# Patient Record
Sex: FEMALE | Race: WHITE | NOT HISPANIC OR LATINO | Employment: FULL TIME | URBAN - METROPOLITAN AREA
[De-identification: names, ages, dates, MRNs, and addresses within clinical notes are randomized per-mention and may not be internally consistent; named-entity substitution may affect disease eponyms.]

---

## 2017-02-10 ENCOUNTER — OFFICE VISIT (OUTPATIENT)
Dept: LAB | Facility: CLINIC | Age: 37
End: 2017-02-10
Payer: COMMERCIAL

## 2017-02-10 ENCOUNTER — TRANSCRIBE ORDERS (OUTPATIENT)
Dept: LAB | Facility: CLINIC | Age: 37
End: 2017-02-10

## 2017-02-10 DIAGNOSIS — Z79.899 ENCOUNTER FOR LONG-TERM (CURRENT) USE OF OTHER MEDICATIONS: ICD-10-CM

## 2017-02-10 DIAGNOSIS — Z79.899 ENCOUNTER FOR LONG-TERM (CURRENT) USE OF OTHER MEDICATIONS: Primary | ICD-10-CM

## 2017-02-10 LAB
ATRIAL RATE: 83 BPM
P AXIS: 46 DEGREES
PR INTERVAL: 152 MS
QRS AXIS: 16 DEGREES
QRSD INTERVAL: 74 MS
QT INTERVAL: 374 MS
QTC INTERVAL: 439 MS
T WAVE AXIS: 34 DEGREES
VENTRICULAR RATE: 83 BPM

## 2017-02-10 PROCEDURE — 93005 ELECTROCARDIOGRAM TRACING: CPT

## 2017-02-14 ENCOUNTER — ALLSCRIPTS OFFICE VISIT (OUTPATIENT)
Dept: OTHER | Facility: OTHER | Age: 37
End: 2017-02-14

## 2017-02-14 DIAGNOSIS — Z86.69 PERSONAL HISTORY OF OTHER DISEASES OF THE NERVOUS SYSTEM AND SENSE ORGANS: ICD-10-CM

## 2017-02-14 DIAGNOSIS — I10 ESSENTIAL (PRIMARY) HYPERTENSION: ICD-10-CM

## 2017-04-20 ENCOUNTER — ALLSCRIPTS OFFICE VISIT (OUTPATIENT)
Dept: OTHER | Facility: OTHER | Age: 37
End: 2017-04-20

## 2017-05-23 ENCOUNTER — HOSPITAL ENCOUNTER (EMERGENCY)
Facility: HOSPITAL | Age: 37
Discharge: HOME | End: 2017-05-23
Attending: EMERGENCY MEDICINE
Payer: COMMERCIAL

## 2017-05-23 ENCOUNTER — APPOINTMENT (EMERGENCY)
Dept: CT IMAGING | Facility: HOSPITAL | Age: 37
End: 2017-05-23
Payer: COMMERCIAL

## 2017-05-23 ENCOUNTER — ALLSCRIPTS OFFICE VISIT (OUTPATIENT)
Dept: OTHER | Facility: OTHER | Age: 37
End: 2017-05-23

## 2017-05-23 VITALS
HEART RATE: 76 BPM | SYSTOLIC BLOOD PRESSURE: 121 MMHG | OXYGEN SATURATION: 98 % | BODY MASS INDEX: 30.57 KG/M2 | WEIGHT: 238.1 LBS | RESPIRATION RATE: 18 BRPM | DIASTOLIC BLOOD PRESSURE: 79 MMHG | TEMPERATURE: 98.1 F

## 2017-05-23 DIAGNOSIS — R10.9 FLANK PAIN: Primary | ICD-10-CM

## 2017-05-23 DIAGNOSIS — N39.0 UTI (URINARY TRACT INFECTION): ICD-10-CM

## 2017-05-23 LAB
ALBUMIN SERPL BCP-MCNC: 3.7 G/DL (ref 3.5–5)
ALP SERPL-CCNC: 58 U/L (ref 46–116)
ALT SERPL W P-5'-P-CCNC: 51 U/L (ref 12–78)
ANION GAP SERPL CALCULATED.3IONS-SCNC: 6 MMOL/L (ref 4–13)
AST SERPL W P-5'-P-CCNC: 21 U/L (ref 5–45)
ATRIAL RATE: 70 BPM
BACTERIA UR QL AUTO: ABNORMAL /HPF
BASOPHILS # BLD AUTO: 0.02 THOUSANDS/ΜL (ref 0–0.1)
BASOPHILS NFR BLD AUTO: 0 % (ref 0–1)
BILIRUB SERPL-MCNC: 0.3 MG/DL (ref 0.2–1)
BILIRUB UR QL STRIP: NEGATIVE
BUN SERPL-MCNC: 7 MG/DL (ref 5–25)
CALCIUM SERPL-MCNC: 9.3 MG/DL (ref 8.3–10.1)
CHLORIDE SERPL-SCNC: 103 MMOL/L (ref 100–108)
CLARITY UR: CLEAR
CLARITY, POC: CLEAR
CO2 SERPL-SCNC: 30 MMOL/L (ref 21–32)
COLOR UR: YELLOW
COLOR, POC: YELLOW
CREAT SERPL-MCNC: 1.1 MG/DL (ref 0.6–1.3)
EOSINOPHIL # BLD AUTO: 0.34 THOUSAND/ΜL (ref 0–0.61)
EOSINOPHIL NFR BLD AUTO: 5 % (ref 0–6)
ERYTHROCYTE [DISTWIDTH] IN BLOOD BY AUTOMATED COUNT: 12.9 % (ref 11.6–15.1)
EXT BILIRUBIN, UA: NORMAL
EXT BLOOD URINE: NORMAL
EXT GLUCOSE, UA: NORMAL
EXT KETONES: NORMAL
EXT NITRITE, UA: NORMAL
EXT PH, UA: 5
EXT PROTEIN, UA: NORMAL
EXT SPECIFIC GRAVITY, UA: 1.01
EXT UROBILINOGEN: NORMAL
GFR SERPL CREATININE-BSD FRML MDRD: 56.2 ML/MIN/1.73SQ M
GLUCOSE SERPL-MCNC: 93 MG/DL
GLUCOSE SERPL-MCNC: 95 MG/DL (ref 65–140)
GLUCOSE UR STRIP-MCNC: NEGATIVE MG/DL
HCG UR QL: NEGATIVE
HCT VFR BLD AUTO: 42.2 % (ref 34.8–46.1)
HGB BLD-MCNC: 14.2 G/DL (ref 11.5–15.4)
HGB UR QL STRIP.AUTO: ABNORMAL
KETONES UR STRIP-MCNC: NEGATIVE MG/DL
LEUKOCYTE ESTERASE UR QL STRIP: ABNORMAL
LYMPHOCYTES # BLD AUTO: 2.16 THOUSANDS/ΜL (ref 0.6–4.47)
LYMPHOCYTES NFR BLD AUTO: 31 % (ref 14–44)
MCH RBC QN AUTO: 30 PG (ref 26.8–34.3)
MCHC RBC AUTO-ENTMCNC: 33.6 G/DL (ref 31.4–37.4)
MCV RBC AUTO: 89 FL (ref 82–98)
MONOCYTES # BLD AUTO: 0.46 THOUSAND/ΜL (ref 0.17–1.22)
MONOCYTES NFR BLD AUTO: 7 % (ref 4–12)
NEUTROPHILS # BLD AUTO: 4.01 THOUSANDS/ΜL (ref 1.85–7.62)
NEUTS SEG NFR BLD AUTO: 57 % (ref 43–75)
NITRITE UR QL STRIP: NEGATIVE
NON-SQ EPI CELLS URNS QL MICRO: ABNORMAL /HPF
P AXIS: 43 DEGREES
PH UR STRIP.AUTO: 5.5 [PH] (ref 4.5–8)
PLATELET # BLD AUTO: 278 THOUSANDS/UL (ref 149–390)
PMV BLD AUTO: 10.3 FL (ref 8.9–12.7)
POTASSIUM SERPL-SCNC: 3.9 MMOL/L (ref 3.5–5.3)
PR INTERVAL: 160 MS
PROT SERPL-MCNC: 7.5 G/DL (ref 6.4–8.2)
PROT UR STRIP-MCNC: NEGATIVE MG/DL
QRS AXIS: 8 DEGREES
QRSD INTERVAL: 78 MS
QT INTERVAL: 382 MS
QTC INTERVAL: 412 MS
RBC # BLD AUTO: 4.73 MILLION/UL (ref 3.81–5.12)
RBC #/AREA URNS AUTO: ABNORMAL /HPF
SODIUM SERPL-SCNC: 139 MMOL/L (ref 136–145)
SP GR UR STRIP.AUTO: 1.01 (ref 1–1.03)
T WAVE AXIS: 31 DEGREES
TROPONIN I SERPL-MCNC: <0.02 NG/ML
UROBILINOGEN UR QL STRIP.AUTO: 0.2 E.U./DL
VENTRICULAR RATE: 70 BPM
WBC # BLD AUTO: 6.99 THOUSAND/UL (ref 4.31–10.16)
WBC # BLD EST: NORMAL 10*3/UL
WBC #/AREA URNS AUTO: ABNORMAL /HPF

## 2017-05-23 PROCEDURE — 81001 URINALYSIS AUTO W/SCOPE: CPT | Performed by: EMERGENCY MEDICINE

## 2017-05-23 PROCEDURE — 84484 ASSAY OF TROPONIN QUANT: CPT | Performed by: EMERGENCY MEDICINE

## 2017-05-23 PROCEDURE — 96374 THER/PROPH/DIAG INJ IV PUSH: CPT

## 2017-05-23 PROCEDURE — 93005 ELECTROCARDIOGRAM TRACING: CPT | Performed by: EMERGENCY MEDICINE

## 2017-05-23 PROCEDURE — 96375 TX/PRO/DX INJ NEW DRUG ADDON: CPT

## 2017-05-23 PROCEDURE — 99284 EMERGENCY DEPT VISIT MOD MDM: CPT

## 2017-05-23 PROCEDURE — 96361 HYDRATE IV INFUSION ADD-ON: CPT

## 2017-05-23 PROCEDURE — 81002 URINALYSIS NONAUTO W/O SCOPE: CPT | Performed by: EMERGENCY MEDICINE

## 2017-05-23 PROCEDURE — 81025 URINE PREGNANCY TEST: CPT | Performed by: EMERGENCY MEDICINE

## 2017-05-23 PROCEDURE — 85025 COMPLETE CBC W/AUTO DIFF WBC: CPT | Performed by: EMERGENCY MEDICINE

## 2017-05-23 PROCEDURE — 74176 CT ABD & PELVIS W/O CONTRAST: CPT

## 2017-05-23 PROCEDURE — 80053 COMPREHEN METABOLIC PANEL: CPT | Performed by: EMERGENCY MEDICINE

## 2017-05-23 PROCEDURE — 36415 COLL VENOUS BLD VENIPUNCTURE: CPT | Performed by: EMERGENCY MEDICINE

## 2017-05-23 RX ORDER — LITHIUM CARBONATE 300 MG
900 TABLET ORAL
COMMUNITY
End: 2018-02-08 | Stop reason: SDUPTHER

## 2017-05-23 RX ORDER — CIPROFLOXACIN 500 MG/1
500 TABLET, FILM COATED ORAL 2 TIMES DAILY
Qty: 13 TABLET | Refills: 0 | Status: SHIPPED | OUTPATIENT
Start: 2017-05-23 | End: 2018-02-08 | Stop reason: SDUPTHER

## 2017-05-23 RX ORDER — HYDROCODONE BITARTRATE AND ACETAMINOPHEN 5; 325 MG/1; MG/1
1 TABLET ORAL EVERY 6 HOURS PRN
Qty: 15 TABLET | Refills: 0 | Status: SHIPPED | OUTPATIENT
Start: 2017-05-23 | End: 2018-02-08 | Stop reason: SDUPTHER

## 2017-05-23 RX ORDER — ONDANSETRON 2 MG/ML
4 INJECTION INTRAMUSCULAR; INTRAVENOUS ONCE
Status: COMPLETED | OUTPATIENT
Start: 2017-05-23 | End: 2017-05-23

## 2017-05-23 RX ORDER — DEXTROAMPHETAMINE SACCHARATE, AMPHETAMINE ASPARTATE, DEXTROAMPHETAMINE SULFATE AND AMPHETAMINE SULFATE 5; 5; 5; 5 MG/1; MG/1; MG/1; MG/1
20 TABLET ORAL 2 TIMES DAILY
COMMUNITY
End: 2018-02-08 | Stop reason: SDUPTHER

## 2017-05-23 RX ORDER — FLUTICASONE PROPIONATE 110 UG/1
AEROSOL, METERED RESPIRATORY (INHALATION)
COMMUNITY
Start: 2017-04-20 | End: 2018-06-20

## 2017-05-23 RX ORDER — METHOCARBAMOL 500 MG/1
500 TABLET, FILM COATED ORAL DAILY
COMMUNITY
Start: 2017-02-14 | End: 2018-03-05 | Stop reason: SDUPTHER

## 2017-05-23 RX ORDER — CIPROFLOXACIN 500 MG/1
500 TABLET, FILM COATED ORAL ONCE
Status: COMPLETED | OUTPATIENT
Start: 2017-05-23 | End: 2017-05-23

## 2017-05-23 RX ADMIN — HYDROMORPHONE HYDROCHLORIDE 1 MG: 1 INJECTION, SOLUTION INTRAMUSCULAR; INTRAVENOUS; SUBCUTANEOUS at 15:08

## 2017-05-23 RX ADMIN — ONDANSETRON 4 MG: 2 INJECTION INTRAMUSCULAR; INTRAVENOUS at 15:07

## 2017-05-23 RX ADMIN — SODIUM CHLORIDE 1000 ML: 0.9 INJECTION, SOLUTION INTRAVENOUS at 15:04

## 2017-05-23 RX ADMIN — CIPROFLOXACIN 500 MG: 500 TABLET, FILM COATED ORAL at 18:06

## 2017-07-24 ENCOUNTER — ALLSCRIPTS OFFICE VISIT (OUTPATIENT)
Dept: OTHER | Facility: OTHER | Age: 37
End: 2017-07-24

## 2017-07-25 ENCOUNTER — GENERIC CONVERSION - ENCOUNTER (OUTPATIENT)
Dept: OTHER | Facility: OTHER | Age: 37
End: 2017-07-25

## 2017-09-06 ENCOUNTER — ALLSCRIPTS OFFICE VISIT (OUTPATIENT)
Dept: OTHER | Facility: OTHER | Age: 37
End: 2017-09-06

## 2017-09-06 ENCOUNTER — LAB REQUISITION (OUTPATIENT)
Dept: LAB | Facility: HOSPITAL | Age: 37
End: 2017-09-06
Payer: COMMERCIAL

## 2017-09-06 DIAGNOSIS — R35.0 FREQUENCY OF MICTURITION: ICD-10-CM

## 2017-09-06 LAB
BACTERIA UR QL AUTO: NORMAL /HPF
BILIRUB UR QL STRIP: NEGATIVE
BILIRUB UR QL STRIP: NORMAL
CLARITY UR: CLEAR
CLARITY UR: NORMAL
COLOR UR: CLEAR
COLOR UR: YELLOW
GLUCOSE (HISTORICAL): NORMAL
GLUCOSE UR STRIP-MCNC: NEGATIVE MG/DL
HGB UR QL STRIP.AUTO: ABNORMAL
HGB UR QL STRIP.AUTO: NORMAL
KETONES UR STRIP-MCNC: NEGATIVE MG/DL
KETONES UR STRIP-MCNC: NORMAL MG/DL
LEUKOCYTE ESTERASE UR QL STRIP: NEGATIVE
LEUKOCYTE ESTERASE UR QL STRIP: NORMAL
NITRITE UR QL STRIP: NEGATIVE
NITRITE UR QL STRIP: NORMAL
NON-SQ EPI CELLS URNS QL MICRO: NORMAL /HPF
PH UR STRIP.AUTO: 6 [PH]
PH UR STRIP.AUTO: 6 [PH] (ref 4.5–8)
PROT UR STRIP-MCNC: NEGATIVE MG/DL
PROT UR STRIP-MCNC: NORMAL MG/DL
RBC #/AREA URNS AUTO: NORMAL /HPF
SP GR UR STRIP.AUTO: 1 (ref 1–1.03)
SP GR UR STRIP.AUTO: 1.01
UROBILINOGEN UR QL STRIP.AUTO: 0.2
UROBILINOGEN UR QL STRIP.AUTO: 0.2 E.U./DL
WBC #/AREA URNS AUTO: NORMAL /HPF

## 2017-09-06 PROCEDURE — 81001 URINALYSIS AUTO W/SCOPE: CPT | Performed by: INTERNAL MEDICINE

## 2017-09-07 ENCOUNTER — GENERIC CONVERSION - ENCOUNTER (OUTPATIENT)
Dept: OTHER | Facility: OTHER | Age: 37
End: 2017-09-07

## 2017-09-14 ENCOUNTER — ALLSCRIPTS OFFICE VISIT (OUTPATIENT)
Dept: OTHER | Facility: OTHER | Age: 37
End: 2017-09-14

## 2017-09-26 ENCOUNTER — APPOINTMENT (OUTPATIENT)
Dept: AUDIOLOGY | Age: 37
End: 2017-09-26
Payer: COMMERCIAL

## 2017-09-26 PROCEDURE — 92567 TYMPANOMETRY: CPT | Performed by: AUDIOLOGIST

## 2017-09-26 PROCEDURE — 92557 COMPREHENSIVE HEARING TEST: CPT | Performed by: AUDIOLOGIST

## 2017-09-27 ENCOUNTER — GENERIC CONVERSION - ENCOUNTER (OUTPATIENT)
Dept: OTHER | Facility: OTHER | Age: 37
End: 2017-09-27

## 2017-10-09 ENCOUNTER — ALLSCRIPTS OFFICE VISIT (OUTPATIENT)
Dept: OTHER | Facility: OTHER | Age: 37
End: 2017-10-09

## 2017-10-10 NOTE — PROGRESS NOTES
Assessment  1  Allergic drug rash (693 0) (L27 0)   2  Ingrown toenail (703 0) (L60 0)    Plan  Ingrown toenail    · 2 - Sunshine MARTIN, Brandon  (Podiatry) Co-Management  *  Status: Hold For - Scheduling   Requested for: 99WMB3114  Care Summary provided  : Yes    Discussion/Summary    Generalized rash  Suspect drug rash, stop carbamazepine  Instructed to take antihistamine in AM, ranitidine bid and Benadryl qHS or up to tid for symptoms  GI symptoms  Stop carbamazepine, f/u with psych  Ingrown toenail  Refer to podiatry  The patient was counseled regarding instructions for management  Chief Complaint  pt complains of vomiting/diarrhea x 1 weeks and generalized rash x 3 days      History of Present Illness  HPI: Ba Cordon complains of a rash the past 2 days  started in her arms, spreading to her back, trunk, hands and feet  It is described as red little dots that are itchy  She took Benadryl to help with the itching  She started Tegretol about a week ago, started having nausea, vomiting and diarrhea since starting it  She has not used any new skin products or laundry detergent  No fever or chills  is also concerned about an ingrown toe, area hurts  No redness or infection  Review of Systems    Constitutional: no fever,-not feeling poorly-and-not feeling tired  Respiratory: no cough  Gastrointestinal: no nausea-and-no vomiting  Integumentary: rash-and-itching, but-as noted in HPI  Neurological: no numbness  Active Problems  1  Acute upper respiratory infection (465 9) (J06 9)   2  ADHD (attention deficit hyperactivity disorder) (314 01) (F90 9)   3  Allergic rhinitis (477 9) (J30 9)   4  Bipolar disorder (296 80) (F31 9)   5  Cervical muscle pain (723 1) (M54 2)   6  Dizziness (780 4) (R42)   7  Exposure to mold (V87 31) (Z77 120)   8  History of migraine (V12 49) (Z86 69)   9  Localized rash (782 1) (R21)   10  Mild persistent asthma (493 90) (J45 30)   11  Nausea (787 02) (R11 0)   12   Near syncope (780  2) (R55)   13  Need for Tdap vaccination (V06 1) (Z23)   14  Observation for suspected condition (V71 9) (Z04 9)   15  Polypharmacy (V58 69) (Z79 899)   16  Sweating   17  Trouble in sleeping (780 50) (G47 9)   18  Urinary frequency (788 41) (R35 0)   19  Urinary leakage (788 30) (R32)    Past Medical History  Active Problems And Past Medical History Reviewed: The active problems and past medical history were reviewed and updated today  Social History   · Drinks coffee   · Exposure to mold (V87 31) (Z77 120)   · Former consumption of alcohol (V11 3) (Z87 898)   · Former smoker (V15 82) (Z87 891)   · QUIT 2014, SMOKED 10 YEARS LESS THAN 1PPD    Current Meds   1  Amphetamine-Dextroamphetamine 20 MG Oral Tablet; Therapy: 07Fnn5788 to (Evaluate:22Jun2017) Recorded   2  ARIPiprazole 10 MG Oral Tablet; TAKE 1 TABLET DAILY; Therapy: 41STT1531 to (Evaluate:08Owy8661); Last Rx:12Lgm5516 Ordered   3  Benzonatate 100 MG Oral Capsule; TAKE 1 CAPSULE EVERY 8 HOURS AS NEEDED; Therapy: 32RVN7289 to (Evaluate:32Kln4948)  Requested for: 25ZGD9869; Last   Rx:45Obh7900 Ordered   4  Flovent  MCG/ACT Inhalation Aerosol; INHALE 1 PUFF BY INHALATION ROUTE   TWICE A DAY; Therapy: 35Uht1824 to (Evaluate:07Oct2017)  Requested for: 26XMX9042; Last   Rx:48Ziq8000 Ordered   5  KlonoPIN 1 MG Oral Tablet; TAKE 2 TABLETS AT BEDTIME; Therapy: (Bang Santiago) to Recorded   6  LamoTRIgine 25 MG Oral Tablet; Therapy: 06Zac8759 to Recorded   7  Methocarbamol 500 MG Oral Tablet; TAKE 1 TABLET BY MOUTH EVERY DAY AS   NEEDED MAY CAUSE DROWSINESS, *DUE FOR APPT*;   Therapy: 87UGT6177 to (Evaluate:22Oct2017)  Requested for: 72Wdh7786; Last   Rx:22Jeg1619 Ordered   8  Montelukast Sodium 10 MG Oral Tablet; take 1 tablet by mouth every day; Therapy: 23NSU6276 to (Janet Grandchild)  Requested for: 16Vfa7549; Last   Rx:97Xga2476 Ordered   9  Ondansetron HCl - 4 MG Oral Tablet;    Therapy: 50PYQ2416 to (Evaluate:12Jun2017) Recorded   10  Propranolol HCl  MG Oral Capsule Extended Release 24 Hour; take 1 capsule    daily; Therapy: 36Vir0246 to (Evaluate:79Pmf7190)  Requested for: 46FRJ6516; Last    Rx:02Oct2017 Ordered   11  Sertraline HCl - 50 MG Oral Tablet; Therapy: 42THJ3393 to Recorded   12  SUMAtriptan Succinate 100 MG Oral Tablet; take 1 tablet by mouth AT ONSET OF    MIGRAINE; Therapy: 52ASH5767 to (Evaluate:98Ezh1183)  Requested for: 97QNR4992; Last    Rx:07Amy7526 Ordered   13  Triamcinolone Acetonide 55 MCG/ACT Nasal Aerosol; administer 2 sprays in each nostril    once a day; Therapy: 86MWL8104 to (Last Rx:49Jvu8952)  Requested for: 27Pnn6478 Ordered   14  Zolpidem Tartrate 5 MG Oral Tablet; Therapy: 33Jli1096 to Recorded    The medication list was reviewed and updated today  Allergies  1  Amoxicillin TABS   2  Percocet TABS   3  predniSONE    Vitals   Recorded: 80EUI5719 11:23AM   Temperature 97 2 F   Heart Rate 80   Respiration 18   Systolic 595   Diastolic 82   Height 6 ft 2 in   Weight 231 lb    BMI Calculated 29 66   BSA Calculated 2 31   O2 Saturation 98     Physical Exam    Constitutional   General appearance: No acute distress, well appearing and well nourished  uncomfortable-and-clothing appropriate  Head and Face   Head and face: Normal     Eyes   Pupils and irises: Equal, round, reactive to light  Ears, Nose, Mouth, and Throat   External inspection of ears and nose: Normal     Pulmonary   Respiratory effort: No increased work of breathing or signs of respiratory distress  Musculoskeletal   Gait and station: Normal     Joints, bones, and muscles: Abnormal  -pain right big toe, no discharge or signs of inflammation  Skin   Skin and subcutaneous tissue: Abnormal  -erythematous papular rash on arms, hands, feet, back        Future Appointments    Date/Time Provider Specialty Site   12/08/2017 11:00 AM Lexie Moore DO Internal Medicine [facility] INTERNAL MED     Signatures Electronically signed by : Slaed Smith MD; Oct  9 2017 11:46AM EST                       (Author)

## 2017-12-08 ENCOUNTER — ALLSCRIPTS OFFICE VISIT (OUTPATIENT)
Dept: OTHER | Facility: OTHER | Age: 37
End: 2017-12-08

## 2017-12-09 NOTE — PROGRESS NOTES
Assessment    1  Acute sinus infection (461 9) (J01 90)   2  Cervical muscle pain (723 1) (M54 2)   3  Mild persistent asthma (493 90) (J45 30)   4  Bipolar disorder (296 80) (F31 9)   5  Persistent cough (786 2) (R05)   6  Chronic migraine (346 70) (G43 709)    Plan  Acute sinus infection    · Azithromycin 500 MG Oral Tablet; TAKE 1 TABLET DAILY   · Follow Up if Not Better Evaluation and Treatment  Follow-up  Status: Complete  Done: 95AYL6683   · Drink at least 6 glasses of water or juice a day ; Status:Complete;   Done: 38XNP1196   · How to use a nasal spray ; Status:Complete;   Done: 20BGD3117   · Irrigate your nose twice a day ; Status:Complete;   Done: 39OMK2249   · Taking a hot steamy shower may help your condition ; Status:Complete;   Done: 16LLQ4330  Acute sinus infection, Mild persistent asthma    · Ventolin  (90 Base) MCG/ACT Inhalation Aerosol Solution; INHALE 1 PUFFS Every 6hours PRN SOB  Bipolar disorder    · From  KlonoPIN 1 MG Oral Tablet TAKE 2 TABLETS AT BEDTIME To ClonazePAM 1 MG OralTablet (KlonoPIN) TAKE 1 TABLET EVERY 8 HOURS AS NEEDED  Chronic migraine    · *1 - [facility] Co-Management  39 y/o F with chronic migraines, persistent despitepreventative med/imitrexevaluate and treat  Status: Active  Requested for: 09DCV3197  Care Summary provided  : Yes  History of migraine    · SUMAtriptan Succinate 50 MG Oral Tablet (Imitrex)  Mild persistent asthma, Persistent cough    · 1 - Snow Ivey DO  (Pulmonary) Co-Management  39 y/o with asthma and persistent coughevaluate and treat  Status: Active  Requested for: 70OXE3167  Care Summary provided  : Yes   · Complete PFT; Status:Active; Requested for:16Upl6261;     Discussion/Summary  Discussion Summary:   1  take antibiotic as prescribedrest, hydrationsee handoutlung doctor appointment and breathing testsneurology appointmentreturn in May 2018 or sooner if questions     Counseling Documentation With Imm: The patient was counseled regarding instructions for management,-- patient and family education,-- impressions,-- risks and benefits of treatment options  Medication SE Review and Pt Understands Tx: Possible side effects of new medications were reviewed with the patient/guardian today  The treatment plan was reviewed with the patient/guardian  The patient/guardian understands and agrees with the treatment plan      Chief Complaint  Chief Complaint Chronic Condition [facility]: Patient is here today for follow up of chronic conditions described in HPI  History of Present Illness  HPI: 41 y/o F here for follow up & sinus infection  above - reviewed meds with patientbeen having runny nose, productive cough, sinus congestion/pressure for >1 weekfever but having fatigue, chills and body achesSOB but hx of asthma without exacerbation at this time but sinus sx are worsening & despite OTC remedies  of migraines - takes BB and imitrex 50-100mg dose with relief  takes imitrex up to 3x per weekof bipolar d/o managed by psychiatryrobaxin for occ muscle spasms/muscle pain in neck with relief, but not every day  persistent dry cough & requests pulm jeal for thisother complaints      Review of Systems  Complete-Female:  Constitutional: feeling poorly,-- chills-- and-- feeling tired, but-- no fever  Eyes: no eyesight problems  ENT: sore throat-- and-- nasal discharge  Cardiovascular: no chest pain  Respiratory: no shortness of breath--   The patient presents with complaints of cough, described as productive  Genitourinary: no dysuria  Musculoskeletal: arthralgias-- and-- myalgias  Integumentary: no rashes  Neurological: no confusion  no feelings of weakness   ROS Reviewed:   ROS reviewed  Active Problems  1  Acute upper respiratory infection (465 9) (J06 9)   2  ADHD (attention deficit hyperactivity disorder) (314 01) (F90 9)   3  Allergic drug rash (693 0) (L27 0)   4  Allergic rhinitis (477 9) (J30 9)   5  Bipolar disorder (296 80) (F31 9)   6  Cervical muscle pain (723 1) (M54 2)   7  Dizziness (780 4) (R42)   8  Exposure to mold (V87 31) (Z77 120)   9  History of migraine (V12 49) (Z86 69)   10  Ingrown toenail (703 0) (L60 0)   11  Localized rash (782 1) (R21)   12  Mild persistent asthma (493 90) (J45 30)   13  Nausea (787 02) (R11 0)   14  Near syncope (780 2) (R55)   15  Need for Tdap vaccination (V06 1) (Z23)   16  Observation for suspected condition (V71 9) (Z04 9)   17  Polypharmacy (V58 69) (Z79 899)   18  Sweating   19  Trouble in sleeping (780 50) (G47 9)   20  Urinary frequency (788 41) (R35 0)   21  Urinary leakage (788 30) (R32)    Past Medical History  1  History of vertigo (V12 49) (I39 170)  Active Problems And Past Medical History Reviewed: The active problems and past medical history were reviewed and updated today  Surgical History  1  History of Hip Replacement   2  History of Spinal Diskectomy Cervical    Family History  Mother    1  Family history of hypertension (V17 49) (Z82 49)  Father    2  Family history of hypertension (V17 49) (Z82 49)  Grandmother    3  History of breast cancer  Family History    4  Denied: Family history of heart disease    Social History     · Drinks coffee   · Exposure to mold (V87 31) (Z77 120)   · Former consumption of alcohol (V11 3) (I78 813)   · Former smoker (B28 12) (J42 224)  Social History Reviewed: The social history was reviewed and updated today  Current Meds   1  Amphetamine-Dextroamphetamine 20 MG Oral Tablet; Therapy: 86Dwu6498 to (Evaluate:83Ibv9509) Recorded   2  ARIPiprazole 15 MG Oral Tablet; Therapy: 66BQH7703 to Recorded   3  Benzonatate 100 MG Oral Capsule; TAKE 1 CAPSULE EVERY 8 HOURS AS NEEDED; Therapy: 66KKJ5994 to (Evaluate:27Jdh0658)  Requested for: 67GKS7333; Last Rx:70Bni6230 Ordered   4  Flovent  MCG/ACT Inhalation Aerosol; INHALE 1 PUFF BY INHALATION ROUTE TWICE A DAY;  Therapy: 21Cbc4311 to (96 860741)  Requested for: 36VHQ0380; Last Rx:65Zyd0252 Ordered   5  KlonoPIN 1 MG Oral Tablet; TAKE 2 TABLETS AT BEDTIME; Therapy: (Sim Patches) to Recorded   6  LamoTRIgine 100 MG Oral Tablet; Therapy: 92DMC7626 to Recorded   7  Methocarbamol 500 MG Oral Tablet; TAKE 1 TABLET BY MOUTH EVERY DAY AS NEEDED MAY CAUSE DROWSINESS, *DUE FOR APPT*; Therapy: 56CZN4675 to (Evaluate:22Oct2017)  Requested for: 55Rgs7060; Last Rx:86Uqf0708 Ordered   8  Montelukast Sodium 10 MG Oral Tablet; take 1 tablet by mouth every day; Therapy: 55XDO5819 to (67 488 45 07)  Requested for: 87Oic9262; Last Rx:08Dpy7148 Ordered   9  Ondansetron HCl - 4 MG Oral Tablet; Therapy: 97GGV3109 to (Evaluate:12Jun2017) Recorded   10  Propranolol HCl  MG Oral Capsule Extended Release 24 Hour; take 1 capsule daily; Therapy: 49Fqz2636 to (Evaluate:68Qgn7560)  Requested for: 53HSE6455; Last Rx:10Opj1964  Ordered   11  Sertraline HCl - 50 MG Oral Tablet; Therapy: 47SDQ5154 to Recorded   12  SUMAtriptan Succinate 100 MG Oral Tablet; take 1 tablet by mouth AT ONSET OF MIGRAINE; Therapy: 17OXY0067 to (Evaluate:65Aam4045)  Requested for: 73CPA4148; Last Rx:22Tml9529  Ordered   13  SUMAtriptan Succinate 50 MG Oral Tablet; TAKE 1 TABLET BY MOUTH FOR MIGRAINE AS DIRECTED  MAY REPEAT IN 2 HOURSIF NEEDED; Therapy: 36EZD5996 to (Evaluate:82Ljo3373)  Requested for: 24LAE9517; Last Rx:01Zcc7983  Ordered   14  Zolpidem Tartrate 5 MG Oral Tablet; Therapy: 47Szo3793 to Recorded  Medication List Reviewed: The medication list was reviewed and updated today  Allergies  1  Amoxicillin TABS   2  Percocet TABS    Vitals  Vital Signs    Recorded: 23LWH3913 11:00AM   Temperature 98 7 F   Heart Rate 56   Respiration 18   Systolic 458   Diastolic 76   Height 6 ft 2 in   Weight 234 lb 8 oz   BMI Calculated 30 11   BSA Calculated 2 33   O2 Saturation 98       Physical Exam   Constitutional  General appearance: No acute distress, well appearing and well nourished  overweight    Head and Face  Palpation of the face and sinuses: Abnormal   Examination of the Sinuses: right maxillary tenderness,-- left maxillary tenderness,-- right frontal tenderness-- and-- left frontal tenderness  Eyes  Pupils and irises: Equal, round, reactive to light  Ears, Nose, Mouth, and Throat  Otoscopic examination: Tympanic membranes translucent with normal light reflex  Canals patent without erythema  Nasal mucosa, septum, and turbinates: Abnormal   The bilateral nasal mucosa was boggy  Oropharynx: Normal with no erythema, edema, exudate or lesions  Pulmonary  Respiratory effort: No increased work of breathing or signs of respiratory distress  Auscultation of lungs: Clear to auscultation  Cardiovascular  Auscultation of heart: Normal rate and rhythm, normal S1 and S2, no murmurs  Examination of extremities for edema and/or varicosities: Normal    Lymphatic  Palpation of lymph nodes in neck: No lymphadenopathy     Psychiatric  Judgment and insight: Normal    Mood and affect: Normal        Signatures   Electronically signed by : Alex Kapadia DO; Dec  8 2017  4:23PM EST                       (Author)

## 2017-12-21 ENCOUNTER — GENERIC CONVERSION - ENCOUNTER (OUTPATIENT)
Dept: OTHER | Facility: OTHER | Age: 37
End: 2017-12-21

## 2017-12-26 DIAGNOSIS — J45.30 MILD PERSISTENT ASTHMA, UNCOMPLICATED: ICD-10-CM

## 2017-12-26 DIAGNOSIS — J30.9 ALLERGIC RHINITIS: ICD-10-CM

## 2017-12-26 DIAGNOSIS — R05.9 COUGH: ICD-10-CM

## 2018-01-11 NOTE — MISCELLANEOUS
Message   Recorded as Task   Date: 07/25/2017 03:36 PM, Created By: Luke Maria   Task Name: Follow Up   Assigned To: Jcarlos Pagan   Regarding Patient: Karyle Jewel, Status:  In Progress   Comment:    Jcarlos Pagan - 25 Jul 2017 3:36 PM     TASK CREATED  flunisolide nasal spray was denied by insurance    insurance recommends taking nasacort nasal spray    if Zigmund Curet is okay with this, let me know and I will change rx at pharmacy    thanks   Alison Nieves - 25 Jul 2017 5:25 PM     TASK IN PROGRESS   Alison Nieves - 25 Jul 2017 5:27 PM     TASK REPLIED TO: Previously Assigned To [facility] [place],Team  pt notified and would like Nasacort ordered to [facility] on file        Plan  Allergic rhinitis    · Flunisolide 25 MCG/ACT (0 025%) Nasal Solution   · Triamcinolone Acetonide 55 MCG/ACT Nasal Aerosol; administer 2 sprays in each  nostril once a day    Signatures   Electronically signed by Josse Bell DO; Jul 25 2017  5:43PM EST                       (Author)

## 2018-01-12 VITALS
TEMPERATURE: 97.9 F | BODY MASS INDEX: 31.73 KG/M2 | HEIGHT: 72 IN | SYSTOLIC BLOOD PRESSURE: 118 MMHG | WEIGHT: 234.25 LBS | DIASTOLIC BLOOD PRESSURE: 72 MMHG | OXYGEN SATURATION: 97 % | HEART RATE: 74 BPM | RESPIRATION RATE: 18 BRPM

## 2018-01-12 VITALS
TEMPERATURE: 98.2 F | DIASTOLIC BLOOD PRESSURE: 78 MMHG | OXYGEN SATURATION: 98 % | WEIGHT: 225.25 LBS | BODY MASS INDEX: 30.51 KG/M2 | HEART RATE: 84 BPM | HEIGHT: 72 IN | RESPIRATION RATE: 18 BRPM | SYSTOLIC BLOOD PRESSURE: 110 MMHG

## 2018-01-12 VITALS
BODY MASS INDEX: 30.66 KG/M2 | TEMPERATURE: 97 F | WEIGHT: 226.38 LBS | SYSTOLIC BLOOD PRESSURE: 124 MMHG | RESPIRATION RATE: 18 BRPM | OXYGEN SATURATION: 98 % | DIASTOLIC BLOOD PRESSURE: 78 MMHG | HEIGHT: 72 IN | HEART RATE: 90 BPM

## 2018-01-12 NOTE — MISCELLANEOUS
Rigoberto Hooker    I am sending this letter to let you know, I received a copy of your hearing test results and your results look fine      Kind Regards    Mejia Lou [facility] Internal Medicine  [phone]      Electronically signed by:Jcarlos Escalera DO  Sep 27 2017  5:52PM EST Author

## 2018-01-13 VITALS
WEIGHT: 231 LBS | OXYGEN SATURATION: 98 % | SYSTOLIC BLOOD PRESSURE: 118 MMHG | HEART RATE: 80 BPM | HEIGHT: 72 IN | DIASTOLIC BLOOD PRESSURE: 82 MMHG | TEMPERATURE: 97.2 F | BODY MASS INDEX: 31.29 KG/M2 | RESPIRATION RATE: 18 BRPM

## 2018-01-13 VITALS
SYSTOLIC BLOOD PRESSURE: 128 MMHG | OXYGEN SATURATION: 98 % | HEART RATE: 90 BPM | TEMPERATURE: 98 F | HEIGHT: 72 IN | WEIGHT: 243.25 LBS | BODY MASS INDEX: 32.95 KG/M2 | DIASTOLIC BLOOD PRESSURE: 82 MMHG | RESPIRATION RATE: 16 BRPM

## 2018-01-14 VITALS
WEIGHT: 236.25 LBS | TEMPERATURE: 98.2 F | OXYGEN SATURATION: 98 % | DIASTOLIC BLOOD PRESSURE: 80 MMHG | SYSTOLIC BLOOD PRESSURE: 114 MMHG | RESPIRATION RATE: 18 BRPM | BODY MASS INDEX: 32 KG/M2 | HEIGHT: 72 IN | HEART RATE: 88 BPM

## 2018-01-16 NOTE — RESULT NOTES
Verified Results  (1) URINALYSIS w URINE C/S REFLEX (will reflex a microscopy if leukocytes, occult blood, or nitrites are not within normal limits) 70XIY9917 05:54PM Jcarlos Pagan     Test Name Result Flag Reference   COLOR Yellow     CLARITY Clear     PH UA 6 0  4 5-8 0   LEUKOCYTE ESTERASE UA Negative  Negative   NITRITE UA Negative  Negative   PROTEIN UA Negative mg/dl  Negative   GLUCOSE UA Negative mg/dl  Negative   KETONES UA Negative mg/dl  Negative   UROBILINOGEN UA 0 2 E U /dl  0 2, 1 0 E U /dl   BILIRUBIN UA Negative  Negative   BLOOD UA Trace A Negative   SPECIFIC GRAVITY UA 1 002 L 1 003-1 030     (1) URINALYSIS w URINE C/S REFLEX (will reflex a microscopy if leukocytes, occult blood, or nitrites are not within normal limits) 86QPU1704 05:54PM Lexie Has     Test Name Result Flag Reference   BACTERIA None Seen /hpf  None Seen, Occasional   EPITHELIAL CELLS None Seen /hpf  None Seen, Occasional   RBC UA None Seen /hpf  None Seen   WBC UA None Seen /hpf  None Seen

## 2018-01-18 ENCOUNTER — GENERIC CONVERSION - ENCOUNTER (OUTPATIENT)
Dept: OTHER | Facility: OTHER | Age: 38
End: 2018-01-18

## 2018-01-23 VITALS
OXYGEN SATURATION: 98 % | RESPIRATION RATE: 18 BRPM | HEIGHT: 72 IN | DIASTOLIC BLOOD PRESSURE: 76 MMHG | TEMPERATURE: 98.7 F | WEIGHT: 234.5 LBS | HEART RATE: 56 BPM | SYSTOLIC BLOOD PRESSURE: 120 MMHG | BODY MASS INDEX: 31.76 KG/M2

## 2018-01-23 NOTE — MISCELLANEOUS
Message   Recorded as Task   Date: 12/26/2017 02:24 PM, Created By: Demarcus Horta   Task Name: Follow Up   Assigned To: Jcarlos Pagan   Regarding Patient: Олег Klein, Status: Active   Comment:    Mily Staples - 26 Dec 2017 2:24 PM     TASK CREATED  Caller: Self; General Medical Question; [phone] (Home); [phone] (Work)  PT NEEDS AN ORDER FOR A CHEST XRAY  SHE IS SEEING A PULMONOLOGIST ON JAN 18TH AND THEY REQUIRE SHE HAS ONE ONE WEEK PRIOR TO HER NEW PT APPT WITH THEM  ALSO, PT NEEDS PAPERWORK FOR HER TO TAKE THE BUS AT A LOWER PRICE DUE TO HER VERTIGO FILLED OUT  SHE WAS JUST IN A FEW WEEKS AGO  CAN SHE JUST DROP THE FORM OFF OR WOULD SHE NEED TO COME BACK IN THE OFFICE? PLEASE CALL PT TO ADVISE [phone]  Plan  Allergic rhinitis, Mild persistent asthma, Persistent cough    · * XR CHEST PA & LATERAL; Status:Active;  Requested for:95Nds1644;     Signatures   Electronically signed by : Tad Sethi DO; Dec 26 2017  3:22PM EST                       (Author)

## 2018-01-24 NOTE — MISCELLANEOUS
Provider Comments  Provider Comments:   PATIENT WAS A NO SHOW FOR APPOINTMENT WITH DR JUNITO MCCARTY      Signatures   Electronically signed by : Christiano Ying DO; Jan 23 2018 10:32AM EST                       (Author)

## 2018-02-01 DIAGNOSIS — J45.30 MILD PERSISTENT ASTHMA WITHOUT COMPLICATION: Primary | ICD-10-CM

## 2018-02-01 RX ORDER — MONTELUKAST SODIUM 10 MG/1
10 TABLET ORAL
Qty: 90 TABLET | Refills: 1 | Status: SHIPPED | OUTPATIENT
Start: 2018-02-01 | End: 2018-02-08 | Stop reason: SDUPTHER

## 2018-02-05 ENCOUNTER — TRANSCRIBE ORDERS (OUTPATIENT)
Dept: ADMINISTRATIVE | Facility: HOSPITAL | Age: 38
End: 2018-02-05

## 2018-02-05 ENCOUNTER — HOSPITAL ENCOUNTER (OUTPATIENT)
Dept: RADIOLOGY | Facility: HOSPITAL | Age: 38
Discharge: HOME | End: 2018-02-05
Payer: COMMERCIAL

## 2018-02-05 DIAGNOSIS — R05.9 COUGH: ICD-10-CM

## 2018-02-05 DIAGNOSIS — J45.30 MILD PERSISTENT ASTHMA, UNCOMPLICATED: ICD-10-CM

## 2018-02-05 DIAGNOSIS — J30.9 ALLERGIC RHINITIS: ICD-10-CM

## 2018-02-05 PROCEDURE — 71046 X-RAY EXAM CHEST 2 VIEWS: CPT

## 2018-02-08 ENCOUNTER — OFFICE VISIT (OUTPATIENT)
Dept: PULMONOLOGY | Facility: CLINIC | Age: 38
End: 2018-02-08
Payer: COMMERCIAL

## 2018-02-08 VITALS
HEIGHT: 72 IN | OXYGEN SATURATION: 99 % | BODY MASS INDEX: 30.75 KG/M2 | TEMPERATURE: 97 F | RESPIRATION RATE: 16 BRPM | WEIGHT: 227 LBS | HEART RATE: 72 BPM

## 2018-02-08 DIAGNOSIS — R05.9 COUGHING: Primary | ICD-10-CM

## 2018-02-08 PROCEDURE — 99244 OFF/OP CNSLTJ NEW/EST MOD 40: CPT | Performed by: INTERNAL MEDICINE

## 2018-02-08 RX ORDER — CLONAZEPAM 1 MG/1
1 TABLET ORAL EVERY 8 HOURS PRN
COMMUNITY
End: 2022-02-25 | Stop reason: ALTCHOICE

## 2018-02-08 RX ORDER — ZOLPIDEM TARTRATE 5 MG/1
TABLET ORAL
COMMUNITY
Start: 2017-08-25 | End: 2018-02-08 | Stop reason: SDUPTHER

## 2018-02-08 RX ORDER — ALPRAZOLAM 0.5 MG/1
TABLET ORAL
COMMUNITY
Start: 2016-11-30 | End: 2018-02-08 | Stop reason: SDUPTHER

## 2018-02-08 RX ORDER — FLUTICASONE PROPIONATE 110 UG/1
AEROSOL, METERED RESPIRATORY (INHALATION) 2 TIMES DAILY
COMMUNITY
Start: 2017-04-20 | End: 2018-02-08 | Stop reason: SDUPTHER

## 2018-02-08 RX ORDER — CYCLOBENZAPRINE HCL 5 MG
TABLET ORAL
COMMUNITY
Start: 2016-10-26 | End: 2018-02-08 | Stop reason: SDUPTHER

## 2018-02-08 RX ORDER — BENZONATATE 100 MG/1
1 CAPSULE ORAL EVERY 8 HOURS PRN
COMMUNITY
Start: 2017-09-06 | End: 2018-02-08 | Stop reason: SDUPTHER

## 2018-02-08 RX ORDER — FLUOXETINE 10 MG/1
CAPSULE ORAL
COMMUNITY
Start: 2016-10-27 | End: 2018-02-08 | Stop reason: SDUPTHER

## 2018-02-08 RX ORDER — GABAPENTIN 100 MG/1
CAPSULE ORAL
COMMUNITY
Start: 2016-10-19 | End: 2018-11-07 | Stop reason: DRUGHIGH

## 2018-02-08 RX ORDER — CLONIDINE HYDROCHLORIDE 0.1 MG/1
0.1 TABLET ORAL
COMMUNITY
Start: 2016-09-25 | End: 2018-02-08 | Stop reason: SDUPTHER

## 2018-02-08 RX ORDER — DEXTROAMPHETAMINE SACCHARATE, AMPHETAMINE ASPARTATE, DEXTROAMPHETAMINE SULFATE AND AMPHETAMINE SULFATE 5; 5; 5; 5 MG/1; MG/1; MG/1; MG/1
20 TABLET ORAL
COMMUNITY
Start: 2017-04-27

## 2018-02-08 RX ORDER — PROPRANOLOL HYDROCHLORIDE 120 MG/1
1 CAPSULE, EXTENDED RELEASE ORAL DAILY
COMMUNITY
Start: 2017-04-30 | End: 2018-04-18 | Stop reason: SDUPTHER

## 2018-02-08 RX ORDER — SUMATRIPTAN 50 MG/1
TABLET, FILM COATED ORAL
COMMUNITY
Start: 2017-07-21 | End: 2018-02-08 | Stop reason: SDUPTHER

## 2018-02-08 RX ORDER — ALBUTEROL SULFATE 90 UG/1
1 AEROSOL, METERED RESPIRATORY (INHALATION) EVERY 6 HOURS PRN
COMMUNITY
Start: 2017-12-08 | End: 2018-02-08 | Stop reason: SDUPTHER

## 2018-02-08 RX ORDER — NALTREXONE HYDROCHLORIDE 50 MG/1
TABLET, FILM COATED ORAL
COMMUNITY
Start: 2016-10-01 | End: 2018-02-08 | Stop reason: SDUPTHER

## 2018-02-08 RX ORDER — CARBAMAZEPINE 200 MG/1
TABLET ORAL
COMMUNITY
Start: 2017-10-02 | End: 2018-02-08 | Stop reason: SDUPTHER

## 2018-02-08 RX ORDER — FLUTICASONE FUROATE AND VILANTEROL 200; 25 UG/1; UG/1
1 POWDER RESPIRATORY (INHALATION) DAILY
Qty: 1 EACH | Refills: 0 | Status: SHIPPED | COMMUNITY
Start: 2018-02-08 | End: 2018-02-19 | Stop reason: SDUPTHER

## 2018-02-08 RX ORDER — ARIPIPRAZOLE 15 MG/1
TABLET ORAL
Refills: 2 | COMMUNITY
Start: 2017-12-21 | End: 2018-02-08 | Stop reason: SDUPTHER

## 2018-02-08 RX ORDER — LAMOTRIGINE 100 MG/1
TABLET ORAL
Refills: 2 | COMMUNITY
Start: 2017-12-28 | End: 2018-02-08 | Stop reason: SDUPTHER

## 2018-02-08 RX ORDER — SUMATRIPTAN 100 MG/1
TABLET, FILM COATED ORAL
COMMUNITY
Start: 2017-05-01 | End: 2018-02-08 | Stop reason: SDUPTHER

## 2018-02-08 NOTE — PROGRESS NOTES
Pulmonary Initial Visit  George Waller 40 y o  female MRN: [id number]  @ Encounter: [id number]      Impressions/Recommendations:   80-year-old female with past medical history significant for migraines, Meniere's disease who presents for evaluation of chronic cough  The patient reports her cough is began approximately 2 years ago  It was initially controlled with Singulair but this stopped working  She was prescribed an inhaler regimen but has essentially stopped taking this as well  Interestingly, the patient reports the more time she spends in her house to worse her symptoms get  When she travels for even 1 night, her symptoms resolved  She also reports a downstairs neighbor has experienced similar symptoms to hers  The neighbor feels better when she is away from the house  I have encouraged the patient to move from this house as there is likely a strong environmental component within the house that is contributing to her current symptoms  Patient mentioned she is planning on moving to [place]  I have highly encouraged her to move away from this house  I have started [facility] for her  If this works, she may call for refill  I have also ordered pulmonary function testing   -  Breo  -  PFTs    She may follow up in 3 months or sooner as necessary    History of Present Illness   HPI:  George Waller is a 40 y o  female with pmhx sig for migraines, Menierrie's disease who presents for evaluation of chronic cough  The patient reports for the past 2 years she has had breathing problems  When the cough first started, she denied any new triggers  She has lived in her house for more than 6 years  She denies new jobs, cars, related construction, perfumes or cleaning agents  She denies any associated travel  Pt lives alone; but downstairs neighbor has similar symptoms  She has radiator heat  She was diagnosed asthma ~1 year ago and started albuterol and Flovent at that time    Since starting these medications, she notes the cough is worse  She does have some relief of the cough when taking albuterol  She takes the Flovent only 1x/week; she has never taken it regularly  For the cough, the patient has taken Singulair which initially improved her symptoms  Her symptoms are relieved when she leaves her house even for 1 night  She reports having runny nose, itchy water eyes  She takes zyrtec daily  Review of systems:  12 point review of systems was completed and was otherwise negative except as listed in HPI  Historical Information   Past Medical History:   Diagnosis Date    Asthma     Bipolar 1 disorder ([address] )     Disease of thyroid gland     ETOH abuse     Herniated cervical disc     Hypertension     Meniere's disease     Migraine     Psychiatric disorder     anxiety/depression    Sinusitis      Past Surgical History:   Procedure Laterality Date    BACK SURGERY      C5-6    JOINT REPLACEMENT      right hip     Denies family history of lung disease    Social History     Social History    Marital status: Single     Spouse name: N/A    Number of children: N/A    Years of education: N/A     Occupational History     [employer]          Social History Main Topics    Smoking status: Former Smoker     Quit date: 3/12/2014    Smokeless tobacco: Never Used    Alcohol use No    Drug use: Yes     Types: Marijuana    Sexual activity: Not Currently     Other Topics Concern    None     Social History Narrative    None     Meds/Allergies   No current facility-administered medications for this visit  Allergies   Allergen Reactions    Amoxicillin Rash    Percocet [Oxycodone-Acetaminophen] Rash    Prednisone Anxiety     hallucination       Vitals: Pulse 72, temperature (!) 97 °F (36 1 °C), resp  rate 16, height 6' 1" (1 854 m), weight 103 kg (227 lb), SpO2 99 % , RA, Body mass index is 29 95 kg/m²      Physical Exam  General: Pleasant, Awake alert and oriented x 3, conversant without conversational dyspnea, NAD, normal affect  HEENT:  PERRL, Sclera noninjected, nonicteric OU, Nares patent, no nasal flaring, no nasal drainage, Mucous membranes, moist, no oral lesions, normal dentition  NECK: Trachea midline, no accessory muscle use, no stridor, no cervical or supraclavicular adenopathy, JVP not elevated  CARDIAC: Reg, single s1/S2, no m/r/g  PULM:  Clear to auscultation bilaterally no wheezes rhonchi or crackles  CHEST: No gross deformities, equal chest expansion on inspiration bilaterally  ABD: Normoactive bowel sounds, soft nontender, nondistended, no rebound, no rigidity, no guarding  EXT: No cyanosis, no clubbing, no edema, normal capillary refill  SKIN:  No rashes, no lesions  NEURO: no focal neurologic deficits, AAOx3, moving all extremities appropriately    Imaging and other studies: I have personally reviewed pertinent films in PACS  CXR - 2/5/2018  -  No acute intrathoracic pathology     Pulmonary function testing:   No testing available for review    Echocardiogram:   No testing available for review    Sy Delgado

## 2018-02-08 NOTE — PATIENT INSTRUCTIONS
Please take the Breo  -1 inhalation daily   -  If this is working, please call in 1 week for a refill    Please have your pulmonary function testing completed  Please strongly consider moving from your house

## 2018-02-13 DIAGNOSIS — IMO0002 CHRONIC MIGRAINE: Primary | ICD-10-CM

## 2018-02-13 RX ORDER — SUMATRIPTAN 100 MG/1
100 TABLET, FILM COATED ORAL AS NEEDED
Qty: 10 TABLET | Refills: 2 | Status: SHIPPED | OUTPATIENT
Start: 2018-02-13 | End: 2018-06-14 | Stop reason: SDUPTHER

## 2018-02-13 RX ORDER — SUMATRIPTAN 100 MG/1
100 TABLET, FILM COATED ORAL AS NEEDED
Refills: 0 | Status: CANCELLED | OUTPATIENT
Start: 2018-02-13

## 2018-02-19 DIAGNOSIS — R05.9 COUGHING: ICD-10-CM

## 2018-02-19 RX ORDER — FLUTICASONE FUROATE AND VILANTEROL 200; 25 UG/1; UG/1
1 POWDER RESPIRATORY (INHALATION) DAILY
Qty: 30 EACH | Refills: 0 | Status: SHIPPED | OUTPATIENT
Start: 2018-02-19 | End: 2018-03-21 | Stop reason: ALTCHOICE

## 2018-02-19 RX ORDER — FLUTICASONE FUROATE AND VILANTEROL 200; 25 UG/1; UG/1
1 POWDER RESPIRATORY (INHALATION) DAILY
Qty: 30 EACH | Refills: 0 | Status: CANCELLED | OUTPATIENT
Start: 2018-02-19 | End: 2018-03-21

## 2018-03-05 DIAGNOSIS — M54.2 CERVICAL MUSCLE PAIN: Primary | ICD-10-CM

## 2018-03-06 RX ORDER — METHOCARBAMOL 500 MG/1
500 TABLET, FILM COATED ORAL DAILY
Qty: 30 TABLET | Refills: 0 | Status: SHIPPED | OUTPATIENT
Start: 2018-03-06 | End: 2018-06-20

## 2018-03-26 ENCOUNTER — TELEPHONE (OUTPATIENT)
Dept: INTERNAL MEDICINE CLINIC | Facility: CLINIC | Age: 38
End: 2018-03-26

## 2018-03-26 DIAGNOSIS — J45.30 MILD PERSISTENT ASTHMA WITHOUT COMPLICATION: Primary | ICD-10-CM

## 2018-03-26 PROBLEM — J30.9 ALLERGIC RHINITIS: Status: ACTIVE | Noted: 2017-07-24

## 2018-03-26 PROBLEM — M54.2 CERVICAL MUSCLE PAIN: Status: ACTIVE | Noted: 2017-02-14

## 2018-03-26 PROBLEM — F31.9 BIPOLAR DISORDER (HCC): Status: ACTIVE | Noted: 2017-02-14

## 2018-03-26 RX ORDER — FLUTICASONE FUROATE AND VILANTEROL 200; 25 UG/1; UG/1
1 POWDER RESPIRATORY (INHALATION) DAILY
Qty: 30 EACH | Refills: 2 | Status: SHIPPED | OUTPATIENT
Start: 2018-03-26 | End: 2018-06-21 | Stop reason: SDUPTHER

## 2018-03-26 NOTE — TELEPHONE ENCOUNTER
[facility] called stating pt is requesting refill on Breo elipta  200/25   Did not see Breo on pt medication list

## 2018-04-06 DIAGNOSIS — Z96.649 HISTORY OF HIP REPLACEMENT, UNSPECIFIED LATERALITY: Primary | ICD-10-CM

## 2018-04-06 DIAGNOSIS — Z96.642 HISTORY OF LEFT HIP REPLACEMENT: ICD-10-CM

## 2018-04-06 RX ORDER — CLINDAMYCIN HYDROCHLORIDE 300 MG/1
600 CAPSULE ORAL ONCE
Qty: 2 CAPSULE | Refills: 1 | Status: SHIPPED | OUTPATIENT
Start: 2018-04-06 | End: 2018-04-06

## 2018-04-06 NOTE — TELEPHONE ENCOUNTER
PT CALLED  SHE NEEDS A ONE DOSE ANTIBIOTIC CALLED IN TO HER [facility]  SHE NEEDS TO TAKE IT PRIOR TO ANY DENTAL WORK DUE TO HER PREVIOUS HIP REPLACEMENT

## 2018-04-06 NOTE — TELEPHONE ENCOUNTER
I don't see previous order by me for this    Does Yony Aronld take clindamycin as a one time dose (30-60 mins prior to dental work)?  she has penicillin allergy    thx

## 2018-04-06 NOTE — TELEPHONE ENCOUNTER
Patient states she does take this as a one time dose prior to dental work  She is okay with taking clindamycin  Has allergies to penicillin

## 2018-04-10 DIAGNOSIS — R11.0 NAUSEA: Primary | ICD-10-CM

## 2018-04-10 RX ORDER — ONDANSETRON 4 MG/1
4 TABLET, FILM COATED ORAL EVERY 8 HOURS PRN
Qty: 20 TABLET | Refills: 0 | Status: SHIPPED | OUTPATIENT
Start: 2018-04-10 | End: 2018-08-29

## 2018-04-18 DIAGNOSIS — R51.9 CHRONIC NONINTRACTABLE HEADACHE, UNSPECIFIED HEADACHE TYPE: ICD-10-CM

## 2018-04-18 DIAGNOSIS — G89.29 CHRONIC NONINTRACTABLE HEADACHE, UNSPECIFIED HEADACHE TYPE: ICD-10-CM

## 2018-04-18 DIAGNOSIS — IMO0002 CHRONIC MIGRAINE: Primary | ICD-10-CM

## 2018-04-18 RX ORDER — PROPRANOLOL HYDROCHLORIDE 120 MG/1
120 CAPSULE, EXTENDED RELEASE ORAL DAILY
Qty: 90 CAPSULE | Refills: 0 | Status: SHIPPED | OUTPATIENT
Start: 2018-04-18 | End: 2018-06-20

## 2018-05-10 ENCOUNTER — TELEPHONE (OUTPATIENT)
Dept: INTERNAL MEDICINE CLINIC | Facility: CLINIC | Age: 38
End: 2018-05-10

## 2018-05-10 NOTE — TELEPHONE ENCOUNTER
I sent a prescription for clindamycin to pt's pharmacy last month for this with 1 Refill    Did she take this rx already?

## 2018-05-10 NOTE — TELEPHONE ENCOUNTER
Pt needs a prescription for 2 doses of antibiotic sent in to [facility] [address]  She has a dentist appt on 05/21 and needs to take an antibiotic prior to dental visits   Call pt if needed at [phone]

## 2018-06-14 DIAGNOSIS — IMO0002 CHRONIC MIGRAINE: ICD-10-CM

## 2018-06-14 RX ORDER — SUMATRIPTAN 50 MG/1
50 TABLET, FILM COATED ORAL AS NEEDED
Qty: 10 TABLET | Refills: 1 | Status: SHIPPED | OUTPATIENT
Start: 2018-06-14 | End: 2018-07-17 | Stop reason: SDUPTHER

## 2018-06-20 ENCOUNTER — OFFICE VISIT (OUTPATIENT)
Dept: INTERNAL MEDICINE CLINIC | Facility: CLINIC | Age: 38
End: 2018-06-20
Payer: COMMERCIAL

## 2018-06-20 VITALS
BODY MASS INDEX: 28.58 KG/M2 | OXYGEN SATURATION: 98 % | WEIGHT: 211 LBS | DIASTOLIC BLOOD PRESSURE: 82 MMHG | RESPIRATION RATE: 16 BRPM | SYSTOLIC BLOOD PRESSURE: 106 MMHG | HEIGHT: 72 IN | HEART RATE: 92 BPM

## 2018-06-20 DIAGNOSIS — R42 ORTHOSTATIC DIZZINESS: ICD-10-CM

## 2018-06-20 DIAGNOSIS — L65.9 THINNING HAIR: ICD-10-CM

## 2018-06-20 DIAGNOSIS — R21 LOCALIZED RASH: Primary | ICD-10-CM

## 2018-06-20 DIAGNOSIS — G43.009 MIGRAINE WITHOUT AURA AND WITHOUT STATUS MIGRAINOSUS, NOT INTRACTABLE: ICD-10-CM

## 2018-06-20 DIAGNOSIS — Z86.79 HISTORY OF ESSENTIAL HYPERTENSION: ICD-10-CM

## 2018-06-20 LAB — SL AMB POCT GLUCOSE BLD: 90

## 2018-06-20 PROCEDURE — 82948 REAGENT STRIP/BLOOD GLUCOSE: CPT | Performed by: INTERNAL MEDICINE

## 2018-06-20 PROCEDURE — 99215 OFFICE O/P EST HI 40 MIN: CPT | Performed by: INTERNAL MEDICINE

## 2018-06-20 RX ORDER — PROPRANOLOL HCL 60 MG
60 CAPSULE, EXTENDED RELEASE 24HR ORAL DAILY
Qty: 7 CAPSULE | Refills: 0 | Status: SHIPPED | OUTPATIENT
Start: 2018-06-20 | End: 2018-08-29

## 2018-06-20 RX ORDER — MONTELUKAST SODIUM 10 MG/1
10 TABLET ORAL
COMMUNITY
End: 2018-07-17 | Stop reason: SDUPTHER

## 2018-06-20 NOTE — PATIENT INSTRUCTIONS
1  Fasting Blood work  2  Dermatology appointment  3  If on a weight loss diet, take generic multivitamin once a day  4  Taper off propranolol, lower dose to 60mg once a day for 5-7 days then stop medication  5  Drink plenty of fluids  6   Neurology appointment

## 2018-06-20 NOTE — PROGRESS NOTES
Assessment/Plan:     Diagnoses and all orders for this visit:    Localized rash  Comments:  on arms, similar to last year when referred to derm(pt did not go)  ref provided again today  Orders:  -     Ambulatory referral to Dermatology; Future    Thinning hair  Comments:  pt on low calorie/weight loss diet with slim fast, advised to take MVI per day and avoid hair treatments, BW ordered and f/u with Derm  Orders:  -     Ambulatory referral to Dermatology; Future  -     Comprehensive metabolic panel; Future  -     CBC and differential  -     TSH, 3rd generation with Free T4 reflex  -     Iron, TIBC and Ferritin Panel; Future  -     JOSY Screen w/ Reflex to Titer/Pattern; Future    Orthostatic dizziness  Comments:  with pulse increase of >20 with standing  Advised not to skip meals/hydrate  Orders:  -     POCT blood glucose    History of essential hypertension  Comments:  taper off inderal & per pt request  Orders:  -     propranolol (INDERAL LA) 60 mg 24 hr capsule; Take 1 capsule (60 mg total) by mouth daily for 7 days Then stop    Migraine without aura and without status migrainosus, not intractable  Comments:  takes imitrex wtih some relief, has neuro appt upcoming  Orders:  -     propranolol (INDERAL LA) 60 mg 24 hr capsule; Take 1 capsule (60 mg total) by mouth daily for 7 days Then stop    Other orders  -     montelukast (SINGULAIR) 10 mg tablet; Take 10 mg by mouth daily at bedtime      Spent >40 mins with patient including >50% of time counseling/coordination of care      Subjective:      Patient ID: Jaz Gottlieb is a 41 y o  female  HPI    Here for follow up, here with mother during appt  Lost 25+ lbs in last year, skips meals and has slim fast shakes in morning for last 6 months  occ feels sweaty but relates this to her anxiety  Not exercising  Stopped antipsychotic as pt told her psychiatrist she is not bipolar and feeling well    C/o thinning hair for years, mother had similar sx around same age & would like to have thyroid checked  No patchy hair loss  C/o dizziness and believes it is from inderal   Orthostatics positive, BS in office 90  Hasn't seen neurology for migraine headaches despite my recommendation in past but now has an appt upcoming  C/o rash on arms for 3-4 years, reports 'telling you about this' in past & 'you did not refer me to dermatology'(pt was referred to an in-network dermatologist 11 mos ago for rash & was reminded of this)  No other complaints  Past Medical History:   Diagnosis Date    Asthma     ETOH abuse     Herniated cervical disc     Meniere's disease     Migraine     Psychiatric disorder     anxiety/depression    Sinusitis     Vertigo      Vitals:    06/20/18 1202 06/20/18 1238 06/20/18 1239 06/20/18 1240   BP: 110/82 116/78 110/80 106/82   Pulse: 86 70 84 92   Resp: 16      SpO2: 98%      Weight: 95 7 kg (211 lb)      Height: 6' 1" (1 854 m)        Body mass index is 27 84 kg/m²      Current Outpatient Prescriptions:     amphetamine-dextroamphetamine (ADDERALL) 20 mg tablet, Take 20 mg by mouth 2 (two) times a day  , Disp: , Rfl:     clonazePAM (KlonoPIN) 1 mg tablet, Take 1 tablet by mouth every 8 (eight) hours as needed, Disp: , Rfl:     fluticasone furoate-vilanterol (BREO ELLIPTA) 200-25 MCG/INH inhaler, Inhale 1 puff daily, Disp: 30 each, Rfl: 2    gabapentin (NEURONTIN) 100 mg capsule, , Disp: , Rfl:     montelukast (SINGULAIR) 10 mg tablet, Take 10 mg by mouth daily at bedtime, Disp: , Rfl:     ondansetron (ZOFRAN) 4 mg tablet, Take 1 tablet (4 mg total) by mouth every 8 (eight) hours as needed for nausea or vomiting, Disp: 20 tablet, Rfl: 0    sertraline (ZOLOFT) 50 mg tablet, Take 100 mg by mouth  , Disp: , Rfl:     SUMAtriptan (IMITREX) 50 mg tablet, Take 1 tablet (50 mg total) by mouth as needed for migraine May repeat in 2 hours if needed, Disp: 10 tablet, Rfl: 1    propranolol (INDERAL LA) 60 mg 24 hr capsule, Take 1 capsule (60 mg total) by mouth daily for 7 days Then stop, Disp: 7 capsule, Rfl: 0  Allergies   Allergen Reactions    Topamax [Topiramate]      psychosis    Amoxicillin Rash    Percocet [Oxycodone-Acetaminophen] Rash    Prednisone Anxiety, Confusion and Delerium     hallucination         Review of Systems   Constitutional: Negative for fever  HENT: Negative for congestion  Eyes: Negative for visual disturbance  Respiratory: Negative for shortness of breath  Cardiovascular: Negative for chest pain  Gastrointestinal: Negative for abdominal pain  Genitourinary: Negative for difficulty urinating  Musculoskeletal: Negative for arthralgias  Skin: Positive for rash  Neurological: Positive for dizziness and headaches  Psychiatric/Behavioral: The patient is nervous/anxious  Objective:      /82   Pulse 92   Resp 16   Ht 6' 1" (1 854 m)   Wt 95 7 kg (211 lb)   SpO2 98%   BMI 27 84 kg/m²          Physical Exam   Constitutional: She appears well-developed and well-nourished  HENT:   Head: Normocephalic and atraumatic  Mouth/Throat: Oropharynx is clear and moist    Eyes: Conjunctivae are normal  Pupils are equal, round, and reactive to light  Cardiovascular: Normal rate, regular rhythm and normal heart sounds  No murmur heard  Pulmonary/Chest: Effort normal and breath sounds normal  She has no wheezes  She has no rales  Abdominal: Soft  Bowel sounds are normal  There is no tenderness  Musculoskeletal: She exhibits no edema  Neurological: She is alert  Skin: Rash (papular rash on arms, essentially unchanged from last year) noted  Psychiatric: Her behavior is normal  Her mood appears anxious  Vitals reviewed

## 2018-06-21 DIAGNOSIS — J45.30 MILD PERSISTENT ASTHMA WITHOUT COMPLICATION: ICD-10-CM

## 2018-06-21 RX ORDER — FLUTICASONE FUROATE AND VILANTEROL 200; 25 UG/1; UG/1
1 POWDER RESPIRATORY (INHALATION) DAILY
Qty: 30 EACH | Refills: 4 | Status: SHIPPED | OUTPATIENT
Start: 2018-06-21 | End: 2018-12-27 | Stop reason: SDUPTHER

## 2018-07-05 ENCOUNTER — TELEPHONE (OUTPATIENT)
Dept: INTERNAL MEDICINE CLINIC | Facility: CLINIC | Age: 38
End: 2018-07-05

## 2018-07-05 NOTE — TELEPHONE ENCOUNTER
BW is back and looks fine including    Thyroid BW  Iron levels  Blood count(no anemia)    Recommend to see dermatologist for thinning hair(ref provided at appt on 6/20), did [facility] call for an appt?

## 2018-07-17 DIAGNOSIS — J45.30 MILD PERSISTENT ASTHMA WITHOUT COMPLICATION: Primary | ICD-10-CM

## 2018-07-17 DIAGNOSIS — IMO0002 CHRONIC MIGRAINE: ICD-10-CM

## 2018-07-17 DIAGNOSIS — G43.009 MIGRAINE WITHOUT AURA AND WITHOUT STATUS MIGRAINOSUS, NOT INTRACTABLE: ICD-10-CM

## 2018-07-17 RX ORDER — MONTELUKAST SODIUM 10 MG/1
10 TABLET ORAL
Qty: 90 TABLET | Refills: 1 | Status: SHIPPED | OUTPATIENT
Start: 2018-07-17 | End: 2019-01-14 | Stop reason: SDUPTHER

## 2018-07-17 RX ORDER — SUMATRIPTAN 50 MG/1
50 TABLET, FILM COATED ORAL AS NEEDED
Qty: 10 TABLET | Refills: 2 | Status: SHIPPED | OUTPATIENT
Start: 2018-07-17 | End: 2018-08-24 | Stop reason: SDUPTHER

## 2018-07-18 ENCOUNTER — TELEPHONE (OUTPATIENT)
Dept: NEUROLOGY | Facility: CLINIC | Age: 38
End: 2018-07-18

## 2018-07-19 DIAGNOSIS — G43.009 MIGRAINE WITHOUT AURA AND WITHOUT STATUS MIGRAINOSUS, NOT INTRACTABLE: ICD-10-CM

## 2018-07-19 RX ORDER — SUMATRIPTAN 50 MG/1
50 TABLET, FILM COATED ORAL AS NEEDED
Qty: 10 TABLET | Refills: 0 | Status: CANCELLED | OUTPATIENT
Start: 2018-07-19

## 2018-07-20 RX ORDER — AMMONIUM LACTATE 12 G/100G
LOTION TOPICAL
Refills: 2 | COMMUNITY
Start: 2018-07-03 | End: 2022-02-25 | Stop reason: ALTCHOICE

## 2018-07-20 RX ORDER — ZOLPIDEM TARTRATE 5 MG/1
5 TABLET ORAL
Refills: 2 | COMMUNITY
Start: 2018-06-26 | End: 2022-02-25 | Stop reason: ALTCHOICE

## 2018-07-20 RX ORDER — SERTRALINE HYDROCHLORIDE 100 MG/1
100 TABLET, FILM COATED ORAL EVERY MORNING
Refills: 1 | COMMUNITY
Start: 2018-06-25

## 2018-08-24 DIAGNOSIS — G43.009 MIGRAINE WITHOUT AURA AND WITHOUT STATUS MIGRAINOSUS, NOT INTRACTABLE: ICD-10-CM

## 2018-08-24 DIAGNOSIS — M54.2 CERVICAL MUSCLE PAIN: Primary | ICD-10-CM

## 2018-08-24 RX ORDER — METHOCARBAMOL 500 MG/1
500 TABLET, FILM COATED ORAL EVERY 12 HOURS PRN
Qty: 25 TABLET | Refills: 0 | Status: SHIPPED | OUTPATIENT
Start: 2018-08-24 | End: 2018-08-29

## 2018-08-24 RX ORDER — SUMATRIPTAN 50 MG/1
50 TABLET, FILM COATED ORAL ONCE AS NEEDED
Qty: 10 TABLET | Refills: 2 | Status: SHIPPED | OUTPATIENT
Start: 2018-08-24 | End: 2018-11-26 | Stop reason: SDUPTHER

## 2018-08-24 NOTE — TELEPHONE ENCOUNTER
rx was robaxin, reordered    Can re-order but pt shouldn't take robaxin at same time as clonazepam(should separate by 8-12 hours)

## 2018-08-24 NOTE — TELEPHONE ENCOUNTER
Pt also looking to refill muscle relaxer but unable to recall name or dosage  States it's been a couple of months since last fill  Appt R/S from today at 1100 to 8/29 at 1530

## 2018-08-29 ENCOUNTER — OFFICE VISIT (OUTPATIENT)
Dept: INTERNAL MEDICINE CLINIC | Facility: CLINIC | Age: 38
End: 2018-08-29
Payer: COMMERCIAL

## 2018-08-29 VITALS
TEMPERATURE: 98 F | SYSTOLIC BLOOD PRESSURE: 126 MMHG | RESPIRATION RATE: 18 BRPM | WEIGHT: 215.6 LBS | OXYGEN SATURATION: 95 % | HEIGHT: 72 IN | DIASTOLIC BLOOD PRESSURE: 84 MMHG | HEART RATE: 95 BPM | BODY MASS INDEX: 29.2 KG/M2

## 2018-08-29 DIAGNOSIS — M54.2 CERVICAL MUSCLE PAIN: ICD-10-CM

## 2018-08-29 DIAGNOSIS — R11.0 NAUSEA: ICD-10-CM

## 2018-08-29 DIAGNOSIS — J45.30 MILD PERSISTENT ASTHMA WITHOUT COMPLICATION: ICD-10-CM

## 2018-08-29 DIAGNOSIS — IMO0002 CHRONIC MIGRAINE: Primary | ICD-10-CM

## 2018-08-29 PROCEDURE — 99214 OFFICE O/P EST MOD 30 MIN: CPT | Performed by: INTERNAL MEDICINE

## 2018-08-29 RX ORDER — ONDANSETRON 4 MG/1
4 TABLET, ORALLY DISINTEGRATING ORAL EVERY 8 HOURS PRN
Qty: 20 TABLET | Refills: 0 | Status: SHIPPED | OUTPATIENT
Start: 2018-08-29 | End: 2019-02-05 | Stop reason: SDUPTHER

## 2018-08-29 RX ORDER — ALBUTEROL SULFATE 90 UG/1
1 AEROSOL, METERED RESPIRATORY (INHALATION) EVERY 6 HOURS PRN
Qty: 8 G | Refills: 2 | Status: SHIPPED | OUTPATIENT
Start: 2018-08-29 | End: 2020-11-16

## 2018-08-29 RX ORDER — TIZANIDINE 2 MG/1
2 TABLET ORAL EVERY 8 HOURS PRN
Qty: 20 TABLET | Refills: 0 | Status: SHIPPED | OUTPATIENT
Start: 2018-08-29 | End: 2018-09-13 | Stop reason: SDUPTHER

## 2018-08-29 NOTE — PROGRESS NOTES
Assessment/Plan:     Diagnoses and all orders for this visit:    Chronic migraine  Comments:  doing well with prn imitrex, c/w rx for now prn  Orders:  -     ondansetron (ZOFRAN-ODT) 4 mg disintegrating tablet; Take 1 tablet (4 mg total) by mouth every 8 (eight) hours as needed for nausea or vomiting    Cervical muscle pain  Comments:  trial of zanaflex instead of robaxin - d/w pt SE of med  discussed DDI of muscle relaxers with SSRI - pt has taken before and no side effects  Orders:  -     tiZANidine (ZANAFLEX) 2 mg tablet; Take 1 tablet (2 mg total) by mouth every 8 (eight) hours as needed for muscle spasms    Mild persistent asthma without complication  Comments:  taking BREO without SE and needs refill on albuterol -> takes only prn  Orders:  -     albuterol (PROVENTIL HFA,VENTOLIN HFA) 90 mcg/act inhaler; Inhale 1 puff every 6 (six) hours as needed (cough)    Nausea  Comments:  with headaches, takes zofran prn with relief -> rx re-ordered      Spent >25 mins with patient including >50% of time counseling, counseling on medications and for coordination of care      Subjective:      Patient ID: Tri Leone is a 41 y o  female  HPI    Here for follow up, here with mother during appt  Reviewed meds with Ba Cordon, stopped inderal and doing well, headaches remain controlled with lower dose imitrex  Still having vertigo symptoms but hydrating better  Believes current sx are related to recurrent neck muscle pains  Took robaxin with limited relief and caused N/V  Has taken flexeril and/or zanaflex in past with some relief and no SE  Having trouble establishing her social [place] locally  Has 1 good friend locally and 2 good friends in [place] [id number] whom she misses a lot  Seeing counselor for this along with psychiatrist   Saw derm and told to use rogaine, losing her hair  Rash has improved with amlac cream, was dx'd with keratosis pilaris  No other complaints       Past Medical History:   Diagnosis Date    Asthma     ETOH abuse     Herniated cervical disc     Meniere's disease     Migraine     Psychiatric disorder     anxiety/depression    Sinusitis     Vertigo      Vitals:    08/29/18 1524   BP: 126/84   Pulse: 95   Resp: 18   Temp: 98 °F (36 7 °C)   TempSrc: Oral   SpO2: 95%   Weight: 97 8 kg (215 lb 9 6 oz)   Height: 6' 1" (1 854 m)     Body mass index is 28 44 kg/m²      Current Outpatient Prescriptions:     ammonium lactate (LAC-HYDRIN) 12 % lotion, APPLY TO ARMS AND THIGHS TWICE DAILY, Disp: , Rfl: 2    amphetamine-dextroamphetamine (ADDERALL) 20 mg tablet, Take 20 mg by mouth 2 (two) times a day  , Disp: , Rfl:     clonazePAM (KlonoPIN) 1 mg tablet, Take 1 tablet by mouth every 8 (eight) hours as needed, Disp: , Rfl:     fluticasone-vilanterol (BREO ELLIPTA) 200-25 MCG/INH inhaler, Inhale 1 puff daily, Disp: 30 each, Rfl: 4    gabapentin (NEURONTIN) 100 mg capsule, , Disp: , Rfl:     montelukast (SINGULAIR) 10 mg tablet, Take 1 tablet (10 mg total) by mouth daily at bedtime, Disp: 90 tablet, Rfl: 1    sertraline (ZOLOFT) 100 mg tablet, Take 100 mg by mouth every morning, Disp: , Rfl: 1    sertraline (ZOLOFT) 50 mg tablet, Take 100 mg by mouth  , Disp: , Rfl:     SUMAtriptan (IMITREX) 50 mg tablet, Take 1 tablet (50 mg total) by mouth once as needed for migraine May repeat in 2 hours if needed, Disp: 10 tablet, Rfl: 2    zolpidem (AMBIEN) 5 mg tablet, Take 5 mg by mouth daily at bedtime as needed, Disp: , Rfl: 2    albuterol (PROVENTIL HFA,VENTOLIN HFA) 90 mcg/act inhaler, Inhale 1 puff every 6 (six) hours as needed (cough), Disp: 8 g, Rfl: 2    ondansetron (ZOFRAN-ODT) 4 mg disintegrating tablet, Take 1 tablet (4 mg total) by mouth every 8 (eight) hours as needed for nausea or vomiting, Disp: 20 tablet, Rfl: 0    tiZANidine (ZANAFLEX) 2 mg tablet, Take 1 tablet (2 mg total) by mouth every 8 (eight) hours as needed for muscle spasms, Disp: 20 tablet, Rfl: 0  Allergies   Allergen Reactions    Topamax [Topiramate]      psychosis    Amoxicillin Rash    Percocet [Oxycodone-Acetaminophen] Rash    Prednisone Anxiety, Confusion and Delerium     hallucination         Review of Systems   Constitutional: Negative for fever  HENT: Negative for congestion  Eyes: Negative for visual disturbance  Respiratory: Negative for shortness of breath  Cardiovascular: Negative for chest pain  Gastrointestinal: Negative for abdominal pain  Genitourinary: Negative for difficulty urinating  Musculoskeletal: Negative for arthralgias  Neurological: Positive for dizziness  Psychiatric/Behavioral: Positive for dysphoric mood (taking rx/sees psychiatry)  The patient is nervous/anxious  Objective:      /84   Pulse 95   Temp 98 °F (36 7 °C) (Oral)   Resp 18   Ht 6' 1" (1 854 m)   Wt 97 8 kg (215 lb 9 6 oz)   SpO2 95%   BMI 28 44 kg/m²          Physical Exam   Constitutional: She appears well-developed and well-nourished  HENT:   Head: Normocephalic and atraumatic  Mouth/Throat: Oropharynx is clear and moist    Eyes: Conjunctivae are normal  Pupils are equal, round, and reactive to light  Cardiovascular: Normal rate, regular rhythm and normal heart sounds  No murmur heard  Pulmonary/Chest: Effort normal and breath sounds normal  She has no wheezes  She has no rales  Abdominal: Soft  Bowel sounds are normal  There is no tenderness  Musculoskeletal: She exhibits no edema  Neurological: She is alert  Psychiatric: Her behavior is normal  Her mood appears anxious  Vitals reviewed

## 2018-09-07 ENCOUNTER — TELEPHONE (OUTPATIENT)
Dept: INTERNAL MEDICINE CLINIC | Facility: CLINIC | Age: 38
End: 2018-09-07

## 2018-09-07 ENCOUNTER — OFFICE VISIT (OUTPATIENT)
Dept: INTERNAL MEDICINE CLINIC | Facility: CLINIC | Age: 38
End: 2018-09-07
Payer: COMMERCIAL

## 2018-09-07 VITALS
DIASTOLIC BLOOD PRESSURE: 90 MMHG | SYSTOLIC BLOOD PRESSURE: 118 MMHG | WEIGHT: 213 LBS | HEIGHT: 72 IN | BODY MASS INDEX: 28.85 KG/M2 | TEMPERATURE: 99.6 F | OXYGEN SATURATION: 95 % | RESPIRATION RATE: 18 BRPM | HEART RATE: 97 BPM

## 2018-09-07 DIAGNOSIS — J45.31 MILD PERSISTENT ASTHMA WITH ACUTE EXACERBATION: ICD-10-CM

## 2018-09-07 DIAGNOSIS — J01.90 ACUTE NON-RECURRENT SINUSITIS, UNSPECIFIED LOCATION: Primary | ICD-10-CM

## 2018-09-07 PROCEDURE — 3008F BODY MASS INDEX DOCD: CPT | Performed by: INTERNAL MEDICINE

## 2018-09-07 PROCEDURE — 99214 OFFICE O/P EST MOD 30 MIN: CPT | Performed by: INTERNAL MEDICINE

## 2018-09-07 PROCEDURE — 94640 AIRWAY INHALATION TREATMENT: CPT | Performed by: INTERNAL MEDICINE

## 2018-09-07 RX ORDER — BENZONATATE 100 MG/1
100 CAPSULE ORAL EVERY 8 HOURS PRN
Qty: 30 CAPSULE | Refills: 0 | Status: SHIPPED | OUTPATIENT
Start: 2018-09-07 | End: 2020-11-16

## 2018-09-07 RX ORDER — DOXYCYCLINE HYCLATE 100 MG/1
100 CAPSULE ORAL EVERY 12 HOURS SCHEDULED
Qty: 20 CAPSULE | Refills: 0 | Status: SHIPPED | OUTPATIENT
Start: 2018-09-07 | End: 2019-02-26 | Stop reason: SDUPTHER

## 2018-09-07 RX ORDER — PREDNISONE 20 MG/1
40 TABLET ORAL DAILY
Qty: 8 TABLET | Refills: 0 | Status: SHIPPED | OUTPATIENT
Start: 2018-09-07 | End: 2018-09-11

## 2018-09-07 RX ORDER — ALBUTEROL SULFATE 2.5 MG/3ML
2.5 SOLUTION RESPIRATORY (INHALATION) EVERY 6 HOURS PRN
Qty: 60 VIAL | Refills: 1 | Status: SHIPPED | OUTPATIENT
Start: 2018-09-07 | End: 2020-11-16

## 2018-09-07 RX ORDER — ALBUTEROL SULFATE 2.5 MG/3ML
2.5 SOLUTION RESPIRATORY (INHALATION) ONCE
Status: COMPLETED | OUTPATIENT
Start: 2018-09-07 | End: 2018-09-07

## 2018-09-07 RX ADMIN — ALBUTEROL SULFATE 2.5 MG: 2.5 SOLUTION RESPIRATORY (INHALATION) at 11:58

## 2018-09-07 NOTE — TELEPHONE ENCOUNTER
Pharmacy notified  States they will run Doxycycline monohydrate 100 mg through  States it most likely will be covered  States under pt insurance the monohydrate was the preferred Rx

## 2018-09-07 NOTE — PATIENT INSTRUCTIONS
1  Take antibiotic as prescribed  2  Medication for cough  3  Nebulizer machine ordered  4  Recommend prednisone for 3 days  5  If symptoms worsen, go to Emergency Room    Asthma   AMBULATORY CARE:   Asthma  is a lung disease that makes breathing difficult  Chronic inflammation and reactions to triggers narrow the airways in your lungs  Asthma can become life-threatening if it is not managed  Cough-variant asthma  is a type of asthma that causes a dry cough that keeps coming back  A dry cough may be your only symptom, or you may also have chest tightness  These symptoms may be caused by exercise or exposure to odors, allergens, or respiratory tract infections  Cough-variant asthma is treated the same way as typical asthma  Common symptoms include the following:   · Coughing     · Wheezing     · Shortness of breath     · Chest tightness  Seek care immediately if:   · You have severe shortness of breath  · Your lips or nails turn blue or gray  · The skin around your neck and ribs pulls in with each breath  · You have shortness of breath, even after you take your short-term medicine as directed  · Your peak flow numbers are in the red zone of your AAP  Contact your healthcare provider if:   · You run out of medicine before your next refill is due  · Your symptoms get worse  · You need to take more medicine than usual to control your symptoms  · You have questions or concerns about your condition or care  Treatment for asthma  will depend on how severe your asthma is  Medicine may decrease inflammation, open airways, and make it easier to breathe  Medicines may be inhaled, taken as a pill, or injected  Short-term medicines relieve your symptoms quickly  Long-term medicines are used to prevent future attacks  You may also need medicine to help control your allergies  Manage and prevent future asthma attacks:   · Follow your asthma action plan    This is a written plan that you and your healthcare provider create  It explains which medicine you need and when to change doses if necessary  It also explains how you can monitor symptoms and use a peak flow meter  The meter measures how well your lungs are working  · Manage other health conditions , such as allergies, acid reflux, and sleep apnea  · Identify and avoid triggers  These may include pets, dust mites, mold, and cockroaches  · Do not smoke or be around others who smoke  Nicotine and other chemicals in cigarettes and cigars can cause lung damage  Ask your healthcare provider for information if you currently smoke and need help to quit  E-cigarettes or smokeless tobacco still contain nicotine  Talk to your healthcare provider before you use these products  · Ask about the flu vaccine  The flu can make your asthma worse  You may need a yearly flu shot  Follow up with your healthcare provider as directed: You will need to return to make sure your medicine is working and your symptoms are controlled  You may be referred to an asthma or allergy specialist  Reed Goldmann may be asked to keep a record of your peak flow values and bring it with you to your appointments  Write down your questions so you remember to ask them during your visits  © 2017 [facility] Information is for End User's use only and may not be sold, redistributed or otherwise used for commercial purposes  All illustrations and images included in CareNotes® are the copyrighted property of A D A M , Inc  or Jeff Goss  The above information is an  only  It is not intended as medical advice for individual conditions or treatments  Talk to your doctor, nurse or pharmacist before following any medical regimen to see if it is safe and effective for you

## 2018-09-07 NOTE — PROGRESS NOTES
Assessment/Plan:     Diagnoses and all orders for this visit:    Acute non-recurrent sinusitis, unspecified location  Comments:  suspect sinus infection with flare of asthma sx -> abx rx, rest, hydration/albuterol MDI and nebulizer machine rx provided  precautions discussed  Orders:  -     Nebulizer  -     albuterol inhalation solution 2 5 mg; Take 3 mL (2 5 mg total) by nebulization once   -     doxycycline hyclate (VIBRAMYCIN) 100 mg capsule; Take 1 capsule (100 mg total) by mouth every 12 (twelve) hours for 10 days  -     benzonatate (TESSALON PERLES) 100 mg capsule; Take 1 capsule (100 mg total) by mouth every 8 (eight) hours as needed for cough    Mild persistent asthma with acute exacerbation  Comments:  d/w pt steroid x3-4 days and pt agrees, denies allergic reaction but causes some anxiety/sleep issues  Orders:  -     Nebulizer  -     albuterol inhalation solution 2 5 mg; Take 3 mL (2 5 mg total) by nebulization once   -     doxycycline hyclate (VIBRAMYCIN) 100 mg capsule; Take 1 capsule (100 mg total) by mouth every 12 (twelve) hours for 10 days  -     Nebulizer Supplies  -     albuterol (2 5 mg/3 mL) 0 083 % nebulizer solution; Take 1 vial (2 5 mg total) by nebulization every 6 (six) hours as needed for wheezing or shortness of breath  -     benzonatate (TESSALON PERLES) 100 mg capsule; Take 1 capsule (100 mg total) by mouth every 8 (eight) hours as needed for cough  -     predniSONE 20 mg tablet; Take 2 tablets (40 mg total) by mouth daily for 4 days Take with food          Subjective:      Patient ID: Olayinka Melvin is a 41 y o  female  HPI    Here with c/o 3-4 days of cough, runny nose, yellow/green congestion and sinus pressure/pain  No fever, but feeling fatigue and chills  Having some asthma related sx as well with chest tightness and wheezing with some/temporary relief with albuterol MDI  NO SOB or CP but +sick contacts  Requests nebulizer machine rx    Hx of anxiety/trouble sleeping with prednisone but no rash or allergic reaction  Rec'd nebulizer treatment today with some relief  No other complaints  Past Medical History:   Diagnosis Date    Asthma     ETOH abuse     Herniated cervical disc     Meniere's disease     Migraine     Psychiatric disorder     anxiety/depression    Sinusitis     Vertigo      Vitals:    09/07/18 1130   BP: 118/90   Pulse: 97   Resp: 18   Temp: 99 6 °F (37 6 °C)   TempSrc: Oral   SpO2: 95%   Weight: 96 6 kg (213 lb)   Height: 6' 1" (1 854 m)     Body mass index is 28 1 kg/m²      Current Outpatient Prescriptions:     albuterol (PROVENTIL HFA,VENTOLIN HFA) 90 mcg/act inhaler, Inhale 1 puff every 6 (six) hours as needed (cough), Disp: 8 g, Rfl: 2    ammonium lactate (LAC-HYDRIN) 12 % lotion, APPLY TO ARMS AND THIGHS TWICE DAILY, Disp: , Rfl: 2    amphetamine-dextroamphetamine (ADDERALL) 20 mg tablet, Take 20 mg by mouth 2 (two) times a day  , Disp: , Rfl:     clonazePAM (KlonoPIN) 1 mg tablet, Take 1 tablet by mouth every 8 (eight) hours as needed, Disp: , Rfl:     fluticasone-vilanterol (BREO ELLIPTA) 200-25 MCG/INH inhaler, Inhale 1 puff daily, Disp: 30 each, Rfl: 4    gabapentin (NEURONTIN) 100 mg capsule, , Disp: , Rfl:     montelukast (SINGULAIR) 10 mg tablet, Take 1 tablet (10 mg total) by mouth daily at bedtime, Disp: 90 tablet, Rfl: 1    ondansetron (ZOFRAN-ODT) 4 mg disintegrating tablet, Take 1 tablet (4 mg total) by mouth every 8 (eight) hours as needed for nausea or vomiting, Disp: 20 tablet, Rfl: 0    sertraline (ZOLOFT) 100 mg tablet, Take 100 mg by mouth every morning, Disp: , Rfl: 1    sertraline (ZOLOFT) 50 mg tablet, Take 100 mg by mouth  , Disp: , Rfl:     SUMAtriptan (IMITREX) 50 mg tablet, Take 1 tablet (50 mg total) by mouth once as needed for migraine May repeat in 2 hours if needed, Disp: 10 tablet, Rfl: 2    tiZANidine (ZANAFLEX) 2 mg tablet, Take 1 tablet (2 mg total) by mouth every 8 (eight) hours as needed for muscle spasms, Disp: 20 tablet, Rfl: 0    zolpidem (AMBIEN) 5 mg tablet, Take 5 mg by mouth daily at bedtime as needed, Disp: , Rfl: 2    albuterol (2 5 mg/3 mL) 0 083 % nebulizer solution, Take 1 vial (2 5 mg total) by nebulization every 6 (six) hours as needed for wheezing or shortness of breath, Disp: 60 vial, Rfl: 1    benzonatate (TESSALON PERLES) 100 mg capsule, Take 1 capsule (100 mg total) by mouth every 8 (eight) hours as needed for cough, Disp: 30 capsule, Rfl: 0    doxycycline hyclate (VIBRAMYCIN) 100 mg capsule, Take 1 capsule (100 mg total) by mouth every 12 (twelve) hours for 10 days, Disp: 20 capsule, Rfl: 0    predniSONE 20 mg tablet, Take 2 tablets (40 mg total) by mouth daily for 4 days Take with food, Disp: 8 tablet, Rfl: 0  No current facility-administered medications for this visit  Allergies   Allergen Reactions    Topamax [Topiramate]      psychosis    Amoxicillin Rash    Percocet [Oxycodone-Acetaminophen] Rash    Prednisone Anxiety, Confusion and Delerium     hallucination         Review of Systems   Constitutional: Positive for chills, fatigue and fever  HENT: Positive for congestion, rhinorrhea, sinus pain, sinus pressure and sore throat  Respiratory: Positive for cough  Negative for shortness of breath  Cardiovascular: Negative for chest pain  Gastrointestinal: Negative for abdominal pain  Genitourinary: Negative for difficulty urinating  Musculoskeletal: Positive for arthralgias and myalgias  Allergic/Immunologic: Positive for environmental allergies  Neurological: Negative for headaches  Psychiatric/Behavioral: Negative for dysphoric mood  Objective:      /90   Pulse 97   Temp 99 6 °F (37 6 °C) (Oral)   Resp 18   Ht 6' 1" (1 854 m)   Wt 96 6 kg (213 lb)   SpO2 95%   BMI 28 10 kg/m²          Physical Exam   Constitutional: She appears well-developed and well-nourished  Coughing throughout the appt   HENT:   Head: Normocephalic and atraumatic     Nose: Mucosal edema present  Right sinus exhibits maxillary sinus tenderness and frontal sinus tenderness  Left sinus exhibits maxillary sinus tenderness and frontal sinus tenderness  Mouth/Throat: Posterior oropharyngeal erythema present  No oropharyngeal exudate  Eyes: Conjunctivae are normal  Pupils are equal, round, and reactive to light  Cardiovascular: Normal rate, regular rhythm and normal heart sounds  No murmur heard  Pulmonary/Chest: Effort normal  She has decreased breath sounds  She has no wheezes  She has no rales  Abdominal: Soft  Bowel sounds are normal  There is no tenderness  Musculoskeletal: She exhibits no edema  Lymphadenopathy:     She has no cervical adenopathy  Neurological: She is alert  Psychiatric: She has a normal mood and affect  Her behavior is normal    Vitals reviewed  Patient given treatment of albuterol nebulizer  Patient subjectively reports some improvement in symptoms & with better air exchange on repeat lung auscultation

## 2018-09-07 NOTE — TELEPHONE ENCOUNTER
Pt insurance will not pay for Doxycycline      Would require another Rx - send to [facility] on [address]

## 2018-09-07 NOTE — TELEPHONE ENCOUNTER
Please call pharmacy and ask pharmacist to run rx as:    Doxycycline monohydrate 100mg, take 1 tablet BID x10 days  INSTEAD OF doxycycline hyclate    Let me know if monohydrate(same medication) is not covered    thanks

## 2018-09-13 DIAGNOSIS — M54.2 CERVICAL MUSCLE PAIN: ICD-10-CM

## 2018-09-13 RX ORDER — TIZANIDINE 2 MG/1
2 TABLET ORAL EVERY 8 HOURS PRN
Qty: 30 TABLET | Refills: 1 | Status: SHIPPED | OUTPATIENT
Start: 2018-09-13 | End: 2018-10-08 | Stop reason: SDUPTHER

## 2018-10-08 DIAGNOSIS — M54.2 CERVICAL MUSCLE PAIN: ICD-10-CM

## 2018-10-08 RX ORDER — TIZANIDINE 2 MG/1
2 TABLET ORAL EVERY 8 HOURS PRN
Qty: 30 TABLET | Refills: 0 | Status: SHIPPED | OUTPATIENT
Start: 2018-10-08 | End: 2018-10-23 | Stop reason: SDUPTHER

## 2018-10-23 DIAGNOSIS — M54.2 CERVICAL MUSCLE PAIN: ICD-10-CM

## 2018-10-23 RX ORDER — TIZANIDINE 2 MG/1
2 TABLET ORAL EVERY 8 HOURS PRN
Qty: 30 TABLET | Refills: 0 | Status: SHIPPED | OUTPATIENT
Start: 2018-10-23 | End: 2018-11-09 | Stop reason: SDUPTHER

## 2018-10-24 DIAGNOSIS — M54.2 CERVICAL MUSCLE PAIN: ICD-10-CM

## 2018-10-24 RX ORDER — TIZANIDINE 2 MG/1
2 TABLET ORAL EVERY 8 HOURS PRN
Qty: 30 TABLET | Refills: 0 | Status: CANCELLED | OUTPATIENT
Start: 2018-10-24

## 2018-11-06 ENCOUNTER — TELEPHONE (OUTPATIENT)
Dept: INTERNAL MEDICINE CLINIC | Facility: CLINIC | Age: 38
End: 2018-11-06

## 2018-11-06 NOTE — TELEPHONE ENCOUNTER
PT FEELS SHE HAS SINUS INFECTION SAME AS WHEN SHE WAS IN IN September AND WANTS TO KNOW IF YOU CAN JUST CALL SOMETHING IN ?

## 2018-11-06 NOTE — TELEPHONE ENCOUNTER
Pt states she is having a flare of the asthma and  has had the mucus for 2 days  She does have a cough and just started with small fever today  She has been taken a tussin pill and antiseptic spray for throat,  She said she is also doing albuterol and nebulizer treatments, feels like she has swollen lips and a scratchy throat as well  I did offer the pt an appointment when she first called and she really wasn't trying to come in she just wanted something sent to pharmacy

## 2018-11-06 NOTE — TELEPHONE ENCOUNTER
Many upper respiratory infections are caused by a virus    Prescribing antibiotic may not be necessary and could put Nhung at risk of side effects or resistance to abx in future    Kaila Valentin should be seen  Smooth Guido She can come see me or go to nearby /walk-in care

## 2018-11-06 NOTE — TELEPHONE ENCOUNTER
We did an abx and steroid in September    Is Yocasta  having a flare of her asthma symptoms at this time? What symptoms is she having:    fever? Coughing/mucus production? How long has she been having current symptoms? What has Yocasta  taken to treat these symptoms?

## 2018-11-07 ENCOUNTER — OFFICE VISIT (OUTPATIENT)
Dept: NEUROLOGY | Facility: CLINIC | Age: 38
End: 2018-11-07
Payer: COMMERCIAL

## 2018-11-07 VITALS
HEART RATE: 122 BPM | BODY MASS INDEX: 28.85 KG/M2 | HEIGHT: 72 IN | SYSTOLIC BLOOD PRESSURE: 120 MMHG | RESPIRATION RATE: 14 BRPM | WEIGHT: 213 LBS | DIASTOLIC BLOOD PRESSURE: 80 MMHG

## 2018-11-07 DIAGNOSIS — R29.2 HYPERREFLEXIA OF LOWER EXTREMITY: ICD-10-CM

## 2018-11-07 DIAGNOSIS — Z82.49 FH: CEREBRAL ANEURYSM: ICD-10-CM

## 2018-11-07 DIAGNOSIS — R20.0 LOSS OF SENSATION: ICD-10-CM

## 2018-11-07 DIAGNOSIS — R42 DIZZINESS AND GIDDINESS: ICD-10-CM

## 2018-11-07 DIAGNOSIS — R42 VERTIGO: ICD-10-CM

## 2018-11-07 DIAGNOSIS — E34.8 PINEAL GLAND CYST: ICD-10-CM

## 2018-11-07 DIAGNOSIS — G43.719 INTRACTABLE CHRONIC MIGRAINE WITHOUT AURA AND WITHOUT STATUS MIGRAINOSUS: Primary | ICD-10-CM

## 2018-11-07 DIAGNOSIS — R26.89 IMBALANCE: ICD-10-CM

## 2018-11-07 DIAGNOSIS — R53.83 OTHER FATIGUE: ICD-10-CM

## 2018-11-07 DIAGNOSIS — M54.12 CERVICAL RADICULOPATHY: ICD-10-CM

## 2018-11-07 PROCEDURE — 99245 OFF/OP CONSLTJ NEW/EST HI 55: CPT | Performed by: PSYCHIATRY & NEUROLOGY

## 2018-11-07 RX ORDER — DICLOFENAC POTASSIUM 50 MG/1
TABLET, FILM COATED ORAL
Qty: 5 TABLET | Refills: 1 | Status: SHIPPED | OUTPATIENT
Start: 2018-11-07 | End: 2020-11-16

## 2018-11-07 RX ORDER — GABAPENTIN 600 MG/1
600 TABLET ORAL
Qty: 30 TABLET | Refills: 2 | Status: SHIPPED | OUTPATIENT
Start: 2018-12-01 | End: 2019-01-27 | Stop reason: SDUPTHER

## 2018-11-07 RX ORDER — MECLIZINE HCL 12.5 MG/1
TABLET ORAL
Qty: 20 TABLET | Refills: 1 | Status: SHIPPED | OUTPATIENT
Start: 2018-11-07 | End: 2018-12-27 | Stop reason: SDUPTHER

## 2018-11-07 NOTE — TELEPHONE ENCOUNTER
Patient notified  States she has been using her nebulizer treatment and sx have improved  States she will call us back at the end of the week if she is not better or if her sx worsen

## 2018-11-07 NOTE — PATIENT INSTRUCTIONS
Neurology    Take diclofenac at onset of migraine (instead of imitrex ) to see if effective  Meclizine as needed for dizziness  Gabapentin 600 nightly  Magnesium oxide 400-1000 mg daily +/- Vitamin B2 daily to help with migraine prevention, can help with constipation too  We will call you if/when Botox approved  Discussed fall precautions:  - Using cane or walker as needed  - Avoiding small area rugs  - Having bright lighting and avoiding stairs   - Physical therapy for balance recommended
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
## 2018-11-09 DIAGNOSIS — M54.2 CERVICAL MUSCLE PAIN: ICD-10-CM

## 2018-11-09 RX ORDER — TIZANIDINE 2 MG/1
2 TABLET ORAL EVERY 8 HOURS PRN
Qty: 30 TABLET | Refills: 0 | Status: SHIPPED | OUTPATIENT
Start: 2018-11-09 | End: 2018-11-29 | Stop reason: SDUPTHER

## 2018-11-18 ENCOUNTER — HOSPITAL ENCOUNTER (OUTPATIENT)
Dept: CT IMAGING | Facility: HOSPITAL | Age: 38
Discharge: HOME | End: 2018-11-18
Attending: PSYCHIATRY & NEUROLOGY
Payer: COMMERCIAL

## 2018-11-18 DIAGNOSIS — R42 DIZZINESS AND GIDDINESS: ICD-10-CM

## 2018-11-18 DIAGNOSIS — Z82.49 FH: CEREBRAL ANEURYSM: ICD-10-CM

## 2018-11-18 DIAGNOSIS — G43.719 INTRACTABLE CHRONIC MIGRAINE WITHOUT AURA AND WITHOUT STATUS MIGRAINOSUS: ICD-10-CM

## 2018-11-18 PROCEDURE — 70496 CT ANGIOGRAPHY HEAD: CPT

## 2018-11-18 PROCEDURE — 70498 CT ANGIOGRAPHY NECK: CPT

## 2018-11-18 RX ADMIN — IOHEXOL 85 ML: 350 INJECTION, SOLUTION INTRAVENOUS at 15:33

## 2018-11-19 ENCOUNTER — TELEPHONE (OUTPATIENT)
Dept: NEUROLOGY | Facility: CLINIC | Age: 38
End: 2018-11-19

## 2018-11-19 NOTE — TELEPHONE ENCOUNTER
----- Message from Blanche Delgado sent at 11/8/2018  8:09 AM EST -----  Regarding: FW: Botox first start    Belinda Rose,        Please schedule BINJ with Dr Janet Crocker, it will be our stock  Please advise  Thanks,  Lev  ----- Message -----  From: Hoda Mohamud DO  Sent: 11/7/2018   4:55 PM  To: Loli Vincent  Subject: Botox first start                                Hello! Botox new start for chronic migraines- 200 units    Thanks!

## 2018-11-19 NOTE — TELEPHONE ENCOUNTER
lmom awaiting a call back  I was calling to set up a new start Botox appointment with Dr Blayne Bryant  I would like to offer the patient an appointment on 12/10/18 at either 4:30 or 5 pm  Which ever appointment the patient does not take please keep on hold  Can transfer to me to schedule   Thanks

## 2018-11-20 ENCOUNTER — DOCUMENTATION (OUTPATIENT)
Dept: NEUROLOGY | Facility: CLINIC | Age: 38
End: 2018-11-20

## 2018-11-20 NOTE — TELEPHONE ENCOUNTER
lmom awaiting a call back  When patient calls back please make her aware that the 430 spot is taken  Please confirm if the patient can come in at 5 pm instead  If patient agrees please document and I will place her in that spot      Thanks

## 2018-11-26 DIAGNOSIS — G43.009 MIGRAINE WITHOUT AURA AND WITHOUT STATUS MIGRAINOSUS, NOT INTRACTABLE: ICD-10-CM

## 2018-11-26 RX ORDER — SUMATRIPTAN 50 MG/1
50 TABLET, FILM COATED ORAL ONCE AS NEEDED
Qty: 10 TABLET | Refills: 2 | Status: SHIPPED | OUTPATIENT
Start: 2018-11-26 | End: 2019-03-28 | Stop reason: SDUPTHER

## 2018-11-29 DIAGNOSIS — M54.2 CERVICAL MUSCLE PAIN: ICD-10-CM

## 2018-11-29 RX ORDER — TIZANIDINE 2 MG/1
2 TABLET ORAL EVERY 12 HOURS PRN
Qty: 30 TABLET | Refills: 0 | Status: SHIPPED | OUTPATIENT
Start: 2018-11-29 | End: 2018-12-21 | Stop reason: SDUPTHER

## 2018-11-29 NOTE — TELEPHONE ENCOUNTER
I sent a tizanidine refill to pharmacy on 11/9    How often is Gladys Smiley taking this rx? Is she have spasms in neck? Elsewhere?     Let me know

## 2018-11-29 NOTE — TELEPHONE ENCOUNTER
Pt notified and states she takes this every day- some days its only 1x and others 2x  Does have spasms in neck   Sees a chiropractor as well

## 2018-12-03 ENCOUNTER — HOSPITAL ENCOUNTER (OUTPATIENT)
Dept: MRI IMAGING | Facility: HOSPITAL | Age: 38
Discharge: HOME | End: 2018-12-03
Attending: PSYCHIATRY & NEUROLOGY
Payer: COMMERCIAL

## 2018-12-03 ENCOUNTER — TELEPHONE (OUTPATIENT)
Dept: INTERNAL MEDICINE CLINIC | Facility: CLINIC | Age: 38
End: 2018-12-03

## 2018-12-03 DIAGNOSIS — G43.709 CHRONIC MIGRAINE WITHOUT AURA WITHOUT STATUS MIGRAINOSUS, NOT INTRACTABLE: ICD-10-CM

## 2018-12-03 DIAGNOSIS — E34.8 PINEAL GLAND CYST: ICD-10-CM

## 2018-12-03 DIAGNOSIS — M54.2 CERVICAL MUSCLE PAIN: ICD-10-CM

## 2018-12-03 DIAGNOSIS — R20.0 LOSS OF SENSATION: ICD-10-CM

## 2018-12-03 DIAGNOSIS — G43.719 INTRACTABLE CHRONIC MIGRAINE WITHOUT AURA AND WITHOUT STATUS MIGRAINOSUS: Primary | ICD-10-CM

## 2018-12-03 DIAGNOSIS — R26.89 BALANCE PROBLEM: ICD-10-CM

## 2018-12-03 DIAGNOSIS — R29.2 HYPERREFLEXIA OF LOWER EXTREMITY: ICD-10-CM

## 2018-12-03 DIAGNOSIS — M54.12 CERVICAL RADICULITIS: ICD-10-CM

## 2018-12-03 DIAGNOSIS — R26.89 IMBALANCE: ICD-10-CM

## 2018-12-03 DIAGNOSIS — G43.719 INTRACTABLE CHRONIC MIGRAINE WITHOUT AURA AND WITHOUT STATUS MIGRAINOSUS: ICD-10-CM

## 2018-12-03 DIAGNOSIS — Z82.49 FH: CEREBRAL ANEURYSM: ICD-10-CM

## 2018-12-03 DIAGNOSIS — G43.009 MIGRAINE WITHOUT AURA AND WITHOUT STATUS MIGRAINOSUS, NOT INTRACTABLE: ICD-10-CM

## 2018-12-03 DIAGNOSIS — R26.89 OTHER ABNORMALITIES OF GAIT AND MOBILITY: ICD-10-CM

## 2018-12-03 DIAGNOSIS — E34.8 OTHER SPECIFIED ENDOCRINE DISORDERS: ICD-10-CM

## 2018-12-03 PROCEDURE — A9585 GADOBUTROL INJECTION: HCPCS | Performed by: PSYCHIATRY & NEUROLOGY

## 2018-12-03 PROCEDURE — 72148 MRI LUMBAR SPINE W/O DYE: CPT

## 2018-12-03 PROCEDURE — 70553 MRI BRAIN STEM W/O & W/DYE: CPT

## 2018-12-03 RX ADMIN — GADOBUTROL 9 ML: 604.72 INJECTION INTRAVENOUS at 18:18

## 2018-12-03 NOTE — TELEPHONE ENCOUNTER
Called neurology and spoke with Santi Bernardo    E34 8- other specified endocrine disorders  R26 89- other abnormalities of gait and mobility

## 2018-12-03 NOTE — TELEPHONE ENCOUNTER
DX : X39 068          E34 8          M54 12          R26 89  THESE ARE THE DIAGNOSIS CODES SHE SAID THEY NEED DONE SO IT IS COVERED      NEUOROLOGY NEEDS A NEW DOCTORS ORDER THE ONE IN THE SYSTEM EXPIRES ON THE 8TH AND HER APPT IS ON THE 10TH

## 2018-12-10 ENCOUNTER — PROCEDURE VISIT (OUTPATIENT)
Dept: NEUROLOGY | Facility: CLINIC | Age: 38
End: 2018-12-10
Payer: COMMERCIAL

## 2018-12-10 VITALS — SYSTOLIC BLOOD PRESSURE: 130 MMHG | DIASTOLIC BLOOD PRESSURE: 80 MMHG | TEMPERATURE: 97.5 F

## 2018-12-10 DIAGNOSIS — E34.8 OTHER SPECIFIED ENDOCRINE DISORDERS: ICD-10-CM

## 2018-12-10 DIAGNOSIS — R26.89 OTHER ABNORMALITIES OF GAIT AND MOBILITY: ICD-10-CM

## 2018-12-10 DIAGNOSIS — G43.709 CHRONIC MIGRAINE WITHOUT AURA WITHOUT STATUS MIGRAINOSUS, NOT INTRACTABLE: ICD-10-CM

## 2018-12-10 DIAGNOSIS — M54.2 CERVICAL MUSCLE PAIN: ICD-10-CM

## 2018-12-10 PROCEDURE — 64615 CHEMODENERV MUSC MIGRAINE: CPT | Performed by: PSYCHIATRY & NEUROLOGY

## 2018-12-11 NOTE — PROGRESS NOTES
Chemodenervation  Date/Time: 12/10/2018 7:07 PM  Performed by: Riya Yañez  Authorized by: Paige Kayser details:     Prepped With: Alcohol    Anesthesia (see MAR for exact dosages): Anesthesia method:  None  Procedure details:     Position:  Supine and upright  Botox:     Botox Type:  Type A    Brand:  Botox    mL's of Botulinum Toxin:  155    mL's of preservative free sterile saline:  2    Final Concentration per CC:  100 units    Needle Gauge:  30 G 2 5 inch  Procedures:     Botox Procedures: chronic headache      Indications: migraines    Injection Location:     Head / Face:  L superior cervical paraspinal, R superior cervical paraspinal, L , R , R frontalis, L frontalis, L lateral occipitalis, R lateral occipitalis, procerus, L temporalis, R temporalis, L superior trapezius and R superior trapezius    L  injection amount:  5 unit(s)    R  injection amount:  5 unit(s)    L lateral frontalis:  5 unit(s)    R lateral frontalis:  5 unit(s)    L medial frontalis:  5 unit(s)    R medial frontalis:  5 unit(s)    L temporalis injection amount:  20 unit(s)    R temporalis injection amount:  20 unit(s)    Procerus injection amount:  5 unit(s)    L lateral occipitalis injection amount:  15 unit(s)    R lateral occipitalis injection amount:  15 unit(s)    L superior cervical paraspinal injection amount:  10 unit(s)    R superior cervical paraspinal injection amount:  10 unit(s)    L superior trapezius injection amount:  15 unit(s)    R superior trapezius injection amount:  15 unit(s)  Total Units:     Total units used:  155    Total units discarded:  45  Post-procedure details:     Chemodenervation:  Chronic migraine    Facial Nerve Location[de-identified]  Bilateral facial nerve    Patient tolerance of procedure: Tolerated well, no immediate complications      This was her first set of Botox injections   Discussed potential med a/e including but not limited to rash, ptosis, worse headache, heaviness/weakness of neck muscles       We reviewed her neuroimaging results including MRI/CTA- no significant abnormalities  Meclizine prn does help vertigo  Gabapentin 600 qhs helps preventatively about 20% with no s/e  Meclizine does make her drowsy  Could not tolerate diclofenac due to cognitive s/e, thus taking maxalt with some benefit    Will follow up in three months time for Botox reassessment and likely reinjection    Will see next follow up 2/2018 for recheck

## 2018-12-21 DIAGNOSIS — M54.2 CERVICAL MUSCLE PAIN: ICD-10-CM

## 2018-12-21 RX ORDER — TIZANIDINE 2 MG/1
2 TABLET ORAL EVERY 12 HOURS PRN
Qty: 30 TABLET | Refills: 1 | Status: SHIPPED | OUTPATIENT
Start: 2018-12-21 | End: 2019-01-28 | Stop reason: SDUPTHER

## 2018-12-26 ENCOUNTER — TELEPHONE (OUTPATIENT)
Dept: NEUROLOGY | Facility: CLINIC | Age: 38
End: 2018-12-26

## 2018-12-27 DIAGNOSIS — G43.719 INTRACTABLE CHRONIC MIGRAINE WITHOUT AURA AND WITHOUT STATUS MIGRAINOSUS: ICD-10-CM

## 2018-12-27 DIAGNOSIS — J45.30 MILD PERSISTENT ASTHMA WITHOUT COMPLICATION: ICD-10-CM

## 2018-12-27 RX ORDER — FLUTICASONE FUROATE AND VILANTEROL 200; 25 UG/1; UG/1
1 POWDER RESPIRATORY (INHALATION) DAILY
Qty: 30 EACH | Refills: 3 | Status: SHIPPED | OUTPATIENT
Start: 2018-12-27 | End: 2019-04-23 | Stop reason: SDUPTHER

## 2018-12-31 RX ORDER — MECLIZINE HCL 12.5 MG/1
TABLET ORAL
Qty: 20 TABLET | Refills: 1 | Status: SHIPPED | OUTPATIENT
Start: 2018-12-31 | End: 2019-02-26 | Stop reason: SDUPTHER

## 2019-01-14 DIAGNOSIS — J45.30 MILD PERSISTENT ASTHMA WITHOUT COMPLICATION: ICD-10-CM

## 2019-01-14 RX ORDER — MONTELUKAST SODIUM 10 MG/1
10 TABLET ORAL
Qty: 90 TABLET | Refills: 1 | Status: SHIPPED | OUTPATIENT
Start: 2019-01-14 | End: 2019-07-17 | Stop reason: SDUPTHER

## 2019-01-27 DIAGNOSIS — M54.12 CERVICAL RADICULOPATHY: ICD-10-CM

## 2019-01-27 DIAGNOSIS — G43.719 INTRACTABLE CHRONIC MIGRAINE WITHOUT AURA AND WITHOUT STATUS MIGRAINOSUS: ICD-10-CM

## 2019-01-28 DIAGNOSIS — M54.2 CERVICAL MUSCLE PAIN: ICD-10-CM

## 2019-01-28 RX ORDER — TIZANIDINE 2 MG/1
2 TABLET ORAL EVERY 12 HOURS PRN
Qty: 30 TABLET | Refills: 0 | Status: SHIPPED | OUTPATIENT
Start: 2019-01-28 | End: 2019-02-25 | Stop reason: SDUPTHER

## 2019-01-28 NOTE — TELEPHONE ENCOUNTER
Spoke with pt and scheduled appt for 2/5/19  She had BW done already at [facility]  Called and requested results to be faxed   These are being faxed over today

## 2019-01-29 RX ORDER — GABAPENTIN 600 MG/1
600 TABLET ORAL
Qty: 30 TABLET | Refills: 2 | Status: SHIPPED | OUTPATIENT
Start: 2019-01-29 | End: 2019-04-28 | Stop reason: SDUPTHER

## 2019-02-05 DIAGNOSIS — IMO0002 CHRONIC MIGRAINE: ICD-10-CM

## 2019-02-11 RX ORDER — ONDANSETRON 4 MG/1
4 TABLET, ORALLY DISINTEGRATING ORAL EVERY 8 HOURS PRN
Qty: 20 TABLET | Refills: 0 | Status: SHIPPED | OUTPATIENT
Start: 2019-02-11 | End: 2019-07-25 | Stop reason: SDUPTHER

## 2019-02-25 DIAGNOSIS — M54.2 CERVICAL MUSCLE PAIN: ICD-10-CM

## 2019-02-25 RX ORDER — TIZANIDINE 2 MG/1
2 TABLET ORAL EVERY 12 HOURS PRN
Qty: 30 TABLET | Refills: 0 | Status: SHIPPED | OUTPATIENT
Start: 2019-02-25 | End: 2019-03-15 | Stop reason: SDUPTHER

## 2019-02-25 NOTE — TELEPHONE ENCOUNTER
Final refill of tizanidine sent today until Nnekadavida Campapalak is seen for an OV    Also due for BW(ordered 6/20/18 by me)    thx

## 2019-02-25 NOTE — TELEPHONE ENCOUNTER
Patient notified and states she is currently on a bus traveling and will call back tomorrow morning to schedule

## 2019-02-26 ENCOUNTER — OFFICE VISIT (OUTPATIENT)
Dept: INTERNAL MEDICINE CLINIC | Facility: CLINIC | Age: 39
End: 2019-02-26
Payer: COMMERCIAL

## 2019-02-26 ENCOUNTER — TELEPHONE (OUTPATIENT)
Dept: INTERNAL MEDICINE CLINIC | Facility: CLINIC | Age: 39
End: 2019-02-26

## 2019-02-26 ENCOUNTER — OFFICE VISIT (OUTPATIENT)
Dept: NEUROLOGY | Facility: CLINIC | Age: 39
End: 2019-02-26
Payer: COMMERCIAL

## 2019-02-26 VITALS
DIASTOLIC BLOOD PRESSURE: 82 MMHG | BODY MASS INDEX: 28.31 KG/M2 | WEIGHT: 209 LBS | HEART RATE: 100 BPM | HEIGHT: 72 IN | SYSTOLIC BLOOD PRESSURE: 120 MMHG

## 2019-02-26 VITALS
BODY MASS INDEX: 27.98 KG/M2 | RESPIRATION RATE: 18 BRPM | HEIGHT: 72 IN | TEMPERATURE: 98.3 F | DIASTOLIC BLOOD PRESSURE: 80 MMHG | HEART RATE: 80 BPM | SYSTOLIC BLOOD PRESSURE: 118 MMHG | WEIGHT: 206.6 LBS | OXYGEN SATURATION: 98 %

## 2019-02-26 DIAGNOSIS — G43.009 MIGRAINE WITHOUT AURA AND WITHOUT STATUS MIGRAINOSUS, NOT INTRACTABLE: Primary | ICD-10-CM

## 2019-02-26 DIAGNOSIS — G43.719 INTRACTABLE CHRONIC MIGRAINE WITHOUT AURA AND WITHOUT STATUS MIGRAINOSUS: ICD-10-CM

## 2019-02-26 DIAGNOSIS — R42 VERTIGO: ICD-10-CM

## 2019-02-26 DIAGNOSIS — J01.90 ACUTE NON-RECURRENT SINUSITIS, UNSPECIFIED LOCATION: ICD-10-CM

## 2019-02-26 DIAGNOSIS — G43.809 VESTIBULAR MIGRAINE: ICD-10-CM

## 2019-02-26 DIAGNOSIS — E55.9 VITAMIN D DEFICIENCY: ICD-10-CM

## 2019-02-26 DIAGNOSIS — H81.01 MENIERE'S DISEASE OF RIGHT EAR: ICD-10-CM

## 2019-02-26 DIAGNOSIS — H93.11 TINNITUS OF RIGHT EAR: ICD-10-CM

## 2019-02-26 DIAGNOSIS — G62.9 NEUROPATHY: ICD-10-CM

## 2019-02-26 PROCEDURE — 99213 OFFICE O/P EST LOW 20 MIN: CPT | Performed by: INTERNAL MEDICINE

## 2019-02-26 PROCEDURE — 99214 OFFICE O/P EST MOD 30 MIN: CPT | Performed by: PSYCHIATRY & NEUROLOGY

## 2019-02-26 RX ORDER — MECLIZINE HCL 12.5 MG/1
TABLET ORAL
Qty: 20 TABLET | Refills: 1 | Status: SHIPPED | OUTPATIENT
Start: 2019-02-26 | End: 2019-07-25 | Stop reason: SDUPTHER

## 2019-02-26 RX ORDER — ERGOCALCIFEROL 1.25 MG/1
CAPSULE ORAL
Qty: 7 CAPSULE | Refills: 0 | Status: SHIPPED | OUTPATIENT
Start: 2019-02-26 | End: 2019-08-10 | Stop reason: SDUPTHER

## 2019-02-26 RX ORDER — DOXYCYCLINE HYCLATE 100 MG/1
100 CAPSULE ORAL EVERY 12 HOURS SCHEDULED
Qty: 20 CAPSULE | Refills: 0 | Status: SHIPPED | OUTPATIENT
Start: 2019-02-26 | End: 2019-02-27 | Stop reason: ALTCHOICE

## 2019-02-26 NOTE — TELEPHONE ENCOUNTER
Patient thinks she has a sinus infection started Tuesday she just got back from [place] yesterday  Nasal congestion  Post nasal Drip  Sore throat  Headache/ Pressure   Mucus/ Greenish in color   No Fever    Patient has been doing Benadryl but it is not doing anything and CBD oil for the headache helping a little     Called yesterday to try and get appointment for today was told to call at 8 am for same day patient called today at 830 am and both same days were gone, would like to come in today after 1 pm because she has an appointment with neuro @  in [place]

## 2019-02-26 NOTE — PROGRESS NOTES
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   - No Notox side effects  - Also takes Mag ox + B2 + B1+ B6 daily with improvement  Vestibular migraine- see above- meclizine works as needed     Vitamin D deficiency  -     ergocalciferol (VITAMIN D2) 50,000 units; Take 1 tab weekly x 7 weeks    Intractable chronic migraine without aura and without status migrainosus  -     meclizine (ANTIVERT) 12 5 MG tablet; Take 1 tab q8h as needed for vertigo  Max 2-3 days a week    Vertigo  -     meclizine (ANTIVERT) 12 5 MG tablet; Take 1 tab q8h as needed for vertigo  Max 2-3 days a week  -     Ambulatory Referral to Otolaryngology; Future    Meniere's disease of right ear  -     Ambulatory Referral to Otolaryngology; Future    Tinnitus of right ear  -     Ambulatory Referral to Otolaryngology; Future     Neuropathy  - Improved with B1 and B6 supplementation  Has reduced sensory loss compared to last visit (hx alcohol abuse stopped 2011- likely etiology)  - Balance improved- no falls    Follow up at next scheduled Botox appointment    Subjective:    HPI      Ms Emelia Wakefield is a pleasant 45 yo female seen in follow up for chronic migraines, hx Meniere's disease- saw ENT in past   States since Botox 12/10/2018 she has had no migraines and only mild headaches that she does not even realize is there  States her overall dizziness and tinnitus are improved as well    States she still is "off balance" when she turns quickly but this certainly not worse (states no falls)  States she takes Magnesium at night time, and at daytime takes B1, B6, B2 with benefit to headaches and dizziness also  States her sensation in LE is improving and feels her feet now and is happy about this  No Botox s/e  States she is extremely happy with her headache and vertigo doing better and her psychiatrist is now weaning her off of Klonopin  States her numbness in her LE is improving  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 as headaches vertigo significantly improved as is happy about this    The following portions of the patient's history were reviewed and updated as appropriate: allergies, current medications, past family history, past medical history, past social history, past surgical history and problem list and ROS         Objective:    Blood pressure 120/82, pulse 100, height 6' 1" (1 854 m), weight 94 8 kg (209 lb)  Physical Exam   Constitutional: She is oriented to person, place, and time  She appears well-developed and well-nourished  HENT:   Head: Normocephalic and atraumatic  Eyes: Pupils are equal, round, and reactive to light  EOM are normal    Neck: Normal range of motion  Cardiovascular: Normal rate and regular rhythm  Pulmonary/Chest: Effort normal    Abdominal: Soft  Musculoskeletal: Normal range of motion  She exhibits no tenderness  Neurological: She is alert and oriented to person, place, and time  She has normal strength  Coordination normal    Reflex Scores:       Achilles reflexes are 1+ on the right side and 1+ on the left side  Nursing note and vitals reviewed  Neurological Exam  Mental Status  Alert  Oriented to person, place, time and situation  Recent and remote memory are intact  no dysarthria present  Language is fluent with no aphasia  Unable to repeat  Attention and concentration are normal  Fund of knowledge is appropriate for level of education  Cranial Nerves  CN II: Visual fields full to confrontation  Right funduscopic exam: disc intact   Left funduscopic exam: disc intact  CN III, IV, VI: Extraocular movements intact bilaterally  Extraocular movements intact bilaterally  Pupils equal round and reactive to light bilaterally  CN V: Facial sensation is normal   CN VII: Full and symmetric facial movement  CN VIII: Hearing is normal   CN IX, X: Palate elevates symmetrically  Normal gag reflex  CN XI: Shoulder shrug strength is normal   CN XII: Tongue midline without atrophy or fasciculations  Motor   Normal muscle tone  Strength is 5/5 throughout all four extremities  Sensory  Light touch is normal in upper and lower extremities  Temperature is normal in upper and lower extremities  Vibration abnormality:  No right-sided hemispatial neglect  No left-sided hemispatial neglect  Reduced sensation b/l LE distal feet (improved from last time)  Reflexes                                           Right                      Left  Achilles                                1+                         1+  Plantar                           Downgoing                Downgoing    Coordination  Finger-to-nose, rapid alternating movements and heel-to-shin normal bilaterally without dysmetria  Gait  Casual gait is normal including stance, stride, and arm swing  Tandem gait abnormality: Romberg is absent  ROS:    Review of Systems   Constitutional: Negative for activity change, appetite change, chills, diaphoresis, fatigue, fever and unexpected weight change  HENT: Negative for congestion, dental problem, drooling, ear discharge, ear pain, facial swelling, hearing loss, mouth sores, nosebleeds, postnasal drip, rhinorrhea, sinus pressure, sinus pain, sneezing, sore throat, tinnitus, trouble swallowing and voice change  Eyes: Negative for photophobia, pain, discharge, redness, itching and visual disturbance  Respiratory: Negative for apnea, cough, choking, chest tightness, shortness of breath, wheezing and stridor      Cardiovascular: Negative for chest pain, palpitations and leg swelling  Gastrointestinal: Negative for abdominal distention, abdominal pain, anal bleeding, blood in stool, constipation, diarrhea, nausea, rectal pain and vomiting  Endocrine: Negative for cold intolerance, heat intolerance, polydipsia, polyphagia and polyuria  Genitourinary: Negative for decreased urine volume, difficulty urinating, dyspareunia, dysuria, enuresis, flank pain, frequency, genital sores, hematuria, menstrual problem, pelvic pain, urgency, vaginal bleeding, vaginal discharge and vaginal pain  Musculoskeletal: Negative for arthralgias, back pain, gait problem, joint swelling, myalgias, neck pain and neck stiffness  Skin: Negative for color change, pallor, rash and wound  Allergic/Immunologic: Negative for environmental allergies, food allergies and immunocompromised state  Neurological: Negative for dizziness, tremors, seizures, syncope, facial asymmetry, speech difficulty, weakness, light-headedness, numbness and headaches  Hematological: Negative for adenopathy  Does not bruise/bleed easily  Psychiatric/Behavioral: Negative for agitation, behavioral problems, confusion, decreased concentration, dysphoric mood, hallucinations, self-injury, sleep disturbance and suicidal ideas  The patient is not nervous/anxious and is not hyperactive

## 2019-02-26 NOTE — PROGRESS NOTES
Assessment/Plan:    No problem-specific Assessment & Plan notes found for this encounter  Diagnoses and all orders for this visit:    Acute non-recurrent sinusitis, unspecified location  Comments:  Start antibiotic  Instructed to use warm compress, steam bath to help with congestion  Start Tylenol or ibuprofen, saline spray prn  Increase fluid intake  Orders:  -     doxycycline hyclate (VIBRAMYCIN) 100 mg capsule; Take 1 capsule (100 mg total) by mouth every 12 (twelve) hours for 10 days      Regular follow up with PCP in 2 months  Subjective:      Patient ID: Sukumar Arnold is a 46 y o  female  Nhung complains of sinus congestion for the past week  She was out of [place] last [place]  Denies any fever or chills  No cough, wheezing or difficulty breathing  She has not tried any over-the-counter medication  She did try CBD oil which she uses for anxiety, applied it on her face with minimal relief  No GI symptoms  Sore throat is mild, no dysphagia  Sinusitis   This is a new problem  The current episode started in the past 7 days  The problem has been gradually worsening since onset  There has been no fever  The pain is moderate  Associated symptoms include congestion, headaches, sinus pressure and a sore throat  Pertinent negatives include no chills, coughing, ear pain, shortness of breath or sneezing  Past treatments include lying down and sitting up  The treatment provided no relief  The following portions of the patient's history were reviewed and updated as appropriate: allergies, current medications, past medical history, past social history and problem list     Review of Systems   Constitutional: Negative for chills  HENT: Positive for congestion, sinus pressure and sore throat  Negative for ear pain and sneezing  Respiratory: Negative for cough and shortness of breath  Gastrointestinal: Negative for abdominal pain, nausea and vomiting  Neurological: Positive for headaches  Negative for dizziness  Objective:      /80   Pulse 80   Temp 98 3 °F (36 8 °C)   Resp 18   Ht 6' 1" (1 854 m)   Wt 93 7 kg (206 lb 9 6 oz)   SpO2 98%   BMI 27 26 kg/m²          Physical Exam   Constitutional: She appears well-developed and well-nourished  HENT:   Head: Normocephalic and atraumatic  Right Ear: Tympanic membrane, external ear and ear canal normal    Left Ear: Tympanic membrane, external ear and ear canal normal    Nose: Rhinorrhea present  No mucosal edema or sinus tenderness  Right sinus exhibits maxillary sinus tenderness and frontal sinus tenderness  Left sinus exhibits maxillary sinus tenderness and frontal sinus tenderness  Mouth/Throat: Oropharynx is clear and moist and mucous membranes are normal    Eyes: Pupils are equal, round, and reactive to light  Conjunctivae are normal    Neck: Neck supple  Cardiovascular: Normal rate, regular rhythm and normal heart sounds  Pulmonary/Chest: Effort normal and breath sounds normal  She has no wheezes  She has no rales  Abdominal: Bowel sounds are normal    Lymphadenopathy:     She has no cervical adenopathy  Neurological: She is alert  Skin: Skin is warm  No rash noted  Nursing note and vitals reviewed

## 2019-02-27 ENCOUNTER — TELEPHONE (OUTPATIENT)
Dept: INTERNAL MEDICINE CLINIC | Facility: CLINIC | Age: 39
End: 2019-02-27

## 2019-02-27 DIAGNOSIS — J01.90 ACUTE NON-RECURRENT SINUSITIS, UNSPECIFIED LOCATION: Primary | ICD-10-CM

## 2019-02-27 RX ORDER — DOXYCYCLINE 100 MG/1
100 TABLET ORAL 2 TIMES DAILY
Qty: 14 TABLET | Refills: 0 | Status: SHIPPED | OUTPATIENT
Start: 2019-02-27 | End: 2019-03-04 | Stop reason: SDUPTHER

## 2019-02-27 NOTE — TELEPHONE ENCOUNTER
spoke with Dr Sarthak Brand states that is fine   Will call in when pharmacy opens at [facility]

## 2019-03-04 ENCOUNTER — OFFICE VISIT (OUTPATIENT)
Dept: INTERNAL MEDICINE CLINIC | Facility: CLINIC | Age: 39
End: 2019-03-04
Payer: COMMERCIAL

## 2019-03-04 ENCOUNTER — DOCUMENTATION (OUTPATIENT)
Dept: NEUROLOGY | Facility: CLINIC | Age: 39
End: 2019-03-04

## 2019-03-04 ENCOUNTER — HOSPITAL ENCOUNTER (OUTPATIENT)
Dept: INFUSION CENTER | Facility: CLINIC | Age: 39
Discharge: HOME | End: 2019-03-04
Payer: COMMERCIAL

## 2019-03-04 VITALS
RESPIRATION RATE: 18 BRPM | HEIGHT: 72 IN | BODY MASS INDEX: 27.85 KG/M2 | OXYGEN SATURATION: 95 % | HEART RATE: 115 BPM | TEMPERATURE: 99.1 F | DIASTOLIC BLOOD PRESSURE: 78 MMHG | WEIGHT: 205.6 LBS | SYSTOLIC BLOOD PRESSURE: 114 MMHG

## 2019-03-04 VITALS
HEIGHT: 72 IN | RESPIRATION RATE: 18 BRPM | HEART RATE: 79 BPM | TEMPERATURE: 97.4 F | SYSTOLIC BLOOD PRESSURE: 126 MMHG | BODY MASS INDEX: 28.1 KG/M2 | WEIGHT: 207.5 LBS | DIASTOLIC BLOOD PRESSURE: 81 MMHG

## 2019-03-04 DIAGNOSIS — J45.31 MILD PERSISTENT ASTHMA WITH ACUTE EXACERBATION: ICD-10-CM

## 2019-03-04 DIAGNOSIS — R42 ORTHOSTATIC DIZZINESS: ICD-10-CM

## 2019-03-04 DIAGNOSIS — R05.9 COUGH: ICD-10-CM

## 2019-03-04 DIAGNOSIS — R68.89 FLU-LIKE SYMPTOMS: Primary | ICD-10-CM

## 2019-03-04 LAB — SL AMB POCT GLUCOSE BLD: 97

## 2019-03-04 PROCEDURE — 3008F BODY MASS INDEX DOCD: CPT | Performed by: INTERNAL MEDICINE

## 2019-03-04 PROCEDURE — 96360 HYDRATION IV INFUSION INIT: CPT

## 2019-03-04 PROCEDURE — 99214 OFFICE O/P EST MOD 30 MIN: CPT | Performed by: INTERNAL MEDICINE

## 2019-03-04 PROCEDURE — 87631 RESP VIRUS 3-5 TARGETS: CPT | Performed by: INTERNAL MEDICINE

## 2019-03-04 PROCEDURE — 82948 REAGENT STRIP/BLOOD GLUCOSE: CPT | Performed by: INTERNAL MEDICINE

## 2019-03-04 RX ORDER — PREDNISONE 20 MG/1
40 TABLET ORAL DAILY
Qty: 6 TABLET | Refills: 0 | Status: SHIPPED | OUTPATIENT
Start: 2019-03-04 | End: 2019-03-07

## 2019-03-04 RX ORDER — OSELTAMIVIR PHOSPHATE 75 MG/1
75 CAPSULE ORAL EVERY 12 HOURS SCHEDULED
Qty: 10 CAPSULE | Refills: 0 | Status: SHIPPED | OUTPATIENT
Start: 2019-03-04 | End: 2019-03-09

## 2019-03-04 RX ORDER — DOXYCYCLINE 100 MG/1
100 TABLET ORAL 2 TIMES DAILY
Qty: 6 TABLET | Refills: 0 | Status: SHIPPED | OUTPATIENT
Start: 2019-03-04 | End: 2019-03-05

## 2019-03-04 RX ADMIN — SODIUM CHLORIDE 500 ML: 0.9 INJECTION, SOLUTION INTRAVENOUS at 12:16

## 2019-03-04 NOTE — PROGRESS NOTES
Patient scheduled with Dr Nicolle Allan on 3/18/19 at the [facility]

## 2019-03-04 NOTE — PROGRESS NOTES
General 03/04/2019 12:16 PM Gosia Cabrera care coordination -   Note    No authorization needed   Please use our stock         Thank you

## 2019-03-04 NOTE — PATIENT INSTRUCTIONS
1  Doxycycline for total of 10 days  2  Prednisone for 3 days  3  tamiflu  4  IV fluids  5   If symptoms worsen, go to Emergency Room

## 2019-03-04 NOTE — PROGRESS NOTES
Assessment/Plan:     Diagnoses and all orders for this visit:    Flu-like symptoms  Comments:  flu swab sent, tamiflu/check CXR and precuations advised  scheduled for IV fluids at [address] [place] [address] at 12pm  Orders:  -     Influenza A/B and RSV by PCR; Future  -     oseltamivir (TAMIFLU) 75 mg capsule; Take 1 capsule (75 mg total) by mouth every 12 (twelve) hours for 5 days  -     doxycycline (ADOXA) 100 MG tablet; Take 1 tablet (100 mg total) by mouth 2 (two) times a day for 3 days For total of 10 days  -     XR chest pa & lateral; Future  -     Influenza A/B and RSV by PCR    Cough  Comments:  CXR as above and albuterol MDI or nebulizer at home  Orders:  -     Influenza A/B and RSV by PCR; Future  -     oseltamivir (TAMIFLU) 75 mg capsule; Take 1 capsule (75 mg total) by mouth every 12 (twelve) hours for 5 days  -     XR chest pa & lateral; Future  -     Influenza A/B and RSV by PCR    Orthostatic dizziness  Comments:  scheduled for IV fluids today as above  Orders:  -     POCT blood glucose    Mild persistent asthma with acute exacerbation  Comments:  prednisone for 3 days and complete 10 days of doxycycline total course  Orders:  -     doxycycline (ADOXA) 100 MG tablet; Take 1 tablet (100 mg total) by mouth 2 (two) times a day for 3 days For total of 10 days  -     predniSONE 20 mg tablet; Take 2 tablets (40 mg total) by mouth daily for 3 days  -     XR chest pa & lateral; Future      strict precautions advised should sx worsen or new sx develop-> ER    Subjective:      Patient ID: Ramesh Franklin is a 46 y o  female  HPI    Here for follow up and c/o cont'd URI sx, which have been worse since last 2-3 days  Had 1 week of sinus congestion/cough and green mucus prior to being seen last week in office by Dr Alcides Reyes  No SOB/wheezing at that time and prescribed doxycycline x7d for sinus infection    Feeling better from sinus headache and congestion but has been having subjective fever, chills, sweats, feeling unwell and chest tightness/cough since last 2-3 days  No SOB or chest pain but feels asthma is flaring at this time & feeling dizzy with standing, no falls or syncope  Orthostatics positive in office today & with HR increase  Declines neb treatment in office  No recent change in meds but is taking CBD oil and rec'd botox for migraines which has helped a lot  Taking zanaflex for neck muscle pains/spasms 1-2 times per day with relief as well  No other complaints    Past Medical History:   Diagnosis Date    Anxiety     Arthritis     Asthma     Depression     ETOH abuse     Herniated cervical disc     Meniere's disease     Migraine     Psychiatric disorder     anxiety/depression    Sinusitis     Thyroid disease     Vertigo     Vertigo      Vitals:    03/04/19 1054 03/04/19 1056 03/04/19 1057   BP: 110/76 102/70 114/78   Pulse: 87 102 (!) 115   Resp: 18     Temp: 99 1 °F (37 3 °C)     TempSrc: Oral     SpO2: 95%     Weight: 93 3 kg (205 lb 9 6 oz)     Height: 6' 1" (1 854 m)       Body mass index is 27 13 kg/m²      Current Outpatient Medications:     albuterol (2 5 mg/3 mL) 0 083 % nebulizer solution, Take 1 vial (2 5 mg total) by nebulization every 6 (six) hours as needed for wheezing or shortness of breath, Disp: 60 vial, Rfl: 1    albuterol (PROVENTIL HFA,VENTOLIN HFA) 90 mcg/act inhaler, Inhale 1 puff every 6 (six) hours as needed (cough), Disp: 8 g, Rfl: 2    ammonium lactate (LAC-HYDRIN) 12 % lotion, APPLY TO ARMS AND THIGHS TWICE DAILY, Disp: , Rfl: 2    amphetamine-dextroamphetamine (ADDERALL) 20 mg tablet, Take 20 mg by mouth 2 (two) times a day  , Disp: , Rfl:     benzonatate (TESSALON PERLES) 100 mg capsule, Take 1 capsule (100 mg total) by mouth every 8 (eight) hours as needed for cough, Disp: 30 capsule, Rfl: 0    clonazePAM (KlonoPIN) 1 mg tablet, Take 1 tablet by mouth every 8 (eight) hours as needed, Disp: , Rfl:     diclofenac potassium (CATAFLAM) 50 mg tablet, Take 1 tab at migraine onset, can repeat once in 8 hours  Max TID  Max 2- 3 days a week, Disp: 5 tablet, Rfl: 1    doxycycline (ADOXA) 100 MG tablet, Take 1 tablet (100 mg total) by mouth 2 (two) times a day for 3 days For total of 10 days, Disp: 6 tablet, Rfl: 0    ergocalciferol (VITAMIN D2) 50,000 units, Take 1 tab weekly x 7 weeks, Disp: 7 capsule, Rfl: 0    fluticasone-vilanterol (BREO ELLIPTA) 200-25 MCG/INH inhaler, Inhale 1 puff daily, Disp: 30 each, Rfl: 3    gabapentin (NEURONTIN) 600 MG tablet, TAKE 1 TABLET (600 MG TOTAL) BY MOUTH DAILY AT BEDTIME, Disp: 30 tablet, Rfl: 2    meclizine (ANTIVERT) 12 5 MG tablet, Take 1 tab q8h as needed for vertigo  Max 2-3 days a week, Disp: 20 tablet, Rfl: 1    montelukast (SINGULAIR) 10 mg tablet, Take 1 tablet (10 mg total) by mouth daily at bedtime, Disp: 90 tablet, Rfl: 1    ondansetron (ZOFRAN-ODT) 4 mg disintegrating tablet, Take 1 tablet (4 mg total) by mouth every 8 (eight) hours as needed for nausea or vomiting, Disp: 20 tablet, Rfl: 0    sertraline (ZOLOFT) 100 mg tablet, Take 100 mg by mouth every morning, Disp: , Rfl: 1    sertraline (ZOLOFT) 50 mg tablet, Take 100 mg by mouth  , Disp: , Rfl:     SUMAtriptan (IMITREX) 50 mg tablet, Take 1 tablet (50 mg total) by mouth once as needed for migraine May repeat in 2 hours if needed, Disp: 10 tablet, Rfl: 2    tiZANidine (ZANAFLEX) 2 mg tablet, Take 1 tablet (2 mg total) by mouth every 12 (twelve) hours as needed for muscle spasms, Disp: 30 tablet, Rfl: 0    zolpidem (AMBIEN) 5 mg tablet, Take 5 mg by mouth daily at bedtime as needed, Disp: , Rfl: 2    oseltamivir (TAMIFLU) 75 mg capsule, Take 1 capsule (75 mg total) by mouth every 12 (twelve) hours for 5 days, Disp: 10 capsule, Rfl: 0    predniSONE 20 mg tablet, Take 2 tablets (40 mg total) by mouth daily for 3 days, Disp: 6 tablet, Rfl: 0  No current facility-administered medications for this visit       Facility-Administered Medications Ordered in Other Visits:     sodium chloride 0 9 % bolus 500 mL, 500 mL, Intravenous, Once, Jcarlos Pagan,   Allergies   Allergen Reactions    Latuda [Lurasidone]      Reaction: Drug Psychosis    Topamax [Topiramate]      psychosis    Amoxicillin Rash    Percocet [Oxycodone-Acetaminophen] Rash    Prednisone Anxiety, Confusion and Delerium     hallucination         Review of Systems   Constitutional: Positive for chills and fatigue  Negative for fever  HENT: Positive for congestion, postnasal drip, rhinorrhea and sore throat  Eyes: Negative for visual disturbance  Respiratory: Positive for cough and chest tightness  Negative for shortness of breath and wheezing  Cardiovascular: Negative for chest pain  Gastrointestinal: Negative for abdominal pain and diarrhea  Genitourinary: Negative for dysuria  Musculoskeletal: Positive for arthralgias and myalgias  Skin: Negative for rash  Allergic/Immunologic: Positive for environmental allergies  Neurological: Positive for light-headedness  Negative for headaches  Psychiatric/Behavioral: Negative for dysphoric mood  Objective:      /78 Comment: standing  Pulse (!) 115   Temp 99 1 °F (37 3 °C) (Oral)   Resp 18   Ht 6' 1" (1 854 m)   Wt 93 3 kg (205 lb 9 6 oz)   SpO2 95%   BMI 27 13 kg/m²          Physical Exam   Constitutional: She appears well-developed and well-nourished  C/o feeling unwell & occ cough during appt but in no acute distress   HENT:   Head: Normocephalic and atraumatic  Right Ear: Tympanic membrane normal    Left Ear: Tympanic membrane normal    Mouth/Throat: Posterior oropharyngeal erythema present  Eyes: Pupils are equal, round, and reactive to light  Cardiovascular: Regular rhythm and normal heart sounds  Tachycardia present  No murmur heard  Pulmonary/Chest: Effort normal and breath sounds normal  She has no wheezes  She has no rales  Abdominal: Soft  Bowel sounds are normal  There is no tenderness  Musculoskeletal: She exhibits no edema  Lymphadenopathy:     She has no cervical adenopathy  Neurological: She is alert  Psychiatric: She has a normal mood and affect  Her behavior is normal    Vitals reviewed

## 2019-03-04 NOTE — PROGRESS NOTES
Pt seen and assessed by PCP Dr Siria Schwartz earlier today  Has flu-like symptoms and is orthostatic  To [facility] for 500ml Normal Saline Hydration  She reports generally not feeling well with fatigue, muscle and joint aches  She is wearing a mask while in clinic

## 2019-03-05 ENCOUNTER — TELEPHONE (OUTPATIENT)
Dept: INTERNAL MEDICINE CLINIC | Facility: CLINIC | Age: 39
End: 2019-03-05

## 2019-03-05 DIAGNOSIS — J45.31 MILD PERSISTENT ASTHMA WITH ACUTE EXACERBATION: ICD-10-CM

## 2019-03-05 DIAGNOSIS — R68.89 FLU-LIKE SYMPTOMS: ICD-10-CM

## 2019-03-05 LAB
FLUAV AG SPEC QL: NOT DETECTED
FLUBV AG SPEC QL: NOT DETECTED
RSV B RNA SPEC QL NAA+PROBE: NOT DETECTED

## 2019-03-05 RX ORDER — DOXYCYCLINE 100 MG/1
100 TABLET ORAL 2 TIMES DAILY
Qty: 6 TABLET | Refills: 0 | Status: SHIPPED | OUTPATIENT
Start: 2019-03-05 | End: 2019-03-08

## 2019-03-05 NOTE — TELEPHONE ENCOUNTER
----- Message from Gage Wilder DO sent at 3/5/2019  7:49 AM EST -----  Flu test is back and negative  Is Nhugn feeling better after IV fluids yesterday?

## 2019-03-05 NOTE — TELEPHONE ENCOUNTER
Patient notified  Patient states she feels a little bit better  States since taking the Tamiflu her body aches have mostly gone away  But states she still feels nauseous, HA's, cough, fatigued, sore throat, and some light headedness  Patient would like to know if we can send over the Doxycycline to [facility] on [address] instead

## 2019-03-07 ENCOUNTER — TELEPHONE (OUTPATIENT)
Dept: INTERNAL MEDICINE CLINIC | Facility: CLINIC | Age: 39
End: 2019-03-07

## 2019-03-07 NOTE — TELEPHONE ENCOUNTER
adam from Infusion called stating Billing is having an issue with the infusion order  A new order needs to be written up stating its a "clarification for order written on 3/4/19"   also the Diagnosis needs to be written out with date and time as well your signature  Original copy is in your bin

## 2019-03-07 NOTE — TELEPHONE ENCOUNTER
Diagnoses are written out & Date/time is written on order    pls contact billing so I can speak with them to get a better understanding & for future ordering of infusions    thx

## 2019-03-07 NOTE — TELEPHONE ENCOUNTER
Spoke with Isadora Brittle and she will reach out to the billing in her department and have them give you a call

## 2019-03-15 DIAGNOSIS — M54.2 CERVICAL MUSCLE PAIN: ICD-10-CM

## 2019-03-15 RX ORDER — TIZANIDINE 2 MG/1
2 TABLET ORAL EVERY 12 HOURS PRN
Qty: 30 TABLET | Refills: 2 | Status: SHIPPED | OUTPATIENT
Start: 2019-03-15 | End: 2019-05-06 | Stop reason: SDUPTHER

## 2019-03-18 ENCOUNTER — PROCEDURE VISIT (OUTPATIENT)
Dept: NEUROLOGY | Facility: CLINIC | Age: 39
End: 2019-03-18
Payer: COMMERCIAL

## 2019-03-18 VITALS — HEART RATE: 111 BPM | TEMPERATURE: 98.8 F | SYSTOLIC BLOOD PRESSURE: 120 MMHG | DIASTOLIC BLOOD PRESSURE: 80 MMHG

## 2019-03-18 DIAGNOSIS — G43.719 INTRACTABLE CHRONIC MIGRAINE WITHOUT AURA AND WITHOUT STATUS MIGRAINOSUS: Primary | ICD-10-CM

## 2019-03-18 PROCEDURE — 64615 CHEMODENERV MUSC MIGRAINE: CPT | Performed by: PSYCHIATRY & NEUROLOGY

## 2019-03-18 NOTE — PROGRESS NOTES
Chemodenervation  Date/Time: 3/18/2019 3:17 PM  Performed by: Maria M Castillo DO  Authorized by: Maria M Castillo DO     Pre-procedure details:     Prepped With: Alcohol    Anesthesia (see MAR for exact dosages): Anesthesia method:  None  Procedure details:     Position:  Supine and upright  Botox:     Botox Type:  Type A    Brand:  Botox    mL's of Botulinum Toxin:  155    mL's of preservative free sterile saline:  2    Final Concentration per CC:  100 units    Needle Gauge:  30 G 2 5 inch  Procedures:     Botox Procedures: chronic headache      Indications: migraines    Injection Location:     Head / Face:  L superior cervical paraspinal, R superior cervical paraspinal, L , R , R frontalis, L frontalis, L lateral occipitalis, R lateral occipitalis, procerus, R temporalis, L temporalis, L superior trapezius and R superior trapezius    L  injection amount:  5 unit(s)    R  injection amount:  5 unit(s)    L lateral frontalis:  5 unit(s)    R lateral frontalis:  5 unit(s)    L medial frontalis:  5 unit(s)    R medial frontalis:  5 unit(s)    L temporalis injection amount:  20 unit(s)    R temporalis injection amount:  20 unit(s)    Procerus injection amount:  5 unit(s)    L lateral occipitalis injection amount:  15 unit(s)    R lateral occipitalis injection amount:  15 unit(s)    L superior cervical paraspinal injection amount:  10 unit(s)    R superior cervical paraspinal injection amount:  10 unit(s)    L superior trapezius injection amount:  15 unit(s)    R superior trapezius injection amount:  15 unit(s)  Total Units:     Total units used:  155    Total units discarded:  45  Post-procedure details:     Chemodenervation:  Chronic migraine    Facial Nerve Location[de-identified]  Bilateral facial nerve    Patient tolerance of procedure:   Tolerated well, no immediate complications

## 2019-03-28 DIAGNOSIS — G43.009 MIGRAINE WITHOUT AURA AND WITHOUT STATUS MIGRAINOSUS, NOT INTRACTABLE: ICD-10-CM

## 2019-03-28 RX ORDER — SUMATRIPTAN 50 MG/1
50 TABLET, FILM COATED ORAL ONCE AS NEEDED
Qty: 10 TABLET | Refills: 2 | Status: SHIPPED | OUTPATIENT
Start: 2019-03-28 | End: 2019-08-13 | Stop reason: SDUPTHER

## 2019-04-23 DIAGNOSIS — J45.30 MILD PERSISTENT ASTHMA WITHOUT COMPLICATION: ICD-10-CM

## 2019-04-23 RX ORDER — FLUTICASONE FUROATE AND VILANTEROL 200; 25 UG/1; UG/1
1 POWDER RESPIRATORY (INHALATION) DAILY
Qty: 30 EACH | Refills: 3 | Status: SHIPPED | OUTPATIENT
Start: 2019-04-23 | End: 2019-06-03

## 2019-04-26 ENCOUNTER — TELEPHONE (OUTPATIENT)
Dept: INTERNAL MEDICINE CLINIC | Facility: CLINIC | Age: 39
End: 2019-04-26

## 2019-04-26 DIAGNOSIS — Z96.649 HISTORY OF HIP REPLACEMENT, UNSPECIFIED LATERALITY: Primary | ICD-10-CM

## 2019-04-26 RX ORDER — CLINDAMYCIN HYDROCHLORIDE 300 MG/1
600 CAPSULE ORAL ONCE
Qty: 4 CAPSULE | Refills: 1 | Status: SHIPPED | OUTPATIENT
Start: 2019-04-26 | End: 2019-04-26

## 2019-04-28 DIAGNOSIS — M54.12 CERVICAL RADICULOPATHY: ICD-10-CM

## 2019-04-28 DIAGNOSIS — G43.719 INTRACTABLE CHRONIC MIGRAINE WITHOUT AURA AND WITHOUT STATUS MIGRAINOSUS: ICD-10-CM

## 2019-04-28 RX ORDER — GABAPENTIN 600 MG/1
600 TABLET ORAL
Qty: 30 TABLET | Refills: 2 | Status: SHIPPED | OUTPATIENT
Start: 2019-04-28 | End: 2019-07-20 | Stop reason: SDUPTHER

## 2019-05-06 DIAGNOSIS — M54.2 CERVICAL MUSCLE PAIN: ICD-10-CM

## 2019-05-06 RX ORDER — TIZANIDINE 2 MG/1
2 TABLET ORAL EVERY 12 HOURS PRN
Qty: 30 TABLET | Refills: 2 | Status: SHIPPED | OUTPATIENT
Start: 2019-05-06 | End: 2019-06-19 | Stop reason: SDUPTHER

## 2019-05-22 DIAGNOSIS — E55.9 VITAMIN D DEFICIENCY: ICD-10-CM

## 2019-05-24 RX ORDER — ERGOCALCIFEROL 1.25 MG/1
CAPSULE ORAL
Qty: 7 CAPSULE | Refills: 0 | OUTPATIENT
Start: 2019-05-24

## 2019-06-03 ENCOUNTER — OFFICE VISIT (OUTPATIENT)
Dept: INTERNAL MEDICINE CLINIC | Facility: CLINIC | Age: 39
End: 2019-06-03
Payer: COMMERCIAL

## 2019-06-03 VITALS
DIASTOLIC BLOOD PRESSURE: 68 MMHG | WEIGHT: 194 LBS | HEIGHT: 72 IN | TEMPERATURE: 98.7 F | HEART RATE: 100 BPM | RESPIRATION RATE: 16 BRPM | OXYGEN SATURATION: 98 % | BODY MASS INDEX: 26.28 KG/M2 | SYSTOLIC BLOOD PRESSURE: 116 MMHG

## 2019-06-03 DIAGNOSIS — R35.0 URINE FREQUENCY: ICD-10-CM

## 2019-06-03 DIAGNOSIS — L65.9 THINNING HAIR: ICD-10-CM

## 2019-06-03 DIAGNOSIS — R53.83 OTHER FATIGUE: ICD-10-CM

## 2019-06-03 DIAGNOSIS — J01.90 ACUTE NON-RECURRENT SINUSITIS, UNSPECIFIED LOCATION: ICD-10-CM

## 2019-06-03 DIAGNOSIS — J45.31 MILD PERSISTENT ASTHMA WITH ACUTE EXACERBATION: Primary | ICD-10-CM

## 2019-06-03 LAB
BILIRUB UR QL STRIP: NEGATIVE
CLARITY UR: CLEAR
COLOR UR: YELLOW
GLUCOSE UR STRIP-MCNC: NEGATIVE MG/DL
HGB UR QL STRIP.AUTO: NEGATIVE
KETONES UR STRIP-MCNC: NEGATIVE MG/DL
LEUKOCYTE ESTERASE UR QL STRIP: NEGATIVE
NITRITE UR QL STRIP: NEGATIVE
PH UR STRIP.AUTO: 6.5 [PH]
PROT UR STRIP-MCNC: NEGATIVE MG/DL
SL AMB  POCT GLUCOSE, UA: NORMAL
SL AMB LEUKOCYTE ESTERASE,UA: NORMAL
SL AMB POCT BILIRUBIN,UA: NORMAL
SL AMB POCT BLOOD,UA: NORMAL
SL AMB POCT CLARITY,UA: CLEAR
SL AMB POCT COLOR,UA: YELLOW
SL AMB POCT KETONES,UA: NORMAL
SL AMB POCT NITRITE,UA: NORMAL
SL AMB POCT PH,UA: 6
SL AMB POCT SPECIFIC GRAVITY,UA: 1
SL AMB POCT URINE PROTEIN: NORMAL
SL AMB POCT UROBILINOGEN: 0.2
SP GR UR STRIP.AUTO: 1 (ref 1–1.03)
UROBILINOGEN UR QL STRIP.AUTO: 0.2 E.U./DL

## 2019-06-03 PROCEDURE — 99214 OFFICE O/P EST MOD 30 MIN: CPT | Performed by: INTERNAL MEDICINE

## 2019-06-03 PROCEDURE — 3008F BODY MASS INDEX DOCD: CPT | Performed by: INTERNAL MEDICINE

## 2019-06-03 PROCEDURE — 81002 URINALYSIS NONAUTO W/O SCOPE: CPT | Performed by: INTERNAL MEDICINE

## 2019-06-03 PROCEDURE — 81003 URINALYSIS AUTO W/O SCOPE: CPT | Performed by: INTERNAL MEDICINE

## 2019-06-03 RX ORDER — DOXYCYCLINE HYCLATE 100 MG/1
100 CAPSULE ORAL EVERY 12 HOURS SCHEDULED
Qty: 20 CAPSULE | Refills: 0 | Status: SHIPPED | OUTPATIENT
Start: 2019-06-03 | End: 2019-06-04

## 2019-06-04 ENCOUNTER — TELEPHONE (OUTPATIENT)
Dept: INTERNAL MEDICINE CLINIC | Facility: CLINIC | Age: 39
End: 2019-06-04

## 2019-06-04 DIAGNOSIS — J45.30 MILD PERSISTENT ASTHMA WITHOUT COMPLICATION: Primary | ICD-10-CM

## 2019-06-04 DIAGNOSIS — J01.90 ACUTE NON-RECURRENT SINUSITIS, UNSPECIFIED LOCATION: Primary | ICD-10-CM

## 2019-06-04 DIAGNOSIS — J45.30 MILD PERSISTENT ASTHMA WITHOUT COMPLICATION: ICD-10-CM

## 2019-06-04 RX ORDER — DOXYCYCLINE 100 MG/1
100 CAPSULE ORAL EVERY 12 HOURS
Qty: 20 CAPSULE | Refills: 0 | Status: SHIPPED | OUTPATIENT
Start: 2019-06-04 | End: 2019-06-14

## 2019-06-12 ENCOUNTER — DOCUMENTATION (OUTPATIENT)
Dept: NEUROLOGY | Facility: CLINIC | Age: 39
End: 2019-06-12

## 2019-06-19 DIAGNOSIS — M54.2 CERVICAL MUSCLE PAIN: ICD-10-CM

## 2019-06-19 RX ORDER — TIZANIDINE 2 MG/1
2 TABLET ORAL EVERY 12 HOURS PRN
Qty: 30 TABLET | Refills: 2 | Status: SHIPPED | OUTPATIENT
Start: 2019-06-19 | End: 2019-08-07 | Stop reason: SDUPTHER

## 2019-06-21 ENCOUNTER — TRANSCRIBE ORDERS (OUTPATIENT)
Dept: LAB | Facility: CLINIC | Age: 39
End: 2019-06-21

## 2019-06-21 ENCOUNTER — APPOINTMENT (OUTPATIENT)
Dept: LAB | Facility: CLINIC | Age: 39
End: 2019-06-21
Payer: COMMERCIAL

## 2019-06-21 DIAGNOSIS — R53.83 OTHER FATIGUE: ICD-10-CM

## 2019-06-21 DIAGNOSIS — R53.83 OTHER FATIGUE: Primary | ICD-10-CM

## 2019-06-21 LAB
FERRITIN SERPL-MCNC: 53 NG/ML (ref 8–388)
IRON SERPL-MCNC: 136 UG/DL (ref 50–170)
T4 FREE SERPL-MCNC: 0.83 NG/DL (ref 0.76–1.46)
TIBC SERPL-MCNC: 276 UG/DL (ref 250–450)
TSH SERPL DL<=0.05 MIU/L-ACNC: 3.94 UIU/ML (ref 0.36–3.74)

## 2019-06-21 PROCEDURE — 83550 IRON BINDING TEST: CPT

## 2019-06-21 PROCEDURE — 83540 ASSAY OF IRON: CPT

## 2019-06-21 PROCEDURE — 36415 COLL VENOUS BLD VENIPUNCTURE: CPT | Performed by: INTERNAL MEDICINE

## 2019-06-21 PROCEDURE — 84439 ASSAY OF FREE THYROXINE: CPT | Performed by: INTERNAL MEDICINE

## 2019-06-21 PROCEDURE — 84443 ASSAY THYROID STIM HORMONE: CPT | Performed by: INTERNAL MEDICINE

## 2019-06-21 PROCEDURE — 82728 ASSAY OF FERRITIN: CPT

## 2019-06-26 ENCOUNTER — TELEPHONE (OUTPATIENT)
Dept: INTERNAL MEDICINE CLINIC | Facility: CLINIC | Age: 39
End: 2019-06-26

## 2019-06-26 DIAGNOSIS — R79.89 ABNORMAL THYROID BLOOD TEST: ICD-10-CM

## 2019-06-26 DIAGNOSIS — R53.83 OTHER FATIGUE: ICD-10-CM

## 2019-06-26 DIAGNOSIS — Z86.39 HISTORY OF HYPOTHYROIDISM: Primary | ICD-10-CM

## 2019-06-28 ENCOUNTER — PROCEDURE VISIT (OUTPATIENT)
Dept: NEUROLOGY | Facility: CLINIC | Age: 39
End: 2019-06-28
Payer: COMMERCIAL

## 2019-06-28 VITALS — DIASTOLIC BLOOD PRESSURE: 84 MMHG | TEMPERATURE: 98.5 F | SYSTOLIC BLOOD PRESSURE: 132 MMHG

## 2019-06-28 DIAGNOSIS — G43.719 INTRACTABLE CHRONIC MIGRAINE WITHOUT AURA AND WITHOUT STATUS MIGRAINOSUS: ICD-10-CM

## 2019-06-28 DIAGNOSIS — IMO0002 CHRONIC MIGRAINE: Primary | ICD-10-CM

## 2019-06-28 PROCEDURE — 64615 CHEMODENERV MUSC MIGRAINE: CPT | Performed by: PSYCHIATRY & NEUROLOGY

## 2019-07-17 DIAGNOSIS — J45.30 MILD PERSISTENT ASTHMA WITHOUT COMPLICATION: ICD-10-CM

## 2019-07-17 RX ORDER — MONTELUKAST SODIUM 10 MG/1
10 TABLET ORAL
Qty: 90 TABLET | Refills: 1 | Status: SHIPPED | OUTPATIENT
Start: 2019-07-17 | End: 2020-01-21 | Stop reason: SDUPTHER

## 2019-07-20 DIAGNOSIS — G43.719 INTRACTABLE CHRONIC MIGRAINE WITHOUT AURA AND WITHOUT STATUS MIGRAINOSUS: ICD-10-CM

## 2019-07-20 DIAGNOSIS — M54.12 CERVICAL RADICULOPATHY: ICD-10-CM

## 2019-07-21 RX ORDER — GABAPENTIN 600 MG/1
600 TABLET ORAL
Qty: 30 TABLET | Refills: 2 | Status: SHIPPED | OUTPATIENT
Start: 2019-07-21 | End: 2019-10-16 | Stop reason: SDUPTHER

## 2019-07-25 DIAGNOSIS — R42 VERTIGO: ICD-10-CM

## 2019-07-25 DIAGNOSIS — IMO0002 CHRONIC MIGRAINE: ICD-10-CM

## 2019-07-25 DIAGNOSIS — G43.719 INTRACTABLE CHRONIC MIGRAINE WITHOUT AURA AND WITHOUT STATUS MIGRAINOSUS: ICD-10-CM

## 2019-07-25 RX ORDER — ONDANSETRON 4 MG/1
TABLET, ORALLY DISINTEGRATING ORAL
Qty: 20 TABLET | Refills: 0 | Status: SHIPPED | OUTPATIENT
Start: 2019-07-25 | End: 2019-08-10 | Stop reason: SDUPTHER

## 2019-07-25 RX ORDER — MECLIZINE HCL 12.5 MG/1
TABLET ORAL
Qty: 20 TABLET | Refills: 1 | Status: SHIPPED | OUTPATIENT
Start: 2019-07-25 | End: 2019-09-24 | Stop reason: SDUPTHER

## 2019-07-25 NOTE — TELEPHONE ENCOUNTER
states that she missed work a few times already due to vertigo  she will be requesting intermittent FMLA  i made her aware of fee and turn around time  her headaches are daily now and vertigo is 2-3 times a week  she does not have any meclizine left  she is asking for a refill  Refill entered   Currently taking MVI  mag, B1, B2, and B6  botox  please advise  [phone]

## 2019-07-26 NOTE — TELEPHONE ENCOUNTER
Pt called and left a message on machine stating that she was returning dr hussein's call    I called pt back and left a vm for pt to call office back

## 2019-07-30 NOTE — TELEPHONE ENCOUNTER
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  starts with a headache and then turns into vertigo by the end of the day  she bought the blue tint glasses and this helps her to see alittle bit better and helps ease the headaches a little  wakes up with headache on the right side behind her eye and getting fluid in her left ear   states that botox was working until she started working  she is interested in getting toradol injection  she currently has a headache, vertigo and nausea  she took meclizine this am and then she took zofran also this am      she would like to come to [place] office tomorrow morning   I scheduled pt for tomorrow morning at 9am   Please enter order

## 2019-07-30 NOTE — TELEPHONE ENCOUNTER
Order pended  Not sure how to sign off on a clinic administered med in the future  Will sign tomorrow when pt is checked in

## 2019-07-31 ENCOUNTER — CLINICAL SUPPORT (OUTPATIENT)
Dept: NEUROLOGY | Facility: CLINIC | Age: 39
End: 2019-07-31
Payer: COMMERCIAL

## 2019-07-31 DIAGNOSIS — G43.719 INTRACTABLE CHRONIC MIGRAINE WITHOUT AURA AND WITHOUT STATUS MIGRAINOSUS: Primary | ICD-10-CM

## 2019-07-31 PROCEDURE — 96372 THER/PROPH/DIAG INJ SC/IM: CPT | Performed by: PSYCHIATRY & NEUROLOGY

## 2019-07-31 RX ORDER — KETOROLAC TROMETHAMINE 30 MG/ML
60 INJECTION, SOLUTION INTRAMUSCULAR; INTRAVENOUS ONCE
Status: COMPLETED | OUTPATIENT
Start: 2019-07-31 | End: 2019-07-31

## 2019-07-31 RX ADMIN — KETOROLAC TROMETHAMINE 60 MG: 30 INJECTION, SOLUTION INTRAMUSCULAR; INTRAVENOUS at 09:15

## 2019-07-31 NOTE — PROGRESS NOTES
Patient came in for Toradol 60MG given in left gluteal due to 1-10 pain behind right eye  Patient done well   [facility]

## 2019-08-06 ENCOUNTER — TELEPHONE (OUTPATIENT)
Dept: INTERNAL MEDICINE CLINIC | Facility: CLINIC | Age: 39
End: 2019-08-06

## 2019-08-06 NOTE — TELEPHONE ENCOUNTER
SPOKE WITH PT, SHE STATED SHE HAD HER DAYS MIXED UP FOR HER F/U APPT AND THAT SHE FORGOT THAT SHE NEEDED THE BLOODWORK DONE  PT STATED THAT SHE'D GET HER BLOODWORK DONE AND THEN CALL BACK TO SCHEDULE HER APPT       PT IS AWARE OF THE NEW OFFICE AND STATED THAT SHE'S STILL GOING TO COME HERE TO SE DR JIANG

## 2019-08-07 DIAGNOSIS — J45.30 MILD PERSISTENT ASTHMA WITHOUT COMPLICATION: Primary | ICD-10-CM

## 2019-08-07 DIAGNOSIS — M54.2 CERVICAL MUSCLE PAIN: ICD-10-CM

## 2019-08-07 RX ORDER — TIZANIDINE 2 MG/1
2 TABLET ORAL EVERY 12 HOURS PRN
Qty: 30 TABLET | Refills: 1 | Status: SHIPPED | OUTPATIENT
Start: 2019-08-07 | End: 2019-09-04 | Stop reason: SDUPTHER

## 2019-08-10 DIAGNOSIS — E55.9 VITAMIN D DEFICIENCY: ICD-10-CM

## 2019-08-10 DIAGNOSIS — IMO0002 CHRONIC MIGRAINE: ICD-10-CM

## 2019-08-12 RX ORDER — ERGOCALCIFEROL 1.25 MG/1
CAPSULE ORAL
Qty: 4 CAPSULE | Refills: 3 | Status: SHIPPED | OUTPATIENT
Start: 2019-08-12 | End: 2020-11-16

## 2019-08-12 RX ORDER — ONDANSETRON 4 MG/1
TABLET, ORALLY DISINTEGRATING ORAL
Qty: 20 TABLET | Refills: 0 | Status: SHIPPED | OUTPATIENT
Start: 2019-08-12 | End: 2019-10-20 | Stop reason: SDUPTHER

## 2019-08-13 DIAGNOSIS — G43.009 MIGRAINE WITHOUT AURA AND WITHOUT STATUS MIGRAINOSUS, NOT INTRACTABLE: ICD-10-CM

## 2019-08-15 ENCOUNTER — CLINICAL SUPPORT (OUTPATIENT)
Dept: NEUROLOGY | Facility: CLINIC | Age: 39
End: 2019-08-15
Payer: COMMERCIAL

## 2019-08-15 ENCOUNTER — TELEPHONE (OUTPATIENT)
Dept: NEUROLOGY | Facility: CLINIC | Age: 39
End: 2019-08-15

## 2019-08-15 ENCOUNTER — APPOINTMENT (OUTPATIENT)
Dept: LAB | Facility: CLINIC | Age: 39
End: 2019-08-15
Payer: COMMERCIAL

## 2019-08-15 ENCOUNTER — TRANSCRIBE ORDERS (OUTPATIENT)
Dept: LAB | Facility: CLINIC | Age: 39
End: 2019-08-15

## 2019-08-15 DIAGNOSIS — J45.30 MILD PERSISTENT ASTHMA WITHOUT COMPLICATION: ICD-10-CM

## 2019-08-15 DIAGNOSIS — R79.89 ABNORMAL THYROID BLOOD TEST: ICD-10-CM

## 2019-08-15 DIAGNOSIS — G43.009 MIGRAINE WITHOUT AURA AND WITHOUT STATUS MIGRAINOSUS, NOT INTRACTABLE: Primary | ICD-10-CM

## 2019-08-15 DIAGNOSIS — IMO0002 CHRONIC MIGRAINE: Primary | ICD-10-CM

## 2019-08-15 DIAGNOSIS — R53.83 OTHER FATIGUE: ICD-10-CM

## 2019-08-15 DIAGNOSIS — M54.2 CERVICAL MUSCLE PAIN: ICD-10-CM

## 2019-08-15 LAB
ALBUMIN SERPL BCP-MCNC: 3.8 G/DL (ref 3.5–5)
ALP SERPL-CCNC: 55 U/L (ref 46–116)
ALT SERPL W P-5'-P-CCNC: 34 U/L (ref 12–78)
ANION GAP SERPL CALCULATED.3IONS-SCNC: 8 MMOL/L (ref 4–13)
AST SERPL W P-5'-P-CCNC: 13 U/L (ref 5–45)
BASOPHILS # BLD AUTO: 0.04 THOUSANDS/ΜL (ref 0–0.1)
BASOPHILS NFR BLD AUTO: 1 % (ref 0–1)
BILIRUB SERPL-MCNC: 0.4 MG/DL (ref 0.2–1)
BUN SERPL-MCNC: 9 MG/DL (ref 5–25)
CALCIUM SERPL-MCNC: 9.2 MG/DL (ref 8.3–10.1)
CHLORIDE SERPL-SCNC: 101 MMOL/L (ref 100–108)
CO2 SERPL-SCNC: 26 MMOL/L (ref 21–32)
CREAT SERPL-MCNC: 1.03 MG/DL (ref 0.6–1.3)
EOSINOPHIL # BLD AUTO: 0.15 THOUSAND/ΜL (ref 0–0.61)
EOSINOPHIL NFR BLD AUTO: 3 % (ref 0–6)
ERYTHROCYTE [DISTWIDTH] IN BLOOD BY AUTOMATED COUNT: 12.7 % (ref 11.6–15.1)
GFR SERPL CREATININE-BSD FRML MDRD: 69 ML/MIN/1.73SQ M
GLUCOSE P FAST SERPL-MCNC: 102 MG/DL (ref 65–99)
HCT VFR BLD AUTO: 45.5 % (ref 34.8–46.1)
HGB BLD-MCNC: 15 G/DL (ref 11.5–15.4)
IMM GRANULOCYTES # BLD AUTO: 0.01 THOUSAND/UL (ref 0–0.2)
IMM GRANULOCYTES NFR BLD AUTO: 0 % (ref 0–2)
LYMPHOCYTES # BLD AUTO: 1.48 THOUSANDS/ΜL (ref 0.6–4.47)
LYMPHOCYTES NFR BLD AUTO: 28 % (ref 14–44)
MCH RBC QN AUTO: 30.9 PG (ref 26.8–34.3)
MCHC RBC AUTO-ENTMCNC: 33 G/DL (ref 31.4–37.4)
MCV RBC AUTO: 94 FL (ref 82–98)
MONOCYTES # BLD AUTO: 0.35 THOUSAND/ΜL (ref 0.17–1.22)
MONOCYTES NFR BLD AUTO: 7 % (ref 4–12)
NEUTROPHILS # BLD AUTO: 3.25 THOUSANDS/ΜL (ref 1.85–7.62)
NEUTS SEG NFR BLD AUTO: 61 % (ref 43–75)
NRBC BLD AUTO-RTO: 0 /100 WBCS
PLATELET # BLD AUTO: 265 THOUSANDS/UL (ref 149–390)
PMV BLD AUTO: 10.5 FL (ref 8.9–12.7)
POTASSIUM SERPL-SCNC: 4.3 MMOL/L (ref 3.5–5.3)
PROT SERPL-MCNC: 7.3 G/DL (ref 6.4–8.2)
RBC # BLD AUTO: 4.85 MILLION/UL (ref 3.81–5.12)
SODIUM SERPL-SCNC: 135 MMOL/L (ref 136–145)
TSH SERPL DL<=0.05 MIU/L-ACNC: 1.52 UIU/ML (ref 0.36–3.74)
WBC # BLD AUTO: 5.28 THOUSAND/UL (ref 4.31–10.16)

## 2019-08-15 PROCEDURE — 96372 THER/PROPH/DIAG INJ SC/IM: CPT | Performed by: PSYCHIATRY & NEUROLOGY

## 2019-08-15 PROCEDURE — 85025 COMPLETE CBC W/AUTO DIFF WBC: CPT | Performed by: INTERNAL MEDICINE

## 2019-08-15 PROCEDURE — 84443 ASSAY THYROID STIM HORMONE: CPT | Performed by: INTERNAL MEDICINE

## 2019-08-15 PROCEDURE — 80053 COMPREHEN METABOLIC PANEL: CPT

## 2019-08-15 PROCEDURE — 36415 COLL VENOUS BLD VENIPUNCTURE: CPT

## 2019-08-15 RX ORDER — SUMATRIPTAN 50 MG/1
50 TABLET, FILM COATED ORAL ONCE AS NEEDED
Qty: 10 TABLET | Refills: 2 | Status: SHIPPED | OUTPATIENT
Start: 2019-08-15 | End: 2019-12-31 | Stop reason: SDUPTHER

## 2019-08-15 RX ORDER — KETOROLAC TROMETHAMINE 30 MG/ML
60 INJECTION, SOLUTION INTRAMUSCULAR; INTRAVENOUS ONCE
Status: COMPLETED | OUTPATIENT
Start: 2019-08-15 | End: 2019-08-15

## 2019-08-15 RX ORDER — KETOROLAC TROMETHAMINE 30 MG/ML
60 INJECTION, SOLUTION INTRAMUSCULAR; INTRAVENOUS ONCE
Status: CANCELLED | OUTPATIENT
Start: 2019-08-15 | End: 2019-08-20

## 2019-08-15 RX ADMIN — KETOROLAC TROMETHAMINE 60 MG: 30 INJECTION, SOLUTION INTRAMUSCULAR; INTRAVENOUS at 14:10

## 2019-08-15 NOTE — TELEPHONE ENCOUNTER
Pt dropped off FMLA forms for completion  Pt is aware of 2wk turn around  $30 00 form fee was dropped for initial completion  Payment was collected   Forms were scanned into chart and placed in clinical bin

## 2019-08-15 NOTE — TELEPHONE ENCOUNTER
Spoke with Kaylin, [address] is currently in with a patient and will sign orders when she is able

## 2019-08-15 NOTE — TELEPHONE ENCOUNTER
Patient currently in [employer] office dropping off FMLA forms  She is asking for a toradol shot as she has a migraine  Patient has migraine that started 8/10  She reports throbbing pain behind her right eye  Reports dizziness, nausea, light/sound sensitivity, and small visual changes only when moving her head too quickly that she states is caused by dizziness  Patient has tried zofran, meclizine, imitrex, and excedrin without relief  Takes gabapentin 600mg HS, B vitamins, and magnesium daily  Spoke with Kaylin  She discussed this with [facility] and patient can have toradol 60mg injection today  Patient made aware  Will enter orders, [facility] will sign off

## 2019-08-16 ENCOUNTER — TELEPHONE (OUTPATIENT)
Dept: INTERNAL MEDICINE CLINIC | Facility: CLINIC | Age: 39
End: 2019-08-16

## 2019-08-16 ENCOUNTER — TELEPHONE (OUTPATIENT)
Dept: NEUROLOGY | Facility: CLINIC | Age: 39
End: 2019-08-16

## 2019-08-16 ENCOUNTER — OFFICE VISIT (OUTPATIENT)
Dept: INTERNAL MEDICINE CLINIC | Facility: CLINIC | Age: 39
End: 2019-08-16
Payer: COMMERCIAL

## 2019-08-16 VITALS
WEIGHT: 187 LBS | BODY MASS INDEX: 25.33 KG/M2 | DIASTOLIC BLOOD PRESSURE: 68 MMHG | HEIGHT: 72 IN | SYSTOLIC BLOOD PRESSURE: 110 MMHG | HEART RATE: 91 BPM | OXYGEN SATURATION: 98 % | TEMPERATURE: 98 F

## 2019-08-16 DIAGNOSIS — J45.30 MILD PERSISTENT ASTHMA WITHOUT COMPLICATION: ICD-10-CM

## 2019-08-16 DIAGNOSIS — M54.9 CHRONIC NECK AND BACK PAIN: ICD-10-CM

## 2019-08-16 DIAGNOSIS — J01.90 ACUTE SINUSITIS, RECURRENCE NOT SPECIFIED, UNSPECIFIED LOCATION: Primary | ICD-10-CM

## 2019-08-16 DIAGNOSIS — M54.2 CHRONIC NECK AND BACK PAIN: ICD-10-CM

## 2019-08-16 DIAGNOSIS — R63.8 EXCESSIVE FLUID INTAKE: ICD-10-CM

## 2019-08-16 DIAGNOSIS — J30.9 ALLERGIC RHINITIS, UNSPECIFIED SEASONALITY, UNSPECIFIED TRIGGER: ICD-10-CM

## 2019-08-16 DIAGNOSIS — G89.29 CHRONIC NECK AND BACK PAIN: ICD-10-CM

## 2019-08-16 DIAGNOSIS — Z98.890 HISTORY OF CERVICAL SPINAL SURGERY: ICD-10-CM

## 2019-08-16 PROCEDURE — 99214 OFFICE O/P EST MOD 30 MIN: CPT | Performed by: INTERNAL MEDICINE

## 2019-08-16 PROCEDURE — 1036F TOBACCO NON-USER: CPT | Performed by: INTERNAL MEDICINE

## 2019-08-16 PROCEDURE — 3008F BODY MASS INDEX DOCD: CPT | Performed by: INTERNAL MEDICINE

## 2019-08-16 RX ORDER — AZITHROMYCIN 250 MG/1
TABLET, FILM COATED ORAL
Qty: 6 TABLET | Refills: 0 | Status: SHIPPED | OUTPATIENT
Start: 2019-08-16 | End: 2019-08-20

## 2019-08-16 NOTE — TELEPHONE ENCOUNTER
STATED THAT FOR 2 DAYS SHE'S BEEN CONGESTED, HER NOSE IS RUNNING, AND HER HEAD HAS BEEN THROBBING  STATED NO FEVER BUT SHE DID ASK FOR AN APPT   I GAVE HER TODAY AT 3PM, SHE STATED THAT SHE'LL BE HERE

## 2019-08-16 NOTE — PROGRESS NOTES
Assessment/Plan:     Diagnoses and all orders for this visit:    Acute sinusitis, recurrence not specified, unspecified location  Comments:  abx rx, rest/hydration(not in excess)  advised to avoid imitrex and zofran while taking abx  Orders:  -     azithromycin (ZITHROMAX) 250 mg tablet; Take 2 tablets on day 1 followed by 1 tablet on days 2-5  -     Ambulatory referral to Allergy; Future    Allergic rhinitis, unspecified seasonality, unspecified trigger  Comments:  ongoing, c/w flonase/antihistamine and allergy ref provided    Mild persistent asthma without complication  Comments:  stable at this time,c/w treating allergies and sinus infxn as above and adviar with albuterol prn    Excessive fluid intake  Comments:  reduce fluids intake by 1/2 -> d/w pt in detail and sequelae of not complying(dizziness, headaches, confusion, worsening symptoms)    Chronic neck and back pain  Comments:  ongoing over recent years, some relief with tizanidine, advised to go back and see her previous spine surgeon, will mail ref to [facility] as well  Orders:  -     Ambulatory referral to Spine Surgery; Future    History of cervical spinal surgery  -     Ambulatory referral to Spine Surgery; Future      BMI Counseling: Body mass index is 25 36 kg/m²  Discussed the patient's BMI with her  The BMI is above average  BMI counseling and education was provided to the patient  Nutrition recommendations include reducing portion sizes and decreasing overall calorie intake  Subjective:      Patient ID: Jolene Smith is a 46 y o  female  HPI    Here for follow up and c/o 1 week of sinus congestion, nasal drainage and cough productive of yellow/green phlegm, feeling unwell and sore throat/postnasal drip  No sick contacts but hx of mild asthma which worsens with URI symptoms  Trying to lose more weight with dieting & drinking plenty of water(already drank 130 oz of water today)    We reviewed most recent BW today and need to reduce her water intake by 1/2 at least to avoid symptoms of excess h20 intake/hyponatremia  Recently treated for sinus infection in June with doxycycline and has had frequent sinus congestion/allergy symptoms over last several months -> would like to see allergist   She is also having more posterior neck and right shoulder pain over recent months to years  Tizanidine helps but she had cervical spine surgery with Dr Philip Mancuso some years ago for the same symptoms  No other complaints  Past Medical History:   Diagnosis Date    Anxiety     Arthritis     Asthma     Depression     ETOH abuse     Herniated cervical disc     Meniere's disease     Migraine     Psychiatric disorder     anxiety/depression    Sinusitis     Thyroid disease     Vertigo     Vertigo      Vitals:    08/16/19 1516   BP: 110/68   BP Location: Left arm   Patient Position: Sitting   Cuff Size: Standard   Pulse: 91   Temp: 98 °F (36 7 °C)   SpO2: 98%   Weight: 84 8 kg (187 lb)   Height: 6' (1 829 m)     Body mass index is 25 36 kg/m²      Current Outpatient Medications:     albuterol (2 5 mg/3 mL) 0 083 % nebulizer solution, Take 1 vial (2 5 mg total) by nebulization every 6 (six) hours as needed for wheezing or shortness of breath, Disp: 60 vial, Rfl: 1    albuterol (PROVENTIL HFA,VENTOLIN HFA) 90 mcg/act inhaler, Inhale 1 puff every 6 (six) hours as needed (cough), Disp: 8 g, Rfl: 2    ammonium lactate (LAC-HYDRIN) 12 % lotion, APPLY TO ARMS AND THIGHS TWICE DAILY, Disp: , Rfl: 2    amphetamine-dextroamphetamine (ADDERALL) 20 mg tablet, Take 20 mg by mouth 2 (two) times a day  , Disp: , Rfl:     azithromycin (ZITHROMAX) 250 mg tablet, Take 2 tablets on day 1 followed by 1 tablet on days 2-5, Disp: 6 tablet, Rfl: 0    benzonatate (TESSALON PERLES) 100 mg capsule, Take 1 capsule (100 mg total) by mouth every 8 (eight) hours as needed for cough, Disp: 30 capsule, Rfl: 0    clonazePAM (KlonoPIN) 1 mg tablet, Take 1 tablet by mouth every 8 (eight) hours as needed, Disp: , Rfl:     diclofenac potassium (CATAFLAM) 50 mg tablet, Take 1 tab at migraine onset, can repeat once in 8 hours  Max TID  Max 2- 3 days a week (Patient not taking: Reported on 6/3/2019), Disp: 5 tablet, Rfl: 1    ergocalciferol (VITAMIN D2) 50,000 units, TAKE 1 CAPSULE WEEKLY FOR 4 WEEKS, Disp: 4 capsule, Rfl: 3    fluticasone-salmeterol (ADVAIR DISKUS, WIXELA INHUB) 250-50 mcg/dose inhaler, Inhale 1 puff 2 (two) times a day Rinse mouth after use , Disp: 1 Inhaler, Rfl: 3    gabapentin (NEURONTIN) 600 MG tablet, TAKE 1 TABLET (600 MG TOTAL) BY MOUTH DAILY AT BEDTIME, Disp: 30 tablet, Rfl: 2    meclizine (ANTIVERT) 12 5 MG tablet, Take 1 tab q8h as needed for vertigo  Max 2-3 days a week, Disp: 20 tablet, Rfl: 1    montelukast (SINGULAIR) 10 mg tablet, Take 1 tablet (10 mg total) by mouth daily at bedtime, Disp: 90 tablet, Rfl: 1    ondansetron (ZOFRAN-ODT) 4 mg disintegrating tablet, TAKE 1 TABLET BY MOUTH EVERY 8 HOURS AS NEEDED FOR NAUSEA AND VOMITING, Disp: 20 tablet, Rfl: 0    sertraline (ZOLOFT) 100 mg tablet, Take 100 mg by mouth every morning, Disp: , Rfl: 1    sertraline (ZOLOFT) 50 mg tablet, Take 100 mg by mouth  , Disp: , Rfl:     SUMAtriptan (IMITREX) 50 mg tablet, Take 1 tablet (50 mg total) by mouth once as needed for migraine May repeat in 2 hours if needed, Disp: 10 tablet, Rfl: 2    tiZANidine (ZANAFLEX) 2 mg tablet, Take 1 tablet (2 mg total) by mouth every 12 (twelve) hours as needed for muscle spasms, Disp: 30 tablet, Rfl: 1    zolpidem (AMBIEN) 5 mg tablet, Take 5 mg by mouth daily at bedtime as needed, Disp: , Rfl: 2  Allergies   Allergen Reactions    Latuda [Lurasidone]      Reaction: Drug Psychosis    Topamax [Topiramate]      psychosis    Amoxicillin Rash    Percocet [Oxycodone-Acetaminophen] Rash         Review of Systems   Constitutional: Positive for chills and fatigue  Negative for fever     HENT: Positive for congestion, postnasal drip, sinus pressure, sinus pain and sore throat  Eyes: Negative for visual disturbance  Respiratory: Positive for cough  Negative for shortness of breath  Cardiovascular: Negative for chest pain  Gastrointestinal: Negative for abdominal pain  Endocrine: Negative for polyuria  Genitourinary: Negative for dysuria  Musculoskeletal: Negative for arthralgias  Skin: Negative for rash  Allergic/Immunologic: Positive for environmental allergies  Neurological: Positive for headaches (but none now)  Psychiatric/Behavioral: Negative for dysphoric mood  Objective:      /68 (BP Location: Left arm, Patient Position: Sitting, Cuff Size: Standard)   Pulse 91   Temp 98 °F (36 7 °C)   Ht 6' (1 829 m)   Wt 84 8 kg (187 lb)   SpO2 98%   BMI 25 36 kg/m²          Physical Exam   Constitutional: She appears well-developed and well-nourished  HENT:   Head: Normocephalic and atraumatic  Right Ear: Tympanic membrane normal    Left Ear: Tympanic membrane normal    Nose: Mucosal edema present  Right sinus exhibits maxillary sinus tenderness and frontal sinus tenderness  Left sinus exhibits maxillary sinus tenderness and frontal sinus tenderness  Mouth/Throat: Posterior oropharyngeal erythema present  Eyes: Pupils are equal, round, and reactive to light  Cardiovascular: Normal rate and regular rhythm  No murmur heard  Pulmonary/Chest: Effort normal and breath sounds normal  She has no wheezes  She has no rales  Abdominal: Soft  Bowel sounds are normal  There is no tenderness  Musculoskeletal: She exhibits no edema  Lymphadenopathy:     She has no cervical adenopathy  Neurological: She is alert  Psychiatric: She has a normal mood and affect  Her behavior is normal    Vitals reviewed        Results for orders placed or performed in visit on 08/15/19   Comprehensive metabolic panel   Result Value Ref Range    Sodium 135 (L) 136 - 145 mmol/L    Potassium 4 3 3 5 - 5 3 mmol/L    Chloride 101 100 - 108 mmol/L    CO2 26 21 - 32 mmol/L    ANION GAP 8 4 - 13 mmol/L    BUN 9 5 - 25 mg/dL    Creatinine 1 03 0 60 - 1 30 mg/dL    Glucose, Fasting 102 (H) 65 - 99 mg/dL    Calcium 9 2 8 3 - 10 1 mg/dL    AST 13 5 - 45 U/L    ALT 34 12 - 78 U/L    Alkaline Phosphatase 55 46 - 116 U/L    Total Protein 7 3 6 4 - 8 2 g/dL    Albumin 3 8 3 5 - 5 0 g/dL    Total Bilirubin 0 40 0 20 - 1 00 mg/dL    eGFR 69 ml/min/1 73sq m

## 2019-08-16 NOTE — TELEPHONE ENCOUNTER
Sophie Gregory AssistantSigned  Yesterday                Pt dropped off FMLA forms for completion  Pt is aware of 2wk turn around  $30 00 form fee was dropped for initial completion  Payment was collected   Forms were scanned into chart and placed in clinical bin

## 2019-08-16 NOTE — PATIENT INSTRUCTIONS
1  Take antibiotic as prescribed  2  Rest/hydration  3  Call if symptoms worsen  4   Go back and see Dr Fernanda Culver
BRBPR

## 2019-08-20 NOTE — TELEPHONE ENCOUNTER
Received forms from [employer] I scanned them in the chart as well as faxed them over and received a confirmation receipt   Left message for pt that forms were done and faxed as well

## 2019-08-20 NOTE — TELEPHONE ENCOUNTER
Forms completed  I gave the forms to Delia Kehr  Pls ask Dr Sidney Durbin to review and sign  Once signed, pls scan into pt's chart and fax it appropriately        thanks

## 2019-08-28 ENCOUNTER — TELEPHONE (OUTPATIENT)
Dept: NEUROLOGY | Facility: CLINIC | Age: 39
End: 2019-08-28

## 2019-08-28 NOTE — TELEPHONE ENCOUNTER
Pt called in to stay that she's been out of work since 8/12/19 with a migraine, vertigo and sinus infection  Pt was in office on 8/15 for a Toradol injections and on abx for sinus infection  Pt states that this week she got her period and her vertigo has increased  Pt states that she feels she is ready to go back to work tomorrow  Pt states she needs letter stating that pt is released back to work since her FMLA is from neurology and she was out for longer than 5 days in a row  Okay to generate?       Clinical team: pt will  at [facility]

## 2019-08-30 NOTE — TELEPHONE ENCOUNTER
Pt left voicemail asking if letter is ready to be picked up  Please advise if okay to generate a return to work note

## 2019-08-30 NOTE — TELEPHONE ENCOUNTER
LMOM for patient to return call  Need to confirm that patient's vertigo is better before we can generate a letter to go back to work  Jennifer Paz is not open today

## 2019-08-30 NOTE — TELEPHONE ENCOUNTER
If vertigo has increased, she should not go back to work  Was this a misprint? If vertigo better- ok to go back to work with letter

## 2019-09-03 NOTE — TELEPHONE ENCOUNTER
pt states that her period ended and vertigo has resolved about 2 days ago and no vertigo since  she states that she still has headaches   having everyday  she states that she would like to go back to work for like 3 days a week, she would prefer 6 hours a day  when she had her period and vertigo she was taking meclizine 3 times a week and this is helpful      please advise on letter  [phone]

## 2019-09-04 ENCOUNTER — DOCUMENTATION (OUTPATIENT)
Dept: NEUROLOGY | Facility: CLINIC | Age: 39
End: 2019-09-04

## 2019-09-04 DIAGNOSIS — M54.2 CERVICAL MUSCLE PAIN: ICD-10-CM

## 2019-09-04 RX ORDER — TIZANIDINE 2 MG/1
2 TABLET ORAL EVERY 12 HOURS PRN
Qty: 30 TABLET | Refills: 0 | Status: SHIPPED | OUTPATIENT
Start: 2019-09-04 | End: 2019-12-31 | Stop reason: SDUPTHER

## 2019-09-04 NOTE — TELEPHONE ENCOUNTER
Did Yony Arnold make an appt to go back and see her spine surgeon?     We talked about it at last visit and I think it will help    Let me know

## 2019-09-04 NOTE — PROGRESS NOTES
General 09/03/2019  4:38 PM Blayne Barrientos MA CARE COORDINATION -   Note    BOTOX 200 UNITS (VISIT# 2) AUTH# [id number] 4 VISITS, AV 06/28/19 TILL 06/28/20    STOCK

## 2019-09-06 NOTE — TELEPHONE ENCOUNTER
yes she is okay to return to work if she is not having symptoms now  Suspect migrainous etiology of vertigo

## 2019-09-06 NOTE — TELEPHONE ENCOUNTER
Hey yes she is okay to return to work if she is not having symptoms  Suspect migrainous etiology of vertigo

## 2019-09-10 NOTE — TELEPHONE ENCOUNTER
Dr Елена Aaron- she is asking for you to write work note to limit work days to 3 per week, 6 hours each, from what I understand

## 2019-09-10 NOTE — TELEPHONE ENCOUNTER
Yes that is fine, will write letter and give to Tania Blanca tomorrow to mail- am in CV tomorrow, until reassessment  When do I or [address] see her next?

## 2019-09-12 NOTE — TELEPHONE ENCOUNTER
brent from staffing benefits dept called to clarify how long will be out of work  i made her aware that we generated a letter yesterday stating that she could return to work with linitations  she is requesting this letter be faxed to her at [phone]    Letter faxed

## 2019-09-16 ENCOUNTER — TELEPHONE (OUTPATIENT)
Dept: NEUROLOGY | Facility: CLINIC | Age: 39
End: 2019-09-16

## 2019-09-16 NOTE — TELEPHONE ENCOUNTER
Patient called in requesting a Toradol injection  Patient currently has a migraine, left frontal area  Associated symptoms of nausea, vomiting, dizziness, light sensitivity  Patient has used imitrex and meclizine with no relief  Patient would like to go to [facility] office today for injection, are you agreeable? Patient also requesting a note for work  Patient was supposed to return to work Friday but was unable to because of a migraine  She also missed today  Are you agreeable to note for missing work 9/13/19 and 9/16/19?

## 2019-09-16 NOTE — LETTER
September 16, 2019     Patient: Harry Tapia   YOB: 1980     To Whom It May Concern:    Madeline Courtney is under my professional care  Please excuse her from work 9/13/19 and 9/16/19 due to her neurological condition  If you have any further questions or concerns, please feel free to reach out to our office at [phone]      Sincerely,     Blas Martino, DO

## 2019-09-16 NOTE — TELEPHONE ENCOUNTER
Letter generated and sent to patient through [address]  Encounter forwarded to Rafa Mehta for Toradol injection to be arranged

## 2019-09-16 NOTE — TELEPHONE ENCOUNTER
Yes she can come in for Toradol injection  Please let patient know-- Yes okay to write for time off for now  However going forward unless we evaluate her the day of unless its on a rare occasion, cannot write time off letters    Miguel Epstein here, if migraines persist, can bring her in Wednesday at , as she was doing well the last time I saw her    Thanks

## 2019-09-16 NOTE — TELEPHONE ENCOUNTER
LMOM for patient to contact office regarding scheduling for toradol inj   If patient is not feeling well after inj there is a spot on hold for wed and she can come in 9/18/19 Cv location

## 2019-09-17 ENCOUNTER — CLINICAL SUPPORT (OUTPATIENT)
Dept: NEUROLOGY | Facility: CLINIC | Age: 39
End: 2019-09-17
Payer: COMMERCIAL

## 2019-09-17 DIAGNOSIS — IMO0002 CHRONIC MIGRAINE: Primary | ICD-10-CM

## 2019-09-17 PROCEDURE — 96372 THER/PROPH/DIAG INJ SC/IM: CPT | Performed by: PSYCHIATRY & NEUROLOGY

## 2019-09-17 RX ORDER — KETOROLAC TROMETHAMINE 30 MG/ML
60 INJECTION, SOLUTION INTRAMUSCULAR; INTRAVENOUS ONCE
Status: COMPLETED | OUTPATIENT
Start: 2019-09-17 | End: 2019-09-17

## 2019-09-17 RX ADMIN — KETOROLAC TROMETHAMINE 60 MG: 30 INJECTION, SOLUTION INTRAMUSCULAR; INTRAVENOUS at 12:10

## 2019-09-17 NOTE — LETTER
September 17, 2019     Nhung Grant    Patient: Harry Tapia   YOB: 1980   Date of Visit: 9/17/2019       Dear Dr Rosen :    Please excuse patient for 9/13/2019 and 9/16/2019   Patient was under my care        Sincerely,        Blas Martino DO

## 2019-09-24 DIAGNOSIS — R42 VERTIGO: ICD-10-CM

## 2019-09-24 DIAGNOSIS — G43.719 INTRACTABLE CHRONIC MIGRAINE WITHOUT AURA AND WITHOUT STATUS MIGRAINOSUS: ICD-10-CM

## 2019-09-24 RX ORDER — MECLIZINE HCL 12.5 MG/1
TABLET ORAL
Qty: 20 TABLET | Refills: 1 | Status: SHIPPED | OUTPATIENT
Start: 2019-09-24 | End: 2020-02-11

## 2019-09-30 ENCOUNTER — PROCEDURE VISIT (OUTPATIENT)
Dept: NEUROLOGY | Facility: CLINIC | Age: 39
End: 2019-09-30
Payer: COMMERCIAL

## 2019-09-30 VITALS — SYSTOLIC BLOOD PRESSURE: 110 MMHG | DIASTOLIC BLOOD PRESSURE: 84 MMHG | HEART RATE: 98 BPM

## 2019-09-30 DIAGNOSIS — IMO0002 CHRONIC MIGRAINE: ICD-10-CM

## 2019-09-30 DIAGNOSIS — G43.719 INTRACTABLE CHRONIC MIGRAINE WITHOUT AURA AND WITHOUT STATUS MIGRAINOSUS: Primary | ICD-10-CM

## 2019-09-30 PROCEDURE — 64615 CHEMODENERV MUSC MIGRAINE: CPT | Performed by: PSYCHIATRY & NEUROLOGY

## 2019-09-30 RX ORDER — DEXAMETHASONE 1 MG
TABLET ORAL
Qty: 7 TABLET | Refills: 0 | Status: SHIPPED | OUTPATIENT
Start: 2019-09-30 | End: 2019-11-22 | Stop reason: SDUPTHER

## 2019-09-30 NOTE — PROGRESS NOTES
Chemodenervation  Date/Time: 9/30/2019 11:12 AM  Performed by: Titus Mendoza DO  Authorized by: Titus Mendoza DO     Pre-procedure details:     Prepped With: Alcohol    Anesthesia (see MAR for exact dosages): Anesthesia method:  None  Procedure details:     Position:  Supine and upright  Botox:     Botox Type:  Type A    Brand:  Botox    mL's of Botulinum Toxin:  180    mL's of preservative free sterile saline:  4    Final Concentration per CC:  100 units and 50 units    Needle Gauge:  30 G 2 5 inch  Procedures:     Botox Procedures: chronic headache      Indications: migraines    Injection Location:     Head / Face:  L superior cervical paraspinal, R superior cervical paraspinal, L , R , R frontalis, L frontalis, L lateral occipitalis, R lateral occipitalis, procerus, R temporalis, L temporalis, L superior trapezius and R superior trapezius    L  injection amount:  5 unit(s)    R  injection amount:  5 unit(s)    L lateral frontalis:  5 unit(s)    R lateral frontalis:  5 unit(s)    L medial frontalis:  5 unit(s)    R medial frontalis:  5 unit(s)    L temporalis injection amount:  30 unit(s)    R temporalis injection amount:  30 unit(s)    Procerus injection amount:  5 unit(s)    L lateral occipitalis injection amount:  15 unit(s)    R lateral occipitalis injection amount:  15 unit(s)    L superior cervical paraspinal injection amount:  10 unit(s)    R superior cervical paraspinal injection amount:  10 unit(s)    L superior trapezius injection amount:  20 unit(s)    R superior trapezius injection amount:  15 unit(s)  Total Units:     Total units used:  180    Total units discarded:  20  Post-procedure details:     Chemodenervation:  Chronic migraine    Facial Nerve Location[de-identified]  Bilateral facial nerve    Patient tolerance of procedure: Tolerated well, no immediate complications      She reports that she continues to have significant benefit with Botox   Her last set of injections- states did not note as much benefit after the first month- however had significant stressors including 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

## 2019-10-16 DIAGNOSIS — G43.719 INTRACTABLE CHRONIC MIGRAINE WITHOUT AURA AND WITHOUT STATUS MIGRAINOSUS: ICD-10-CM

## 2019-10-16 DIAGNOSIS — M54.12 CERVICAL RADICULOPATHY: ICD-10-CM

## 2019-10-16 RX ORDER — GABAPENTIN 600 MG/1
600 TABLET ORAL
Qty: 30 TABLET | Refills: 2 | Status: SHIPPED | OUTPATIENT
Start: 2019-10-16 | End: 2020-01-07

## 2019-10-18 ENCOUNTER — TRANSCRIBE ORDERS (OUTPATIENT)
Dept: LAB | Facility: CLINIC | Age: 39
End: 2019-10-18

## 2019-10-18 ENCOUNTER — APPOINTMENT (OUTPATIENT)
Dept: LAB | Facility: CLINIC | Age: 39
End: 2019-10-18
Payer: COMMERCIAL

## 2019-10-18 DIAGNOSIS — Z11.3 SCREENING EXAMINATION FOR VENEREAL DISEASE: ICD-10-CM

## 2019-10-18 DIAGNOSIS — N92.6 IRREGULAR MENSTRUAL CYCLE: Primary | ICD-10-CM

## 2019-10-18 DIAGNOSIS — N92.6 IRREGULAR MENSTRUAL CYCLE: ICD-10-CM

## 2019-10-18 LAB
B-HCG SERPL-ACNC: <2 MIU/ML
BASOPHILS # BLD AUTO: 0.04 THOUSANDS/ΜL (ref 0–0.1)
BASOPHILS NFR BLD AUTO: 1 % (ref 0–1)
EOSINOPHIL # BLD AUTO: 0.09 THOUSAND/ΜL (ref 0–0.61)
EOSINOPHIL NFR BLD AUTO: 2 % (ref 0–6)
ERYTHROCYTE [DISTWIDTH] IN BLOOD BY AUTOMATED COUNT: 12.9 % (ref 11.6–15.1)
ESTRADIOL SERPL-MCNC: 84 PG/ML
FSH SERPL-ACNC: 4.7 MIU/ML
HCT VFR BLD AUTO: 43.9 % (ref 34.8–46.1)
HGB BLD-MCNC: 14.3 G/DL (ref 11.5–15.4)
IMM GRANULOCYTES # BLD AUTO: 0.01 THOUSAND/UL (ref 0–0.2)
IMM GRANULOCYTES NFR BLD AUTO: 0 % (ref 0–2)
LYMPHOCYTES # BLD AUTO: 1.1 THOUSANDS/ΜL (ref 0.6–4.47)
LYMPHOCYTES NFR BLD AUTO: 21 % (ref 14–44)
MCH RBC QN AUTO: 31 PG (ref 26.8–34.3)
MCHC RBC AUTO-ENTMCNC: 32.6 G/DL (ref 31.4–37.4)
MCV RBC AUTO: 95 FL (ref 82–98)
MONOCYTES # BLD AUTO: 0.37 THOUSAND/ΜL (ref 0.17–1.22)
MONOCYTES NFR BLD AUTO: 7 % (ref 4–12)
NEUTROPHILS # BLD AUTO: 3.65 THOUSANDS/ΜL (ref 1.85–7.62)
NEUTS SEG NFR BLD AUTO: 69 % (ref 43–75)
NRBC BLD AUTO-RTO: 0 /100 WBCS
PLATELET # BLD AUTO: 261 THOUSANDS/UL (ref 149–390)
PMV BLD AUTO: 10.7 FL (ref 8.9–12.7)
RBC # BLD AUTO: 4.61 MILLION/UL (ref 3.81–5.12)
WBC # BLD AUTO: 5.26 THOUSAND/UL (ref 4.31–10.16)

## 2019-10-18 PROCEDURE — 84702 CHORIONIC GONADOTROPIN TEST: CPT

## 2019-10-18 PROCEDURE — 83001 ASSAY OF GONADOTROPIN (FSH): CPT

## 2019-10-18 PROCEDURE — 85025 COMPLETE CBC W/AUTO DIFF WBC: CPT

## 2019-10-18 PROCEDURE — 87340 HEPATITIS B SURFACE AG IA: CPT

## 2019-10-18 PROCEDURE — 82670 ASSAY OF TOTAL ESTRADIOL: CPT

## 2019-10-18 PROCEDURE — 36415 COLL VENOUS BLD VENIPUNCTURE: CPT

## 2019-10-18 PROCEDURE — 87389 HIV-1 AG W/HIV-1&-2 AB AG IA: CPT

## 2019-10-18 PROCEDURE — 86803 HEPATITIS C AB TEST: CPT

## 2019-10-18 PROCEDURE — 86592 SYPHILIS TEST NON-TREP QUAL: CPT

## 2019-10-19 LAB
HBV SURFACE AG SER QL: NORMAL
HCV AB SER QL: ABNORMAL

## 2019-10-20 DIAGNOSIS — G43.719 INTRACTABLE CHRONIC MIGRAINE WITHOUT AURA AND WITHOUT STATUS MIGRAINOSUS: ICD-10-CM

## 2019-10-20 DIAGNOSIS — IMO0002 CHRONIC MIGRAINE: ICD-10-CM

## 2019-10-20 LAB — HIV 1+2 AB+HIV1 P24 AG SERPL QL IA: NORMAL

## 2019-10-20 RX ORDER — ONDANSETRON 4 MG/1
TABLET, ORALLY DISINTEGRATING ORAL
Qty: 20 TABLET | Refills: 0 | Status: SHIPPED | OUTPATIENT
Start: 2019-10-20 | End: 2020-02-26

## 2019-10-21 LAB — RPR SER QL: NORMAL

## 2019-10-21 RX ORDER — DEXAMETHASONE 1 MG
TABLET ORAL
Qty: 7 TABLET | Refills: 0 | OUTPATIENT
Start: 2019-10-21

## 2019-11-14 ENCOUNTER — TELEPHONE (OUTPATIENT)
Dept: NEUROLOGY | Facility: CLINIC | Age: 39
End: 2019-11-14

## 2019-11-18 ENCOUNTER — OFFICE VISIT (OUTPATIENT)
Dept: INTERNAL MEDICINE CLINIC | Facility: CLINIC | Age: 39
End: 2019-11-18
Payer: COMMERCIAL

## 2019-11-18 VITALS
RESPIRATION RATE: 16 BRPM | HEIGHT: 72 IN | WEIGHT: 188 LBS | DIASTOLIC BLOOD PRESSURE: 72 MMHG | SYSTOLIC BLOOD PRESSURE: 112 MMHG | OXYGEN SATURATION: 98 % | HEART RATE: 89 BPM | TEMPERATURE: 98.2 F | BODY MASS INDEX: 25.47 KG/M2

## 2019-11-18 DIAGNOSIS — R39.9 UTI SYMPTOMS: ICD-10-CM

## 2019-11-18 DIAGNOSIS — R09.81 SINUS CONGESTION: ICD-10-CM

## 2019-11-18 DIAGNOSIS — R30.0 DYSURIA: Primary | ICD-10-CM

## 2019-11-18 LAB
BACTERIA UR QL AUTO: ABNORMAL /HPF
BILIRUB UR QL STRIP: NEGATIVE
CLARITY UR: ABNORMAL
COLOR UR: ABNORMAL
GLUCOSE UR STRIP-MCNC: NEGATIVE MG/DL
HGB UR QL STRIP.AUTO: ABNORMAL
KETONES UR STRIP-MCNC: NEGATIVE MG/DL
LEUKOCYTE ESTERASE UR QL STRIP: ABNORMAL
NITRITE UR QL STRIP: POSITIVE
NON-SQ EPI CELLS URNS QL MICRO: ABNORMAL /HPF
PH UR STRIP.AUTO: 6 [PH]
PROT UR STRIP-MCNC: NEGATIVE MG/DL
RBC #/AREA URNS AUTO: ABNORMAL /HPF
SL AMB  POCT GLUCOSE, UA: NORMAL
SL AMB LEUKOCYTE ESTERASE,UA: NORMAL
SL AMB POCT BILIRUBIN,UA: NORMAL
SL AMB POCT BLOOD,UA: NORMAL
SL AMB POCT CLARITY,UA: CLEAR
SL AMB POCT COLOR,UA: NORMAL
SL AMB POCT KETONES,UA: NORMAL
SL AMB POCT NITRITE,UA: NORMAL
SL AMB POCT PH,UA: 6
SL AMB POCT SPECIFIC GRAVITY,UA: 1
SL AMB POCT URINE PROTEIN: NORMAL
SL AMB POCT UROBILINOGEN: NORMAL
SP GR UR STRIP.AUTO: 1 (ref 1–1.03)
UROBILINOGEN UR QL STRIP.AUTO: 1 E.U./DL
WBC #/AREA URNS AUTO: ABNORMAL /HPF

## 2019-11-18 PROCEDURE — 87086 URINE CULTURE/COLONY COUNT: CPT | Performed by: NURSE PRACTITIONER

## 2019-11-18 PROCEDURE — 99213 OFFICE O/P EST LOW 20 MIN: CPT | Performed by: NURSE PRACTITIONER

## 2019-11-18 PROCEDURE — 81001 URINALYSIS AUTO W/SCOPE: CPT | Performed by: NURSE PRACTITIONER

## 2019-11-18 PROCEDURE — 87077 CULTURE AEROBIC IDENTIFY: CPT | Performed by: NURSE PRACTITIONER

## 2019-11-18 PROCEDURE — 81002 URINALYSIS NONAUTO W/O SCOPE: CPT | Performed by: NURSE PRACTITIONER

## 2019-11-18 PROCEDURE — 87186 SC STD MICRODIL/AGAR DIL: CPT | Performed by: NURSE PRACTITIONER

## 2019-11-18 PROCEDURE — 1036F TOBACCO NON-USER: CPT | Performed by: NURSE PRACTITIONER

## 2019-11-18 RX ORDER — CARIPRAZINE 1.5 MG/1
1.5 CAPSULE, GELATIN COATED ORAL DAILY
Refills: 1 | COMMUNITY
Start: 2019-10-28 | End: 2022-02-25 | Stop reason: ALTCHOICE

## 2019-11-18 RX ORDER — NITROFURANTOIN 25; 75 MG/1; MG/1
100 CAPSULE ORAL 2 TIMES DAILY
Qty: 10 CAPSULE | Refills: 0 | Status: SHIPPED | OUTPATIENT
Start: 2019-11-18 | End: 2019-11-23

## 2019-11-18 NOTE — PROGRESS NOTES
Assessment/Plan:     Diagnoses and all orders for this visit:    Dysuria  Comments:  urine dip not accurate due to azo  send urine for culture  start macrobid     Orders:  -     POCT urine dip  -     UA w Reflex to Microscopic w Reflex to Culture - Clinic Collect  -     nitrofurantoin (MACROBID) 100 mg capsule; Take 1 capsule (100 mg total) by mouth 2 (two) times a day for 5 days    UTI symptoms  -     POCT urine dip  -     UA w Reflex to Microscopic w Reflex to Culture - Clinic Collect  -     nitrofurantoin (MACROBID) 100 mg capsule; Take 1 capsule (100 mg total) by mouth 2 (two) times a day for 5 days    Sinus congestion  Comments:  flonase 1 spray in am and saline nasal spray in pm     Other orders  -     VRAYLAR 1 5 MG capsule; Take 1 5 mg by mouth daily  -     onabotulinumtoxin A (BOTOX) 100 units; one time  "for vertigo"          Subjective:      Patient ID: Albino Quintero is a 46 y o  female  Here today with UTI symptoms   Started 1 week ago   Burning with urination, pressure in lower abdomen, urgency and frequency  +menses   Took AZO yesterday and today     Also having some sinus congestion, rhinorrhea, and scratchy throat   Started about a week ago   Denies any fevers                   The following portions of the patient's history were reviewed and updated as appropriate: allergies, current medications, past family history, past medical history, past social history, past surgical history and problem list     Review of Systems   Constitutional: Negative for activity change, appetite change, chills, fatigue and fever  HENT: Positive for congestion, postnasal drip, rhinorrhea and sore throat  Negative for sinus pressure and sinus pain  Respiratory: Negative for cough, chest tightness and shortness of breath  Cardiovascular: Negative for chest pain  Gastrointestinal: Positive for abdominal pain  Negative for diarrhea, nausea and vomiting          Lower abdominal pressure    Genitourinary: Positive for dysuria, frequency and urgency  Negative for decreased urine volume, difficulty urinating, flank pain and hematuria  Musculoskeletal: Negative for myalgias  Neurological: Negative for dizziness, weakness, light-headedness and headaches  Objective:      /72   Pulse 89   Temp 98 2 °F (36 8 °C)   Resp 16   Ht 6' (1 829 m)   Wt 85 3 kg (188 lb)   SpO2 98%   BMI 25 50 kg/m²          Physical Exam   Constitutional: She is oriented to person, place, and time  She appears well-developed and well-nourished  HENT:   Right Ear: External ear normal    Left Ear: External ear normal    Mouth/Throat: Oropharynx is clear and moist    Eyes: Pupils are equal, round, and reactive to light  Cardiovascular: Normal rate, regular rhythm and normal heart sounds  Pulmonary/Chest: Effort normal and breath sounds normal    Abdominal: Soft  Bowel sounds are normal  There is no tenderness  Lymphadenopathy:     She has no cervical adenopathy  Neurological: She is alert and oriented to person, place, and time  Skin: Skin is warm and dry  Psychiatric: She has a normal mood and affect  Her behavior is normal    Vitals reviewed

## 2019-11-20 LAB — BACTERIA UR CULT: ABNORMAL

## 2019-11-22 DIAGNOSIS — G43.719 INTRACTABLE CHRONIC MIGRAINE WITHOUT AURA AND WITHOUT STATUS MIGRAINOSUS: ICD-10-CM

## 2019-11-22 RX ORDER — DEXAMETHASONE 1 MG
TABLET ORAL
Qty: 7 TABLET | Refills: 0 | Status: SHIPPED | OUTPATIENT
Start: 2019-11-22 | End: 2020-01-16 | Stop reason: SDUPTHER

## 2019-11-25 NOTE — TELEPHONE ENCOUNTER
Received a voicemail from the patient stating she is calling back to reschedule her Botox appointment  Patient states she got our message and would like a call back  Patient also states she is having an episode of vertigo and would like Dr Po Fletcher to call in her Dexadron again so she can get ahead of the vertigo      Phone: [phone]

## 2019-11-25 NOTE — TELEPHONE ENCOUNTER
Please let patient know that I send over her decadron 11/22/19    Brigid: Please help reschedule Botox    thanks

## 2019-11-25 NOTE — TELEPHONE ENCOUNTER
Notified patient of decadron being sent and that she should be expecting a call to reschedule botox

## 2019-12-16 ENCOUNTER — DOCUMENTATION (OUTPATIENT)
Dept: NEUROLOGY | Facility: CLINIC | Age: 39
End: 2019-12-16

## 2019-12-16 NOTE — PROGRESS NOTES
General 12/11/2019  5:28 PM Pao Cifuentes MA CARE COORDINATION -   Note    BOTOX 200 UNITS (VISIT# 3)- AUTH# [id number] 3 VISITS, AV- 06/28/19 TILL 06/28/20    STOCK

## 2019-12-26 ENCOUNTER — TELEPHONE (OUTPATIENT)
Dept: INTERNAL MEDICINE CLINIC | Facility: CLINIC | Age: 39
End: 2019-12-26

## 2019-12-26 ENCOUNTER — TELEPHONE (OUTPATIENT)
Dept: NEUROLOGY | Facility: CLINIC | Age: 39
End: 2019-12-26

## 2019-12-26 DIAGNOSIS — G89.29 CHRONIC NONINTRACTABLE HEADACHE, UNSPECIFIED HEADACHE TYPE: ICD-10-CM

## 2019-12-26 DIAGNOSIS — G43.009 MIGRAINE WITHOUT AURA AND WITHOUT STATUS MIGRAINOSUS, NOT INTRACTABLE: Primary | ICD-10-CM

## 2019-12-26 DIAGNOSIS — R51.9 CHRONIC NONINTRACTABLE HEADACHE, UNSPECIFIED HEADACHE TYPE: ICD-10-CM

## 2019-12-26 NOTE — TELEPHONE ENCOUNTER
Irlanda @ [address] [place] [address] [facility] wants to know if you can please put into epic a ambulatory referral for neurology , patient has appt on 12/31    Aware PCP is out of office asked that I sent to other provider

## 2019-12-31 ENCOUNTER — PROCEDURE VISIT (OUTPATIENT)
Dept: NEUROLOGY | Facility: CLINIC | Age: 39
End: 2019-12-31
Payer: COMMERCIAL

## 2019-12-31 VITALS — DIASTOLIC BLOOD PRESSURE: 70 MMHG | TEMPERATURE: 97.7 F | SYSTOLIC BLOOD PRESSURE: 103 MMHG | HEART RATE: 88 BPM

## 2019-12-31 DIAGNOSIS — M54.2 CERVICAL MUSCLE PAIN: ICD-10-CM

## 2019-12-31 DIAGNOSIS — G43.009 MIGRAINE WITHOUT AURA AND WITHOUT STATUS MIGRAINOSUS, NOT INTRACTABLE: ICD-10-CM

## 2019-12-31 DIAGNOSIS — IMO0002 CHRONIC MIGRAINE: Primary | ICD-10-CM

## 2019-12-31 DIAGNOSIS — G89.29 CHRONIC NONINTRACTABLE HEADACHE, UNSPECIFIED HEADACHE TYPE: ICD-10-CM

## 2019-12-31 DIAGNOSIS — R51.9 CHRONIC NONINTRACTABLE HEADACHE, UNSPECIFIED HEADACHE TYPE: ICD-10-CM

## 2019-12-31 PROCEDURE — 64615 CHEMODENERV MUSC MIGRAINE: CPT | Performed by: PSYCHIATRY & NEUROLOGY

## 2019-12-31 RX ORDER — TIZANIDINE 2 MG/1
2 TABLET ORAL EVERY 12 HOURS PRN
Qty: 30 TABLET | Refills: 2 | Status: SHIPPED | OUTPATIENT
Start: 2019-12-31 | End: 2020-02-06

## 2019-12-31 RX ORDER — SUMATRIPTAN 50 MG/1
TABLET, FILM COATED ORAL
Qty: 10 TABLET | Refills: 2 | Status: SHIPPED | OUTPATIENT
Start: 2019-12-31 | End: 2020-04-16

## 2019-12-31 NOTE — PROGRESS NOTES
Chemodenervation  Date/Time: 12/31/2019 9:30 AM  Performed by: Kike Ridley DO  Authorized by: Kike Ridley DO     Pre-procedure details:     Prepped With: Alcohol    Anesthesia (see MAR for exact dosages): Anesthesia method:  None  Procedure details:     Position:  Supine and upright  Botox:     Botox Type:  Type A    Brand:  Botox    mL's of Botulinum Toxin:  180    mL's of preservative free sterile saline:  2    Final Concentration per CC:  100 units    Needle Gauge:  30 G 2 5 inch  Procedures:     Botox Procedures: chronic headache      Indications: migraines    Injection Location:     Head / Face:  L superior cervical paraspinal, R superior cervical paraspinal, L , R , R frontalis, L frontalis, L lateral occipitalis, R lateral occipitalis, procerus, R temporalis, L temporalis, L superior trapezius and R superior trapezius    L  injection amount:  5 unit(s)    R  injection amount:  5 unit(s)    L lateral frontalis:  5 unit(s)    R lateral frontalis:  5 unit(s)    L medial frontalis:  5 unit(s)    R medial frontalis:  5 unit(s)    L temporalis injection amount:  30 unit(s)    R temporalis injection amount:  30 unit(s)    Procerus injection amount:  5 unit(s)    L lateral occipitalis injection amount:  15 unit(s)    R lateral occipitalis injection amount:  15 unit(s)    L superior cervical paraspinal injection amount:  10 unit(s)    R superior cervical paraspinal injection amount:  10 unit(s)    L superior trapezius injection amount:  20 unit(s)    R superior trapezius injection amount:  15 unit(s)  Total Units:     Total units used:  180    Total units discarded:  20  Post-procedure details:     Chemodenervation:  Chronic migraine    Facial Nerve Location[de-identified]  Bilateral facial nerve    Patient tolerance of procedure:   Tolerated well, no immediate complications  Comments:      She continues to do well with Botox for chronic migraine with over 7-10 migraines a month reduction  No medication adverse effects  Did an increased dosing similar to last visit, medically necessary as this significantly helps her with her migraines  She continues to take gabapentin 600 mg nightly  I did refill her Imitrex abortive treatment  Also sent her tizanidine 2 mg b i d  P r n  As needed for cervicalgia  Advised of s/e  Discussed importance of staying hydrated for tension type headaches as well as low BP noted today on exam   Will follow up in three months time

## 2020-01-07 DIAGNOSIS — M54.12 CERVICAL RADICULOPATHY: ICD-10-CM

## 2020-01-07 DIAGNOSIS — G43.719 INTRACTABLE CHRONIC MIGRAINE WITHOUT AURA AND WITHOUT STATUS MIGRAINOSUS: ICD-10-CM

## 2020-01-07 RX ORDER — GABAPENTIN 600 MG/1
600 TABLET ORAL
Qty: 30 TABLET | Refills: 3 | Status: SHIPPED | OUTPATIENT
Start: 2020-01-07 | End: 2020-04-17

## 2020-01-16 DIAGNOSIS — G43.719 INTRACTABLE CHRONIC MIGRAINE WITHOUT AURA AND WITHOUT STATUS MIGRAINOSUS: ICD-10-CM

## 2020-01-16 RX ORDER — DEXAMETHASONE 1 MG
TABLET ORAL
Qty: 7 TABLET | Refills: 0 | Status: SHIPPED | OUTPATIENT
Start: 2020-01-16 | End: 2020-02-26

## 2020-01-16 NOTE — TELEPHONE ENCOUNTER
Medication refill check list    Correct patient? yes   Correct medication name, dose, and pill size? yes   Correct provider? yes   Last and Next appt  scheduled? Yes, last date 12/31/19 last procedure visit- last office visit was 02/26/19  & next date 04/06/20   Right pharmacy listed? yes   Correct quantity for 30 or 90 days? yes   Is the patient out of refills? When was it last prescribed? Yes, last date 11/22/19   Directions match what the patient says they are taking?  yes   Enough refills? (none for controlled substances, 1 year for routine medications) yes

## 2020-01-21 DIAGNOSIS — J45.30 MILD PERSISTENT ASTHMA WITHOUT COMPLICATION: ICD-10-CM

## 2020-01-21 RX ORDER — MONTELUKAST SODIUM 10 MG/1
10 TABLET ORAL
Qty: 90 TABLET | Refills: 1 | Status: SHIPPED | OUTPATIENT
Start: 2020-01-21 | End: 2020-06-23

## 2020-02-06 DIAGNOSIS — M54.2 CERVICAL MUSCLE PAIN: ICD-10-CM

## 2020-02-06 RX ORDER — TIZANIDINE 2 MG/1
2 TABLET ORAL EVERY 12 HOURS PRN
Qty: 30 TABLET | Refills: 2 | Status: SHIPPED | OUTPATIENT
Start: 2020-02-06 | End: 2020-03-31 | Stop reason: SDUPTHER

## 2020-02-11 DIAGNOSIS — G43.719 INTRACTABLE CHRONIC MIGRAINE WITHOUT AURA AND WITHOUT STATUS MIGRAINOSUS: ICD-10-CM

## 2020-02-11 DIAGNOSIS — R42 VERTIGO: ICD-10-CM

## 2020-02-11 RX ORDER — MECLIZINE HCL 12.5 MG/1
TABLET ORAL
Qty: 20 TABLET | Refills: 1 | Status: SHIPPED | OUTPATIENT
Start: 2020-02-11 | End: 2020-06-23

## 2020-02-25 DIAGNOSIS — G43.719 INTRACTABLE CHRONIC MIGRAINE WITHOUT AURA AND WITHOUT STATUS MIGRAINOSUS: ICD-10-CM

## 2020-02-25 DIAGNOSIS — IMO0002 CHRONIC MIGRAINE: ICD-10-CM

## 2020-02-26 RX ORDER — DEXAMETHASONE 1 MG
TABLET ORAL
Qty: 7 TABLET | Refills: 0 | Status: SHIPPED | OUTPATIENT
Start: 2020-02-26 | End: 2020-07-13

## 2020-02-26 RX ORDER — ONDANSETRON 4 MG/1
TABLET, ORALLY DISINTEGRATING ORAL
Qty: 20 TABLET | Refills: 0 | Status: SHIPPED | OUTPATIENT
Start: 2020-02-26 | End: 2020-04-27

## 2020-03-02 ENCOUNTER — DOCUMENTATION (OUTPATIENT)
Dept: NEUROLOGY | Facility: CLINIC | Age: 40
End: 2020-03-02

## 2020-03-02 NOTE — PROGRESS NOTES
General 03/02/2020  4:08 PM Sandra Mack MA CARE COORDINATION -   Note    BOTOX 200 UNITS (VISIT# 3) - AUTH# [id number] 4 VISITS, AV- 06/28/2019 TILL 06/28/2020    STOCK

## 2020-03-31 ENCOUNTER — PROCEDURE VISIT (OUTPATIENT)
Dept: NEUROLOGY | Facility: CLINIC | Age: 40
End: 2020-03-31
Payer: COMMERCIAL

## 2020-03-31 VITALS — DIASTOLIC BLOOD PRESSURE: 84 MMHG | SYSTOLIC BLOOD PRESSURE: 122 MMHG | TEMPERATURE: 98.3 F

## 2020-03-31 DIAGNOSIS — G43.719 INTRACTABLE CHRONIC MIGRAINE WITHOUT AURA AND WITHOUT STATUS MIGRAINOSUS: Primary | ICD-10-CM

## 2020-03-31 DIAGNOSIS — M54.2 CERVICAL MUSCLE PAIN: ICD-10-CM

## 2020-03-31 PROCEDURE — 64615 CHEMODENERV MUSC MIGRAINE: CPT | Performed by: PSYCHIATRY & NEUROLOGY

## 2020-03-31 RX ORDER — TIZANIDINE 2 MG/1
TABLET ORAL
Qty: 60 TABLET | Refills: 4 | Status: SHIPPED | OUTPATIENT
Start: 2020-03-31 | End: 2020-08-03

## 2020-03-31 NOTE — PROGRESS NOTES
Chemodenervation  Date/Time: 3/31/2020 2:55 PM  Performed by: Noris Gibson DO  Authorized by: Noris Gibson DO     Pre-procedure details:     Preparation: Patient was prepped and draped in usual sterile fashion      Prepped With: Alcohol    Anesthesia (see MAR for exact dosages): Anesthesia method:  None  Procedure details:     Position:  Upright and supine  Botox:     Botox Type:  Type A    Brand:  Botox    mL's of Botulinum Toxin:  180    mL's of preservative free sterile saline:  2    Final Concentration per CC:  100 units    Needle Gauge:  30 G 2 5 inch  Procedures:     Botox Procedures: chronic headache      Indications: migraines    Injection Location:     Head / Face:  L superior cervical paraspinal, R superior cervical paraspinal, L , R , R frontalis, L frontalis, L lateral occipitalis, R lateral occipitalis, R superior trapezius, L superior trapezius, R temporalis, L temporalis and procerus    L  injection amount:  5 unit(s)    R  injection amount:  5 unit(s)    L lateral frontalis:  5 unit(s)    R lateral frontalis:  5 unit(s)    L medial frontalis:  5 unit(s)    R medial frontalis:  5 unit(s)    L temporalis injection amount:  30 unit(s)    R temporalis injection amount:  30 unit(s)    Procerus injection amount:  5 unit(s)    L lateral occipitalis injection amount:  15 unit(s)    R lateral occipitalis injection amount:  15 unit(s)    L superior cervical paraspinal injection amount:  10 unit(s)    R superior cervical paraspinal injection amount:  10 unit(s)    L superior trapezius injection amount:  20 unit(s)    R superior trapezius injection amount:  15 unit(s)  Total Units:     Total units used:  180    Total units discarded:  20  Post-procedure details:     Chemodenervation:  Chronic migraine    Facial Nerve Location[de-identified]  Bilateral facial nerve    Patient tolerance of procedure:   Tolerated well, no immediate complications  Comments:      Patient continues to do well with Botox for chronic migraines- no s/e  Over 75% reduction in migraine frequency and severity  Take tizanidine 4 mg nightly to help with cervicalgia no a/e      Will follow up with Myrna in [address] for Botox reinjection in three months in my absence then 6 months with me

## 2020-04-16 DIAGNOSIS — G43.009 MIGRAINE WITHOUT AURA AND WITHOUT STATUS MIGRAINOSUS, NOT INTRACTABLE: ICD-10-CM

## 2020-04-16 DIAGNOSIS — G43.719 INTRACTABLE CHRONIC MIGRAINE WITHOUT AURA AND WITHOUT STATUS MIGRAINOSUS: ICD-10-CM

## 2020-04-16 DIAGNOSIS — M54.12 CERVICAL RADICULOPATHY: ICD-10-CM

## 2020-04-16 RX ORDER — SUMATRIPTAN 50 MG/1
TABLET, FILM COATED ORAL
Qty: 10 TABLET | Refills: 2 | Status: SHIPPED | OUTPATIENT
Start: 2020-04-16 | End: 2020-07-16

## 2020-04-17 RX ORDER — GABAPENTIN 600 MG/1
600 TABLET ORAL
Qty: 30 TABLET | Refills: 3 | Status: SHIPPED | OUTPATIENT
Start: 2020-04-17 | End: 2020-08-02

## 2020-04-24 ENCOUNTER — TELEPHONE (OUTPATIENT)
Dept: INTERNAL MEDICINE CLINIC | Facility: CLINIC | Age: 40
End: 2020-04-24

## 2020-04-24 DIAGNOSIS — J30.9 ALLERGIC RHINITIS, UNSPECIFIED SEASONALITY, UNSPECIFIED TRIGGER: Primary | ICD-10-CM

## 2020-04-24 RX ORDER — FLUTICASONE PROPIONATE 50 MCG
1 SPRAY, SUSPENSION (ML) NASAL DAILY PRN
Qty: 16 G | Refills: 0 | Status: SHIPPED | OUTPATIENT
Start: 2020-04-24 | End: 2020-10-12

## 2020-04-26 DIAGNOSIS — IMO0002 CHRONIC MIGRAINE: ICD-10-CM

## 2020-04-27 RX ORDER — ONDANSETRON 4 MG/1
TABLET, ORALLY DISINTEGRATING ORAL
Qty: 9 TABLET | Refills: 2 | Status: SHIPPED | OUTPATIENT
Start: 2020-04-27 | End: 2020-07-13

## 2020-05-21 ENCOUNTER — TELEPHONE (OUTPATIENT)
Dept: NEUROLOGY | Facility: CLINIC | Age: 40
End: 2020-05-21

## 2020-06-03 ENCOUNTER — TELEPHONE (OUTPATIENT)
Dept: NEUROLOGY | Facility: CLINIC | Age: 40
End: 2020-06-03

## 2020-06-04 ENCOUNTER — TELEPHONE (OUTPATIENT)
Dept: NEUROLOGY | Facility: CLINIC | Age: 40
End: 2020-06-04

## 2020-06-23 ENCOUNTER — TELEPHONE (OUTPATIENT)
Dept: NEUROLOGY | Facility: CLINIC | Age: 40
End: 2020-06-23

## 2020-06-23 DIAGNOSIS — G43.719 INTRACTABLE CHRONIC MIGRAINE WITHOUT AURA AND WITHOUT STATUS MIGRAINOSUS: ICD-10-CM

## 2020-06-23 DIAGNOSIS — J45.30 MILD PERSISTENT ASTHMA WITHOUT COMPLICATION: ICD-10-CM

## 2020-06-23 DIAGNOSIS — R42 VERTIGO: ICD-10-CM

## 2020-06-23 RX ORDER — MECLIZINE HCL 12.5 MG/1
TABLET ORAL
Qty: 20 TABLET | Refills: 1 | Status: SHIPPED | OUTPATIENT
Start: 2020-06-23 | End: 2020-06-24 | Stop reason: SDUPTHER

## 2020-06-23 RX ORDER — MONTELUKAST SODIUM 10 MG/1
TABLET ORAL
Qty: 90 TABLET | Refills: 1 | Status: SHIPPED | OUTPATIENT
Start: 2020-06-23 | End: 2021-04-14

## 2020-06-24 ENCOUNTER — OFFICE VISIT (OUTPATIENT)
Dept: NEUROLOGY | Facility: CLINIC | Age: 40
End: 2020-06-24
Payer: COMMERCIAL

## 2020-06-24 VITALS
WEIGHT: 184 LBS | HEIGHT: 72 IN | SYSTOLIC BLOOD PRESSURE: 128 MMHG | BODY MASS INDEX: 24.92 KG/M2 | HEART RATE: 78 BPM | DIASTOLIC BLOOD PRESSURE: 88 MMHG

## 2020-06-24 DIAGNOSIS — R42 VERTIGO: ICD-10-CM

## 2020-06-24 DIAGNOSIS — Z72.0 NICOTINE ABUSE: ICD-10-CM

## 2020-06-24 DIAGNOSIS — G43.709 CHRONIC MIGRAINE WITHOUT AURA WITHOUT STATUS MIGRAINOSUS, NOT INTRACTABLE: Primary | ICD-10-CM

## 2020-06-24 DIAGNOSIS — G43.809 VESTIBULAR MIGRAINE: ICD-10-CM

## 2020-06-24 DIAGNOSIS — G43.719 INTRACTABLE CHRONIC MIGRAINE WITHOUT AURA AND WITHOUT STATUS MIGRAINOSUS: ICD-10-CM

## 2020-06-24 PROCEDURE — 3008F BODY MASS INDEX DOCD: CPT | Performed by: PHYSICIAN ASSISTANT

## 2020-06-24 PROCEDURE — 99213 OFFICE O/P EST LOW 20 MIN: CPT | Performed by: PHYSICIAN ASSISTANT

## 2020-06-24 PROCEDURE — 1036F TOBACCO NON-USER: CPT | Performed by: PHYSICIAN ASSISTANT

## 2020-06-24 PROCEDURE — 3079F DIAST BP 80-89 MM HG: CPT | Performed by: PHYSICIAN ASSISTANT

## 2020-06-24 PROCEDURE — 3074F SYST BP LT 130 MM HG: CPT | Performed by: PHYSICIAN ASSISTANT

## 2020-06-24 RX ORDER — MECLIZINE HCL 12.5 MG/1
12.5 TABLET ORAL EVERY 8 HOURS PRN
Qty: 20 TABLET | Refills: 1 | Status: SHIPPED | OUTPATIENT
Start: 2020-06-24 | End: 2020-10-13

## 2020-06-26 ENCOUNTER — DOCUMENTATION (OUTPATIENT)
Dept: NEUROLOGY | Facility: CLINIC | Age: 40
End: 2020-06-26

## 2020-07-07 ENCOUNTER — PROCEDURE VISIT (OUTPATIENT)
Dept: NEUROLOGY | Facility: CLINIC | Age: 40
End: 2020-07-07
Payer: COMMERCIAL

## 2020-07-07 VITALS
TEMPERATURE: 97.8 F | BODY MASS INDEX: 24.95 KG/M2 | DIASTOLIC BLOOD PRESSURE: 72 MMHG | HEART RATE: 97 BPM | SYSTOLIC BLOOD PRESSURE: 122 MMHG | HEIGHT: 72 IN

## 2020-07-07 DIAGNOSIS — G43.709 CHRONIC MIGRAINE WITHOUT AURA WITHOUT STATUS MIGRAINOSUS, NOT INTRACTABLE: Primary | ICD-10-CM

## 2020-07-07 PROCEDURE — 64615 CHEMODENERV MUSC MIGRAINE: CPT | Performed by: PSYCHIATRY & NEUROLOGY

## 2020-07-07 NOTE — PROGRESS NOTES
Chemodenervation  Date/Time: 7/7/2020 2:30 PM  Performed by: Brijesh Massey MD  Authorized by: Brijesh Massey MD     Pre-procedure details:     Prepped With: Alcohol    Anesthesia (see MAR for exact dosages): Anesthesia method:  None  Botox:     Botox Type:  Type A    Brand:  Botox    mL's of Botulinum Toxin:  155    Medication Administration:  100 Units onabotulinumtoxin A 100 units  Procedures:     Botox Procedures: chronic headache      Indications: migraines    Injection Location:     Head / Face:  L frontalis, R frontalis, R , L , L inferior cervical paraspinal, R inferior cervical paraspinal, L superior cervical paraspinal, R superior cervical paraspinal, procerus, L inferior occipitalis, R inferior occipitalis, L inferior trapezius, R inferior trapezius, L temporalis and R temporalis    L  injection amount:  5 unit(s)    R  injection amount:  5 unit(s)    L lateral frontalis:  5 unit(s)    R lateral frontalis:  5 unit(s)    L medial frontalis:  5 unit(s)    R medial frontalis:  5 unit(s)    L temporalis injection amount:  20 unit(s)    R temporalis injection amount:  20 unit(s)    Procerus injection amount:  5 unit(s)    L inferior occipitalis injection amount:  15 unit(s)    R inferior occipitalis injection amount:  15 unit(s)    L inferior cervical paraspinal injection amount:  5 unit(s)    R inferior cervical paraspinal injection amount:  5 unit(s)    L superior cervical paraspinal injection amount:  5 unit(s)    R superior cervical paraspinal injection amount:  5 unit(s)    L inferior trapezius injection amount:  15 unit(s)    R inferior trapezius injection amount:  15 unit(s)  Total Units:     Total units used:  155    Total units discarded:  45  Post-procedure details:     Chemodenervation:  Chronic migraine    Facial Nerve Location[de-identified]  Bilateral facial nerve    Patient tolerance of procedure:   Tolerated well, no immediate complications

## 2020-07-12 DIAGNOSIS — IMO0002 CHRONIC MIGRAINE: ICD-10-CM

## 2020-07-12 DIAGNOSIS — G43.719 INTRACTABLE CHRONIC MIGRAINE WITHOUT AURA AND WITHOUT STATUS MIGRAINOSUS: ICD-10-CM

## 2020-07-13 RX ORDER — ONDANSETRON 4 MG/1
TABLET, ORALLY DISINTEGRATING ORAL
Qty: 9 TABLET | Refills: 2 | Status: SHIPPED | OUTPATIENT
Start: 2020-07-13 | End: 2020-10-10

## 2020-07-13 RX ORDER — DEXAMETHASONE 1 MG
TABLET ORAL
Qty: 7 TABLET | Refills: 0 | Status: SHIPPED | OUTPATIENT
Start: 2020-07-13 | End: 2020-08-09

## 2020-07-16 DIAGNOSIS — G43.009 MIGRAINE WITHOUT AURA AND WITHOUT STATUS MIGRAINOSUS, NOT INTRACTABLE: ICD-10-CM

## 2020-07-16 RX ORDER — SUMATRIPTAN 50 MG/1
TABLET, FILM COATED ORAL
Qty: 9 TABLET | Refills: 3 | Status: SHIPPED | OUTPATIENT
Start: 2020-07-16 | End: 2020-12-18

## 2020-08-02 DIAGNOSIS — M54.12 CERVICAL RADICULOPATHY: ICD-10-CM

## 2020-08-02 DIAGNOSIS — G43.719 INTRACTABLE CHRONIC MIGRAINE WITHOUT AURA AND WITHOUT STATUS MIGRAINOSUS: ICD-10-CM

## 2020-08-02 RX ORDER — GABAPENTIN 600 MG/1
600 TABLET ORAL
Qty: 30 TABLET | Refills: 3 | Status: SHIPPED | OUTPATIENT
Start: 2020-08-02 | End: 2020-11-23

## 2020-08-03 DIAGNOSIS — M54.2 CERVICAL MUSCLE PAIN: ICD-10-CM

## 2020-08-03 RX ORDER — TIZANIDINE 2 MG/1
TABLET ORAL
Qty: 60 TABLET | Refills: 4 | Status: SHIPPED | OUTPATIENT
Start: 2020-08-03 | End: 2021-01-22 | Stop reason: SDUPTHER

## 2020-08-05 DIAGNOSIS — G43.719 INTRACTABLE CHRONIC MIGRAINE WITHOUT AURA AND WITHOUT STATUS MIGRAINOSUS: ICD-10-CM

## 2020-08-09 RX ORDER — DEXAMETHASONE 1 MG
TABLET ORAL
Qty: 7 TABLET | Refills: 0 | Status: SHIPPED | OUTPATIENT
Start: 2020-08-09 | End: 2020-09-03 | Stop reason: SDUPTHER

## 2020-08-21 ENCOUNTER — DOCUMENTATION (OUTPATIENT)
Dept: NEUROLOGY | Facility: CLINIC | Age: 40
End: 2020-08-21

## 2020-08-21 NOTE — PROGRESS NOTES
General  08/21/2020 10:47 AM  Jeff Bond MA  CARE COORDINATION  -    Note     BOTOX 200 UNITS (VISIT# 2) -AUTH# [id number]  4 VISITS,  AV- 06/29/2020 TILL 06/29/2021    STOCK

## 2020-09-03 DIAGNOSIS — G43.719 INTRACTABLE CHRONIC MIGRAINE WITHOUT AURA AND WITHOUT STATUS MIGRAINOSUS: ICD-10-CM

## 2020-09-04 ENCOUNTER — TELEPHONE (OUTPATIENT)
Dept: NEUROLOGY | Facility: CLINIC | Age: 40
End: 2020-09-04

## 2020-09-04 RX ORDER — DEXAMETHASONE 1 MG
TABLET ORAL
Qty: 7 TABLET | Refills: 0 | Status: SHIPPED | OUTPATIENT
Start: 2020-09-04 | End: 2020-10-10

## 2020-09-04 NOTE — TELEPHONE ENCOUNTER
Left message for patient to contact office regarding rescheduling appointment with Dr Nupur Galindo   Please transfer call once patient calls back

## 2020-09-30 ENCOUNTER — TELEPHONE (OUTPATIENT)
Dept: NEUROLOGY | Facility: CLINIC | Age: 40
End: 2020-09-30

## 2020-09-30 NOTE — TELEPHONE ENCOUNTER
ADRIANNA for patient as a reminder for their upcoming appointment in 2 weeks  Asked patient if they are having any emergent or worsening symptoms to please give us a call back

## 2020-10-10 DIAGNOSIS — G43.719 INTRACTABLE CHRONIC MIGRAINE WITHOUT AURA AND WITHOUT STATUS MIGRAINOSUS: ICD-10-CM

## 2020-10-10 DIAGNOSIS — R42 VERTIGO: ICD-10-CM

## 2020-10-10 DIAGNOSIS — J30.9 ALLERGIC RHINITIS, UNSPECIFIED SEASONALITY, UNSPECIFIED TRIGGER: ICD-10-CM

## 2020-10-10 DIAGNOSIS — IMO0002 CHRONIC MIGRAINE: ICD-10-CM

## 2020-10-10 RX ORDER — ONDANSETRON 4 MG/1
TABLET, ORALLY DISINTEGRATING ORAL
Qty: 9 TABLET | Refills: 2 | Status: SHIPPED | OUTPATIENT
Start: 2020-10-10 | End: 2020-12-24 | Stop reason: SDUPTHER

## 2020-10-10 RX ORDER — DEXAMETHASONE 1 MG
TABLET ORAL
Qty: 7 TABLET | Refills: 0 | Status: SHIPPED | OUTPATIENT
Start: 2020-10-10 | End: 2021-07-05

## 2020-10-12 RX ORDER — FLUTICASONE PROPIONATE 50 MCG
1 SPRAY, SUSPENSION (ML) NASAL DAILY PRN
Qty: 16 ML | Refills: 0 | Status: SHIPPED | OUTPATIENT
Start: 2020-10-12 | End: 2020-11-09

## 2020-10-13 RX ORDER — MECLIZINE HCL 12.5 MG/1
TABLET ORAL
Qty: 20 TABLET | Refills: 1 | Status: SHIPPED | OUTPATIENT
Start: 2020-10-13 | End: 2021-06-08

## 2020-10-14 ENCOUNTER — PROCEDURE VISIT (OUTPATIENT)
Dept: NEUROLOGY | Facility: CLINIC | Age: 40
End: 2020-10-14
Payer: COMMERCIAL

## 2020-10-14 VITALS — SYSTOLIC BLOOD PRESSURE: 126 MMHG | TEMPERATURE: 98.1 F | HEART RATE: 94 BPM | DIASTOLIC BLOOD PRESSURE: 82 MMHG

## 2020-10-14 DIAGNOSIS — G43.719 INTRACTABLE CHRONIC MIGRAINE WITHOUT AURA AND WITHOUT STATUS MIGRAINOSUS: Primary | ICD-10-CM

## 2020-10-14 PROCEDURE — 64615 CHEMODENERV MUSC MIGRAINE: CPT | Performed by: PSYCHIATRY & NEUROLOGY

## 2020-11-06 DIAGNOSIS — J30.9 ALLERGIC RHINITIS, UNSPECIFIED SEASONALITY, UNSPECIFIED TRIGGER: ICD-10-CM

## 2020-11-09 RX ORDER — FLUTICASONE PROPIONATE 50 MCG
1 SPRAY, SUSPENSION (ML) NASAL DAILY PRN
Qty: 16 ML | Refills: 0 | Status: SHIPPED | OUTPATIENT
Start: 2020-11-09 | End: 2020-12-18

## 2020-11-16 ENCOUNTER — OFFICE VISIT (OUTPATIENT)
Dept: INTERNAL MEDICINE CLINIC | Facility: CLINIC | Age: 40
End: 2020-11-16
Payer: COMMERCIAL

## 2020-11-16 VITALS
HEART RATE: 94 BPM | BODY MASS INDEX: 25.6 KG/M2 | WEIGHT: 189 LBS | TEMPERATURE: 96.9 F | HEIGHT: 72 IN | OXYGEN SATURATION: 98 % | DIASTOLIC BLOOD PRESSURE: 82 MMHG | SYSTOLIC BLOOD PRESSURE: 130 MMHG

## 2020-11-16 DIAGNOSIS — Z13.29 SCREENING FOR THYROID DISORDER: ICD-10-CM

## 2020-11-16 DIAGNOSIS — Z13.220 SCREENING FOR LIPID DISORDERS: ICD-10-CM

## 2020-11-16 DIAGNOSIS — F98.8 ATTENTION DEFICIT DISORDER, UNSPECIFIED HYPERACTIVITY PRESENCE: ICD-10-CM

## 2020-11-16 DIAGNOSIS — Z12.31 ENCOUNTER FOR SCREENING MAMMOGRAM FOR BREAST CANCER: ICD-10-CM

## 2020-11-16 DIAGNOSIS — R21 RASH OF FACE: ICD-10-CM

## 2020-11-16 DIAGNOSIS — F41.1 GENERALIZED ANXIETY DISORDER: ICD-10-CM

## 2020-11-16 DIAGNOSIS — B35.3 TINEA PEDIS OF BOTH FEET: Primary | ICD-10-CM

## 2020-11-16 DIAGNOSIS — J45.30 MILD PERSISTENT ASTHMA WITHOUT COMPLICATION: ICD-10-CM

## 2020-11-16 DIAGNOSIS — F32.A DEPRESSION, UNSPECIFIED DEPRESSION TYPE: ICD-10-CM

## 2020-11-16 DIAGNOSIS — G43.709 CHRONIC MIGRAINE W/O AURA W/O STATUS MIGRAINOSUS, NOT INTRACTABLE: ICD-10-CM

## 2020-11-16 PROCEDURE — 99214 OFFICE O/P EST MOD 30 MIN: CPT | Performed by: NURSE PRACTITIONER

## 2020-11-16 PROCEDURE — 3725F SCREEN DEPRESSION PERFORMED: CPT | Performed by: NURSE PRACTITIONER

## 2020-11-16 PROCEDURE — 3008F BODY MASS INDEX DOCD: CPT | Performed by: NURSE PRACTITIONER

## 2020-11-16 PROCEDURE — 1036F TOBACCO NON-USER: CPT | Performed by: NURSE PRACTITIONER

## 2020-11-16 RX ORDER — METHYLPREDNISOLONE 4 MG/1
TABLET ORAL
Qty: 21 EACH | Refills: 0 | Status: SHIPPED | OUTPATIENT
Start: 2020-11-16 | End: 2021-02-12

## 2020-11-16 RX ORDER — CLOTRIMAZOLE 1 %
CREAM (GRAM) TOPICAL 2 TIMES DAILY
Qty: 60 G | Refills: 0 | Status: SHIPPED | OUTPATIENT
Start: 2020-11-16 | End: 2022-02-25 | Stop reason: ALTCHOICE

## 2020-11-22 DIAGNOSIS — G43.719 INTRACTABLE CHRONIC MIGRAINE WITHOUT AURA AND WITHOUT STATUS MIGRAINOSUS: ICD-10-CM

## 2020-11-22 DIAGNOSIS — M54.12 CERVICAL RADICULOPATHY: ICD-10-CM

## 2020-11-23 RX ORDER — GABAPENTIN 600 MG/1
600 TABLET ORAL
Qty: 30 TABLET | Refills: 3 | Status: SHIPPED | OUTPATIENT
Start: 2020-11-23 | End: 2021-03-15

## 2020-11-24 ENCOUNTER — LAB (OUTPATIENT)
Dept: LAB | Facility: CLINIC | Age: 40
End: 2020-11-24
Payer: COMMERCIAL

## 2020-11-24 DIAGNOSIS — Z13.220 SCREENING FOR LIPID DISORDERS: ICD-10-CM

## 2020-11-24 DIAGNOSIS — J45.30 MILD PERSISTENT ASTHMA WITHOUT COMPLICATION: ICD-10-CM

## 2020-11-24 DIAGNOSIS — Z13.29 SCREENING FOR THYROID DISORDER: ICD-10-CM

## 2020-11-24 LAB
ALBUMIN SERPL BCP-MCNC: 3.8 G/DL (ref 3.5–5)
ALP SERPL-CCNC: 47 U/L (ref 46–116)
ALT SERPL W P-5'-P-CCNC: 21 U/L (ref 12–78)
ANION GAP SERPL CALCULATED.3IONS-SCNC: 9 MMOL/L (ref 4–13)
AST SERPL W P-5'-P-CCNC: 12 U/L (ref 5–45)
BASOPHILS # BLD AUTO: 0.04 THOUSANDS/ΜL (ref 0–0.1)
BASOPHILS NFR BLD AUTO: 1 % (ref 0–1)
BILIRUB SERPL-MCNC: 0.34 MG/DL (ref 0.2–1)
BUN SERPL-MCNC: 18 MG/DL (ref 5–25)
CALCIUM SERPL-MCNC: 9.1 MG/DL (ref 8.3–10.1)
CHLORIDE SERPL-SCNC: 102 MMOL/L (ref 100–108)
CHOLEST SERPL-MCNC: 154 MG/DL (ref 50–200)
CO2 SERPL-SCNC: 25 MMOL/L (ref 21–32)
CREAT SERPL-MCNC: 0.85 MG/DL (ref 0.6–1.3)
EOSINOPHIL # BLD AUTO: 0.15 THOUSAND/ΜL (ref 0–0.61)
EOSINOPHIL NFR BLD AUTO: 3 % (ref 0–6)
ERYTHROCYTE [DISTWIDTH] IN BLOOD BY AUTOMATED COUNT: 12.4 % (ref 11.6–15.1)
GFR SERPL CREATININE-BSD FRML MDRD: 87 ML/MIN/1.73SQ M
GLUCOSE P FAST SERPL-MCNC: 98 MG/DL (ref 65–99)
HCT VFR BLD AUTO: 45.7 % (ref 34.8–46.1)
HDLC SERPL-MCNC: 55 MG/DL
HGB BLD-MCNC: 15.1 G/DL (ref 11.5–15.4)
IMM GRANULOCYTES # BLD AUTO: 0.02 THOUSAND/UL (ref 0–0.2)
IMM GRANULOCYTES NFR BLD AUTO: 0 % (ref 0–2)
LDLC SERPL CALC-MCNC: 78 MG/DL (ref 0–100)
LYMPHOCYTES # BLD AUTO: 1.97 THOUSANDS/ΜL (ref 0.6–4.47)
LYMPHOCYTES NFR BLD AUTO: 33 % (ref 14–44)
MCH RBC QN AUTO: 31.1 PG (ref 26.8–34.3)
MCHC RBC AUTO-ENTMCNC: 33 G/DL (ref 31.4–37.4)
MCV RBC AUTO: 94 FL (ref 82–98)
MONOCYTES # BLD AUTO: 0.35 THOUSAND/ΜL (ref 0.17–1.22)
MONOCYTES NFR BLD AUTO: 6 % (ref 4–12)
NEUTROPHILS # BLD AUTO: 3.39 THOUSANDS/ΜL (ref 1.85–7.62)
NEUTS SEG NFR BLD AUTO: 57 % (ref 43–75)
NRBC BLD AUTO-RTO: 0 /100 WBCS
PLATELET # BLD AUTO: 275 THOUSANDS/UL (ref 149–390)
PMV BLD AUTO: 10.3 FL (ref 8.9–12.7)
POTASSIUM SERPL-SCNC: 4.2 MMOL/L (ref 3.5–5.3)
PROT SERPL-MCNC: 7.3 G/DL (ref 6.4–8.2)
RBC # BLD AUTO: 4.85 MILLION/UL (ref 3.81–5.12)
SODIUM SERPL-SCNC: 136 MMOL/L (ref 136–145)
TRIGL SERPL-MCNC: 103 MG/DL
TSH SERPL DL<=0.05 MIU/L-ACNC: 1.64 UIU/ML (ref 0.36–3.74)
WBC # BLD AUTO: 5.92 THOUSAND/UL (ref 4.31–10.16)

## 2020-11-24 PROCEDURE — 84443 ASSAY THYROID STIM HORMONE: CPT

## 2020-11-24 PROCEDURE — 80061 LIPID PANEL: CPT

## 2020-11-24 PROCEDURE — 36415 COLL VENOUS BLD VENIPUNCTURE: CPT

## 2020-11-24 PROCEDURE — 85025 COMPLETE CBC W/AUTO DIFF WBC: CPT

## 2020-11-24 PROCEDURE — 80053 COMPREHEN METABOLIC PANEL: CPT

## 2020-12-07 ENCOUNTER — TELEPHONE (OUTPATIENT)
Dept: INTERNAL MEDICINE CLINIC | Facility: CLINIC | Age: 40
End: 2020-12-07

## 2020-12-07 DIAGNOSIS — Z29.8 INDICATION PRESENT FOR ENDOCARDITIS PROPHYLAXIS: Primary | ICD-10-CM

## 2020-12-07 RX ORDER — CLINDAMYCIN HYDROCHLORIDE 300 MG/1
600 CAPSULE ORAL ONCE
Qty: 2 CAPSULE | Refills: 0 | Status: SHIPPED | OUTPATIENT
Start: 2020-12-07 | End: 2020-12-07

## 2020-12-11 ENCOUNTER — DOCUMENTATION (OUTPATIENT)
Dept: NEUROLOGY | Facility: CLINIC | Age: 40
End: 2020-12-11

## 2020-12-17 DIAGNOSIS — G43.009 MIGRAINE WITHOUT AURA AND WITHOUT STATUS MIGRAINOSUS, NOT INTRACTABLE: ICD-10-CM

## 2020-12-17 DIAGNOSIS — J30.9 ALLERGIC RHINITIS, UNSPECIFIED SEASONALITY, UNSPECIFIED TRIGGER: ICD-10-CM

## 2020-12-18 RX ORDER — FLUTICASONE PROPIONATE 50 MCG
1 SPRAY, SUSPENSION (ML) NASAL DAILY PRN
Qty: 16 ML | Refills: 0 | Status: SHIPPED | OUTPATIENT
Start: 2020-12-18 | End: 2021-01-14

## 2020-12-18 RX ORDER — SUMATRIPTAN 50 MG/1
TABLET, FILM COATED ORAL
Qty: 9 TABLET | Refills: 3 | Status: SHIPPED | OUTPATIENT
Start: 2020-12-18 | End: 2021-04-07

## 2020-12-21 ENCOUNTER — HOSPITAL ENCOUNTER (OUTPATIENT)
Dept: MAMMOGRAPHY | Facility: HOSPITAL | Age: 40
Discharge: HOME | End: 2020-12-21
Payer: COMMERCIAL

## 2020-12-21 VITALS — WEIGHT: 189 LBS | BODY MASS INDEX: 25.6 KG/M2 | HEIGHT: 72 IN

## 2020-12-21 DIAGNOSIS — Z12.31 ENCOUNTER FOR SCREENING MAMMOGRAM FOR BREAST CANCER: ICD-10-CM

## 2020-12-21 PROCEDURE — 77063 BREAST TOMOSYNTHESIS BI: CPT

## 2020-12-21 PROCEDURE — 77067 SCR MAMMO BI INCL CAD: CPT

## 2020-12-24 DIAGNOSIS — IMO0002 CHRONIC MIGRAINE: ICD-10-CM

## 2020-12-24 RX ORDER — ONDANSETRON 4 MG/1
TABLET, ORALLY DISINTEGRATING ORAL
Qty: 9 TABLET | Refills: 2 | Status: SHIPPED | OUTPATIENT
Start: 2020-12-24 | End: 2021-02-02

## 2020-12-24 NOTE — TELEPHONE ENCOUNTER
Received a fax from [facility]/pharmacy to refill her pndansetron odt 4 mg tab, last ov 06/24/20 next ov 01/18/21

## 2021-01-13 ENCOUNTER — TELEPHONE (OUTPATIENT)
Dept: INTERNAL MEDICINE CLINIC | Facility: CLINIC | Age: 41
End: 2021-01-13

## 2021-01-13 DIAGNOSIS — J30.9 ALLERGIC RHINITIS, UNSPECIFIED SEASONALITY, UNSPECIFIED TRIGGER: ICD-10-CM

## 2021-01-14 DIAGNOSIS — G43.719 INTRACTABLE CHRONIC MIGRAINE WITHOUT AURA AND WITHOUT STATUS MIGRAINOSUS: Primary | ICD-10-CM

## 2021-01-14 RX ORDER — FLUTICASONE PROPIONATE 50 MCG
1 SPRAY, SUSPENSION (ML) NASAL DAILY PRN
Qty: 16 ML | Refills: 0 | Status: SHIPPED | OUTPATIENT
Start: 2021-01-14 | End: 2022-03-16

## 2021-01-15 ENCOUNTER — OFFICE VISIT (OUTPATIENT)
Dept: INTERNAL MEDICINE CLINIC | Facility: CLINIC | Age: 41
End: 2021-01-15
Payer: COMMERCIAL

## 2021-01-15 VITALS
SYSTOLIC BLOOD PRESSURE: 130 MMHG | HEIGHT: 72 IN | BODY MASS INDEX: 25.57 KG/M2 | TEMPERATURE: 97.2 F | HEART RATE: 98 BPM | OXYGEN SATURATION: 96 % | WEIGHT: 188.8 LBS | DIASTOLIC BLOOD PRESSURE: 80 MMHG

## 2021-01-15 DIAGNOSIS — R35.0 URINARY FREQUENCY: ICD-10-CM

## 2021-01-15 DIAGNOSIS — L30.9 DERMATITIS: Primary | ICD-10-CM

## 2021-01-15 LAB
BILIRUB UR QL STRIP: NEGATIVE
CLARITY UR: CLEAR
COLOR UR: YELLOW
GLUCOSE UR STRIP-MCNC: NEGATIVE MG/DL
HGB UR QL STRIP.AUTO: NEGATIVE
KETONES UR STRIP-MCNC: NEGATIVE MG/DL
LEUKOCYTE ESTERASE UR QL STRIP: NEGATIVE
NITRITE UR QL STRIP: NEGATIVE
PH UR STRIP.AUTO: 6 [PH]
PROT UR STRIP-MCNC: NEGATIVE MG/DL
SL AMB  POCT GLUCOSE, UA: 0
SL AMB LEUKOCYTE ESTERASE,UA: 0
SL AMB POCT BILIRUBIN,UA: 0
SL AMB POCT BLOOD,UA: 10
SL AMB POCT CLARITY,UA: CLEAR
SL AMB POCT COLOR,UA: YELLOW
SL AMB POCT KETONES,UA: 0
SL AMB POCT NITRITE,UA: 0
SL AMB POCT PH,UA: 6
SL AMB POCT SPECIFIC GRAVITY,UA: 1.02
SL AMB POCT URINE PROTEIN: 0
SL AMB POCT UROBILINOGEN: 0.2
SP GR UR STRIP.AUTO: 1.01 (ref 1–1.03)
UROBILINOGEN UR QL STRIP.AUTO: 0.2 E.U./DL

## 2021-01-15 PROCEDURE — 99213 OFFICE O/P EST LOW 20 MIN: CPT | Performed by: NURSE PRACTITIONER

## 2021-01-15 PROCEDURE — 81003 URINALYSIS AUTO W/O SCOPE: CPT | Performed by: NURSE PRACTITIONER

## 2021-01-15 PROCEDURE — 81002 URINALYSIS NONAUTO W/O SCOPE: CPT | Performed by: NURSE PRACTITIONER

## 2021-01-15 PROCEDURE — 1036F TOBACCO NON-USER: CPT | Performed by: NURSE PRACTITIONER

## 2021-01-15 PROCEDURE — 3008F BODY MASS INDEX DOCD: CPT | Performed by: NURSE PRACTITIONER

## 2021-01-15 RX ORDER — METHYLPREDNISOLONE 4 MG/1
TABLET ORAL
Qty: 21 EACH | Refills: 0 | Status: SHIPPED | OUTPATIENT
Start: 2021-01-15 | End: 2021-02-12

## 2021-01-15 NOTE — PROGRESS NOTES
Assessment/Plan:     Diagnoses and all orders for this visit:    Dermatitis  Comments:  avoid scented lotions or detergents  take steroid as prescribed  call if not improving  Orders:  -     methylPREDNISolone 4 MG tablet therapy pack; Use as directed on package    Urinary frequency  Comments:  urine dip normal  will send out UA to lab  Orders:  -     UA w Reflex to Microscopic w Reflex to Culture - Clinic Collect  -     POCT urine dip          Subjective:      Patient ID: Cici Lopez is a 45 y o  female  Here today with complaints of a rash  Started 2 days ago   It started around her waist and has spread to her armpits and her lower back  It is red and itchy  She denies any new soaps, lotions, detergents, or foods  She has been taking benadryl with some relief  She is also complaining of urinary frequency and pressure  She has urgency as well  This has been ongoing over the last month  She denies hematuria or flank pain  The following portions of the patient's history were reviewed and updated as appropriate: allergies, current medications, past family history, past medical history, past social history, past surgical history and problem list     Review of Systems   Constitutional: Negative for activity change, appetite change, fatigue and fever  Respiratory: Negative for cough and shortness of breath  Cardiovascular: Negative for chest pain  Gastrointestinal: Negative for abdominal pain, diarrhea, nausea and vomiting  Genitourinary: Positive for frequency and urgency  Negative for decreased urine volume, difficulty urinating, dysuria and flank pain  Musculoskeletal: Negative for myalgias  Skin: Positive for rash  Neurological: Negative for dizziness, light-headedness and headaches           Objective:      /80   Pulse 98   Temp (!) 97 2 °F (36 2 °C)   Ht 6' 6" (1 981 m)   Wt 85 6 kg (188 lb 12 8 oz)   SpO2 96%   BMI 21 82 kg/m²          Physical Exam  Vitals signs reviewed  Constitutional:       Appearance: Normal appearance  HENT:      Head: Normocephalic and atraumatic  Eyes:      Conjunctiva/sclera: Conjunctivae normal       Pupils: Pupils are equal, round, and reactive to light  Pulmonary:      Effort: Pulmonary effort is normal    Abdominal:      Tenderness: There is no abdominal tenderness  Skin:     General: Skin is warm and dry  Findings: Rash present  Comments: Erythematous macules and patches on bilateral hip area, lower back, and axillary areas  Neurological:      Mental Status: She is alert and oriented to person, place, and time     Psychiatric:         Behavior: Behavior normal

## 2021-01-18 ENCOUNTER — PROCEDURE VISIT (OUTPATIENT)
Dept: NEUROLOGY | Facility: CLINIC | Age: 41
End: 2021-01-18
Payer: COMMERCIAL

## 2021-01-18 VITALS — SYSTOLIC BLOOD PRESSURE: 135 MMHG | HEART RATE: 88 BPM | TEMPERATURE: 97.5 F | DIASTOLIC BLOOD PRESSURE: 83 MMHG

## 2021-01-18 DIAGNOSIS — G43.719 INTRACTABLE CHRONIC MIGRAINE WITHOUT AURA AND WITHOUT STATUS MIGRAINOSUS: Primary | ICD-10-CM

## 2021-01-18 PROCEDURE — 64615 CHEMODENERV MUSC MIGRAINE: CPT | Performed by: PSYCHIATRY & NEUROLOGY

## 2021-01-18 NOTE — PROGRESS NOTES
Chemodenervation     Date/Time 1/18/2021 12:02 PM     Performed by  Dereck Walters DO     Authorized by Dereck Walters DO        Pre-procedure details      Prepped With: Alcohol     Anesthesia  (see MAR for exact dosages): Anesthesia method:  None   Procedure details     Position:  Supine and upright   Botox     Botox Type:  Type A    Brand:  Botox    mL's of Botulinum Toxin:  200    mL's of preservative free sterile saline:  2    Final Concentration per CC:  100 units    Needle Gauge:  30 G 2 5 inch   Procedures     Botox Procedures: chronic headache      Indications: migraines     Injection Location      Head / Face:  L superior cervical paraspinal, R superior cervical paraspinal, L , R , R frontalis, L frontalis, L lateral occipitalis, R lateral occipitalis, procerus, R temporalis, L temporalis, L superior trapezius and R superior trapezius    L  injection amount:  5 unit(s)    R  injection amount:  5 unit(s)    L lateral frontalis:  5 unit(s)    R lateral frontalis:  5 unit(s)    L medial frontalis:  5 unit(s)    R medial frontalis:  5 unit(s)    L temporalis injection amount:  40 unit(s)    R temporalis injection amount:  40 unit(s)    Procerus injection amount:  5 unit(s)    L lateral occipitalis injection amount:  15 unit(s)    R lateral occipitalis injection amount:  15 unit(s)    L superior cervical paraspinal injection amount:  10 unit(s)    R superior cervical paraspinal injection amount:  10 unit(s)    L superior trapezius injection amount:  20 unit(s)    R superior trapezius injection amount:  15 unit(s)   Total Units     Total units used:  200    Total units discarded:  0   Post-procedure details      Chemodenervation:  Chronic migraine    Facial Nerve Location[de-identified]  Bilateral facial nerve    Patient tolerance of procedure:   Tolerated well, no immediate complications   Comments      Increased dosing medically necessary to further help reduce migraines as well as wearing off the last 2 weeks  She experiences over 7 migraines a month or greater reduction since Botox initiation  No adverse effects

## 2021-01-22 DIAGNOSIS — M54.2 CERVICAL MUSCLE PAIN: ICD-10-CM

## 2021-01-22 RX ORDER — TIZANIDINE 2 MG/1
TABLET ORAL
Qty: 60 TABLET | Refills: 4 | Status: SHIPPED | OUTPATIENT
Start: 2021-01-22 | End: 2021-06-01

## 2021-01-22 NOTE — TELEPHONE ENCOUNTER
Medication refill check list    Correct patient? Y   Correct medication name, dose, and pill size? Y-Tizanidine 2 mg tab   Correct provider? Y-Devarinti   Last and Next appt  scheduled? 1/18/21-4/28/21   Right pharmacy listed? [facility]   Correct quantity for 30 or 90 days? Y-30 days   Is the patient out of refills? When was it last prescribed? Y-8/3/2020   Directions match what the patient says they are taking?  Y-TAKE 1-2 TABS NIGHTLY AS NEEDED FOR NECK PAIN   Enough refills? (none for controlled substances, 1 year for routine medications) N-4

## 2021-02-02 DIAGNOSIS — IMO0002 CHRONIC MIGRAINE: ICD-10-CM

## 2021-02-02 RX ORDER — ONDANSETRON 4 MG/1
TABLET, ORALLY DISINTEGRATING ORAL
Qty: 9 TABLET | Refills: 2 | Status: SHIPPED | OUTPATIENT
Start: 2021-02-02 | End: 2021-07-26

## 2021-02-03 ENCOUNTER — TELEMEDICINE (OUTPATIENT)
Dept: INTERNAL MEDICINE CLINIC | Facility: CLINIC | Age: 41
End: 2021-02-03
Payer: COMMERCIAL

## 2021-02-03 DIAGNOSIS — J45.31 MILD PERSISTENT ASTHMA WITH ACUTE EXACERBATION: ICD-10-CM

## 2021-02-03 DIAGNOSIS — U07.1 COVID-19 VIRUS INFECTION: Primary | ICD-10-CM

## 2021-02-03 PROCEDURE — 99214 OFFICE O/P EST MOD 30 MIN: CPT | Performed by: NURSE PRACTITIONER

## 2021-02-03 RX ORDER — ALBUTEROL SULFATE 2.5 MG/3ML
2.5 SOLUTION RESPIRATORY (INHALATION) EVERY 6 HOURS PRN
Qty: 60 VIAL | Refills: 1 | Status: SHIPPED | OUTPATIENT
Start: 2021-02-03 | End: 2022-03-16

## 2021-02-03 RX ORDER — ALBUTEROL SULFATE 90 UG/1
2 AEROSOL, METERED RESPIRATORY (INHALATION) EVERY 6 HOURS PRN
Qty: 1 INHALER | Refills: 0 | Status: SHIPPED | OUTPATIENT
Start: 2021-02-03 | End: 2021-07-26

## 2021-02-03 NOTE — PROGRESS NOTES
COVID-19 Virtual Visit     Assessment/Plan:    Problem List Items Addressed This Visit     None      Visit Diagnoses     COVID-19 virus infection    -  Primary    Relevant Medications    albuterol (PROVENTIL HFA,VENTOLIN HFA) 90 mcg/act inhaler         Disposition:     I recommended continued isolation until at least 24 hours have passed since recovery defined as resolution of fever without the use of fever-reducing medications AND improvement in COVID symptoms AND 10 days have passed since onset of symptoms (or 10 days have passed since date of first positive viral diagnostic test for asymptomatic patients)  I have spent 10 minutes directly with the patient  Greater than 50% of this time was spent in counseling/coordination of care regarding: instructions for management and patient and family education  Encounter provider CROW Martinez    Provider located at [address] [place] [phone]    Recent Visits  No visits were found meeting these conditions  Showing recent visits within past 7 days and meeting all other requirements     Future Appointments  No visits were found meeting these conditions  Showing future appointments within next 150 days and meeting all other requirements      This virtual check-in was done via [employer] and patient was informed that this is a secure, HIPAA-compliant platform  She agrees to proceed  Patient agrees to participate in a virtual check in via telephone or video visit instead of presenting to the office to address urgent/immediate medical needs  Patient is aware this is a billable service  After connecting through [facility], the patient was identified by name and date of birth  Donna Quijano was informed that this was a telemedicine visit and that the exam was being conducted confidentially over secure lines  My office door was closed  No one else was in the room   Nhung LEVINE Juanita acknowledged consent and understanding of privacy and security of the telemedicine visit  I informed the patient that I have reviewed her record in Epic and presented the opportunity for her to ask any questions regarding the visit today  The patient agreed to participate  Subjective:   Chavez Valencia is a 45 y o  female who has been screened for COVID-19  Symptom change since last report: unchanged  Patient's symptoms include fever, fatigue, malaise, anosmia, loss of taste, cough, shortness of breath, chest tightness, nausea, diarrhea, myalgias and headache  Patient denies congestion, sore throat and vomiting  Susie Cooks has been staying home and has isolated themselves in her home  She is taking care to not share personal items and is cleaning all surfaces that are touched often, like counters, tabletops, and doorknobs using household cleaning sprays or wipes  She is wearing a mask when she leaves her room  Date of symptom onset: 1/24/2021  Date of positive COVID-19 PCR: 1/31/2021    Nhung tested positive for covid on 1/31  Her symptoms started on 1/24  Symptoms started on 1/24  She has a subjective fever, cough, chest tightness, shortness of breath, and a headache  She has extreme fatigue  She has occasional diarrhea  She has been using albuterol           Lab Results   Component Value Date    SARSCOV2 DETECTED (A) 01/31/2021     Past Medical History:   Diagnosis Date    Anxiety     Arthritis     Asthma     Depression     ETOH abuse     Herniated cervical disc     Meniere's disease     Migraine     Psychiatric disorder     anxiety/depression    Sinusitis     Thyroid disease     Vertigo     Vertigo      Past Surgical History:   Procedure Laterality Date    BACK SURGERY      C5-6    JOINT REPLACEMENT      right hip    OTHER SURGICAL HISTORY      Hip Replacement (right) , Spinal  Diskectomy Cervical C5-C6     Current Outpatient Medications   Medication Sig Dispense Refill    albuterol (PROVENTIL HFA,VENTOLIN HFA) 90 mcg/act inhaler Inhale 2 puffs every 6 (six) hours as needed for wheezing or shortness of breath 1 Inhaler 0    ammonium lactate (LAC-HYDRIN) 12 % lotion APPLY TO ARMS AND THIGHS TWICE DAILY  2    amphetamine-dextroamphetamine (ADDERALL) 20 mg tablet Take 20 mg by mouth 2 (two) times a day        clonazePAM (KlonoPIN) 1 mg tablet Take 1 tablet by mouth every 8 (eight) hours as needed      clotrimazole (LOTRIMIN) 1 % cream Apply topically 2 (two) times a day 60 g 0    dexamethasone (DECADRON) 1 mg tablet TAKE 2 TABS DAILY X 2DAYS, 1 TAB DAILY X 2DAYS THEN 1/2 TAB DAILY X 2DAYS IN THE MORNING WITH FOOD 7 tablet 0    fluticasone (FLONASE) 50 mcg/act nasal spray 1 SPRAY INTO EACH NOSTRIL DAILY AS NEEDED FOR RHINITIS 16 mL 0    gabapentin (NEURONTIN) 600 MG tablet TAKE 1 TABLET (600 MG TOTAL) BY MOUTH DAILY AT BEDTIME 30 tablet 3    meclizine (ANTIVERT) 12 5 MG tablet TAKE 1 TAB EVERY 8 HOURS AS NEEDED FOR VERTIGO  MAX 2-3 DAYS A WEEK 20 tablet 1    methylPREDNISolone 4 MG tablet therapy pack Use as directed on package 21 each 0    methylPREDNISolone 4 MG tablet therapy pack Use as directed on package 21 each 0    montelukast (SINGULAIR) 10 mg tablet TAKE 1 TABLET BY MOUTH DAILY AT BEDTIME 90 tablet 1    onabotulinumtoxin A (BOTOX) 100 units one time  "for vertigo"      ondansetron (ZOFRAN-ODT) 4 mg disintegrating tablet TAKE 1 TABLET BY MOUTH EVERY 8 HOURS AS NEEDED FOR NAUSEA AND VOMITING 9 tablet 2    sertraline (ZOLOFT) 100 mg tablet Take 100 mg by mouth every morning  1    sertraline (ZOLOFT) 50 mg tablet Take 100 mg by mouth        SUMAtriptan (IMITREX) 50 mg tablet TAKE 1 TO 2 TABS BY MOUTH AT ONSET OF MIGRAINE  MAY REPEAT IN 2 HOURS IF NEEDED   MAX 4 TABS IN 24HRS 9 tablet 3    tiZANidine (ZANAFLEX) 2 mg tablet TAKE 1-2 TABS NIGHTLY AS NEEDED FOR NECK PAIN 60 tablet 4    VRAYLAR 1 5 MG capsule Take 1 5 mg by mouth daily  1    zolpidem (AMBIEN) 5 mg tablet Take 5 mg by mouth daily at bedtime as needed  2     No current facility-administered medications for this visit  Allergies   Allergen Reactions    Latuda [Lurasidone]      Reaction: Drug Psychosis    Topamax [Topiramate]      psychosis    Amoxicillin Rash    Percocet [Oxycodone-Acetaminophen] Rash       Review of Systems   Constitutional: Positive for fatigue and fever  HENT: Negative for congestion and sore throat  Respiratory: Positive for cough, chest tightness and shortness of breath  Gastrointestinal: Positive for diarrhea and nausea  Negative for vomiting  Musculoskeletal: Positive for myalgias  Neurological: Positive for headaches  Objective: There were no vitals filed for this visit  Physical Exam  Constitutional:       Appearance: She is well-developed  HENT:      Head: Normocephalic  Eyes:      Pupils: Pupils are equal, round, and reactive to light  Pulmonary:      Effort: Pulmonary effort is normal    Neurological:      Mental Status: She is alert  Psychiatric:         Behavior: Behavior normal        VIRTUAL VISIT DISCLAIMER    Nhung Grant acknowledges that she has consented to an online visit or consultation  She understands that the online visit is based solely on information provided by her, and that, in the absence of a face-to-face physical evaluation by the physician, the diagnosis she receives is both limited and provisional in terms of accuracy and completeness  This is not intended to replace a full medical face-to-face evaluation by the physician  Nhung Grant understands and accepts these terms

## 2021-02-03 NOTE — TELEPHONE ENCOUNTER
Pt needs refill on Albuterol solution for her nebulizer she has been using about twice a day due to positive covid results     Has appt this afternoon

## 2021-02-05 RX ORDER — MELOXICAM 15 MG/1
15 TABLET ORAL DAILY
COMMUNITY
Start: 2021-01-18 | End: 2022-02-25 | Stop reason: ALTCHOICE

## 2021-02-05 RX ORDER — CLINDAMYCIN HYDROCHLORIDE 300 MG/1
CAPSULE ORAL
COMMUNITY
Start: 2020-12-07 | End: 2021-06-08

## 2021-02-08 ENCOUNTER — TELEMEDICINE (OUTPATIENT)
Dept: INTERNAL MEDICINE CLINIC | Facility: CLINIC | Age: 41
End: 2021-02-08
Payer: COMMERCIAL

## 2021-02-08 DIAGNOSIS — U07.1 COVID-19: Primary | ICD-10-CM

## 2021-02-08 DIAGNOSIS — U07.1 COVID-19: ICD-10-CM

## 2021-02-08 DIAGNOSIS — J45.30 MILD PERSISTENT ASTHMA WITHOUT COMPLICATION: ICD-10-CM

## 2021-02-08 PROCEDURE — 99214 OFFICE O/P EST MOD 30 MIN: CPT | Performed by: NURSE PRACTITIONER

## 2021-02-08 RX ORDER — AZITHROMYCIN 250 MG/1
TABLET, FILM COATED ORAL
Qty: 6 TABLET | Refills: 0 | Status: SHIPPED | OUTPATIENT
Start: 2021-02-08 | End: 2021-02-09

## 2021-02-08 NOTE — ASSESSMENT & PLAN NOTE
Symptoms have improved overall  Remaining sinus congestion and ear pain may be due to infection  Take antibiotics as prescribed  Continue albuterol inhaler as needed     Off quarantine 2/10 as long as symptoms are improving

## 2021-02-08 NOTE — PROGRESS NOTES
COVID-19 Virtual Visit     Assessment/Plan:    Problem List Items Addressed This Visit        Respiratory    Mild persistent asthma    Relevant Medications    azithromycin (ZITHROMAX) 250 mg tablet       Other    COVID-19 - Primary     Symptoms have improved overall  Remaining sinus congestion and ear pain may be due to infection  Take antibiotics as prescribed  Continue albuterol inhaler as needed  Off quarantine 2/10 as long as symptoms are improving          Relevant Medications    azithromycin (ZITHROMAX) 250 mg tablet         Disposition:     I recommended continued isolation until at least 24 hours have passed since recovery defined as resolution of fever without the use of fever-reducing medications AND improvement in COVID symptoms AND 10 days have passed since onset of symptoms (or 10 days have passed since date of first positive viral diagnostic test for asymptomatic patients)  I have spent 7 minutes directly with the patient  Greater than 50% of this time was spent in counseling/coordination of care regarding: instructions for management and patient and family education  Encounter provider CROW Driscoll    Provider located at [address] [place] [id number]    Recent Visits  Date Type Provider Dept   02/03/21 Telemedicine Sol Millan, [address] Internal Med   Showing recent visits within past 7 days and meeting all other requirements     Today's Visits  Date Type Provider Dept   02/08/21 [facility] Donny Hunter 123, [address] Internal Med   Showing today's visits and meeting all other requirements     Future Appointments  No visits were found meeting these conditions  Showing future appointments within next 150 days and meeting all other requirements      This virtual check-in was done via [employer] and patient was informed that this is a secure, HIPAA-compliant platform   She agrees to proceed  Patient agrees to participate in a virtual check in via telephone or video visit instead of presenting to the office to address urgent/immediate medical needs  Patient is aware this is a billable service  After connecting through [facility], the patient was identified by name and date of birth  Aris Littlejohn was informed that this was a telemedicine visit and that the exam was being conducted confidentially over secure lines  My office door was closed  No one else was in the room  Aris Littlejohn acknowledged consent and understanding of privacy and security of the telemedicine visit  I informed the patient that I have reviewed her record in Epic and presented the opportunity for her to ask any questions regarding the visit today  The patient agreed to participate  Subjective:   Aris Littlejohn is a 45 y o  female who has been screened for COVID-19  Symptom change since last report: improving  Patient's symptoms include nasal congestion, anosmia, loss of taste, cough and headache  Patient denies fever, chills, fatigue, sore throat, shortness of breath, chest tightness, nausea, vomiting and diarrhea  Avram Crigler has been staying home and has isolated themselves in her home  She is taking care to not share personal items and is cleaning all surfaces that are touched often, like counters, tabletops, and doorknobs using household cleaning sprays or wipes  She is wearing a mask when she leaves her room  Date of symptom onset: 1/24/2021  Date of positive COVID-19 PCR: 1/31/2021    Avram Crigler is feeling better  She has no further fevers  Her cough and shortness of breath are improving  She has been using her inhaler  She does feel like she's getting a sinus infection  She feels better overall but has worsening sinus pressure with green mucous and left ear congestion          Lab Results   Component Value Date    SARSCOV2 DETECTED (A) 01/31/2021     Past Medical History:   Diagnosis Date    Anxiety     Arthritis     Asthma     Depression     ETOH abuse     Herniated cervical disc     Meniere's disease     Migraine     Psychiatric disorder     anxiety/depression    Sinusitis     Thyroid disease     Vertigo     Vertigo      Past Surgical History:   Procedure Laterality Date    BACK SURGERY      C5-6    JOINT REPLACEMENT      right hip    OTHER SURGICAL HISTORY      Hip Replacement (right) , Spinal  Diskectomy Cervical C5-C6     Current Outpatient Medications   Medication Sig Dispense Refill    albuterol (2 5 mg/3 mL) 0 083 % nebulizer solution Take 1 vial (2 5 mg total) by nebulization every 6 (six) hours as needed for wheezing or shortness of breath 60 vial 1    albuterol (PROVENTIL HFA,VENTOLIN HFA) 90 mcg/act inhaler Inhale 2 puffs every 6 (six) hours as needed for wheezing or shortness of breath 1 Inhaler 0    ammonium lactate (LAC-HYDRIN) 12 % lotion APPLY TO ARMS AND THIGHS TWICE DAILY  2    amphetamine-dextroamphetamine (ADDERALL) 20 mg tablet Take 20 mg by mouth 2 (two) times a day        azithromycin (ZITHROMAX) 250 mg tablet Take 2 tablets daily on day 1 then 1 tablet daily for the next 4 days 6 tablet 0    clindamycin (CLEOCIN) 300 MG capsule TAKE 2 CAPSULES (600 MG TOTAL) BY MOUTH ONCE FOR 1 DOSE TAKE 30 60 MINUTES PRIOR TO DENTAL PROCEDURE      clonazePAM (KlonoPIN) 1 mg tablet Take 1 tablet by mouth every 8 (eight) hours as needed      clotrimazole (LOTRIMIN) 1 % cream Apply topically 2 (two) times a day 60 g 0    dexamethasone (DECADRON) 1 mg tablet TAKE 2 TABS DAILY X 2DAYS, 1 TAB DAILY X 2DAYS THEN 1/2 TAB DAILY X 2DAYS IN THE MORNING WITH FOOD 7 tablet 0    fluticasone (FLONASE) 50 mcg/act nasal spray 1 SPRAY INTO EACH NOSTRIL DAILY AS NEEDED FOR RHINITIS 16 mL 0    gabapentin (NEURONTIN) 600 MG tablet TAKE 1 TABLET (600 MG TOTAL) BY MOUTH DAILY AT BEDTIME 30 tablet 3    meclizine (ANTIVERT) 12 5 MG tablet TAKE 1 TAB EVERY 8 HOURS AS NEEDED FOR VERTIGO   MAX 2-3 DAYS A WEEK 20 tablet 1    meloxicam (MOBIC) 15 mg tablet Take 15 mg by mouth daily With food      methylPREDNISolone 4 MG tablet therapy pack Use as directed on package 21 each 0    methylPREDNISolone 4 MG tablet therapy pack Use as directed on package 21 each 0    montelukast (SINGULAIR) 10 mg tablet TAKE 1 TABLET BY MOUTH DAILY AT BEDTIME 90 tablet 1    onabotulinumtoxin A (BOTOX) 100 units one time  "for vertigo"      ondansetron (ZOFRAN-ODT) 4 mg disintegrating tablet TAKE 1 TABLET BY MOUTH EVERY 8 HOURS AS NEEDED FOR NAUSEA AND VOMITING 9 tablet 2    sertraline (ZOLOFT) 100 mg tablet Take 100 mg by mouth every morning  1    sertraline (ZOLOFT) 50 mg tablet Take 100 mg by mouth        SUMAtriptan (IMITREX) 50 mg tablet TAKE 1 TO 2 TABS BY MOUTH AT ONSET OF MIGRAINE  MAY REPEAT IN 2 HOURS IF NEEDED  MAX 4 TABS IN 24HRS 9 tablet 3    tiZANidine (ZANAFLEX) 2 mg tablet TAKE 1-2 TABS NIGHTLY AS NEEDED FOR NECK PAIN 60 tablet 4    VRAYLAR 1 5 MG capsule Take 1 5 mg by mouth daily  1    zolpidem (AMBIEN) 5 mg tablet Take 5 mg by mouth daily at bedtime as needed  2     No current facility-administered medications for this visit  Allergies   Allergen Reactions    Latuda [Lurasidone]      Reaction: Drug Psychosis    Topamax [Topiramate]      psychosis    Amoxicillin Rash    Percocet [Oxycodone-Acetaminophen] Rash       Review of Systems   Constitutional: Negative for chills, fatigue and fever  HENT: Positive for congestion  Negative for sore throat  Respiratory: Positive for cough  Negative for chest tightness and shortness of breath  Gastrointestinal: Negative for diarrhea, nausea and vomiting  Neurological: Positive for headaches  Objective: There were no vitals filed for this visit  Physical Exam  Constitutional:       Appearance: She is well-developed  HENT:      Head: Normocephalic  Eyes:      Pupils: Pupils are equal, round, and reactive to light     Pulmonary:      Effort: Pulmonary effort is normal    Neurological:      Mental Status: She is alert  Psychiatric:         Behavior: Behavior normal        VIRTUAL VISIT DISCLAIMER    Nhung Grant acknowledges that she has consented to an online visit or consultation  She understands that the online visit is based solely on information provided by her, and that, in the absence of a face-to-face physical evaluation by the physician, the diagnosis she receives is both limited and provisional in terms of accuracy and completeness  This is not intended to replace a full medical face-to-face evaluation by the physician  Nhung Grant understands and accepts these terms

## 2021-02-09 RX ORDER — AZITHROMYCIN 250 MG/1
TABLET, FILM COATED ORAL
Qty: 6 TABLET | Refills: 0 | Status: SHIPPED | OUTPATIENT
Start: 2021-02-09 | End: 2021-02-13

## 2021-02-12 ENCOUNTER — TELEPHONE (OUTPATIENT)
Dept: INTERNAL MEDICINE CLINIC | Facility: CLINIC | Age: 41
End: 2021-02-12

## 2021-02-12 DIAGNOSIS — R21 RASH OF FACE: ICD-10-CM

## 2021-02-12 RX ORDER — METHYLPREDNISOLONE 4 MG/1
TABLET ORAL
Qty: 21 EACH | Refills: 0 | Status: SHIPPED | OUTPATIENT
Start: 2021-02-12 | End: 2022-02-25 | Stop reason: ALTCHOICE

## 2021-02-12 NOTE — TELEPHONE ENCOUNTER
Patient called in, she said she saw Jessica Shields for [place] now turned sinus infection but she is experiencing a rash on her waist line and under her arms  She thinks it is the same exact rash that she had about a month ago    she is requesting if prednisone can be called in for the rash?

## 2021-02-19 ENCOUNTER — TELEMEDICINE (OUTPATIENT)
Dept: INTERNAL MEDICINE CLINIC | Facility: CLINIC | Age: 41
End: 2021-02-19
Payer: COMMERCIAL

## 2021-02-19 DIAGNOSIS — U07.1 COVID-19: Primary | ICD-10-CM

## 2021-02-19 PROCEDURE — 99213 OFFICE O/P EST LOW 20 MIN: CPT | Performed by: NURSE PRACTITIONER

## 2021-02-19 PROCEDURE — 1036F TOBACCO NON-USER: CPT | Performed by: NURSE PRACTITIONER

## 2021-02-19 NOTE — PROGRESS NOTES
Virtual Regular Visit      Assessment/Plan:    Problem List Items Addressed This Visit        Other    [id number] - Primary        Provided reassurance that she is no longer contagious  Continue social distancing and masking as standard precautions  Symptoms overall continue to improve but may take some time to resolve completely  Albuterol inhaler as needed  Advised to start walking about 15-30 minutes a few times a week  Reason for visit is No chief complaint on file  Encounter provider CROW Bernstein    Provider located at [address] [place] [id number]      Recent Visits  Date Type Provider Dept   02/12/21 Telephone Melania Edwards, [address] [facility] Internal Med   Showing recent visits within past 7 days and meeting all other requirements     Future Appointments  No visits were found meeting these conditions  Showing future appointments within next 150 days and meeting all other requirements        The patient was identified by name and date of birth  Molly Peacock was informed that this is a telemedicine visit and that the visit is being conducted through [facility] and patient was informed that this is a secure, HIPAA-compliant platform  She agrees to proceed     My office door was closed  No one else was in the room  She acknowledged consent and understanding of privacy and security of the video platform  The patient has agreed to participate and understands they can discontinue the visit at any time  Patient is aware this is a billable service  Subjective  Molly Peacock is a 45 y o  female with complaints of fatigue and chest tightness  [employer] had covid over a month ago  Since then although her symptoms have improved she still has anxiety about being around other people due to some lingering symptoms   She continues to have intermittent nasal congestion, chest tightness and burning, and some mild shortness of breath  This has improved however she is worried about being contagious  She denies any fevers  She has been using her inhaler intermittently       Past Medical History:   Diagnosis Date    Anxiety     Arthritis     Asthma     Depression     ETOH abuse     Herniated cervical disc     Meniere's disease     Migraine     Psychiatric disorder     anxiety/depression    Sinusitis     Thyroid disease     Vertigo     Vertigo        Past Surgical History:   Procedure Laterality Date    BACK SURGERY      C5-6    JOINT REPLACEMENT      right hip    OTHER SURGICAL HISTORY      Hip Replacement (right) , Spinal  Diskectomy Cervical C5-C6       Current Outpatient Medications   Medication Sig Dispense Refill    albuterol (2 5 mg/3 mL) 0 083 % nebulizer solution Take 1 vial (2 5 mg total) by nebulization every 6 (six) hours as needed for wheezing or shortness of breath 60 vial 1    albuterol (PROVENTIL HFA,VENTOLIN HFA) 90 mcg/act inhaler Inhale 2 puffs every 6 (six) hours as needed for wheezing or shortness of breath 1 Inhaler 0    ammonium lactate (LAC-HYDRIN) 12 % lotion APPLY TO ARMS AND THIGHS TWICE DAILY  2    amphetamine-dextroamphetamine (ADDERALL) 20 mg tablet Take 20 mg by mouth 2 (two) times a day        clindamycin (CLEOCIN) 300 MG capsule TAKE 2 CAPSULES (600 MG TOTAL) BY MOUTH ONCE FOR 1 DOSE TAKE 30 60 MINUTES PRIOR TO DENTAL PROCEDURE      clonazePAM (KlonoPIN) 1 mg tablet Take 1 tablet by mouth every 8 (eight) hours as needed      clotrimazole (LOTRIMIN) 1 % cream Apply topically 2 (two) times a day 60 g 0    dexamethasone (DECADRON) 1 mg tablet TAKE 2 TABS DAILY X 2DAYS, 1 TAB DAILY X 2DAYS THEN 1/2 TAB DAILY X 2DAYS IN THE MORNING WITH FOOD 7 tablet 0    fluticasone (FLONASE) 50 mcg/act nasal spray 1 SPRAY INTO EACH NOSTRIL DAILY AS NEEDED FOR RHINITIS 16 mL 0    gabapentin (NEURONTIN) 600 MG tablet TAKE 1 TABLET (600 MG TOTAL) BY MOUTH DAILY AT BEDTIME 30 tablet 3    meclizine (ANTIVERT) 12 5 MG tablet TAKE 1 TAB EVERY 8 HOURS AS NEEDED FOR VERTIGO  MAX 2-3 DAYS A WEEK 20 tablet 1    meloxicam (MOBIC) 15 mg tablet Take 15 mg by mouth daily With food      methylPREDNISolone 4 MG tablet therapy pack Use as directed on package 21 each 0    montelukast (SINGULAIR) 10 mg tablet TAKE 1 TABLET BY MOUTH DAILY AT BEDTIME 90 tablet 1    onabotulinumtoxin A (BOTOX) 100 units one time  "for vertigo"      ondansetron (ZOFRAN-ODT) 4 mg disintegrating tablet TAKE 1 TABLET BY MOUTH EVERY 8 HOURS AS NEEDED FOR NAUSEA AND VOMITING 9 tablet 2    sertraline (ZOLOFT) 100 mg tablet Take 100 mg by mouth every morning  1    sertraline (ZOLOFT) 50 mg tablet Take 100 mg by mouth        SUMAtriptan (IMITREX) 50 mg tablet TAKE 1 TO 2 TABS BY MOUTH AT ONSET OF MIGRAINE  MAY REPEAT IN 2 HOURS IF NEEDED  MAX 4 TABS IN 24HRS 9 tablet 3    tiZANidine (ZANAFLEX) 2 mg tablet TAKE 1-2 TABS NIGHTLY AS NEEDED FOR NECK PAIN 60 tablet 4    VRAYLAR 1 5 MG capsule Take 1 5 mg by mouth daily  1    zolpidem (AMBIEN) 5 mg tablet Take 5 mg by mouth daily at bedtime as needed  2     No current facility-administered medications for this visit  Allergies   Allergen Reactions    Latuda [Lurasidone]      Reaction: Drug Psychosis    Topamax [Topiramate]      psychosis    Amoxicillin Rash    Percocet [Oxycodone-Acetaminophen] Rash       Review of Systems   Constitutional: Positive for fatigue  Negative for activity change, appetite change and fever  HENT: Positive for congestion  Negative for sore throat  Respiratory: Positive for cough, chest tightness and shortness of breath  Negative for wheezing  Cardiovascular: Negative for chest pain  Gastrointestinal: Negative for abdominal pain, diarrhea, nausea and vomiting  Musculoskeletal: Negative for myalgias  Neurological: Negative for dizziness, light-headedness and headaches  Psychiatric/Behavioral: The patient is nervous/anxious          Video Exam    There were no vitals filed for this visit  Physical Exam  Constitutional:       Appearance: She is well-developed  HENT:      Head: Normocephalic  Eyes:      Pupils: Pupils are equal, round, and reactive to light  Pulmonary:      Effort: Pulmonary effort is normal    Neurological:      Mental Status: She is alert and oriented to person, place, and time  Psychiatric:         Behavior: Behavior normal           I spent 10 minutes directly with the patient during this visit      Jorge Stewart acknowledges that she has consented to an online visit or consultation  She understands that the online visit is based solely on information provided by her, and that, in the absence of a face-to-face physical evaluation by the physician, the diagnosis she receives is both limited and provisional in terms of accuracy and completeness  This is not intended to replace a full medical face-to-face evaluation by the physician  Nhung Grant understands and accepts these terms

## 2021-03-14 DIAGNOSIS — M54.12 CERVICAL RADICULOPATHY: ICD-10-CM

## 2021-03-14 DIAGNOSIS — G43.719 INTRACTABLE CHRONIC MIGRAINE WITHOUT AURA AND WITHOUT STATUS MIGRAINOSUS: ICD-10-CM

## 2021-03-15 RX ORDER — GABAPENTIN 600 MG/1
600 TABLET ORAL
Qty: 30 TABLET | Refills: 3 | Status: SHIPPED | OUTPATIENT
Start: 2021-03-15 | End: 2021-07-06

## 2021-03-19 ENCOUNTER — TELEPHONE (OUTPATIENT)
Dept: NEUROLOGY | Facility: CLINIC | Age: 41
End: 2021-03-19

## 2021-03-19 NOTE — TELEPHONE ENCOUNTER
Type Date User Summary Attachment   General 03/11/2021  2:33 PM Natalya Howell care coordination  -   Note    Botox- authorization #: [id number]- last visit- valid from 6/29/2020 until 6/29/2021   Please use our stock      Thank you,     Reyna Steiner

## 2021-04-07 DIAGNOSIS — G43.009 MIGRAINE WITHOUT AURA AND WITHOUT STATUS MIGRAINOSUS, NOT INTRACTABLE: ICD-10-CM

## 2021-04-07 RX ORDER — SUMATRIPTAN 50 MG/1
TABLET, FILM COATED ORAL
Qty: 9 TABLET | Refills: 3 | Status: SHIPPED | OUTPATIENT
Start: 2021-04-07 | End: 2021-07-26

## 2021-04-13 DIAGNOSIS — J45.30 MILD PERSISTENT ASTHMA WITHOUT COMPLICATION: ICD-10-CM

## 2021-04-14 RX ORDER — MONTELUKAST SODIUM 10 MG/1
TABLET ORAL
Qty: 90 TABLET | Refills: 1 | Status: SHIPPED | OUTPATIENT
Start: 2021-04-14 | End: 2021-09-24

## 2021-04-28 ENCOUNTER — PROCEDURE VISIT (OUTPATIENT)
Dept: NEUROLOGY | Facility: CLINIC | Age: 41
End: 2021-04-28
Payer: COMMERCIAL

## 2021-04-28 VITALS — SYSTOLIC BLOOD PRESSURE: 146 MMHG | DIASTOLIC BLOOD PRESSURE: 93 MMHG | TEMPERATURE: 98 F | HEART RATE: 119 BPM

## 2021-04-28 DIAGNOSIS — G43.719 INTRACTABLE CHRONIC MIGRAINE WITHOUT AURA AND WITHOUT STATUS MIGRAINOSUS: Primary | ICD-10-CM

## 2021-04-28 DIAGNOSIS — M54.50 CHRONIC MIDLINE LOW BACK PAIN WITHOUT SCIATICA: ICD-10-CM

## 2021-04-28 DIAGNOSIS — M54.2 CERVICALGIA: ICD-10-CM

## 2021-04-28 DIAGNOSIS — G89.29 CHRONIC MIDLINE LOW BACK PAIN WITHOUT SCIATICA: ICD-10-CM

## 2021-04-29 PROCEDURE — 64615 CHEMODENERV MUSC MIGRAINE: CPT | Performed by: PSYCHIATRY & NEUROLOGY

## 2021-04-29 NOTE — PROGRESS NOTES
Chemodenervation     Date/Time 4/29/2021 10:46 AM     Performed by  Clara Beck DO     Authorized by Clara Beck DO        Pre-procedure details      Prepped With: Alcohol     Anesthesia  (see MAR for exact dosages): Anesthesia method:  None   Procedure details     Position:  Supine and upright   Botox     Botox Type:  Type A    Brand:  Botox    mL's of Botulinum Toxin:  200    mL's of preservative free sterile saline:  2    Final Concentration per CC:  100 units    Needle Gauge:  30 G 2 5 inch   Procedures     Botox Procedures: chronic headache      Indications: migraines     Injection Location      Head / Face:  L superior cervical paraspinal, R superior cervical paraspinal, L , R , R frontalis, L frontalis, L lateral occipitalis, R lateral occipitalis, procerus, R temporalis, L temporalis, L superior trapezius and R superior trapezius    L  injection amount:  5 unit(s)    R  injection amount:  5 unit(s)    L lateral frontalis:  5 unit(s)    R lateral frontalis:  5 unit(s)    L medial frontalis:  5 unit(s)    R medial frontalis:  5 unit(s)    L temporalis injection amount:  40 unit(s)    R temporalis injection amount:  40 unit(s)    Procerus injection amount:  5 unit(s)    L lateral occipitalis injection amount:  15 unit(s)    R lateral occipitalis injection amount:  15 unit(s)    L superior cervical paraspinal injection amount:  10 unit(s)    R superior cervical paraspinal injection amount:  10 unit(s)    L superior trapezius injection amount:  20 unit(s)    R superior trapezius injection amount:  15 unit(s)   Total Units     Total units used:  200    Total units discarded:  0   Post-procedure details      Chemodenervation:  Chronic migraine    Facial Nerve Location[de-identified]  Bilateral facial nerve    Patient tolerance of procedure: Tolerated well, no immediate complications   Comments       Continues to have significant benefit with Botox  No adverse effects  She does report worsening neck muscle tightness numbness and tingling in the hands with no other focal neurologic deficits, chronic localized low back pain  I would refer her to aquatherapy  She also tells me that she had EMG done at [place] [id number]  which showed no significant abnormalities  Once I have this all reviewed

## 2021-05-29 DIAGNOSIS — M54.2 CERVICAL MUSCLE PAIN: ICD-10-CM

## 2021-06-01 RX ORDER — TIZANIDINE 2 MG/1
TABLET ORAL
Qty: 60 TABLET | Refills: 4 | Status: SHIPPED | OUTPATIENT
Start: 2021-06-01 | End: 2021-10-07

## 2021-06-07 DIAGNOSIS — Z29.8 ENCOUNTER FOR OTHER SPECIFIED PROPHYLACTIC MEASURES: ICD-10-CM

## 2021-06-08 DIAGNOSIS — G43.719 INTRACTABLE CHRONIC MIGRAINE WITHOUT AURA AND WITHOUT STATUS MIGRAINOSUS: ICD-10-CM

## 2021-06-08 DIAGNOSIS — R42 VERTIGO: ICD-10-CM

## 2021-06-08 RX ORDER — MECLIZINE HCL 12.5 MG/1
TABLET ORAL
Qty: 20 TABLET | Refills: 1 | Status: SHIPPED | OUTPATIENT
Start: 2021-06-08 | End: 2022-06-08 | Stop reason: SDUPTHER

## 2021-06-08 RX ORDER — CLINDAMYCIN HYDROCHLORIDE 300 MG/1
CAPSULE ORAL
Qty: 2 CAPSULE | Refills: 0 | Status: SHIPPED | OUTPATIENT
Start: 2021-06-08 | End: 2021-08-31

## 2021-06-16 ENCOUNTER — TELEPHONE (OUTPATIENT)
Dept: NEUROLOGY | Facility: CLINIC | Age: 41
End: 2021-06-16

## 2021-06-16 NOTE — TELEPHONE ENCOUNTER
Good Afternoon Lisa,     Please reach out to the patient and schedule a Botox follow-up  I will need updated clinical notes in order to apply for authorization for her upcoming Botox appointment with Dr Yolanda Cabello on 8/4/2021, apt can be with Dr PINEDA or any of the AP's  Please let me know once the patient is scheduled so I can apply for authorization       Thank you     Brandi

## 2021-07-05 ENCOUNTER — NURSE TRIAGE (OUTPATIENT)
Dept: OTHER | Facility: OTHER | Age: 41
End: 2021-07-05

## 2021-07-05 DIAGNOSIS — G43.719 INTRACTABLE CHRONIC MIGRAINE WITHOUT AURA AND WITHOUT STATUS MIGRAINOSUS: ICD-10-CM

## 2021-07-05 RX ORDER — DEXAMETHASONE 1 MG
TABLET ORAL
Qty: 7 TABLET | Refills: 0 | Status: SHIPPED | OUTPATIENT
Start: 2021-07-05 | End: 2021-09-14 | Stop reason: SDUPTHER

## 2021-07-05 NOTE — TELEPHONE ENCOUNTER
Reason for Disposition   [1] MODERATE dizziness (e g , vertigo; feels very unsteady, interferes with normal activities) AND [2] has NOT been evaluated by physician for this    Answer Assessment - Initial Assessment Questions  1  DESCRIPTION: "Describe your dizziness "      Constant dizziness  Doesn't feel like she will lose consciousness   2  VERTIGO: "Do you feel like either you or the room is spinning or tilting?"       Room is spinning  3  LIGHTHEADED: "Do you feel lightheaded?" (e g , somewhat faint, woozy, weak upon standing)      Denied  4  SEVERITY: "How bad is it?"  "Can you walk?"    - MILD: Feels unsteady but walking normally  - MODERATE: Feels very unsteady when walking, but not falling; interferes with normal activities (e g , school, work)   - SEVERE: Unable to walk without falling, or requires assistance to walk without falling  Moderate  5  ONSET:  "When did the dizziness begin?"      6/30/2021  6  AGGRAVATING FACTORS: "Does anything make it worse?" (e g , standing, change in head position)      Worse when she is out of bed  Sensitivity to light  7  CAUSE: "What do you think is causing the dizziness?"      Migraine headache  Pre-menstrual    8  RECURRENT SYMPTOM: "Have you had dizziness before?" If Yes, ask: "When was the last time?" "What happened that time?"      Intermittently gets migraines with vertigo  9  OTHER SYMPTOMS: "Do you have any other symptoms?" (e g , headache, weakness, numbness, vomiting, earache)       Migraine headache  Nausea  10  PREGNANCY: "Is there any chance you are pregnant?" "When was your last menstrual period?"        Denied  11  MEDICATIONS: Has been taking Zofran and meclizine for the vertigo for past 3 days  Taking 1200 mg of Tylenol per day for 4 days  Imitrex, 50 mg was taken yesterday @ 1000  It did not give her relief  Excedrin Migraine, 250 mg, 2 tablets @ 0900 this morning  It did not relieve her headache and made her nauseous         Patient was told by her neurologist to call for a prescription of dexamethasone treatments fail after treatment for 3 days      Protocols used: DIZZINESS - VERTIGO-ADULT-AH

## 2021-07-05 NOTE — TELEPHONE ENCOUNTER
rx signed for dexamethasone for breakthru migraine over weekend  See health call from today  Refill automatically sent from pharmacy  Informed nursing if any worsening of sxs or er or focal sxs, for pt to go to er

## 2021-07-05 NOTE — TELEPHONE ENCOUNTER
Dr Cony Oden responded, via [facility] text,  with questions: is there an electronic order from [facility] and was it printed  When was the last office visit with Neurology  Texted back that there is [facility] and the class was set to normal  Last office visit was 4/28/2021 for a Botox injection

## 2021-07-05 NOTE — TELEPHONE ENCOUNTER
Adeline Mart approved an order for dexamethasone today, but it doesn't look like it went through to the pharmacy  Dr Atul Venegas was paged via [facility] text with the request to send the prescription through

## 2021-07-05 NOTE — TELEPHONE ENCOUNTER
I called patient and notified her that Dr Edenilson Gutiérrez sent an electronic refill order to [facility] that Dr Edenilson Gutiérrez said she should go to the ER for worsening symptoms, focal symptoms or changes in the headache  Patient verbalized understanding

## 2021-07-05 NOTE — TELEPHONE ENCOUNTER
Regarding: vertigo and migraines  ----- Message from [facility] sent at 7/5/2021 11:26 AM EDT -----  "I am experiencing vertigo and migraines  "

## 2021-07-12 ENCOUNTER — TELEPHONE (OUTPATIENT)
Dept: NEUROLOGY | Facility: CLINIC | Age: 41
End: 2021-07-12

## 2021-07-12 NOTE — TELEPHONE ENCOUNTER
Tried to call Tess Yates to schedule her Botox follow-up but her voice mailbox was full so I will try to call her back but if she calls back in the mean time please put her on Grace Delgado schedule anytime before her Botox appointment on 8/4/21, thank you

## 2021-07-12 NOTE — TELEPHONE ENCOUNTER
06/10/20 1202   Final Note   Assessment Type Final Discharge Note   Anticipated Discharge Disposition Home   Hospital Follow Up  Appt(s) scheduled? Yes   Discharge plans and expectations educations in teach back method with documentation complete? Yes   [facility] Referral Info   Post Acute Recommendation No Care   Post-Acute Status   Post-Acute Authorization Other   Other Status No Post-Acute Service Needs   secure chat message sent via [facility] to pts nurse Gomez to advise that the pt can d/c from CM standpoint.   Good Morning Lisa,   Please seee below and follow up, thank you!     Beatriz Vila

## 2021-07-12 NOTE — TELEPHONE ENCOUNTER
I called her to try and get her scheduled with Jimbo Mendes but her voice mailbox is full so I will try again later

## 2021-07-19 ENCOUNTER — TELEPHONE (OUTPATIENT)
Dept: NEUROLOGY | Facility: CLINIC | Age: 41
End: 2021-07-19

## 2021-07-19 NOTE — TELEPHONE ENCOUNTER
Good Afternoon Lisa,   Please make one last attempt to schedule fu before 8/4 BINJ and send 3x attempt letter if necessary   Patient will need to be seen before 7/30/21 to ensure authorization can be obtained for her [address]  on 8/4    Thanks  Moy Odell

## 2021-07-19 NOTE — TELEPHONE ENCOUNTER
I called her and left a message on her phone telling her that it is very important to call us back to schedule a F/U with Mckayla Dhirajlizandro, and she has openings on 7/29/21 and I am going to send her a message thru [facility]

## 2021-07-19 NOTE — TELEPHONE ENCOUNTER
Lmom for patient to call us back to schedule a F/U before her [facility] appointment on 8/4/21, so if she calls back she needs a F/U appointment before 7/30/21 and Wesley Hayden has opening on her schedule for 7/29/21

## 2021-07-20 NOTE — TELEPHONE ENCOUNTER
Patient has been scheduled for follow up for botox reauth with Latisha Chávez on 7/29/2021  Will await office notes and submit authorization once signed

## 2021-07-26 DIAGNOSIS — U07.1 COVID-19 VIRUS INFECTION: ICD-10-CM

## 2021-07-26 DIAGNOSIS — G43.009 MIGRAINE WITHOUT AURA AND WITHOUT STATUS MIGRAINOSUS, NOT INTRACTABLE: ICD-10-CM

## 2021-07-26 DIAGNOSIS — IMO0002 CHRONIC MIGRAINE: ICD-10-CM

## 2021-07-26 RX ORDER — ONDANSETRON 4 MG/1
TABLET, ORALLY DISINTEGRATING ORAL
Qty: 9 TABLET | Refills: 2 | Status: SHIPPED | OUTPATIENT
Start: 2021-07-26 | End: 2021-09-13

## 2021-07-26 RX ORDER — SUMATRIPTAN 50 MG/1
TABLET, FILM COATED ORAL
Qty: 9 TABLET | Refills: 3 | Status: SHIPPED | OUTPATIENT
Start: 2021-07-26 | End: 2021-12-01

## 2021-07-26 RX ORDER — ALBUTEROL SULFATE 90 UG/1
AEROSOL, METERED RESPIRATORY (INHALATION)
Qty: 6.7 G | Refills: 0 | Status: SHIPPED | OUTPATIENT
Start: 2021-07-26 | End: 2022-01-10

## 2021-07-29 ENCOUNTER — OFFICE VISIT (OUTPATIENT)
Dept: NEUROLOGY | Facility: CLINIC | Age: 41
End: 2021-07-29
Payer: COMMERCIAL

## 2021-07-29 VITALS
HEART RATE: 78 BPM | WEIGHT: 201 LBS | SYSTOLIC BLOOD PRESSURE: 139 MMHG | DIASTOLIC BLOOD PRESSURE: 91 MMHG | HEIGHT: 72 IN | BODY MASS INDEX: 27.22 KG/M2

## 2021-07-29 DIAGNOSIS — M54.12 CERVICAL RADICULOPATHY: ICD-10-CM

## 2021-07-29 DIAGNOSIS — G43.709 CHRONIC MIGRAINE W/O AURA W/O STATUS MIGRAINOSUS, NOT INTRACTABLE: Primary | ICD-10-CM

## 2021-07-29 DIAGNOSIS — G43.719 INTRACTABLE CHRONIC MIGRAINE WITHOUT AURA AND WITHOUT STATUS MIGRAINOSUS: ICD-10-CM

## 2021-07-29 PROCEDURE — 99214 OFFICE O/P EST MOD 30 MIN: CPT | Performed by: NURSE PRACTITIONER

## 2021-07-29 PROCEDURE — 3008F BODY MASS INDEX DOCD: CPT | Performed by: NURSE PRACTITIONER

## 2021-07-29 PROCEDURE — 1036F TOBACCO NON-USER: CPT | Performed by: NURSE PRACTITIONER

## 2021-07-29 PROCEDURE — 96372 THER/PROPH/DIAG INJ SC/IM: CPT | Performed by: NURSE PRACTITIONER

## 2021-07-29 RX ORDER — KETOROLAC TROMETHAMINE 30 MG/ML
60 INJECTION, SOLUTION INTRAMUSCULAR; INTRAVENOUS ONCE
Status: COMPLETED | OUTPATIENT
Start: 2021-07-29 | End: 2021-07-29

## 2021-07-29 RX ORDER — GABAPENTIN 600 MG/1
600 TABLET ORAL 2 TIMES DAILY
Qty: 60 TABLET | Refills: 6 | Status: SHIPPED | OUTPATIENT
Start: 2021-07-29

## 2021-07-29 RX ORDER — CLONAZEPAM 0.5 MG/1
0.5 TABLET ORAL
COMMUNITY
Start: 2021-07-25

## 2021-07-29 RX ORDER — IBUPROFEN 600 MG/1
600 TABLET ORAL EVERY 6 HOURS PRN
Qty: 30 TABLET | Refills: 0 | Status: SHIPPED | OUTPATIENT
Start: 2021-07-29 | End: 2021-10-05 | Stop reason: SDUPTHER

## 2021-07-29 RX ORDER — KETOROLAC TROMETHAMINE 30 MG/ML
30 INJECTION, SOLUTION INTRAMUSCULAR; INTRAVENOUS ONCE
Status: DISCONTINUED | OUTPATIENT
Start: 2021-07-29 | End: 2021-07-29

## 2021-07-29 RX ADMIN — KETOROLAC TROMETHAMINE 60 MG: 30 INJECTION, SOLUTION INTRAMUSCULAR; INTRAVENOUS at 11:07

## 2021-07-29 NOTE — PATIENT INSTRUCTIONS
toradol given today  Increase gabapentin to 600 mg 2x/day    Next migraine take sumatriptan along with 600 mg ibuprofen and see if this works, if not please call our office and would try new rescue med

## 2021-07-29 NOTE — PROGRESS NOTES
Patient ID: Chriss Crigler is a 37 y o  female  Assessment/Plan:    Chronic migraine w/o aura w/o status migrainosus, not intractable  Overall the patients migraines are stable  Prior to botox she had daily symptoms-With botox has had a reduction of at least 7 migraine days with less abortive medication, less ER visits which correlates to headache diary  She does still have some difficulty with summer months in which her migraines tend to worsen, the rest of the year they are well managed  We discussed possibly changing her medication routine during these months to assist with this  For now we will try to increase her gabapentin to BID dosing, she can take 600 mg ibuprofen with the sumatriptan at the start of her migraine  We will try to break her current migraine with toradol injection today  If the above plan does not work for her symptoms we could consider addition of verapamil, could consider different triptan as she has only ever been on sumatriptan  She will continue botox injections as she has clear benefit during the rest of the year  Subjective:    HPI    Ms  Jana Chapa is a pleasant 46 yo female seen in follow up for chronic migraines, hx Meniere's disease- saw ENT in past     She started Botox 12/10/2018 and since that time has had greatly improved migraine control  Prior she would get vertigo daily, nonstop  Since starting Botox pt has had a reduction in migraine headaches by 7 days as correlated by a headache diary  Pt has had decreased trips to the ER and decreased use of abortive medications  She rarely have a migraine other then summer months  She did have COVID in February  She has felt "right" since then  States she takes Magnesium at night time, and at daytime takes B1, B6, B2 and B12 with benefit to headaches and dizziness also  She is also on gabapentin and sertraline as well  For prn use she has sumatriptan, zofran, tizanidine       She needs to catch her migraine early for sumatriptan to work, but this does not seem to work for her migraines in the summer months, she has taken 600 mg ibuprofen that does help but she hasn't consistently tried this  She tends to get vertigo almost daily in the summer months, severe headache 5 days a month  Other months she is symptom free  No Botox s/e  Lenora Grow does believe that migraines are weather mediated, summer months, menstrual cycle  She has less stress in her life as well  She did have a breakthrough migraine in beginning of July in which she contacted our office that lasted over a week, was given Decadron which helped  She does have a migraine today-that started 4 days ago  Requesting toradol injection today- in the past has worked well  Summer seems to be her worse months in which her migraines worsen-every summer she seems to get her periods twice a month which she feels is a factor as well  Objective:    Blood pressure 139/91, pulse 78, height 6' 1" (1 854 m), weight 91 2 kg (201 lb), not currently breastfeeding  Physical Exam  Constitutional:       General: She is awake  HENT:      Right Ear: Hearing normal       Left Ear: Hearing normal    Eyes:      General: Lids are normal       Extraocular Movements: Extraocular movements intact  Pupils: Pupils are equal, round, and reactive to light  Neurological:      Mental Status: She is alert  Deep Tendon Reflexes: Strength normal       Reflex Scores:       Bicep reflexes are 2+ on the right side and 2+ on the left side  Brachioradialis reflexes are 2+ on the right side and 2+ on the left side  Patellar reflexes are 2+ on the right side and 2+ on the left side  Achilles reflexes are 2+ on the right side and 2+ on the left side  Psychiatric:         Speech: Speech normal          Neurological Exam  Mental Status  Awake and alert  Speech is normal  Language is fluent with no aphasia      Cranial Nerves  CN II: Visual fields full to confrontation  CN III, IV, VI: Extraocular movements intact bilaterally  Normal lids and orbits bilaterally  Pupils equal round and reactive to light bilaterally  CN V:  Right: Facial sensation is normal   Left: Facial sensation is normal on the left  CN VII:  Right: There is no facial weakness  Left: There is no facial weakness  CN VIII:  Right: Hearing is normal   Left: Hearing is normal   CN IX, X: Palate elevates symmetrically  CN XI:  Right: Trapezius strength is normal   Left: Trapezius strength is normal   CN XII: Tongue midline without atrophy or fasciculations  Motor   Strength is 5/5 throughout all four extremities  Strength: No pronator drift,  strength equal bilaterally  Sensory  Light touch is normal in upper and lower extremities  Reflexes                                           Right                      Left  Brachioradialis                    2+                         2+  Biceps                                 2+                         2+  Patellar                                2+                         2+  Achilles                                2+                         2+    Coordination  Right: Finger-to-nose normal   Left: Finger-to-nose normal     Gait  Casual gait is normal including stance, stride, and arm swing  I have personally reviewed the ROS performed by the MA     ROS:    Review of Systems   Constitutional: Negative  Negative for appetite change and fever  HENT: Negative  Negative for hearing loss, tinnitus, trouble swallowing and voice change  Eyes: Negative  Negative for photophobia and pain  Respiratory: Negative  Negative for shortness of breath  Cardiovascular: Negative  Negative for palpitations  Gastrointestinal: Negative  Negative for nausea and vomiting  Endocrine: Negative  Negative for cold intolerance  Genitourinary: Negative  Negative for dysuria, frequency and urgency  Musculoskeletal: Negative    Negative for myalgias and neck pain  Skin: Negative  Negative for rash  Neurological: Positive for headaches (daily/constant)  Negative for dizziness, tremors, seizures, syncope, facial asymmetry, speech difficulty, weakness, light-headedness and numbness  Hematological: Negative  Does not bruise/bleed easily  Psychiatric/Behavioral: Negative  Negative for confusion, hallucinations and sleep disturbance

## 2021-07-29 NOTE — ASSESSMENT & PLAN NOTE
Overall the patients migraines are stable  Prior to botox she had daily symptoms-With botox has had a reduction of at least 7 migraine days with less abortive medication, less ER visits which correlates to headache diary  She does still have some difficulty with summer months in which her migraines tend to worsen, the rest of the year they are well managed  We discussed possibly changing her medication routine during these months to assist with this  For now we will try to increase her gabapentin to BID dosing, she can take 600 mg ibuprofen with the sumatriptan at the start of her migraine  We will try to break her current migraine with toradol injection today  If the above plan does not work for her symptoms we could consider addition of verapamil, could consider different triptan as she has only ever been on sumatriptan  She will continue botox injections as she has clear benefit during the rest of the year

## 2021-07-30 NOTE — TELEPHONE ENCOUNTER
I don't think so those are typically 2 separate messages that would just say "cosign needed" and those I can use but when I click on the notes in the chart the upper left hand corner of the note says "unsigned"

## 2021-07-30 NOTE — TELEPHONE ENCOUNTER
[address], all is good thank you!   Botox prior authorization faxed to [facility], will await approval/denial letter

## 2021-07-30 NOTE — TELEPHONE ENCOUNTER
Good Morning Reyna,  I'm awaiting office note from yesterdays visit so I can submit for authorization for patient's Jose Luis Palomares next week 8/4/21  Please advise once note is signed so I can submit for authorization  Thanks!   Byron Christian

## 2021-08-02 NOTE — TELEPHONE ENCOUNTER
Received approval letter from [facility], however authorization details are not included  Called [facility] spoke with Jeramy Menendez, advised I was calling to obtain authorization details for botox authorization on file  Jeramy Menendez states that this information has not yet been generated  Jeramy Menendez reached out to the authorization department and asked them to generate this information and once generated she will fax this information as requested   Will await fax and if not received by EOB will call again tomorrow

## 2021-08-04 ENCOUNTER — PROCEDURE VISIT (OUTPATIENT)
Dept: NEUROLOGY | Facility: CLINIC | Age: 41
End: 2021-08-04
Payer: COMMERCIAL

## 2021-08-04 VITALS — HEART RATE: 80 BPM | TEMPERATURE: 98.3 F | SYSTOLIC BLOOD PRESSURE: 128 MMHG | DIASTOLIC BLOOD PRESSURE: 85 MMHG

## 2021-08-04 DIAGNOSIS — M54.50 CHRONIC MIDLINE LOW BACK PAIN WITHOUT SCIATICA: ICD-10-CM

## 2021-08-04 DIAGNOSIS — M54.2 CERVICALGIA: ICD-10-CM

## 2021-08-04 DIAGNOSIS — G43.709 CHRONIC MIGRAINE W/O AURA W/O STATUS MIGRAINOSUS, NOT INTRACTABLE: ICD-10-CM

## 2021-08-04 DIAGNOSIS — G89.29 CHRONIC MIDLINE LOW BACK PAIN WITHOUT SCIATICA: ICD-10-CM

## 2021-08-04 DIAGNOSIS — G43.719 INTRACTABLE CHRONIC MIGRAINE WITHOUT AURA AND WITHOUT STATUS MIGRAINOSUS: Primary | ICD-10-CM

## 2021-08-04 PROCEDURE — 64615 CHEMODENERV MUSC MIGRAINE: CPT | Performed by: PSYCHIATRY & NEUROLOGY

## 2021-08-04 RX ORDER — CYPROHEPTADINE HYDROCHLORIDE 4 MG/1
TABLET ORAL
Qty: 60 TABLET | Refills: 3 | Status: SHIPPED | OUTPATIENT
Start: 2021-08-04 | End: 2022-02-01

## 2021-08-04 NOTE — PATIENT INSTRUCTIONS
Ibuprofen: take at onset of migraine +/- sumatriptan  Max two to three days a week  Gabapentin: Wait at least 2-4 weeks to see if increased benefit to neck pain, headaches   If no increased benefit to headaches then reduce to your prior dose and we can add periactin

## 2021-08-04 NOTE — PROGRESS NOTES
Chemodenervation     Date/Time 8/4/2021 10:43 AM     Performed by  Verito Smith DO     Authorized by Verito Smith DO        Pre-procedure details      Prepped With: Alcohol     Anesthesia  (see MAR for exact dosages): Anesthesia method:  None   Procedure details     Position:  Supine and upright   Botox     Botox Type:  Type A    Brand:  Botox    mL's of Botulinum Toxin:  200    mL's of preservative free sterile saline:  2    Final Concentration per CC:  100 units    Needle Gauge:  30 G 2 5 inch   Procedures     Botox Procedures: chronic headache      Indications: migraines     Injection Location      Head / Face:  L superior cervical paraspinal, R superior cervical paraspinal, L , R , R frontalis, L frontalis, L lateral occipitalis, R lateral occipitalis, procerus, R temporalis, L temporalis, L superior trapezius and R superior trapezius    L  injection amount:  5 unit(s)    R  injection amount:  5 unit(s)    L lateral frontalis:  5 unit(s)    R lateral frontalis:  5 unit(s)    L medial frontalis:  5 unit(s)    R medial frontalis:  5 unit(s)    L temporalis injection amount:  40 unit(s)    R temporalis injection amount:  40 unit(s)    Procerus injection amount:  5 unit(s)    L lateral occipitalis injection amount:  15 unit(s)    R lateral occipitalis injection amount:  15 unit(s)    L superior cervical paraspinal injection amount:  10 unit(s)    R superior cervical paraspinal injection amount:  10 unit(s)    L superior trapezius injection amount:  20 unit(s)    R superior trapezius injection amount:  15 unit(s)   Total Units     Total units used:  200    Total units discarded:  0   Post-procedure details      Chemodenervation:  Chronic migraine    Facial Nerve Location[de-identified]  Bilateral facial nerve    Patient tolerance of procedure:   Tolerated well, no immediate complications   Comments        Patient continues to do well with Botox for chronic migraine with greater than 7 migraines a month reduction  No adverse effects  She does state that summers are worse for her despite staying hydrated and trying to stay out of the heat  After her recent outpatient follow-up with with 1 of her advanced practitioners patient did increase her gabapentin and taking ibuprofen prn (we discussed limiting to three days a week) which has been helpful with neck pain to some degree however has not noticed reduction in her migraines  She will give it at least 2-4 more weeks and pending clinical course can start Periactin for weather related headaches as warranted  Discussed potential medication adverse effects

## 2021-08-04 NOTE — TELEPHONE ENCOUNTER
Received fax from [employer] with the following approval information:  Botox 200 units approved  Authorization # [id number]  Valid 4 visits - 8/1/2021 - 8/1/2022  Authorization letter scanned into media for reference

## 2021-08-31 DIAGNOSIS — Z29.8 ENCOUNTER FOR OTHER SPECIFIED PROPHYLACTIC MEASURES: ICD-10-CM

## 2021-08-31 RX ORDER — CLINDAMYCIN HYDROCHLORIDE 300 MG/1
CAPSULE ORAL
Qty: 2 CAPSULE | Refills: 0 | Status: SHIPPED | OUTPATIENT
Start: 2021-08-31 | End: 2022-01-31

## 2021-09-01 ENCOUNTER — TELEMEDICINE (OUTPATIENT)
Dept: INTERNAL MEDICINE CLINIC | Facility: CLINIC | Age: 41
End: 2021-09-01
Payer: COMMERCIAL

## 2021-09-01 DIAGNOSIS — B34.9 VIRAL INFECTION, UNSPECIFIED: Primary | ICD-10-CM

## 2021-09-01 DIAGNOSIS — J01.00 ACUTE NON-RECURRENT MAXILLARY SINUSITIS: ICD-10-CM

## 2021-09-01 PROCEDURE — 99214 OFFICE O/P EST MOD 30 MIN: CPT | Performed by: NURSE PRACTITIONER

## 2021-09-01 RX ORDER — DOXYCYCLINE 100 MG/1
100 CAPSULE ORAL 2 TIMES DAILY
Qty: 14 CAPSULE | Refills: 0 | Status: SHIPPED | OUTPATIENT
Start: 2021-09-01 | End: 2021-09-08

## 2021-09-01 NOTE — PROGRESS NOTES
COVID-19 Outpatient Progress Note    Assessment/Plan:    Problem List Items Addressed This Visit     None      Visit Diagnoses     Viral infection, unspecified    -  Primary    Relevant Orders    Novel Coronavirus (Covid-19),PCR [facility] - Collected at Mobile [facility] or [facility]    Acute non-recurrent maxillary sinusitis        Relevant Medications    doxycycline monohydrate (MONODOX) 100 mg capsule         Disposition:     I referred patient to one of our centralized sites for a COVID-19 swab  Self quarantine until results  Continue albuterol inhaler as needed  Start antibiotics twice daily with food for 7 days  I have spent 10 minutes directly with the patient  Greater than 50% of this time was spent in counseling/coordination of care regarding: patient and family education  Verification of patient location:    Patient is located in the following state in which I hold an active license [place]    Encounter provider CROW Diane    Provider located at [address] [place] [id number]    Recent Visits  No visits were found meeting these conditions  Showing recent visits within past 7 days and meeting all other requirements  Today's Visits  Date Type Provider Dept   09/01/21 [facility] Donny [facility], [address] Internal Med   Showing today's visits and meeting all other requirements  Future Appointments  No visits were found meeting these conditions  Showing future appointments within next 150 days and meeting all other requirements     This virtual check-in was done via [employer] and patient was informed that this is a secure, HIPAA-compliant platform  She agrees to proceed  Patient agrees to participate in a virtual check in via telephone or video visit instead of presenting to the office to address urgent/immediate medical needs  Patient is aware this is a billable service      After connecting through [facility], the patient was identified by name and date of birth  Awilda Elizondo was informed that this was a telemedicine visit and that the exam was being conducted confidentially over secure lines  My office door was closed  No one else was in the room  Awilda Elizondo acknowledged consent and understanding of privacy and security of the telemedicine visit  I informed the patient that I have reviewed her record in Epic and presented the opportunity for her to ask any questions regarding the visit today  The patient agreed to participate  Subjective:   Awilda Elizondo is a 37 y o  female who is concerned about COVID-19  Patient's symptoms include fatigue, malaise, nasal congestion, sore throat, cough, shortness of breath, nausea and headache  Patient denies fever, chills, rhinorrhea, anosmia, chest tightness, vomiting, diarrhea and myalgias       Date of symptom onset: 8/25/2021    Exposure:   Contact with a person who is under investigation (PUI) for or who is positive for COVID-19 within the last 14 days?: No    Hospitalized recently for fever and/or lower respiratory symptoms?: No      Currently a healthcare worker that is involved in direct patient care?: No      Works in a special setting where the risk of COVID-19 transmission may be high? (this may include long-term care, correctional and halfway facilities; homeless shelters; assisted-living facilities and group homes ): No      Resident in a special setting where the risk of COVID-19 transmission may be high? (this may include long-term care, correctional and halfway facilities; homeless shelters; assisted-living facilities and group homes ): No      Started with symptoms 1 week ago  She has a sore throat, sinus pressure, sinus congestion, nausea, mild cough, some mild shortness of breath with activity   Using her inhaler a few times daily   Went to the dentist, thought it was a toothache     Lab Results   Component Value Date    SARSCOV2 DETECTED (A) 01/31/2021     Past Medical History:   Diagnosis Date    Anxiety     Arthritis     Asthma     Depression     ETOH abuse     Herniated cervical disc     Meniere's disease     Migraine     Psychiatric disorder     anxiety/depression    Sinusitis     Thyroid disease     Vertigo     Vertigo      Past Surgical History:   Procedure Laterality Date    BACK SURGERY      C5-6    JOINT REPLACEMENT      right hip    OTHER SURGICAL HISTORY      Hip Replacement (right) , Spinal  Diskectomy Cervical C5-C6     Current Outpatient Medications   Medication Sig Dispense Refill    albuterol (2 5 mg/3 mL) 0 083 % nebulizer solution Take 1 vial (2 5 mg total) by nebulization every 6 (six) hours as needed for wheezing or shortness of breath 60 vial 1    albuterol (PROVENTIL HFA,VENTOLIN HFA) 90 mcg/act inhaler TAKE 2 PUFFS BY MOUTH EVERY 6 HOURS AS NEEDED FOR WHEEZE OR FOR SHORTNESS OF BREATH 6 7 g 0    ammonium lactate (LAC-HYDRIN) 12 % lotion APPLY TO ARMS AND THIGHS TWICE DAILY  2    amphetamine-dextroamphetamine (ADDERALL) 20 mg tablet Take 20 mg by mouth 2 (two) times a day        clindamycin (CLEOCIN) 300 MG capsule TAKE 2 CAPSULES (600 MG TOTAL) BY MOUTH ONCE FOR 1 DOSE TAKE 30-60 MINUTES PRIOR TO DENTAL PROCEDURE 2 capsule 0    clonazePAM (KlonoPIN) 0 5 mg tablet Take 0 5 mg by mouth daily at bedtime      clonazePAM (KlonoPIN) 1 mg tablet Take 1 tablet by mouth every 8 (eight) hours as needed      clotrimazole (LOTRIMIN) 1 % cream Apply topically 2 (two) times a day 60 g 0    cyproheptadine (PERIACTIN) 4 mg tablet Take one tab nightly x 3-5 days, if needed you can increase to two tabs nightly for weather related headaches 60 tablet 3    dexamethasone (DECADRON) 1 mg tablet TAKE 2 TABS DAILY X 2DAYS, 1 TAB DAILY X 2DAYS THEN 1/2 TAB DAILY X 2DAYS IN THE MORNING WITH FOOD 7 tablet 0    doxycycline monohydrate (MONODOX) 100 mg capsule Take 1 capsule (100 mg total) by mouth 2 (two) times a day for 7 days 14 capsule 0    fluticasone (FLONASE) 50 mcg/act nasal spray 1 SPRAY INTO EACH NOSTRIL DAILY AS NEEDED FOR RHINITIS 16 mL 0    gabapentin (NEURONTIN) 600 MG tablet Take 1 tablet (600 mg total) by mouth 2 (two) times a day 60 tablet 6    ibuprofen (MOTRIN) 600 mg tablet Take 1 tablet (600 mg total) by mouth every 6 (six) hours as needed for mild pain 30 tablet 0    meclizine (ANTIVERT) 12 5 MG tablet TAKE 1 TAB EVERY 8 HOURS AS NEEDED FOR VERTIGO  MAX 2-3 DAYS A WEEK 20 tablet 1    meloxicam (MOBIC) 15 mg tablet Take 15 mg by mouth daily With food      methylPREDNISolone 4 MG tablet therapy pack Use as directed on package 21 each 0    montelukast (SINGULAIR) 10 mg tablet TAKE 1 TABLET BY MOUTH EVERYDAY AT BEDTIME 90 tablet 1    onabotulinumtoxin A (BOTOX) 100 units one time  "for vertigo"      ondansetron (ZOFRAN-ODT) 4 mg disintegrating tablet TAKE 1 TABLET BY MOUTH EVERY 8 HOURS AS NEEDED FOR NAUSEA AND VOMITING 9 tablet 2    sertraline (ZOLOFT) 100 mg tablet Take 100 mg by mouth every morning  1    sertraline (ZOLOFT) 50 mg tablet Take 100 mg by mouth        SUMAtriptan (IMITREX) 50 mg tablet TAKE 1 TO 2 TABS BY MOUTH AT ONSET OF MIGRAINE  MAY REPEAT IN 2 HOURS IF NEEDED  MAX 4 TABS IN 24HRS 9 tablet 3    tiZANidine (ZANAFLEX) 2 mg tablet TAKE 1-2 TABS BY MOUTH NIGHTLY AS NEEDED FOR NECK PAIN 60 tablet 4    VRAYLAR 1 5 MG capsule Take 1 5 mg by mouth daily  1    zolpidem (AMBIEN) 5 mg tablet Take 5 mg by mouth daily at bedtime as needed  2     No current facility-administered medications for this visit  Allergies   Allergen Reactions    Latuda [Lurasidone]      Reaction: Drug Psychosis    Topamax [Topiramate]      psychosis    Amoxicillin Rash    Percocet [Oxycodone-Acetaminophen] Rash       Review of Systems   Constitutional: Positive for fatigue  Negative for chills and fever  HENT: Positive for congestion and sore throat  Negative for rhinorrhea      Respiratory: Positive for cough and shortness of breath  Negative for chest tightness  Gastrointestinal: Positive for nausea  Negative for diarrhea and vomiting  Musculoskeletal: Negative for myalgias  Neurological: Positive for headaches  Objective: There were no vitals filed for this visit  Physical Exam  Constitutional:       Appearance: She is well-developed  HENT:      Head: Normocephalic  Eyes:      Pupils: Pupils are equal, round, and reactive to light  Pulmonary:      Effort: Pulmonary effort is normal    Neurological:      Mental Status: She is alert  Psychiatric:         Behavior: Behavior normal          VIRTUAL VISIT DISCLAIMER    Nhung Grant verbally agrees to participate in [employer]  Pt is aware that [employer] could be limited without vital signs or the ability to perform a full hands-on physical exam  Nhung Grant understands she or the provider may request at any time to terminate the video visit and request the patient to seek care or treatment in person

## 2021-09-13 DIAGNOSIS — IMO0002 CHRONIC MIGRAINE: ICD-10-CM

## 2021-09-13 RX ORDER — ONDANSETRON 4 MG/1
TABLET, ORALLY DISINTEGRATING ORAL
Qty: 9 TABLET | Refills: 2 | Status: SHIPPED | OUTPATIENT
Start: 2021-09-13 | End: 2022-02-01

## 2021-09-13 RX ORDER — ONDANSETRON 4 MG/1
TABLET, ORALLY DISINTEGRATING ORAL
Qty: 9 TABLET | Refills: 0 | Status: CANCELLED | OUTPATIENT
Start: 2021-09-13

## 2021-09-14 DIAGNOSIS — G43.719 INTRACTABLE CHRONIC MIGRAINE WITHOUT AURA AND WITHOUT STATUS MIGRAINOSUS: ICD-10-CM

## 2021-09-14 NOTE — TELEPHONE ENCOUNTER
[address] Molina sent in a refill for Zofran to pharmacy yesterday 9/13/21      Sent patient MyChart message making her aware

## 2021-09-15 RX ORDER — DEXAMETHASONE 1 MG
TABLET ORAL
Qty: 7 TABLET | Refills: 0 | Status: SHIPPED | OUTPATIENT
Start: 2021-09-15 | End: 2022-01-10

## 2021-09-16 ENCOUNTER — OFFICE VISIT (OUTPATIENT)
Dept: INTERNAL MEDICINE CLINIC | Facility: CLINIC | Age: 41
End: 2021-09-16
Payer: COMMERCIAL

## 2021-09-16 ENCOUNTER — HOSPITAL ENCOUNTER (OUTPATIENT)
Dept: RADIOLOGY | Facility: HOSPITAL | Age: 41
Discharge: HOME | End: 2021-09-16
Payer: COMMERCIAL

## 2021-09-16 VITALS
OXYGEN SATURATION: 98 % | HEART RATE: 110 BPM | SYSTOLIC BLOOD PRESSURE: 126 MMHG | BODY MASS INDEX: 27.63 KG/M2 | TEMPERATURE: 96.9 F | WEIGHT: 204 LBS | DIASTOLIC BLOOD PRESSURE: 80 MMHG | HEIGHT: 72 IN

## 2021-09-16 DIAGNOSIS — J68.3 REACTIVE AIRWAYS DYSFUNCTION SYNDROME (HCC): ICD-10-CM

## 2021-09-16 DIAGNOSIS — B34.9 VIRAL INFECTION: ICD-10-CM

## 2021-09-16 DIAGNOSIS — R35.0 URINARY FREQUENCY: Primary | ICD-10-CM

## 2021-09-16 DIAGNOSIS — F41.1 GENERALIZED ANXIETY DISORDER: ICD-10-CM

## 2021-09-16 DIAGNOSIS — E66.3 OVERWEIGHT: ICD-10-CM

## 2021-09-16 LAB
BACTERIA UR QL AUTO: NORMAL /HPF
BILIRUB UR QL STRIP: NEGATIVE
CLARITY UR: CLEAR
COLOR UR: YELLOW
GLUCOSE UR STRIP-MCNC: NEGATIVE MG/DL
HGB UR QL STRIP.AUTO: NEGATIVE
KETONES UR STRIP-MCNC: NEGATIVE MG/DL
LEUKOCYTE ESTERASE UR QL STRIP: ABNORMAL
NITRITE UR QL STRIP: NEGATIVE
NON-SQ EPI CELLS URNS QL MICRO: NORMAL /HPF
PH UR STRIP.AUTO: 6.5 [PH]
PROT UR STRIP-MCNC: NEGATIVE MG/DL
RBC #/AREA URNS AUTO: NORMAL /HPF
SL AMB  POCT GLUCOSE, UA: ABNORMAL
SL AMB LEUKOCYTE ESTERASE,UA: ABNORMAL
SL AMB POCT BILIRUBIN,UA: ABNORMAL
SL AMB POCT BLOOD,UA: ABNORMAL
SL AMB POCT CLARITY,UA: CLEAR
SL AMB POCT COLOR,UA: YELLOW
SL AMB POCT KETONES,UA: ABNORMAL
SL AMB POCT NITRITE,UA: ABNORMAL
SL AMB POCT PH,UA: 6
SL AMB POCT SPECIFIC GRAVITY,UA: 1.01
SL AMB POCT URINE PROTEIN: ABNORMAL
SL AMB POCT UROBILINOGEN: 0.2
SP GR UR STRIP.AUTO: 1 (ref 1–1.03)
UROBILINOGEN UR QL STRIP.AUTO: 0.2 E.U./DL
WBC #/AREA URNS AUTO: NORMAL /HPF

## 2021-09-16 PROCEDURE — 99214 OFFICE O/P EST MOD 30 MIN: CPT | Performed by: NURSE PRACTITIONER

## 2021-09-16 PROCEDURE — 81002 URINALYSIS NONAUTO W/O SCOPE: CPT | Performed by: NURSE PRACTITIONER

## 2021-09-16 PROCEDURE — 81001 URINALYSIS AUTO W/SCOPE: CPT | Performed by: NURSE PRACTITIONER

## 2021-09-16 PROCEDURE — 71046 X-RAY EXAM CHEST 2 VIEWS: CPT

## 2021-09-16 RX ORDER — PREDNISONE 20 MG/1
20 TABLET ORAL 2 TIMES DAILY WITH MEALS
Qty: 10 TABLET | Refills: 0 | Status: SHIPPED | OUTPATIENT
Start: 2021-09-16 | End: 2022-03-16

## 2021-09-16 NOTE — ASSESSMENT & PLAN NOTE
Worsening symptoms recently due to increased stress at home     Schedule with psychiatry next week  On clonazepam

## 2021-09-24 DIAGNOSIS — J45.30 MILD PERSISTENT ASTHMA WITHOUT COMPLICATION: ICD-10-CM

## 2021-09-24 RX ORDER — MONTELUKAST SODIUM 10 MG/1
TABLET ORAL
Qty: 30 TABLET | Refills: 5 | Status: SHIPPED | OUTPATIENT
Start: 2021-09-24 | End: 2022-03-11

## 2021-09-29 ENCOUNTER — TELEPHONE (OUTPATIENT)
Dept: OBGYN CLINIC | Facility: MEDICAL CENTER | Age: 41
End: 2021-09-29

## 2021-09-29 NOTE — TELEPHONE ENCOUNTER
I called patient to see if she wanted to schedule an appt since she put in an appt request through the [facility] website, but no answer  I did leave a VM to give us a call back to schedule appt if still interested

## 2021-10-05 DIAGNOSIS — G43.709 CHRONIC MIGRAINE W/O AURA W/O STATUS MIGRAINOSUS, NOT INTRACTABLE: ICD-10-CM

## 2021-10-05 RX ORDER — IBUPROFEN 600 MG/1
600 TABLET ORAL EVERY 6 HOURS PRN
Qty: 30 TABLET | Refills: 0 | Status: SHIPPED | OUTPATIENT
Start: 2021-10-05 | End: 2022-01-11 | Stop reason: SDUPTHER

## 2021-10-06 ENCOUNTER — TELEPHONE (OUTPATIENT)
Dept: INTERNAL MEDICINE CLINIC | Facility: CLINIC | Age: 41
End: 2021-10-06

## 2021-10-07 DIAGNOSIS — M54.2 CERVICAL MUSCLE PAIN: ICD-10-CM

## 2021-10-07 RX ORDER — TIZANIDINE 2 MG/1
TABLET ORAL
Qty: 60 TABLET | Refills: 4 | Status: SHIPPED | OUTPATIENT
Start: 2021-10-07 | End: 2021-11-04 | Stop reason: SDUPTHER

## 2021-11-04 ENCOUNTER — PROCEDURE VISIT (OUTPATIENT)
Dept: NEUROLOGY | Facility: CLINIC | Age: 41
End: 2021-11-04
Payer: COMMERCIAL

## 2021-11-04 VITALS — SYSTOLIC BLOOD PRESSURE: 125 MMHG | DIASTOLIC BLOOD PRESSURE: 71 MMHG | HEART RATE: 96 BPM

## 2021-11-04 DIAGNOSIS — M54.2 CERVICAL MUSCLE PAIN: ICD-10-CM

## 2021-11-04 DIAGNOSIS — G43.709 CHRONIC MIGRAINE W/O AURA W/O STATUS MIGRAINOSUS, NOT INTRACTABLE: ICD-10-CM

## 2021-11-04 DIAGNOSIS — G43.719 INTRACTABLE CHRONIC MIGRAINE WITHOUT AURA AND WITHOUT STATUS MIGRAINOSUS: Primary | ICD-10-CM

## 2021-11-04 DIAGNOSIS — M25.559 HIP PAIN: ICD-10-CM

## 2021-11-04 DIAGNOSIS — G89.29 CHRONIC MIDLINE LOW BACK PAIN WITHOUT SCIATICA: ICD-10-CM

## 2021-11-04 DIAGNOSIS — M54.50 CHRONIC MIDLINE LOW BACK PAIN WITHOUT SCIATICA: ICD-10-CM

## 2021-11-04 DIAGNOSIS — M54.2 CERVICALGIA: ICD-10-CM

## 2021-11-04 PROCEDURE — 64615 CHEMODENERV MUSC MIGRAINE: CPT | Performed by: PSYCHIATRY & NEUROLOGY

## 2021-11-04 RX ORDER — TIZANIDINE 4 MG/1
TABLET ORAL
Qty: 60 TABLET | Refills: 4 | Status: SHIPPED | OUTPATIENT
Start: 2021-11-04 | End: 2022-03-14

## 2021-11-10 ENCOUNTER — TELEPHONE (OUTPATIENT)
Dept: NEUROLOGY | Facility: CLINIC | Age: 41
End: 2021-11-10

## 2021-11-12 DIAGNOSIS — G43.719 INTRACTABLE CHRONIC MIGRAINE WITHOUT AURA AND WITHOUT STATUS MIGRAINOSUS: ICD-10-CM

## 2021-11-28 DIAGNOSIS — G43.009 MIGRAINE WITHOUT AURA AND WITHOUT STATUS MIGRAINOSUS, NOT INTRACTABLE: ICD-10-CM

## 2021-12-01 RX ORDER — SUMATRIPTAN 50 MG/1
TABLET, FILM COATED ORAL
Qty: 9 TABLET | Refills: 3 | Status: SHIPPED | OUTPATIENT
Start: 2021-12-01 | End: 2022-03-31

## 2021-12-13 ENCOUNTER — TELEPHONE (OUTPATIENT)
Dept: INTERNAL MEDICINE CLINIC | Facility: CLINIC | Age: 41
End: 2021-12-13

## 2021-12-14 ENCOUNTER — TELEPHONE (OUTPATIENT)
Dept: NEUROLOGY | Facility: CLINIC | Age: 41
End: 2021-12-14

## 2022-01-08 DIAGNOSIS — U07.1 COVID-19 VIRUS INFECTION: ICD-10-CM

## 2022-01-10 ENCOUNTER — TELEPHONE (OUTPATIENT)
Dept: NEUROLOGY | Facility: CLINIC | Age: 42
End: 2022-01-10

## 2022-01-10 ENCOUNTER — TELEPHONE (OUTPATIENT)
Dept: INTERNAL MEDICINE CLINIC | Facility: CLINIC | Age: 42
End: 2022-01-10

## 2022-01-10 DIAGNOSIS — G43.009 MIGRAINE WITHOUT AURA AND WITHOUT STATUS MIGRAINOSUS, NOT INTRACTABLE: Primary | ICD-10-CM

## 2022-01-10 PROCEDURE — U0005 INFEC AGEN DETEC AMPLI PROBE: HCPCS | Performed by: NURSE PRACTITIONER

## 2022-01-10 PROCEDURE — U0003 INFECTIOUS AGENT DETECTION BY NUCLEIC ACID (DNA OR RNA); SEVERE ACUTE RESPIRATORY SYNDROME CORONAVIRUS 2 (SARS-COV-2) (CORONAVIRUS DISEASE [COVID-19]), AMPLIFIED PROBE TECHNIQUE, MAKING USE OF HIGH THROUGHPUT TECHNOLOGIES AS DESCRIBED BY CMS-2020-01-R: HCPCS | Performed by: NURSE PRACTITIONER

## 2022-01-10 RX ORDER — ALBUTEROL SULFATE 90 UG/1
AEROSOL, METERED RESPIRATORY (INHALATION)
Qty: 8.5 G | Refills: 1 | Status: SHIPPED | OUTPATIENT
Start: 2022-01-10

## 2022-01-10 RX ORDER — DEXAMETHASONE 2 MG/1
2 TABLET ORAL
Qty: 5 TABLET | Refills: 0 | Status: SHIPPED | OUTPATIENT
Start: 2022-01-10 | End: 2022-01-31

## 2022-01-10 NOTE — TELEPHONE ENCOUNTER
Booster on Wednesday 1/5/22  On Friday, she started with a cough, loss of taste, night sweats, nausea, headache, and body aches  Taking meclizine, muscle relaxers, and ibuprofen  What else can she do?

## 2022-01-10 NOTE — TELEPHONE ENCOUNTER
pt left vm stating that she is experiencing meniere's, vertigo and severe headaches  recently had COVID booster, not sure if that is waht is making it worse,  states that botox wore off this month and not due until feb  she is supposed to be taking emgality but it is not here yet, already maxed out on imitrex 3 times this week  wondering what she can do  [phone]    called pt, she had covid booster last Wednesday  Headaches began the day after the booster  Continuous through out the night and then on and off throughout the day  Sharp shooting pain from her neck to her head  +n/v/light/sound sensitivity  Dizziness/lightheaded  Also using tylenol, taking bid since Saturday  Advised regarding medication overuse headaches  States that she lost her taste yesterday    COVID test was ordered by pcp but has not been tested yet         emgality should be delivered on 1/12, this will be her 3rd month  Gabapentin 600mg-takes as needed-took last night and was helpful   Tizanidine 4mg 1 tab hs  Meclizine 12 5mg prn-took this this am also    Decadron has been effective in the past   Has never tried depakote and olanzapine in the past  Please advise  [phone] to leave detailed message

## 2022-01-10 NOTE — TELEPHONE ENCOUNTER
She should not have a cough or loss of taste after the booster  Recommend getting tested  Covid PCR order placed  Please provide testing instructions  She should be quarantining

## 2022-01-10 NOTE — TELEPHONE ENCOUNTER
Agree with covid test, headache could also be related to booster  I sent in decardron 2 mg 1 tab qd for 5 days to take  If persists let us know

## 2022-01-11 DIAGNOSIS — G43.709 CHRONIC MIGRAINE W/O AURA W/O STATUS MIGRAINOSUS, NOT INTRACTABLE: ICD-10-CM

## 2022-01-11 RX ORDER — IBUPROFEN 600 MG/1
600 TABLET ORAL EVERY 6 HOURS PRN
Qty: 30 TABLET | Refills: 0 | Status: SHIPPED | OUTPATIENT
Start: 2022-01-11 | End: 2022-01-31

## 2022-01-12 ENCOUNTER — PATIENT MESSAGE (OUTPATIENT)
Dept: INTERNAL MEDICINE CLINIC | Facility: CLINIC | Age: 42
End: 2022-01-12

## 2022-01-19 ENCOUNTER — TELEPHONE (OUTPATIENT)
Dept: INTERNAL MEDICINE CLINIC | Facility: CLINIC | Age: 42
End: 2022-01-19

## 2022-01-19 NOTE — TELEPHONE ENCOUNTER
Pt still has cough and nasal congestion with green mucus mixed with blood when blowing her nose and a headache that started two days ago  She thinks maybe she has a sinus infection?

## 2022-01-19 NOTE — TELEPHONE ENCOUNTER
Have her symptoms of nasal congestion and sinus pressure worsened? Any fevers?    Would recommend starting flonase 1 spray in each nostril in pm and saline nasal spray in am

## 2022-01-31 DIAGNOSIS — G43.709 CHRONIC MIGRAINE WITHOUT AURA, NOT INTRACTABLE, WITHOUT STATUS MIGRAINOSUS: ICD-10-CM

## 2022-01-31 DIAGNOSIS — G43.709 CHRONIC MIGRAINE W/O AURA W/O STATUS MIGRAINOSUS, NOT INTRACTABLE: ICD-10-CM

## 2022-01-31 DIAGNOSIS — G43.719 INTRACTABLE CHRONIC MIGRAINE WITHOUT AURA AND WITHOUT STATUS MIGRAINOSUS: ICD-10-CM

## 2022-01-31 DIAGNOSIS — G43.009 MIGRAINE WITHOUT AURA AND WITHOUT STATUS MIGRAINOSUS, NOT INTRACTABLE: ICD-10-CM

## 2022-01-31 RX ORDER — IBUPROFEN 600 MG/1
600 TABLET ORAL EVERY 6 HOURS PRN
Qty: 30 TABLET | Refills: 0 | Status: SHIPPED | OUTPATIENT
Start: 2022-01-31 | End: 2022-04-29

## 2022-01-31 RX ORDER — DEXAMETHASONE 2 MG/1
2 TABLET ORAL
Qty: 5 TABLET | Refills: 0 | Status: SHIPPED | OUTPATIENT
Start: 2022-01-31 | End: 2022-02-28

## 2022-02-01 ENCOUNTER — TELEPHONE (OUTPATIENT)
Dept: INTERNAL MEDICINE CLINIC | Facility: CLINIC | Age: 42
End: 2022-02-01

## 2022-02-01 RX ORDER — CYPROHEPTADINE HYDROCHLORIDE 4 MG/1
TABLET ORAL
Qty: 60 TABLET | Refills: 3 | Status: SHIPPED | OUTPATIENT
Start: 2022-02-01 | End: 2022-02-25 | Stop reason: ALTCHOICE

## 2022-02-01 RX ORDER — ONDANSETRON 4 MG/1
TABLET, ORALLY DISINTEGRATING ORAL
Qty: 9 TABLET | Refills: 2 | Status: SHIPPED | OUTPATIENT
Start: 2022-02-01 | End: 2022-06-08 | Stop reason: SDUPTHER

## 2022-02-01 NOTE — TELEPHONE ENCOUNTER
Please place an order for Neurology - pt has an appointment on 2/8/22    for the following: G43 719, G43 709, M54 2, M54 50, G89 29, N29 327

## 2022-02-08 ENCOUNTER — PROCEDURE VISIT (OUTPATIENT)
Dept: NEUROLOGY | Facility: CLINIC | Age: 42
End: 2022-02-08
Payer: COMMERCIAL

## 2022-02-08 VITALS — SYSTOLIC BLOOD PRESSURE: 156 MMHG | HEART RATE: 107 BPM | DIASTOLIC BLOOD PRESSURE: 83 MMHG | TEMPERATURE: 96.8 F

## 2022-02-08 DIAGNOSIS — G89.29 CHRONIC LOW BACK PAIN, UNSPECIFIED BACK PAIN LATERALITY, UNSPECIFIED WHETHER SCIATICA PRESENT: ICD-10-CM

## 2022-02-08 DIAGNOSIS — G43.709 CHRONIC MIGRAINE WITHOUT AURA WITHOUT STATUS MIGRAINOSUS, NOT INTRACTABLE: ICD-10-CM

## 2022-02-08 DIAGNOSIS — G43.709 CHRONIC MIGRAINE WITHOUT AURA, NOT INTRACTABLE, WITHOUT STATUS MIGRAINOSUS: Primary | ICD-10-CM

## 2022-02-08 DIAGNOSIS — M54.50 CHRONIC LOW BACK PAIN, UNSPECIFIED BACK PAIN LATERALITY, UNSPECIFIED WHETHER SCIATICA PRESENT: ICD-10-CM

## 2022-02-08 DIAGNOSIS — M25.559 PAIN IN JOINT INVOLVING PELVIC REGION AND THIGH, UNSPECIFIED LATERALITY: ICD-10-CM

## 2022-02-08 DIAGNOSIS — M54.50 LOW BACK PAIN, UNSPECIFIED BACK PAIN LATERALITY, UNSPECIFIED CHRONICITY, UNSPECIFIED WHETHER SCIATICA PRESENT: ICD-10-CM

## 2022-02-08 DIAGNOSIS — M54.2 CERVICALGIA: ICD-10-CM

## 2022-02-08 DIAGNOSIS — G43.719 INTRACTABLE CHRONIC MIGRAINE WITHOUT AURA AND WITHOUT STATUS MIGRAINOSUS: ICD-10-CM

## 2022-02-08 PROCEDURE — 64615 CHEMODENERV MUSC MIGRAINE: CPT | Performed by: PHYSICIAN ASSISTANT

## 2022-02-08 NOTE — PROGRESS NOTES
Universal Protocol   Consent: Verbal consent obtained  Written consent obtained  Risks and benefits: risks, benefits and alternatives were discussed  Consent given by: patient  Time out: Immediately prior to procedure a "time out" was called to verify the correct patient, procedure, equipment, support staff and site/side marked as required  Patient understanding: patient states understanding of the procedure being performed  Patient consent: the patient's understanding of the procedure matches consent given  Procedure consent: procedure consent matches procedure scheduled  Relevant documents: relevant documents present and verified  Patient identity confirmed: verbally with patient        Chemodenervation     Date/Time 2/8/2022 2:35 PM     Performed by  Vivien Guzman PA-C     Authorized by Vivien Guzman PA-C        Pre-procedure details      Prepped With: Alcohol     Anesthesia  (see MAR for exact dosages):      Anesthesia method:  None   Procedure details     Position:  Upright   Botox     Botox Type:  Type A    Brand:  Botox    mL's of Botulinum Toxin:  200    Final Concentration per CC:  50 units    Needle Gauge:  30 G 2 5 inch   Procedures     Botox Procedures: chronic headache      Indications: migraines     Injection Location      Head / Face:  L superior cervical paraspinal, R superior cervical paraspinal, L , R , L frontalis, R frontalis, L medial occipitalis, R medial occipitalis, procerus, R temporalis, L temporalis, R superior trapezius and L superior trapezius    L  injection amount:  5 unit(s)    R  injection amount:  5 unit(s)    L lateral frontalis:  5 unit(s)    R lateral frontalis:  5 unit(s)    L medial frontalis:  5 unit(s)    R medial frontalis:  5 unit(s)    L temporalis injection amount:  25 unit(s)    R temporalis injection amount:  25 unit(s)    Procerus injection amount:  5 unit(s)    L medial occipitalis injection amount:  20 unit(s)    R medial occipitalis injection amount:  30 unit(s)    L superior cervical paraspinal injection amount:  10 unit(s)    R superior cervical paraspinal injection amount:  10 unit(s)    L superior trapezius injection amount:  20 unit(s)    R superior trapezius injection amount:  15 unit(s)   Total Units     Total units used:  200    Total units discarded:  0   Post-procedure details      Chemodenervation:  Chronic migraine    Facial Nerve Location[de-identified]  Bilateral facial nerve    Patient tolerance of procedure:   Tolerated well, no immediate complications   Comments      All medically necessary

## 2022-02-25 ENCOUNTER — AMB VIDEO VISIT (OUTPATIENT)
Dept: OTHER | Facility: HOSPITAL | Age: 42
End: 2022-02-25

## 2022-02-25 DIAGNOSIS — B96.89 BV (BACTERIAL VAGINOSIS): ICD-10-CM

## 2022-02-25 DIAGNOSIS — N30.00 ACUTE CYSTITIS WITHOUT HEMATURIA: Primary | ICD-10-CM

## 2022-02-25 DIAGNOSIS — N76.0 BV (BACTERIAL VAGINOSIS): ICD-10-CM

## 2022-02-25 PROBLEM — U07.1 COVID-19: Status: RESOLVED | Noted: 2021-02-08 | Resolved: 2022-02-25

## 2022-02-25 PROCEDURE — ECARE PR SL URGENT CARE VIRTUAL VISIT: Performed by: PHYSICIAN ASSISTANT

## 2022-02-25 RX ORDER — SULFAMETHOXAZOLE AND TRIMETHOPRIM 800; 160 MG/1; MG/1
1 TABLET ORAL EVERY 12 HOURS SCHEDULED
Qty: 14 TABLET | Refills: 0 | Status: SHIPPED | OUTPATIENT
Start: 2022-02-25 | End: 2022-03-04

## 2022-02-25 RX ORDER — METRONIDAZOLE 500 MG/1
500 TABLET ORAL EVERY 12 HOURS SCHEDULED
Qty: 14 TABLET | Refills: 0 | Status: SHIPPED | OUTPATIENT
Start: 2022-02-25 | End: 2022-03-04

## 2022-02-25 NOTE — PROGRESS NOTES
Video Visit - Jerardo Grant 40 y o  female MRN: [id number]    REQUIRED DOCUMENTATION:         1  This service was provided via [employer]  2  Provider located at [address] [place] [id number]  3  [facility] provider: Olivia Boles PA-C   4  Identify all parties in [facility]:  No one else  5  After connecting through [employer], patient was identified by name and date of birth  Patient was then informed that this was a Telemedicine visit and that the exam was being conducted confidentially over secure lines  My office door was closed  No one else was in the room  Patient acknowledged consent and understanding of privacy and security of the Telemedicine visit  I informed the patient that I have reviewed their record in Epic and presented the opportunity for them to ask any questions regarding the visit today  The patient agreed to participate  VITALS: Heart Rate: 16 BPM, Respiratory Rate: 12 RPM, Temperature Unavailable° F, Blood Pressure Unavailable mmHg, Pulse Ox Unavailable % on RA    HPI  Pt reports UTI today, frequency, dysuria, voiding small amounts  No hematuria  Reports lower back pain and feeling "run down " Just finished period yesterday, has some discharge white with pink hue, fishy odor  No concern for STDs  No fevers  Physical Exam  Constitutional:       General: She is not in acute distress  Appearance: Normal appearance  She is not toxic-appearing  HENT:      Head: Normocephalic and atraumatic  Nose: No rhinorrhea  Mouth/Throat:      Mouth: Mucous membranes are moist    Eyes:      Conjunctiva/sclera: Conjunctivae normal       Comments: glasses   Pulmonary:      Effort: Pulmonary effort is normal  No respiratory distress  Breath sounds: No wheezing (no gross audible wheeze through computer)  Musculoskeletal:      Cervical back: Normal range of motion        Comments: Points to CVA as area of pain   Skin: Findings: No rash (on face or neck)  Neurological:      Mental Status: She is alert  Cranial Nerves: No dysarthria or facial asymmetry  Psychiatric:         Mood and Affect: Mood normal          Behavior: Behavior normal         Concern for early pyelo  Will have pt submit urine sample  Diagnoses and all orders for this visit:    Acute cystitis without hematuria  -     UA w Reflex to Microscopic w Reflex to Culture -Lab Collect; Future  -     sulfamethoxazole-trimethoprim (BACTRIM DS) 800-160 mg per tablet; Take 1 tablet by mouth every 12 (twelve) hours for 7 days    BV (bacterial vaginosis)  -     metroNIDAZOLE (FLAGYL) 500 mg tablet; Take 1 tablet (500 mg total) by mouth every 12 (twelve) hours for 7 days      Patient Instructions   Schedule a follow-up appointment with your primary care physician for recheck in 2-3 days  If you cannot see your PCP, you can schedule a follow up appointment at a [address] Now   Go to the emergency department if you develop any new or worsening symptoms including fevers, vomiting, worsening back pain, or anything else that is concerning     1 21 [phone]) POPPY ([phone])  Schedule or Reschedule Outpatient Testing - Option 2  Billing - Option 3  General Info - Option 4  MyChart Help - Option 5  Comprehensive Spine Program - Option 6   COVID - Option 7

## 2022-02-25 NOTE — PATIENT INSTRUCTIONS
Schedule a follow-up appointment with your primary care physician for recheck in 2-3 days  If you cannot see your PCP, you can schedule a follow up appointment at a [address] Now   Go to the emergency department if you develop any new or worsening symptoms including fevers, vomiting, worsening back pain, or anything else that is concerning     1 21 ) POPPY ([id number])  Schedule or Reschedule Outpatient Testing - Option 2  Billing - Option 3  General Info - Option 4  MyChart Help - Option 5  Comprehensive Spine Program - Option 6   COVID - Option 7

## 2022-02-26 DIAGNOSIS — G43.009 MIGRAINE WITHOUT AURA AND WITHOUT STATUS MIGRAINOSUS, NOT INTRACTABLE: ICD-10-CM

## 2022-02-28 RX ORDER — DEXAMETHASONE 2 MG/1
2 TABLET ORAL
Qty: 5 TABLET | Refills: 0 | Status: SHIPPED | OUTPATIENT
Start: 2022-02-28 | End: 2022-03-05

## 2022-03-02 ENCOUNTER — TELEPHONE (OUTPATIENT)
Dept: NEUROLOGY | Facility: CLINIC | Age: 42
End: 2022-03-02

## 2022-03-02 NOTE — TELEPHONE ENCOUNTER
Her injections were in the same general areas as Dr Manuelito Partida had done previous  I followed her injection map from before  Is she taking tizanidine at bedtime?   Her migraines are likely worse with the emgality being delayed as well

## 2022-03-02 NOTE — TELEPHONE ENCOUNTER
Pt called and states that she had botox on 2/8/22, it did not help at all  States that it was not normally on the usual spot as before  Since, then she has had intermittent left side pain of her head-temple area and radiates to shoulder  Complaining of neck and shoulder stiffness so when she lift her left arm, it feels heavy  She started decadron on 2/28  Her symptoms are getting better  Also, her emgality came in w/ faulty syringe  [facility] will be sending her another emgality inj  She is 2 weeks late  Also she will call [facility]in [facility]zon to report the faulty syringe       Pls see [employer] message      Asking for your recommendation                              [phone] ok to leave detailed message

## 2022-03-02 NOTE — TELEPHONE ENCOUNTER
Called and advised pt of all of the below  She verbalized clear understanding  Pt states that she is taking tizanidine 4 mg 1-2 tabs at bedtime  States that the pain and stiffness are getting better  If it does not go away in few weeks, she will give us a call  Her emgality is scheduled to be delivered on 3/4/22

## 2022-03-03 ENCOUNTER — TELEPHONE (OUTPATIENT)
Dept: INTERNAL MEDICINE CLINIC | Facility: CLINIC | Age: 42
End: 2022-03-03

## 2022-03-10 ENCOUNTER — TELEPHONE (OUTPATIENT)
Dept: ADMINISTRATIVE | Facility: OTHER | Age: 42
End: 2022-03-10

## 2022-03-10 NOTE — TELEPHONE ENCOUNTER
Upon review of the In Basket request we were able to locate, review, and update the patient chart as requested for Pap Smear (HPV) aka Cervical Cancer Screening  Any additional questions or concerns should be emailed to the Practice Liaisons via [email or web address] email, please do not reply via In Basket      Thank you  Nora Alas MA

## 2022-03-10 NOTE — TELEPHONE ENCOUNTER
----- Message from Fredrick Acosta sent at 3/9/2022  1:47 PM EST -----  Regarding: care gap request- PAP  03/09/22 1:47 PM    Hello, our patient attached above has had Pap Smear (HPV) aka Cervical Cancer Screening completed/performed  Please assist in updating the patient chart by pulling the Care Everywhere (CE) document  The date of service is 09/25/2019       Thank you,  Fredrick Acosta  [facility] INTERNAL MED

## 2022-03-11 ENCOUNTER — HOSPITAL ENCOUNTER (OUTPATIENT)
Dept: MAMMOGRAPHY | Facility: HOSPITAL | Age: 42
Discharge: HOME | End: 2022-03-11
Payer: COMMERCIAL

## 2022-03-11 VITALS — BODY MASS INDEX: 27.62 KG/M2 | WEIGHT: 203.93 LBS | HEIGHT: 72 IN

## 2022-03-11 DIAGNOSIS — Z12.31 SCREENING MAMMOGRAM FOR BREAST CANCER: ICD-10-CM

## 2022-03-11 DIAGNOSIS — M54.2 CERVICALGIA: ICD-10-CM

## 2022-03-11 PROCEDURE — 77067 SCR MAMMO BI INCL CAD: CPT

## 2022-03-11 PROCEDURE — 77063 BREAST TOMOSYNTHESIS BI: CPT

## 2022-03-14 RX ORDER — TIZANIDINE 4 MG/1
TABLET ORAL
Qty: 60 TABLET | Refills: 4 | Status: SHIPPED | OUTPATIENT
Start: 2022-03-14 | End: 2022-07-25 | Stop reason: SDUPTHER

## 2022-03-16 ENCOUNTER — OFFICE VISIT (OUTPATIENT)
Dept: INTERNAL MEDICINE CLINIC | Facility: CLINIC | Age: 42
End: 2022-03-16
Payer: COMMERCIAL

## 2022-03-16 VITALS
WEIGHT: 200 LBS | SYSTOLIC BLOOD PRESSURE: 112 MMHG | DIASTOLIC BLOOD PRESSURE: 70 MMHG | OXYGEN SATURATION: 97 % | HEIGHT: 72 IN | TEMPERATURE: 97.4 F | HEART RATE: 79 BPM | BODY MASS INDEX: 27.09 KG/M2

## 2022-03-16 DIAGNOSIS — J68.3 REACTIVE AIRWAYS DYSFUNCTION SYNDROME (HCC): ICD-10-CM

## 2022-03-16 DIAGNOSIS — M54.2 CHRONIC NECK AND BACK PAIN: ICD-10-CM

## 2022-03-16 DIAGNOSIS — G89.29 CHRONIC NECK AND BACK PAIN: ICD-10-CM

## 2022-03-16 DIAGNOSIS — E66.3 OVERWEIGHT: ICD-10-CM

## 2022-03-16 DIAGNOSIS — G89.29 CHRONIC RIGHT SHOULDER PAIN: ICD-10-CM

## 2022-03-16 DIAGNOSIS — G43.709 CHRONIC MIGRAINE W/O AURA W/O STATUS MIGRAINOSUS, NOT INTRACTABLE: Primary | ICD-10-CM

## 2022-03-16 DIAGNOSIS — M54.9 CHRONIC NECK AND BACK PAIN: ICD-10-CM

## 2022-03-16 DIAGNOSIS — F41.1 GENERALIZED ANXIETY DISORDER: ICD-10-CM

## 2022-03-16 DIAGNOSIS — M25.511 CHRONIC RIGHT SHOULDER PAIN: ICD-10-CM

## 2022-03-16 DIAGNOSIS — F98.8 ATTENTION DEFICIT DISORDER, UNSPECIFIED HYPERACTIVITY PRESENCE: ICD-10-CM

## 2022-03-16 PROCEDURE — 99214 OFFICE O/P EST MOD 30 MIN: CPT | Performed by: NURSE PRACTITIONER

## 2022-03-16 RX ORDER — NALOXONE HYDROCHLORIDE 4 MG/.1ML
SPRAY NASAL
Qty: 1 EACH | Refills: 1 | Status: SHIPPED | OUTPATIENT
Start: 2022-03-16 | End: 2022-06-07

## 2022-03-16 RX ORDER — TRAZODONE HYDROCHLORIDE 100 MG/1
50-100 TABLET ORAL
COMMUNITY
Start: 2022-03-12

## 2022-03-16 RX ORDER — QUETIAPINE FUMARATE 100 MG/1
100 TABLET, FILM COATED ORAL EVERY EVENING
COMMUNITY
Start: 2022-02-07 | End: 2022-03-16

## 2022-03-16 RX ORDER — TRAMADOL HYDROCHLORIDE 50 MG/1
50 TABLET ORAL EVERY 12 HOURS PRN
Qty: 15 TABLET | Refills: 0 | Status: SHIPPED | OUTPATIENT
Start: 2022-03-16 | End: 2022-03-25 | Stop reason: SDUPTHER

## 2022-03-16 NOTE — ASSESSMENT & PLAN NOTE
Continue strength training exercises  Tylenol or motrin as needed  Takes a muscle relaxer occasionally

## 2022-03-16 NOTE — PROGRESS NOTES
Assessment/Plan:    Reactive airways dysfunction syndrome (HCC)  Albuterol PRN  Has not needed  Chronic migraine w/o aura w/o status migrainosus, not intractable  With increased frequency recently  Receiving botox every 3 months along with emgality  imitrex for abortive treatment  Sees neurology     Generalized anxiety disorder  Worsening symptoms due to her sister's passing  In grief counseling  On zoloft  Rarely uses clonazepam    Trazodone as needed for sleep  Sees psychiatry  Chronic right shoulder pain  Check xray  Start PT  Will give tramadol 15 tablets only  Use only for severe pain  Avoid taking with clonazepam or trazodone  Continue tylenol or motrin for mild/moderate pain  stretching exercises daily  Schedule with ortho  Chronic neck and back pain  Continue strength training exercises  Tylenol or motrin as needed  Takes a muscle relaxer occasionally  ADD (attention deficit disorder)  On adderall prescribed by psychiatry  BMI Counseling: Body mass index is 26 39 kg/m²  The BMI is above normal  Nutrition recommendations include reducing portion sizes and decreasing overall calorie intake  Exercise recommendations include exercising 3-5 times per week  Diagnoses and all orders for this visit:    Chronic migraine w/o aura w/o status migrainosus, not intractable    Generalized anxiety disorder  -     TSH, 3rd generation with Free T4 reflex; Future    Chronic right shoulder pain  -     XR shoulder 2+ vw right; Future  -     Ambulatory Referral to Physical Therapy; Future  -     Ambulatory Referral to Orthopedic Surgery; Future  -     traMADol (Ultram) 50 mg tablet; Take 1 tablet (50 mg total) by mouth every 12 (twelve) hours as needed for severe pain  -     naloxone (NARCAN) 4 mg/0 1 mL nasal spray; Administer 1 spray into a nostril  If no response after 2-3 minutes, give another dose in the other nostril using a new spray      Reactive airways dysfunction syndrome ([facility] )  -     CBC and differential; Future    Attention deficit disorder, unspecified hyperactivity presence    Chronic neck and back pain    Overweight  -     Comprehensive metabolic panel; Future  -     Lipid Panel with Direct LDL reflex; Future    Other orders  -     traZODone (DESYREL) 100 mg tablet; Take  mg by mouth daily at bedtime as needed  -     Discontinue: QUEtiapine (SEROquel) 100 mg tablet; Take 100 mg by mouth every evening          Subjective:      Patient ID: Emmanuel Leblanc is a 40 y o  female  Here today for follow up  Brigette Godinez has been doing ok  Her breathing has been good  She has not needed her albuterol inhaler  Her sister unfortunately passed away in January unexpectedly  She is now starting to feel better emotionally  She goes back to work Monday  However, she has had more migraines since then  Even with botox and emgality  She does get relief from the medication however still getting them frequently  She has chronic back and neck pain  She also has been experiencing right shoulder pain for the last 2 years  She takes a muscle relaxer and ibuprofen  Lately it has not been helping  She thinks its because of the stress and tension  She took tramadol which helped  She is requesting a refill until she can get seen by orthopedics  She has also started yoga and strength training which has helped  She is in grief counseling  She saw her psychiatrist yesterday  She has been disassociating since her sister's death  She hasn't been able to feel anything during sexual activity with her boyfriend  She has had no taste as well  She did have covid back in January but gained her taste back within a day  Her psychiatrist feels this is a coping mechanism                 The following portions of the patient's history were reviewed and updated as appropriate: allergies, current medications, past family history, past medical history, past social history, past surgical history and problem list     Review of Systems   Constitutional: Negative for activity change, appetite change and fatigue  Eyes: Negative for visual disturbance  Respiratory: Negative for cough, chest tightness and shortness of breath  Cardiovascular: Negative for chest pain, palpitations and leg swelling  Gastrointestinal: Negative for abdominal pain, constipation and diarrhea  Genitourinary: Negative for difficulty urinating  Musculoskeletal: Positive for arthralgias, back pain and neck pain  Skin: Negative for rash  Neurological: Negative for dizziness, weakness, light-headedness and headaches  Psychiatric/Behavioral: Positive for sleep disturbance  Negative for dysphoric mood and suicidal ideas  The patient is not nervous/anxious  Objective:      /70   Pulse 79   Temp (!) 97 4 °F (36 3 °C)   Ht 6' 1" (1 854 m)   Wt 90 7 kg (200 lb)   LMP 03/11/2022 (Exact Date)   SpO2 97%   BMI 26 39 kg/m²          Physical Exam  Vitals reviewed  Constitutional:       Appearance: She is well-developed  HENT:      Head: Normocephalic and atraumatic  Eyes:      Conjunctiva/sclera: Conjunctivae normal       Pupils: Pupils are equal, round, and reactive to light  Neck:      Thyroid: No thyromegaly  Cardiovascular:      Rate and Rhythm: Normal rate and regular rhythm  Heart sounds: Normal heart sounds  Pulmonary:      Effort: Pulmonary effort is normal       Breath sounds: Normal breath sounds  Abdominal:      General: Bowel sounds are normal       Palpations: Abdomen is soft  Musculoskeletal:      Right shoulder: No swelling  Decreased range of motion  Normal strength  Normal pulse  Cervical back: Tenderness present  Decreased range of motion  Lymphadenopathy:      Cervical: No cervical adenopathy  Skin:     General: Skin is warm and dry  Neurological:      Mental Status: She is alert and oriented to person, place, and time     Psychiatric:         Behavior: Behavior normal

## 2022-03-16 NOTE — ASSESSMENT & PLAN NOTE
Check xray  Start PT  Will give tramadol 15 tablets only  Use only for severe pain  Avoid taking with clonazepam or trazodone  Continue tylenol or motrin for mild/moderate pain  stretching exercises daily  Schedule with ortho

## 2022-03-16 NOTE — ASSESSMENT & PLAN NOTE
With increased frequency recently  Receiving botox every 3 months along with emgality  imitrex for abortive treatment     Sees neurology

## 2022-03-16 NOTE — ASSESSMENT & PLAN NOTE
Worsening symptoms due to her sister's passing  In grief counseling  On zoloft  Rarely uses clonazepam    Trazodone as needed for sleep  Sees psychiatry

## 2022-03-18 ENCOUNTER — HOSPITAL ENCOUNTER (OUTPATIENT)
Dept: RADIOLOGY | Facility: HOSPITAL | Age: 42
Discharge: HOME | End: 2022-03-18
Payer: COMMERCIAL

## 2022-03-18 ENCOUNTER — APPOINTMENT (OUTPATIENT)
Dept: LAB | Facility: CLINIC | Age: 42
End: 2022-03-18
Payer: COMMERCIAL

## 2022-03-18 DIAGNOSIS — F41.1 GENERALIZED ANXIETY DISORDER: ICD-10-CM

## 2022-03-18 DIAGNOSIS — J68.3 REACTIVE AIRWAYS DYSFUNCTION SYNDROME (HCC): ICD-10-CM

## 2022-03-18 DIAGNOSIS — M25.511 CHRONIC RIGHT SHOULDER PAIN: ICD-10-CM

## 2022-03-18 DIAGNOSIS — G89.29 CHRONIC RIGHT SHOULDER PAIN: ICD-10-CM

## 2022-03-18 DIAGNOSIS — B34.9 VIRAL INFECTION: ICD-10-CM

## 2022-03-18 DIAGNOSIS — E66.3 OVERWEIGHT: ICD-10-CM

## 2022-03-18 LAB
ALBUMIN SERPL BCP-MCNC: 3.5 G/DL (ref 3.5–5)
ALP SERPL-CCNC: 44 U/L (ref 46–116)
ALT SERPL W P-5'-P-CCNC: 26 U/L (ref 12–78)
ANION GAP SERPL CALCULATED.3IONS-SCNC: 7 MMOL/L (ref 4–13)
AST SERPL W P-5'-P-CCNC: 15 U/L (ref 5–45)
BASOPHILS # BLD AUTO: 0.04 THOUSANDS/ΜL (ref 0–0.1)
BASOPHILS NFR BLD AUTO: 1 % (ref 0–1)
BILIRUB SERPL-MCNC: 0.38 MG/DL (ref 0.2–1)
BUN SERPL-MCNC: 12 MG/DL (ref 5–25)
CALCIUM SERPL-MCNC: 8.4 MG/DL (ref 8.3–10.1)
CHLORIDE SERPL-SCNC: 102 MMOL/L (ref 100–108)
CHOLEST SERPL-MCNC: 199 MG/DL
CO2 SERPL-SCNC: 28 MMOL/L (ref 21–32)
CREAT SERPL-MCNC: 0.92 MG/DL (ref 0.6–1.3)
EOSINOPHIL # BLD AUTO: 0.23 THOUSAND/ΜL (ref 0–0.61)
EOSINOPHIL NFR BLD AUTO: 5 % (ref 0–6)
ERYTHROCYTE [DISTWIDTH] IN BLOOD BY AUTOMATED COUNT: 13 % (ref 11.6–15.1)
GFR SERPL CREATININE-BSD FRML MDRD: 77 ML/MIN/1.73SQ M
GLUCOSE P FAST SERPL-MCNC: 102 MG/DL (ref 65–99)
HCT VFR BLD AUTO: 43.3 % (ref 34.8–46.1)
HDLC SERPL-MCNC: 56 MG/DL
HGB BLD-MCNC: 14.3 G/DL (ref 11.5–15.4)
IMM GRANULOCYTES # BLD AUTO: 0.01 THOUSAND/UL (ref 0–0.2)
IMM GRANULOCYTES NFR BLD AUTO: 0 % (ref 0–2)
LDLC SERPL CALC-MCNC: 100 MG/DL (ref 0–100)
LYMPHOCYTES # BLD AUTO: 1.81 THOUSANDS/ΜL (ref 0.6–4.47)
LYMPHOCYTES NFR BLD AUTO: 35 % (ref 14–44)
MCH RBC QN AUTO: 30.6 PG (ref 26.8–34.3)
MCHC RBC AUTO-ENTMCNC: 33 G/DL (ref 31.4–37.4)
MCV RBC AUTO: 93 FL (ref 82–98)
MONOCYTES # BLD AUTO: 0.41 THOUSAND/ΜL (ref 0.17–1.22)
MONOCYTES NFR BLD AUTO: 8 % (ref 4–12)
NEUTROPHILS # BLD AUTO: 2.64 THOUSANDS/ΜL (ref 1.85–7.62)
NEUTS SEG NFR BLD AUTO: 51 % (ref 43–75)
NRBC BLD AUTO-RTO: 0 /100 WBCS
PLATELET # BLD AUTO: 263 THOUSANDS/UL (ref 149–390)
PMV BLD AUTO: 10.3 FL (ref 8.9–12.7)
POTASSIUM SERPL-SCNC: 4.3 MMOL/L (ref 3.5–5.3)
PROT SERPL-MCNC: 7.1 G/DL (ref 6.4–8.2)
RBC # BLD AUTO: 4.67 MILLION/UL (ref 3.81–5.12)
SODIUM SERPL-SCNC: 137 MMOL/L (ref 136–145)
TRIGL SERPL-MCNC: 215 MG/DL
TSH SERPL DL<=0.05 MIU/L-ACNC: 1.7 UIU/ML (ref 0.36–3.74)
WBC # BLD AUTO: 5.14 THOUSAND/UL (ref 4.31–10.16)

## 2022-03-18 PROCEDURE — 73030 X-RAY EXAM OF SHOULDER: CPT

## 2022-03-18 PROCEDURE — 80053 COMPREHEN METABOLIC PANEL: CPT

## 2022-03-18 PROCEDURE — 36415 COLL VENOUS BLD VENIPUNCTURE: CPT

## 2022-03-18 PROCEDURE — 80061 LIPID PANEL: CPT

## 2022-03-18 PROCEDURE — 85025 COMPLETE CBC W/AUTO DIFF WBC: CPT

## 2022-03-18 PROCEDURE — 84443 ASSAY THYROID STIM HORMONE: CPT

## 2022-03-25 ENCOUNTER — OFFICE VISIT (OUTPATIENT)
Dept: INTERNAL MEDICINE CLINIC | Facility: CLINIC | Age: 42
End: 2022-03-25
Payer: COMMERCIAL

## 2022-03-25 VITALS
TEMPERATURE: 98 F | SYSTOLIC BLOOD PRESSURE: 140 MMHG | OXYGEN SATURATION: 98 % | HEIGHT: 72 IN | HEART RATE: 92 BPM | DIASTOLIC BLOOD PRESSURE: 90 MMHG | BODY MASS INDEX: 28.44 KG/M2 | WEIGHT: 210 LBS

## 2022-03-25 DIAGNOSIS — G89.29 CHRONIC RIGHT SHOULDER PAIN: ICD-10-CM

## 2022-03-25 DIAGNOSIS — M25.511 CHRONIC RIGHT SHOULDER PAIN: ICD-10-CM

## 2022-03-25 DIAGNOSIS — J06.9 ACUTE UPPER RESPIRATORY INFECTION: Primary | ICD-10-CM

## 2022-03-25 DIAGNOSIS — G89.29 CHRONIC PAIN OF LEFT KNEE: ICD-10-CM

## 2022-03-25 DIAGNOSIS — M25.562 CHRONIC PAIN OF LEFT KNEE: ICD-10-CM

## 2022-03-25 PROCEDURE — 99214 OFFICE O/P EST MOD 30 MIN: CPT | Performed by: NURSE PRACTITIONER

## 2022-03-25 RX ORDER — AZITHROMYCIN 250 MG/1
TABLET, FILM COATED ORAL
Qty: 6 TABLET | Refills: 0 | Status: SHIPPED | OUTPATIENT
Start: 2022-03-25 | End: 2022-03-29

## 2022-03-25 RX ORDER — TRAMADOL HYDROCHLORIDE 50 MG/1
50 TABLET ORAL EVERY 12 HOURS PRN
Qty: 15 TABLET | Refills: 0 | Status: SHIPPED | OUTPATIENT
Start: 2022-03-25 | End: 2022-06-08

## 2022-03-25 NOTE — PROGRESS NOTES
Assessment/Plan:     Diagnoses and all orders for this visit:    Acute upper respiratory infection  Comments:  if symptoms do not improve over the next 2-3 days, recommend starting antibiotics  albuterol inhaler as needed  Orders:  -     azithromycin (ZITHROMAX) 250 mg tablet; Take 2 tablets daily on day 1 then 1 tablet daily for the next 4 days    Chronic right shoulder pain  Comments:  recommend starting PT  Orders:  -     traMADol (Ultram) 50 mg tablet; Take 1 tablet (50 mg total) by mouth every 12 (twelve) hours as needed for severe pain    Chronic pain of left knee  Comments:  small prescription of tramadol provided  no further prescriptions  advised to schedule with her orthopedic    recommend PT  Subjective:      Patient ID: Sukumar Arnold is a 40 y o  female  Here today with multiple complaints  She has been having left knee pain for the last few years  She saw ortho a few years ago  She was doing home exercises  Lately the pain is getting worse  She has pain in the front of her knee  It is worse with stairs and getting out of the car  She hears her knee crunching when she bends  She was taking tramadol with relief  About 5 days ago she started with nasal congestion, body aches, and head pressure  Since then she has started with some left tooth pain, sore throat, and a cough  She is taking allegra without relief  Home covid test was negative  She has not needed her inhaler  She hears her left elbow crunching when she bends it  She has no pain or swelling  The following portions of the patient's history were reviewed and updated as appropriate: allergies, current medications, past family history, past medical history, past social history, past surgical history and problem list     Review of Systems   Constitutional: Positive for activity change and fatigue  Negative for appetite change and fever     HENT: Positive for congestion, postnasal drip, rhinorrhea and sore throat  Respiratory: Positive for cough and chest tightness  Negative for shortness of breath and wheezing  Cardiovascular: Negative for chest pain  Gastrointestinal: Negative for abdominal pain, diarrhea, nausea and vomiting  Genitourinary: Negative for difficulty urinating  Musculoskeletal: Positive for arthralgias  Neurological: Positive for headaches  Negative for dizziness and light-headedness  Objective:      /90   Pulse 92   Temp 98 °F (36 7 °C)   Ht 6' 1" (1 854 m)   Wt 95 3 kg (210 lb)   LMP 03/11/2022 (Exact Date)   SpO2 98%   BMI 27 71 kg/m²          Physical Exam  Vitals reviewed  Constitutional:       Appearance: Normal appearance  HENT:      Head: Normocephalic and atraumatic  Right Ear: Tympanic membrane, ear canal and external ear normal       Left Ear: Tympanic membrane, ear canal and external ear normal       Mouth/Throat:      Pharynx: Posterior oropharyngeal erythema present  No oropharyngeal exudate  Eyes:      Conjunctiva/sclera: Conjunctivae normal    Cardiovascular:      Rate and Rhythm: Normal rate and regular rhythm  Heart sounds: Normal heart sounds  Pulmonary:      Effort: Pulmonary effort is normal  No respiratory distress  Breath sounds: Normal breath sounds  Musculoskeletal:      Left elbow: Normal  No swelling  Normal range of motion  No tenderness  Left knee: No swelling  Tenderness present over the patellar tendon  Skin:     General: Skin is warm and dry  Neurological:      Mental Status: She is alert and oriented to person, place, and time     Psychiatric:         Mood and Affect: Mood normal          Behavior: Behavior normal

## 2022-03-28 ENCOUNTER — TELEPHONE (OUTPATIENT)
Dept: INTERNAL MEDICINE CLINIC | Facility: CLINIC | Age: 42
End: 2022-03-28

## 2022-03-28 NOTE — TELEPHONE ENCOUNTER
No alternative for prescription  She can try lidocaine patch 4%, aleve or ibuprofen, tylenol, or voltaren gel- all OTC

## 2022-03-28 NOTE — TELEPHONE ENCOUNTER
Tramadol:      Alternative requested: Patients insurance is not covering the 7 day supply of tramadol

## 2022-03-30 DIAGNOSIS — G43.009 MIGRAINE WITHOUT AURA AND WITHOUT STATUS MIGRAINOSUS, NOT INTRACTABLE: ICD-10-CM

## 2022-03-31 RX ORDER — SUMATRIPTAN 50 MG/1
TABLET, FILM COATED ORAL
Qty: 9 TABLET | Refills: 3 | Status: SHIPPED | OUTPATIENT
Start: 2022-03-31 | End: 2022-07-26 | Stop reason: SDUPTHER

## 2022-04-05 DIAGNOSIS — J45.30 MILD PERSISTENT ASTHMA WITHOUT COMPLICATION: ICD-10-CM

## 2022-04-05 RX ORDER — MONTELUKAST SODIUM 10 MG/1
TABLET ORAL
Qty: 30 TABLET | Refills: 0 | Status: SHIPPED | OUTPATIENT
Start: 2022-04-05 | End: 2022-05-02

## 2022-04-26 ENCOUNTER — TELEPHONE (OUTPATIENT)
Dept: NEUROLOGY | Facility: CLINIC | Age: 42
End: 2022-04-26

## 2022-04-26 NOTE — TELEPHONE ENCOUNTER
Pt has not been seen since starting emgality  [employer] message sent to pt to get more info on current migraine status      Current PA expires on 5/12/22    Will complete PA once we have current migraine status

## 2022-04-26 NOTE — TELEPHONE ENCOUNTER
April 25, 2022             3:07 PM  Umang Zimmer, RN routed this conversation to [facility]       Nhung Grant  to Mali Stanley 11, DO          2:40 PM  I need an insurance preauth for my emgality sent to [facility]

## 2022-04-27 ENCOUNTER — TELEPHONE (OUTPATIENT)
Dept: NEUROLOGY | Facility: CLINIC | Age: 42
End: 2022-04-27

## 2022-04-27 NOTE — TELEPHONE ENCOUNTER
Pt states that she will be getting new insurance on 5/1, states that she will be getting [employer] as primary and will still have [employer] as secondary  Advised that she call back with new insurance info or send it via [employer]      Will postpone encounter and await new insurance info

## 2022-04-27 NOTE — TELEPHONE ENCOUNTER
Lashawn Weir  to Me          4/26/22 9:51 AM  I get maybe 3 to 4 migraines a month  Before it was double or triple that especially when the botox wore off in the 3rd month  Intensity and duration are short bc I'll take an imitrex

## 2022-04-27 NOTE — TELEPHONE ENCOUNTER
pt called regarding botox appt that is scheduled for 5/19  this was scheduled wtih dr Jody Valentin and needs to be rescheduled    states that she will be getting new insurance on 5/1, states that she will be getting [employer] as primary and will still have [employer] as secondary  Made her aware that we would need to check with her new insurance once she has that info     [phone] that she can only be reached before 9:30am, between 1:30-2 and between 4:30-4:45pm   states that she works til [employer]

## 2022-04-29 DIAGNOSIS — G43.709 CHRONIC MIGRAINE W/O AURA W/O STATUS MIGRAINOSUS, NOT INTRACTABLE: ICD-10-CM

## 2022-04-29 RX ORDER — IBUPROFEN 600 MG/1
600 TABLET ORAL EVERY 6 HOURS PRN
Qty: 30 TABLET | Refills: 0 | Status: SHIPPED | OUTPATIENT
Start: 2022-04-29

## 2022-05-02 DIAGNOSIS — J45.30 MILD PERSISTENT ASTHMA WITHOUT COMPLICATION: ICD-10-CM

## 2022-05-02 RX ORDER — MONTELUKAST SODIUM 10 MG/1
TABLET ORAL
Qty: 30 TABLET | Refills: 0 | Status: SHIPPED | OUTPATIENT
Start: 2022-05-02 | End: 2022-05-27

## 2022-05-04 ENCOUNTER — TELEPHONE (OUTPATIENT)
Dept: NEUROLOGY | Facility: CLINIC | Age: 42
End: 2022-05-04

## 2022-05-04 DIAGNOSIS — G43.809 VESTIBULAR MIGRAINE: Primary | ICD-10-CM

## 2022-05-04 RX ORDER — DEXAMETHASONE 2 MG/1
2 TABLET ORAL
Qty: 5 TABLET | Refills: 0 | Status: SHIPPED | OUTPATIENT
Start: 2022-05-04 | End: 2022-06-08 | Stop reason: SDUPTHER

## 2022-05-04 NOTE — TELEPHONE ENCOUNTER
Nhung Grant  to Nadiya Stanley, DO          5/4/22 9:03 AM  I need the steroid dexa something called into [facility] on [address]  Asap   I can't go to work bc my vertigo is really bad  Thank you

## 2022-05-04 NOTE — TELEPHONE ENCOUNTER
Nhung Grant  to Mali Stanley 11, DO          5/4/22 12:46 PM  I thought I would have heard back by now about the steroid   I'm missing work and I really need to start the steroid

## 2022-05-04 NOTE — TELEPHONE ENCOUNTER
Pt left a message today at 0208  Complaining of vertigo  Requesting decadron refill be sent to [facility] pharmacy on file  [phone]

## 2022-05-04 NOTE — TELEPHONE ENCOUNTER
Called pt, currently having a migraine  She woke up with migraine and vertigo  Vertigo is constant  This is typical with her migraines  Currently 2-3/10  States that it is just starting   +n/light sensitivity  Decadron effective in the past   She is asking to have this prescribed      Please send to pharm if agreeable  [phone] to leave detailed message

## 2022-05-12 ENCOUNTER — TELEPHONE (OUTPATIENT)
Dept: INTERNAL MEDICINE CLINIC | Facility: CLINIC | Age: 42
End: 2022-05-12

## 2022-05-12 NOTE — TELEPHONE ENCOUNTER
Please call patient, ask how she is feeling  When did your symptoms start? When did you test (+)? Monitor fevers, if with any shortness of breath  Use saline spray for congestion, Tylenol as needed for pain/headache

## 2022-05-12 NOTE — TELEPHONE ENCOUNTER
----- Message from Dominik Irvin sent at 5/12/2022  8:10 AM EDT -----  Regarding: FW: Stephane pino Patient   ----- Message -----  From: Maxx Shea  Sent: 5/12/2022   8:07 AM EDT  To: [place] [facility]  Subject: Covid                                            I took an at home covid test and it was positive just wanted to let you know

## 2022-05-13 NOTE — TELEPHONE ENCOUNTER
Pt states doing ok       Symptoms started Monday and tested positive on wednesday     Will continue OTC medications

## 2022-05-17 ENCOUNTER — TELEPHONE (OUTPATIENT)
Dept: NEUROLOGY | Facility: CLINIC | Age: 42
End: 2022-05-17

## 2022-05-17 NOTE — TELEPHONE ENCOUNTER
[id number] now has [employer] and needs new auth      Authorization request done through [employer]    Will await approval/denial

## 2022-05-23 NOTE — TELEPHONE ENCOUNTER
Authorization for patients Tala Lim   Approved 200 units  Auth # [id number]  Valid 5/17/22-5/17/23    Please use stock

## 2022-05-26 ENCOUNTER — TELEPHONE (OUTPATIENT)
Dept: NEUROLOGY | Facility: CLINIC | Age: 42
End: 2022-05-26

## 2022-05-26 NOTE — TELEPHONE ENCOUNTER
Left voicemail for pt to call back to confirm upcoming appointment with [facility] on 06/02

## 2022-05-27 DIAGNOSIS — J45.30 MILD PERSISTENT ASTHMA WITHOUT COMPLICATION: ICD-10-CM

## 2022-05-27 RX ORDER — MONTELUKAST SODIUM 10 MG/1
TABLET ORAL
Qty: 90 TABLET | Refills: 1 | Status: SHIPPED | OUTPATIENT
Start: 2022-05-27

## 2022-06-01 ENCOUNTER — TELEPHONE (OUTPATIENT)
Dept: NEUROLOGY | Facility: CLINIC | Age: 42
End: 2022-06-01

## 2022-06-01 DIAGNOSIS — G43.719 INTRACTABLE CHRONIC MIGRAINE WITHOUT AURA AND WITHOUT STATUS MIGRAINOSUS: ICD-10-CM

## 2022-06-01 NOTE — TELEPHONE ENCOUNTER
Roman'd call from [facility] stating pt's Emgality requires a PA    CB# [phone]

## 2022-06-02 ENCOUNTER — PROCEDURE VISIT (OUTPATIENT)
Dept: NEUROLOGY | Facility: CLINIC | Age: 42
End: 2022-06-02
Payer: COMMERCIAL

## 2022-06-02 VITALS
WEIGHT: 210 LBS | HEIGHT: 72 IN | DIASTOLIC BLOOD PRESSURE: 75 MMHG | TEMPERATURE: 97.4 F | SYSTOLIC BLOOD PRESSURE: 128 MMHG | BODY MASS INDEX: 28.44 KG/M2 | HEART RATE: 84 BPM

## 2022-06-02 DIAGNOSIS — G43.809 VESTIBULAR MIGRAINE: ICD-10-CM

## 2022-06-02 DIAGNOSIS — G43.709 CHRONIC MIGRAINE W/O AURA W/O STATUS MIGRAINOSUS, NOT INTRACTABLE: Primary | ICD-10-CM

## 2022-06-02 PROCEDURE — 64615 CHEMODENERV MUSC MIGRAINE: CPT | Performed by: PHYSICIAN ASSISTANT

## 2022-06-02 RX ORDER — VERAPAMIL HYDROCHLORIDE 40 MG/1
TABLET ORAL
Qty: 30 TABLET | Refills: 2 | Status: SHIPPED | OUTPATIENT
Start: 2022-06-02

## 2022-06-02 NOTE — PROGRESS NOTES
Universal Protocol   Consent: Verbal consent obtained  Written consent obtained    Risks and benefits: risks, benefits and alternatives were discussed  Consent given by: patient  Patient understanding: patient states understanding of the procedure being performed  Patient consent: the patient's understanding of the procedure matches consent given  Procedure consent: procedure consent matches procedure scheduled        Chemodenervation     Date/Time 6/2/2022 8:23 AM     Performed by  Roc Solorzano PA-C     Authorized by Roc Solorzano PA-C        Pre-procedure details      Prepped With: Alcohol     Procedure details     Position:  Upright   Botox     Botox Type:  Type A    Brand:  Botox    mL's of Botulinum Toxin:  200    Final Concentration per CC:  100 units    Needle Gauge:  30 G 2 5 inch   Procedures     Botox Procedures: chronic headache      Indications: migraines     Injection Location      Head / Face:  L superior trapezius, R superior trapezius, L superior cervical paraspinal, R superior cervical paraspinal, L , R , procerus, L temporalis, R temporalis, R frontalis, L frontalis, R medial occipitalis and L medial occipitalis    L  injection amount:  5 unit(s)    R  injection amount:  5 unit(s)    L lateral frontalis:  5 unit(s)    R lateral frontalis:  5 unit(s)    L medial frontalis:  5 unit(s)    R medial frontalis:  5 unit(s)    L temporalis injection amount:  20 unit(s)    R temporalis injection amount:  20 unit(s)    Procerus injection amount:  5 unit(s)    L medial occipitalis injection amount:  15 unit(s)    R medial occipitalis injection amount:  15 unit(s)    L superior cervical paraspinal injection amount:  10 unit(s)    R superior cervical paraspinal injection amount:  10 unit(s)    L superior trapezius injection amount:  15 unit(s)    R superior trapezius injection amount:  15 unit(s)   Total Units     Total units used:  200    Total units discarded:  0 Post-procedure details      Chemodenervation:  Chronic migraine    Facial Nerve Location[de-identified]  Bilateral facial nerve    Patient tolerance of procedure: Tolerated well, no immediate complications   Comments      Extra units medically necessary:  - 15 R temporalis  - 10 R lateral occipitalis and behind R ear  - 10 R traps  - 10 L temporalis         Blood pressure 128/75, pulse 84, temperature (!) 97 4 °F (36 3 °C), temperature source Temporal, height 6' 1" (1 854 m), weight 95 3 kg (210 lb), not currently breastfeeding  Pt experiences vertigo with her migraines  She was agreeable to try verapamil, side effects reviewed  Botox is helpful but wears off after 6-8 weeks when she gets more vertigo  Symptoms are worse in the summer  Will also work on [facility] prior [facility]

## 2022-06-08 ENCOUNTER — TELEPHONE (OUTPATIENT)
Dept: NEUROLOGY | Facility: CLINIC | Age: 42
End: 2022-06-08

## 2022-06-08 ENCOUNTER — OFFICE VISIT (OUTPATIENT)
Dept: INTERNAL MEDICINE CLINIC | Facility: CLINIC | Age: 42
End: 2022-06-08
Payer: COMMERCIAL

## 2022-06-08 VITALS
DIASTOLIC BLOOD PRESSURE: 82 MMHG | HEART RATE: 96 BPM | SYSTOLIC BLOOD PRESSURE: 126 MMHG | BODY MASS INDEX: 27.36 KG/M2 | TEMPERATURE: 96.6 F | OXYGEN SATURATION: 98 % | HEIGHT: 72 IN | WEIGHT: 202 LBS

## 2022-06-08 DIAGNOSIS — G43.809 VESTIBULAR MIGRAINE: ICD-10-CM

## 2022-06-08 DIAGNOSIS — F43.10 PTSD (POST-TRAUMATIC STRESS DISORDER): ICD-10-CM

## 2022-06-08 DIAGNOSIS — G89.29 CHRONIC RIGHT SHOULDER PAIN: ICD-10-CM

## 2022-06-08 DIAGNOSIS — F98.8 ATTENTION DEFICIT DISORDER, UNSPECIFIED HYPERACTIVITY PRESENCE: ICD-10-CM

## 2022-06-08 DIAGNOSIS — G43.719 INTRACTABLE CHRONIC MIGRAINE WITHOUT AURA AND WITHOUT STATUS MIGRAINOSUS: ICD-10-CM

## 2022-06-08 DIAGNOSIS — H81.03 MENIERE'S DISEASE OF BOTH EARS: ICD-10-CM

## 2022-06-08 DIAGNOSIS — M25.511 CHRONIC RIGHT SHOULDER PAIN: ICD-10-CM

## 2022-06-08 DIAGNOSIS — J45.30 MILD PERSISTENT ASTHMA WITHOUT COMPLICATION: ICD-10-CM

## 2022-06-08 DIAGNOSIS — G43.709 CHRONIC MIGRAINE WITHOUT AURA, NOT INTRACTABLE, WITHOUT STATUS MIGRAINOSUS: ICD-10-CM

## 2022-06-08 DIAGNOSIS — R42 VERTIGO: ICD-10-CM

## 2022-06-08 DIAGNOSIS — F41.1 GENERALIZED ANXIETY DISORDER: ICD-10-CM

## 2022-06-08 DIAGNOSIS — G43.709 CHRONIC MIGRAINE W/O AURA W/O STATUS MIGRAINOSUS, NOT INTRACTABLE: ICD-10-CM

## 2022-06-08 DIAGNOSIS — Z00.00 ANNUAL PHYSICAL EXAM: Primary | ICD-10-CM

## 2022-06-08 PROCEDURE — 99396 PREV VISIT EST AGE 40-64: CPT | Performed by: NURSE PRACTITIONER

## 2022-06-08 RX ORDER — MELOXICAM 7.5 MG/1
7.5 TABLET ORAL DAILY
Qty: 30 TABLET | Refills: 0 | Status: SHIPPED | OUTPATIENT
Start: 2022-06-08

## 2022-06-08 RX ORDER — MECLIZINE HCL 12.5 MG/1
12.5 TABLET ORAL EVERY 8 HOURS PRN
Qty: 20 TABLET | Refills: 0 | Status: SHIPPED | OUTPATIENT
Start: 2022-06-08

## 2022-06-08 RX ORDER — ONDANSETRON 4 MG/1
TABLET, ORALLY DISINTEGRATING ORAL
Qty: 9 TABLET | Refills: 0 | Status: SHIPPED | OUTPATIENT
Start: 2022-06-08 | End: 2022-08-04

## 2022-06-08 RX ORDER — DEXAMETHASONE 2 MG/1
2 TABLET ORAL
Qty: 5 TABLET | Refills: 0 | Status: SHIPPED | OUTPATIENT
Start: 2022-06-08 | End: 2022-07-13

## 2022-06-08 NOTE — PROGRESS NOTES
ADULT ANNUAL [address] [place] INTERNAL MEDICINE    NAME: Wagner Finch  AGE: 40 y o  SEX: female  : 1980     DATE: 2022     Assessment and Plan:     Problem List Items Addressed This Visit        Respiratory    Mild persistent asthma     Worse due to recent covid infection  Continue albuterol as needed  If needing more than once daily, recommend steroid inhaler  On singulair daily  Cardiovascular and Mediastinum    Chronic migraine w/o aura w/o status migrainosus, not intractable     Worse due to recent covid infection  On botox and emgality  Takes imitrex as needed  Sees neuro  Relevant Medications    meloxicam (Mobic) 7 5 mg tablet       Nervous and Auditory    Meniere's disease of both ears     Continue treatment as above for migraines  Other    ADD (attention deficit disorder)     Stable  On adderall per psychiatry            Generalized anxiety disorder     Worse recently due her sisters passing  Sees psychiatry  Working on finding a new counselor  On zoloft, trazodone, and vraylar  Clonazepam as needed  PTSD (post-traumatic stress disorder)    Chronic right shoulder pain     Start PT as recommended last visit    meloxicam daily as needed  Referral to ortho previously provided  Relevant Medications    meloxicam (Mobic) 7 5 mg tablet      Other Visit Diagnoses     Annual physical exam    -  Primary          Immunizations and preventive care screenings were discussed with patient today  Appropriate education was printed on patient's after visit summary  Return in about 4 months (around 10/8/2022)  Chief Complaint:     Chief Complaint   Patient presents with    Annual Exam      History of Present Illness:     Adult Annual Physical   Patient here for a comprehensive physical exam  The patient reports see below  Ave Rodriguez had covid about 3 weeks ago   She continues to have chest tightness and brain fog  She is using her albuterol about once daily  She also has been having more vertigo and headaches since then  She has been missing work periodically due to symptoms  She noticed about a year ago she woke up with a dark bruise under her left eye  It has caused her no discomfort and has not changed over the last year  It is not raised  She recently lost her sister  She has been handling it ok however her counselor no longer accepts her insurance so she is working on finding a new one  Diet and Physical Activity  · Diet/Nutrition: well balanced diet  · Exercise: no formal exercise  Depression Screening  PHQ-2/9 Depression Screening         General Health  · Sleep: sleeps well  · Hearing: normal - none   · Vision: most recent eye exam >1 year ago and wears glasses  · Dental: regular dental visits  /GYN Health  · Patient is: premenopausal  · Last menstrual period: irregular     Review of Systems:     Review of Systems   Constitutional: Negative for activity change, appetite change, fatigue and unexpected weight change  Eyes: Negative for visual disturbance  Respiratory: Positive for chest tightness  Negative for cough, shortness of breath and wheezing  Cardiovascular: Negative for chest pain, palpitations and leg swelling  Gastrointestinal: Negative for abdominal pain, blood in stool, constipation and diarrhea  Genitourinary: Negative for difficulty urinating  Musculoskeletal: Negative for arthralgias  Skin: Positive for color change  Negative for rash  Neurological: Positive for dizziness and headaches  Negative for weakness and light-headedness  Psychiatric/Behavioral: Negative for sleep disturbance        Past Medical History:     Past Medical History:   Diagnosis Date    Anxiety     Arthritis     Asthma     COVID-19 2/8/2021    Depression     ETOH abuse     Herniated cervical disc     Meniere's disease     Migraine     Psychiatric disorder     anxiety/depression    Sinusitis     Thyroid disease     Vertigo     Vertigo       Past Surgical History:     Past Surgical History:   Procedure Laterality Date    BACK SURGERY      C5-6    JOINT REPLACEMENT      right hip    OTHER SURGICAL HISTORY      Hip Replacement (right) , Spinal  Diskectomy Cervical C5-C6      Social History:     Social History     Socioeconomic History    Marital status: Single     Spouse name: None    Number of children: None    Years of education: None    Highest education level: None   Occupational History    Occupation:      Employer: [employer] Occupation:    Tobacco Use    Smoking status: Former Smoker     Packs/day: 1 00     Years: 10      Pack years: 10      Quit date: 3/12/2014     Years since quittin 2    Smokeless tobacco: Never Used   Vaping Use    Vaping Use: Never used   Substance and Sexual Activity    Alcohol use:  Yes    Drug use: No    Sexual activity: Not Currently   Other Topics Concern    None   Social History Narrative    None     Social Determinants of Health     Financial Resource Strain: Not on file   Food Insecurity: Not on file   Transportation Needs: Not on file   Physical Activity: Not on file   Stress: Not on file   Social Connections: Not on file   Intimate Partner Violence: Not on file   Housing Stability: Not on file      Family History:     Family History   Problem Relation Age of Onset    Hypertension Mother     Thyroid disease Mother     Hypertension Father     Colon cancer Maternal Grandmother     Breast cancer Paternal Grandmother 61    Heart disease Paternal Grandfather     No Known Problems Maternal Aunt     No Known Problems Paternal Aunt       Current Medications:     Current Outpatient Medications   Medication Sig Dispense Refill    meloxicam (Mobic) 7 5 mg tablet Take 1 tablet (7 5 mg total) by mouth daily 30 tablet 0    albuterol (PROVENTIL HFA,VENTOLIN HFA) 90 mcg/act inhaler TAKE 2 PUFFS BY MOUTH EVERY 6 HOURS AS NEEDED FOR WHEEZE OR FOR SHORTNESS OF BREATH 8 5 g 1    amphetamine-dextroamphetamine (ADDERALL) 20 mg tablet Take 20 mg by mouth 2 (two) times a day        clonazePAM (KlonoPIN) 0 5 mg tablet Take 0 5 mg by mouth daily at bedtime      dexamethasone (DECADRON) 2 mg tablet Take 1 tablet (2 mg total) by mouth daily with breakfast 5 tablet 0    gabapentin (NEURONTIN) 600 MG tablet Take 1 tablet (600 mg total) by mouth 2 (two) times a day 60 tablet 6    Galcanezumab-gnlm 120 MG/ML SOAJ Inject 120 mg under the skin every 30 (thirty) days After loading dose of 2 pens the first month  Separate prescription sent for loading dose  1 mL 11    ibuprofen (MOTRIN) 600 mg tablet TAKE 1 TABLET (600 MG TOTAL) BY MOUTH EVERY 6 (SIX) HOURS AS NEEDED FOR MILD PAIN 30 tablet 0    meclizine (ANTIVERT) 12 5 MG tablet TAKE 1 TAB EVERY 8 HOURS AS NEEDED FOR VERTIGO  MAX 2-3 DAYS A WEEK 20 tablet 1    montelukast (SINGULAIR) 10 mg tablet TAKE 1 TABLET BY MOUTH EVERYDAY AT BEDTIME 90 tablet 1    onabotulinumtoxin A (BOTOX) 100 units one time  "for vertigo"      ondansetron (ZOFRAN-ODT) 4 mg disintegrating tablet TAKE 1 TABLET BY MOUTH EVERY 8 HOURS AS NEEDED FOR NAUSEA AND VOMITING 9 tablet 2    sertraline (ZOLOFT) 100 mg tablet Take 100 mg by mouth every morning  1    SUMAtriptan (IMITREX) 50 mg tablet TAKE 1 TO 2 TABS BY MOUTH AT ONSET OF MIGRAINE  MAY REPEAT IN 2 HOURS IF NEEDED  MAX 4 TABS IN 24HRS 9 tablet 3    tiZANidine (ZANAFLEX) 4 mg tablet TAKE 1-2 TABS BY MOUTH NIGHTLY 60 tablet 4    traZODone (DESYREL) 100 mg tablet Take  mg by mouth daily at bedtime as needed      verapamil (CALAN) 40 mg tablet 1 tab qhs 30 tablet 2    Vraylar 1 5 MG capsule        No current facility-administered medications for this visit  Allergies:      Allergies   Allergen Reactions    Latuda [Lurasidone]      Reaction: Drug Psychosis    Topamax [Topiramate]      psychosis    Amoxicillin Rash    Percocet [Oxycodone-Acetaminophen] Rash      Physical Exam:     /82   Pulse 96   Temp (!) 96 6 °F (35 9 °C)   Ht 6' 1" (1 854 m)   Wt 91 6 kg (202 lb)   SpO2 98%   BMI 26 65 kg/m²     Physical Exam  Vitals reviewed  Constitutional:       Appearance: Normal appearance  HENT:      Head: Normocephalic and atraumatic  Eyes:      Conjunctiva/sclera: Conjunctivae normal    Cardiovascular:      Rate and Rhythm: Normal rate and regular rhythm  Heart sounds: Normal heart sounds  Pulmonary:      Effort: Pulmonary effort is normal       Breath sounds: Normal breath sounds  Musculoskeletal:         General: Normal range of motion  Right lower leg: No edema  Left lower leg: No edema  Skin:     General: Skin is warm and dry  Neurological:      Mental Status: She is alert and oriented to person, place, and time  Psychiatric:         Mood and Affect: Mood normal  Affect is tearful           Behavior: Behavior normal           Toney Enciso, [address] INTERNAL MEDICINE

## 2022-06-08 NOTE — ASSESSMENT & PLAN NOTE
Worse recently due her sisters passing  Sees psychiatry  Working on finding a new counselor  On zoloft, trazodone, and vraylar  Clonazepam as needed

## 2022-06-08 NOTE — TELEPHONE ENCOUNTER
Returning patient's VM.    Patient is asking if her Emgality can be sent to [facility], she is no longer using [facility].  Asked about PA on Emgality. PA was submitted awaiting insurance determination.    Patient is also asking if you would be willing to do FMLA paperwork for Intermittent leave due to her Migraines , and Meniere's Disease.    MK-Are you agreeable to FMLA or should she schedule a visit with you or another provider to discuss this further?      # [phone]

## 2022-06-08 NOTE — ASSESSMENT & PLAN NOTE
Start PT as recommended last visit    meloxicam daily as needed  Referral to ortho previously provided

## 2022-06-08 NOTE — ASSESSMENT & PLAN NOTE
Worse due to recent covid infection  Continue albuterol as needed  If needing more than once daily, recommend steroid inhaler  On singulair daily

## 2022-06-09 NOTE — TELEPHONE ENCOUNTER
Emgality denied  System indicates that patient has alternative pharamcy benefits     Seems that patient has [facility] as primary, will need to resubmit to [facility]    ID: [id number]  [facility]: [id number]  PCN: [id number]  Group: [id number]    Submitted PA on CMM, awaiting determination   Key: [id number]

## 2022-06-09 NOTE — TELEPHONE ENCOUNTER
Called patient. She is going to be sending in FMLA forms for provider review.     Awaiting form    Please see separate encounter regarding Emgality PA

## 2022-06-10 NOTE — TELEPHONE ENCOUNTER
Received VM from patient requesting refill for Em[facility] to go to [facility]  Reviewed the chart can see that Emgality was approved through 6/9/23  Called [facility], spoke with Joseph Brush  States that they are not contracted with her plan       *will have to call patient to discuss

## 2022-06-13 NOTE — TELEPHONE ENCOUNTER
Called patient with no answer  Left detailed message (okay per consent form), informing her of situation  Advised she call her plan to discuss where the script should go, then call back

## 2022-06-15 NOTE — TELEPHONE ENCOUNTER
Called and Left a message on pt's answering machine for a call back  Pt reports severe migraine, need to triage  Emgality Rx pended, to be sent to [facility]  Please review and sign if agreeable

## 2022-06-16 DIAGNOSIS — G43.719 INTRACTABLE CHRONIC MIGRAINE WITHOUT AURA AND WITHOUT STATUS MIGRAINOSUS: Primary | ICD-10-CM

## 2022-06-16 RX ORDER — KETOROLAC TROMETHAMINE 30 MG/ML
60 INJECTION, SOLUTION INTRAMUSCULAR; INTRAVENOUS ONCE
Status: COMPLETED | OUTPATIENT
Start: 2022-06-16 | End: 2022-06-17

## 2022-06-16 RX ORDER — PROCHLORPERAZINE EDISYLATE 5 MG/ML
10 INJECTION INTRAMUSCULAR; INTRAVENOUS ONCE
Status: COMPLETED | OUTPATIENT
Start: 2022-06-16 | End: 2022-06-17

## 2022-06-16 NOTE — ADDENDUM NOTE
Addended by: Manuel Smiley on: 6/16/2022 12:50 PM     Modules accepted: Orders [facility] Discharge Summary    Joao العلي MRN# [id number]   Age: 33 year old YOB: 1985     Date of Admission:  2018  Date of Discharge::  2018  Admitting Physician:  MAURO Godoy CNM  Discharge Physician:  MAURO Alexis CNM      Home clinic: [facility]          Admission Diagnoses:   Maternity max**18 ROR  Encounter for triage in pregnant patient  Labor and delivery, indication for care   (normal spontaneous vaginal delivery)          Discharge Diagnosis:   Normal spontaneous vaginal delivery  Intrauterine pregnancy at 38 weeks gestation          Procedures:   Procedure(s): Repair of first degree perineal laceration       No other significant procedures performed during this admission           Medications Prior to Admission:     Prescriptions Prior to Admission   Medication Sig Dispense Refill Last Dose     order for DME Equipment being ordered:  Thigh high compression stocking, 20-30 mmHg; L leg 2 each 0 Taking     Prenatal Vit-Fe Fumarate-FA (PRENATAL 19) CHEW Take 1 tablet by mouth daily  9 Taking     Cholecalciferol 4000 UNITS TABS Take 1 tablet by mouth daily   Taking             Discharge Medications:     Current Discharge Medication List      START taking these medications    Details   sennosides (SENOKOT) 8.6 MG tablet Take 1 tablet by mouth 2 times daily as needed for constipation  Qty: 60 tablet, Refills: 1    Associated Diagnoses:  (normal spontaneous vaginal delivery)         CONTINUE these medications which have NOT CHANGED    Details   order for DME Equipment being ordered:  Thigh high compression stocking, 20-30 mmHg; L leg  Qty: 2 each, Refills: 0    Associated Diagnoses: Varicose vein of leg      Prenatal Vit-Fe Fumarate-FA (PRENATAL 19) CHEW Take 1 tablet by mouth daily  Refills: 9      Cholecalciferol 4000 UNITS TABS Take 1 tablet by mouth daily    Associated Diagnoses: Supervision of other normal  pregnancy, antepartum                   Consultations:   No consultations were requested during this admission          Brief History of Labor:   Brief Labor Course: Pt presented to [facility] in active labor after SROM around 0015.  Labor progressed spontaneously to C/C/0 at 0300.  Began pushing with strong maternal urge.  Delivery Note:    of live female at 0329 in LIANG position, shoulders delivered without difficulty over intact perineum. Baby placed immediately on mother's abdomen, dried and stimulated for spontaneous cry. Cord clamped and cut by FOB once pulsing stopped.  Cord blood collected. IM pitocin given prior to delivery of placenta. Placenta delivered at 0335 intact with 3VC with Wolfe maneuver with trailing membranes teased out gently.  Fundus firm and midline with massage. Upon inspection, 1st degree vaginal laceration noted with hemostatic labial lacerations. The 1st degree laceration was infiltrated with 1% lidocaine and repaired in the usual fashion with 3-0 vicryl.  Labial lacerations left unrepaired.  Brisk bleeding continued during repair of laceration, buccal misoprostol given. Manual removal of clots d/t continued bleeding, 1g Ancef given after manual sweep.                     ml.   IUP at 38 weeks gestation delivered on 2018.     delivery of a viable Female infant.  Weight : pending  Apgars of 8 at 1 minute and 9 at 5 minutes.  Labor was spontaneous.  Medications administered  in labor:  Pain Rx None; Antibiotics No; Other n/a  Perineum: 1st degree vaginal, labial lacs  Placenta-mechanism: spontaneous, intact,  with a 3 vessel cord. IM oxytocin was given after delivery of baby. Buccal misoprostol was given.  Quantitative Blood Loss was 399cc.   Complications of labor and delivery: None  Anticipated Discharge Date: 2018  Birth attendants: MAXIMINO Grady APRN CNM      Assessment Day of Discharge      Breasts:soft, filling  Nipples: intact    Fundus:firm -1/u  Abdomen: soft  Lochia: as expected   Perineum:laceration well approximated  Legs:no edema           Hospital Course:   The patient's hospital course was unremarkable.  On discharge, her pain was well controlled. Vaginal bleeding is similar to peak menstrual flow.  Voiding without difficulty.  Ambulating well and tolerating a normal diet.  No fever.  Breastfeeding well.  Infant is stable.  No bowel movement yet.  She was discharged on post-partum day #1.    Post-partum hemoglobin:   Hemoglobin   Date Value Ref Range Status   01/13/2018 11.2 (L) 11.7 - 15.7 g/dL Final             Discharge Instructions and Follow-Up:   Discharge diet: Regular   Discharge activity: Pelvic rest: abstain from intercourse and do not use tampons for 6 week(s)   Discharge follow-up: Follow up with VELIAM in 6 weeks   Wound care: Tub soaks to perineum           Discharge Disposition:   Discharged to home        MAURO Alexis CNM

## 2022-06-16 NOTE — TELEPHONE ENCOUNTER
Patient is receptive to increased dose of verapamil  She is asking if she can come into the office tomorrow for ketorolac and compazine injection  Please provide recommendation, thank you

## 2022-06-17 ENCOUNTER — CLINICAL SUPPORT (OUTPATIENT)
Dept: NEUROLOGY | Facility: CLINIC | Age: 42
End: 2022-06-17
Payer: COMMERCIAL

## 2022-06-17 DIAGNOSIS — G43.709 CHRONIC MIGRAINE W/O AURA W/O STATUS MIGRAINOSUS, NOT INTRACTABLE: ICD-10-CM

## 2022-06-17 PROCEDURE — 96372 THER/PROPH/DIAG INJ SC/IM: CPT | Performed by: PSYCHIATRY & NEUROLOGY

## 2022-06-17 RX ADMIN — PROCHLORPERAZINE EDISYLATE 10 MG: 5 INJECTION INTRAMUSCULAR; INTRAVENOUS at 09:15

## 2022-06-17 RX ADMIN — KETOROLAC TROMETHAMINE 60 MG: 30 INJECTION, SOLUTION INTRAMUSCULAR; INTRAVENOUS at 09:13

## 2022-06-17 NOTE — TELEPHONE ENCOUNTER
Received VM from patient following up on script  Called and left detailed message informing her it was sent and there is an approval through [email or web address] on file  Asked for her to call back with any other questions or concerns

## 2022-06-17 NOTE — PROGRESS NOTES
Procedures     Pt tolerated B12 INJ  Toradol INJ  Left Dorsogluteal     Compazine INJ  Right Dorsogluteal     Pt waited 10 mins  0 Erythema  0 Induration

## 2022-06-20 NOTE — TELEPHONE ENCOUNTER
Called and left a message for the patient (ok per consent) that her appointment would need to be re-scheduled and then we would obtain authorization based on the new appointment  Pleas call the patient to re-schedule botox appointment

## 2022-06-24 NOTE — TELEPHONE ENCOUNTER
I am out of the office next week, but she can have a temporary FMLA for 2 weeks out of the office. Just specify the dates for the forms.

## 2022-06-24 NOTE — TELEPHONE ENCOUNTER
Patient left  to check if she can come in person on Monday or Tuesday next week to discuss her FMLA forms in person with MK.    Patient states she has been out of work for 2 weeks and would like to go back to work on Wednesday next week      Did our office get these FMLA forms?  I don't see them in patient's chart.      # [phone]

## 2022-06-30 ENCOUNTER — OFFICE VISIT (OUTPATIENT)
Dept: INTERNAL MEDICINE CLINIC | Facility: CLINIC | Age: 42
End: 2022-06-30
Payer: COMMERCIAL

## 2022-06-30 VITALS
BODY MASS INDEX: 27.22 KG/M2 | HEART RATE: 81 BPM | HEIGHT: 72 IN | DIASTOLIC BLOOD PRESSURE: 84 MMHG | TEMPERATURE: 97.6 F | OXYGEN SATURATION: 96 % | SYSTOLIC BLOOD PRESSURE: 120 MMHG | WEIGHT: 201 LBS

## 2022-06-30 DIAGNOSIS — R41.89 BRAIN FOG: ICD-10-CM

## 2022-06-30 DIAGNOSIS — U09.9 POST-COVID SYNDROME: Primary | ICD-10-CM

## 2022-06-30 DIAGNOSIS — J68.3 REACTIVE AIRWAYS DYSFUNCTION SYNDROME (HCC): ICD-10-CM

## 2022-06-30 DIAGNOSIS — J45.30 MILD PERSISTENT ASTHMA WITHOUT COMPLICATION: ICD-10-CM

## 2022-06-30 PROCEDURE — 3008F BODY MASS INDEX DOCD: CPT | Performed by: NURSE PRACTITIONER

## 2022-06-30 PROCEDURE — 1036F TOBACCO NON-USER: CPT | Performed by: NURSE PRACTITIONER

## 2022-06-30 PROCEDURE — 99214 OFFICE O/P EST MOD 30 MIN: CPT | Performed by: NURSE PRACTITIONER

## 2022-06-30 NOTE — PROGRESS NOTES
Patient Name: Yadira Rodriguez     : 1980     MRN: [id number]    Assessment/Plan:    Problem List Items Addressed This Visit        Respiratory    Mild persistent asthma    Reactive airways dysfunction syndrome ([address] )      Other Visit Diagnoses     Post-COVID syndrome    -  Primary    Relevant Orders    Ambulatory referral to Physical Therapy    Ambulatory referral to Occupational Therapy    Ambulatory referral to Speech Therapy    Brain fog        Relevant Orders    Ambulatory referral to Physical Therapy    Ambulatory referral to Occupational Therapy    Ambulatory referral to Speech Therapy        Discussion:     Other management recommendations:     Dyspnea & cough   Discussed the need for deep breathing and breathing exercises      Neurologic & neurocognitive sequalae:  Patient was reassured; no neuroimaging is indicated at this time  Cognitive screening was performed  Referral to speech language pathologist was recommended  Fatigue, poor endurance, and functional status:   Encouraged adequate rest, proper hydration/nutrition, and good sleep hygiene  Referral to physical therapy and pulmonary rehab  I spent 30 minutes directly with the patient during this visit     Subjective:        New or persistent symptoms:  Patient complains of: fatigue, shortness of breath, headache, dizziness and nausea  Patient denies: weakness, cough and appetite change  Patient rates severity of current symptoms as moderate  Outpatient treatment:      Did patient receive monoclonal antibody therapy?: No      Here today with post covid concerns  She has had covid 3 times over the last 2 years  The last time being this past May  Since then she's had continued brain fog  She has been unable to work due to symptoms  She has memory issues and forgetfulness  Recently started back on emgality, her migraines have been improving  Breathing is not back at baseline     She has been using her albuterol inhaler almost everyday  Review of Systems   Constitutional: Positive for fatigue  Negative for activity change, appetite change, diaphoresis and fever  Eyes: Negative for visual disturbance  Respiratory: Positive for shortness of breath  Negative for cough  Cardiovascular: Negative for chest pain and palpitations  Gastrointestinal: Positive for nausea  Negative for abdominal pain and vomiting  Musculoskeletal: Positive for neck pain  Negative for back pain  Neurological: Positive for dizziness and headaches  Negative for syncope, weakness and light-headedness  Psychiatric/Behavioral: Positive for confusion  Patient Active Problem List   Diagnosis    Chronic migraine w/o aura w/o status migrainosus, not intractable    Allergic rhinitis    Cervical muscle pain    Mild persistent asthma    Nausea    ADD (attention deficit disorder)    Chronic neck and back pain    Depression    Generalized anxiety disorder    LGSIL (low grade squamous intraepithelial dysplasia)    Meniere's disease of both ears    PTSD (post-traumatic stress disorder)    Reactive airways dysfunction syndrome (HCC)    History of hip replacement    Nicotine abuse    Vestibular migraine    Chronic right shoulder pain     Social History     Tobacco Use    Smoking status: Former Smoker     Packs/day: 1 00     Years: 10      Pack years: 10 00     Quit date: 3/12/2014     Years since quittin 3    Smokeless tobacco: Never Used   Vaping Use    Vaping Use: Never used   Substance Use Topics    Alcohol use: Yes    Drug use: No      Objective:  /84   Pulse 81   Temp 97 6 °F (36 4 °C)   Ht 6' 1" (1 854 m)   Wt 91 2 kg (201 lb)   SpO2 96%   BMI 26 52 kg/m²      Physical Exam  Vitals reviewed  Constitutional:       Appearance: Normal appearance  HENT:      Head: Normocephalic and atraumatic        Right Ear: Ear canal normal    Eyes:      Conjunctiva/sclera: Conjunctivae normal       Pupils: Pupils are equal, round, and reactive to light  Cardiovascular:      Rate and Rhythm: Normal rate and regular rhythm  Heart sounds: Normal heart sounds  Pulmonary:      Effort: Pulmonary effort is normal       Breath sounds: Normal breath sounds  Musculoskeletal:         General: No swelling  Normal range of motion  Skin:     General: Skin is warm and dry  Neurological:      General: No focal deficit present  Mental Status: She is alert and oriented to person, place, and time     Psychiatric:         Mood and Affect: Mood normal          Behavior: Behavior normal          CROW Eid

## 2022-06-30 NOTE — PROGRESS NOTES
Assessment/Plan:    No problem-specific Assessment & Plan notes found for this encounter  There are no diagnoses linked to this encounter  Subjective:      Patient ID: Vanda Sequeira is a 40 y o  female      HPI    The following portions of the patient's history were reviewed and updated as appropriate: allergies, current medications, past family history, past medical history, past social history, past surgical history and problem list     Review of Systems      Objective:      /84   Pulse 81   Temp 97 6 °F (36 4 °C)   Ht 6' 1" (1 854 m)   Wt 91 2 kg (201 lb)   SpO2 96%   BMI 26 52 kg/m²          Physical Exam

## 2022-07-07 ENCOUNTER — TELEPHONE (OUTPATIENT)
Dept: INTERNAL MEDICINE CLINIC | Facility: CLINIC | Age: 42
End: 2022-07-07

## 2022-07-07 NOTE — TELEPHONE ENCOUNTER
Is there paperwork here for her?  I thought that was sent to her neurologist?       ----- Message from Ghazal Hall sent at 7/6/2022  4:34 PM EDT -----  Regarding: FW: Covid paper work     ----- Message -----  From: Wolfgang Zavala  Sent: 7/6/2022   4:32 PM EDT  To: [facility] Internal Med Clerical  Subject: Covid paper work                                 Malini Head I just wanted to touch base with you and see if you still had that paperwork I had given to you also I really just need you to send in paperwork for the week I was out for covid if there's a email I can email it to I can do that or I can upload it to here I do need you to send in for the covid though so I can get my covid leave pay thank you have a good day

## 2022-07-07 NOTE — TELEPHONE ENCOUNTER
Spoke with patient paperwork for FMLA will be going to neurologist     She is sending us the paperwork for when she was out from covid

## 2022-07-13 ENCOUNTER — TELEPHONE (OUTPATIENT)
Dept: SPEECH THERAPY | Facility: CLINIC | Age: 42
End: 2022-07-13

## 2022-07-13 DIAGNOSIS — G43.809 VESTIBULAR MIGRAINE: ICD-10-CM

## 2022-07-13 RX ORDER — DEXAMETHASONE 2 MG/1
TABLET ORAL
Qty: 5 TABLET | Refills: 0 | Status: SHIPPED | OUTPATIENT
Start: 2022-07-13 | End: 2022-08-09

## 2022-07-14 NOTE — TELEPHONE ENCOUNTER
Sent [employer] message to patient ( easier to contact this way) following up on paperwork      Last message on 07/08 stated she started a STD claim and they would be sending paperwork within 5 days still have not received

## 2022-07-19 ENCOUNTER — EVALUATION (OUTPATIENT)
Dept: OCCUPATIONAL THERAPY | Facility: CLINIC | Age: 42
End: 2022-07-19
Payer: COMMERCIAL

## 2022-07-19 DIAGNOSIS — U09.9 POST-COVID SYNDROME: Primary | ICD-10-CM

## 2022-07-19 DIAGNOSIS — R41.89 BRAIN FOG: ICD-10-CM

## 2022-07-19 NOTE — PROGRESS NOTES
OCCUPATIONAL THERAPY INITIAL EVALUATION:      [unfilled]  Harvel Kussmaul Donst  1980  [id number]  CROW Schaffer   Diagnosis ICD-10-CM Associated Orders   1  Post-COVID syndrome  U09 9 Ambulatory referral to Occupational Therapy   2  Brain fog  R41 89 Ambulatory referral to Occupational Therapy         Assessment/Plan    SKILLED ANALYSIS:  Pt is a 40year old female referred to Occupational Therapy s/p COVID 3 times in the past year and a half  Pt presents with cognitive, olfactory occulomotor deficits, and visual perceptual deficits due to effects of COVID causing cognitive/oculomotor and physiologic intolerance impairment affecting her function  Pt also presents with prolonged removal of normal routine and cognitive/visual stimulating tasks affecting recovery process  Educated patient on effects of COVID and typical timelines and recovery  All questions answered  Pt will benefit from Occupational Therapy 2x/week for 8-12  weeks with focus on attention, occulomotor deficits, visual perceptual skills and olfactory retraining  Discussed with SLP will focus on memory  Please complete vision screen, olfactory retraining, and MVPT  Next session  PLAN OF CARE START:7/19/2022  PLAN OF CARE END: 10/19/2022  FREQUENCY: 2x times per week for 8-12 weeks        SUBJECTIVE:  Pt reports lives with 2 cats and in a house and 2nd floor; Pt reports is a  and taking insurance calls- pt reports she is having difficulty since she is in training phases of new position  Pt reports diplopia and difficulty fixating when driving  Pt reports loss of taste and smell; Pt enjoys reading, hanging out with friends, hiking, yoga; pt reports difficulty with reading/writing and difficulty with memory       Pt's Goal "to retain information better"    PAIN: pt reports migraines   At rest  0/10  After activity  4/10     OTR educated Pt on the following Olfactory Re-training HEP breifly and will assess next session- Olfactory Testing:    Anosmia hyposmia; parosmia   Molly Lemon Eucalyptus Clove   Odor Threshold       Odor Discrimination       Odor Identification         Odor threshold (minimum strength of an odor that can be perceived): Scent will be placed at various distances  Odor discrimination (differentiation between different odors): Scents will be placed 2 cm from nostrils for 3-4 seconds   Odor identification (identification of odors): Scents will be placed 2 cm from nostrils for 3-4 seconds       Assessments  The Repeatable Battery for the Assessment of Neuropsychological Status (RBANS) is a brief, individually-administered assessment which measures attention, language, visuospatial/constructional abilities, and immediate & delayed memory  The RBANS is intended for use with adolescents to adults, ages 15 to 80 years  The following results were obtained during the administration of the assessment  Form: A    Cognitive Domain/Subtest: Index Score: Percentile Rank: Classification: IE: Status:   IMMEDIATE MEMORY 83 13%ile Low average 7/20         1  List Learning (18/40)          2  Story Memory (17/24)           VISUOSPATIAL/  CONSTRUCTIONAL 62 1%ile Extremely low          3  Figure Copy (13/20)          4  Line Orientation (12/20)           LANGUAGE 97 42%ile average          5  Picture Naming (9/10)          6  Semantic Fluency (20/40)           ATTENTION 103 58%ile average          7  Digit Span (12/16)          8  Coding (42/89)           DELAYED MEMORY 89 23%ile Low average          9  List Recall (6/10)          10  List Recognition (20/20)          11  Story Recall (9/12)          12  Figure Recall (6/20)           Sum of Index Scores:  434  A    Total Score:  82      Percentile: 12%ile      Classification: Low average          IE indicates the scores from the initial evaluation (7/20/22)   Form: A    LONG TERM GOALS:             ? Pt will maintain attention to task for 60 minutes in semi modal environment for baseline performance,  improved role performance and to improve learning and to simulate return to IADLs and complete cooking/cleaning tasks  o  Pt will demo ability to participate in dual tasking/divided attention task with 90% accuracy in multimodal environment to simulate return to life and work roles  o Pt will demo ability to alternate attention between 2 tasks with cog loading and 90% accuracy with G retention of task directions in multimodal environment to simulate return to life and work  Roles  o Pt will increase  skills by completing visual spatial skills by 85 on index scale on RBANs in preparation for working tasks  SHORT TERM GOALS 4 weeks     ? Pt will maintain attention to task for 45 minutes in semi modal environment for baseline performance,  improved role performance and to improve learning and to simulate return to IADLs and complete cooking/cleaning tasks  o  Pt will demo ability to participate in dual tasking/divided attention task with 75% accuracy in multimodal environment to simulate return to life and work roles  o Pt will demo ability to alternate attention between 2 tasks with cog loading and 75% accuracy with G retention of task directions in multimodal environment to simulate return to life and work  Roles  o Pt will be independent with olfactory HEP for IADL management and safety within home  o Pt will increase  skills by completing visual spatial skills by 72on index scale on RBANs in preparation for working tasks                         TIME SPENT  70 min for administration, documentation, interpretation, scoring adn POC development RBANS    PLANNED THERAPY INTERVENTIONS:  Internal and external memory aides  Hypersensitivity strategies education  Multi-modal environment  Sustained/alternating/divided attention  Temporal Awareness: Organize the Hour activities  Memory and mental manipulation  Auditory processing with immediate recall  Memory retention with immediate and delayed recall  Edu on cog/vision apps    Eval/ Re-eval POC expires Kaye Mosley #/ Referral # Total units  Start date  Expiration date Extension                                                             Date               Units:  Used               Authed:  Remaining

## 2022-07-20 PROCEDURE — 97165 OT EVAL LOW COMPLEX 30 MIN: CPT

## 2022-07-20 PROCEDURE — 96125 COGNITIVE TEST BY HC PRO: CPT

## 2022-07-20 NOTE — TELEPHONE ENCOUNTER
Pt responded to [employer]  V-Y Plasty Text: The defect edges were debeveled with a #15 scalpel blade.  Given the location of the defect, shape of the defect and the proximity to free margins an V-Y advancement flap was deemed most appropriate.  Using a sterile surgical marker, an appropriate advancement flap was drawn incorporating the defect and placing the expected incisions within the relaxed skin tension lines where possible.    The area thus outlined was incised deep to adipose tissue with a #15 scalpel blade.  The skin margins were undermined to an appropriate distance in all directions utilizing iris scissors.

## 2022-07-22 ENCOUNTER — OFFICE VISIT (OUTPATIENT)
Dept: OCCUPATIONAL THERAPY | Facility: CLINIC | Age: 42
End: 2022-07-22
Payer: COMMERCIAL

## 2022-07-22 DIAGNOSIS — R41.89 BRAIN FOG: ICD-10-CM

## 2022-07-22 DIAGNOSIS — U09.9 POST-COVID SYNDROME: Primary | ICD-10-CM

## 2022-07-22 PROCEDURE — 97530 THERAPEUTIC ACTIVITIES: CPT | Performed by: OCCUPATIONAL THERAPIST

## 2022-07-22 NOTE — PROGRESS NOTES
Daily Note     Today's date: 2022  Patient name: Alease Cooks  : 1980  MRN: [id number]  Referring provider: Sumner Castleman, CRNP  Dx:   Encounter Diagnoses   Name Primary?  Post-COVID syndrome Yes    Brain fog      POC expires Auth Status Total   Visits  Start date  Expiration date PT/OT + Visit Limit? Co-Insurance   7/19 10/19 24 22 120 PCY PT/OT/ST Yes                                           Visit Tracking  Theone Barthel Status: Approved Date              Visits  Authed: 24 Used 1 1                               Start Time: 1105  Stop Time: 5240  Total time in clinic (min): 40 minutes    Precautions: standard  PN due visit 10  POC valid 10/19    Subjective: Pt reports visual motion sensitivity      Objective: See treatment below  Olfactory Testing:    Anosmia hyposmia; parosmia   Molly Lemon Eucalyptus Clove   Odor Threshold 2 5" 2 5 6 5" 5 5"   Odor Discrimination Accurate Accurate Accurate Accurate   Odor Identification "some type of flower" Unable- clove Accurate Accurate     Odor threshold (minimum strength of an odor that can be perceived): Scent will be placed at various distances  Odor discrimination (differentiation between different odors): Scents will be placed 2 cm from nostrils for 3-4 seconds   Odor identification (identification of odors): Scents will be placed 2 cm from nostrils for 3-4 seconds     Pt provided with HEP for olfactory retraining and indicated understanding of this      Vision Screen: GLASSES (prescription)    Visual acuity, near: B 20/30    Binocularity, far: orthotropia, orthophoria    Binocularity, near: orthotropia, orthophoria    Red/Green fusion: sees all figures    Convergence: 7 5" break point, 9 5-11" recovery point    Philipp string: Y, near suppression, alignment    Pursuits: slightly jerky in all planes    Saccades:  accurate  in all planes    Range of Motion: [facility]    Visual perceptual midline: WNL horizon; WNLmidline        Assessment: Tolerated treatment well  Pt demonstrating impaired convergence and pursuits, impaired olfactory function  Pt would benefit from continued OT services to address cognition, oculomotor control for life roles  Plan: Continued skilled OT per POC  Goals to be added:  Pt will demonstrate improved oculomotor control for convergence to 5 5" in order to return to work and participate in leisure activities  Pt will demonstrate improved oculomotor control for pursuits to smooth in all planes in order to participate in life roles

## 2022-07-25 ENCOUNTER — OFFICE VISIT (OUTPATIENT)
Dept: OCCUPATIONAL THERAPY | Facility: CLINIC | Age: 42
End: 2022-07-25
Payer: COMMERCIAL

## 2022-07-25 DIAGNOSIS — U09.9 POST-COVID SYNDROME: Primary | ICD-10-CM

## 2022-07-25 DIAGNOSIS — R41.89 BRAIN FOG: ICD-10-CM

## 2022-07-25 DIAGNOSIS — G43.009 MIGRAINE WITHOUT AURA AND WITHOUT STATUS MIGRAINOSUS, NOT INTRACTABLE: ICD-10-CM

## 2022-07-25 DIAGNOSIS — M54.2 CERVICALGIA: ICD-10-CM

## 2022-07-25 PROCEDURE — 97530 THERAPEUTIC ACTIVITIES: CPT | Performed by: OCCUPATIONAL THERAPIST

## 2022-07-25 PROCEDURE — 97112 NEUROMUSCULAR REEDUCATION: CPT | Performed by: OCCUPATIONAL THERAPIST

## 2022-07-25 RX ORDER — SUMATRIPTAN 50 MG/1
TABLET, FILM COATED ORAL
Qty: 9 TABLET | Refills: 0 | Status: CANCELLED | OUTPATIENT
Start: 2022-07-25

## 2022-07-25 RX ORDER — TIZANIDINE 4 MG/1
TABLET ORAL
Qty: 60 TABLET | Refills: 6 | Status: SHIPPED | OUTPATIENT
Start: 2022-07-25

## 2022-07-25 NOTE — PROGRESS NOTES
Daily Note     Today's date: 2022  Patient name: George Waller  : 1980  MRN: [id number]  Referring provider: CROW Cedeno  Dx:   Encounter Diagnoses   Name Primary?  Post-COVID syndrome Yes    Brain fog      POC expires Auth Status Total   Visits  Start date  Expiration date PT/OT + Visit Limit? Co-Insurance   7/19 10/19 24 7/13 12/31/22 120 PCY PT/OT/ST Yes                                           Visit Tracking  Fadi Lowers Status: Approved Date             Visits  Authed: 24 Used 1 1 1             Remaining                  Start Time: 0012  Stop Time: 1100  Total time in clinic (min): 45 minutes    Precautions: standard  PN due visit 10  POC valid 10/19    Subjective: Pt reports she's not feeling well this morning- took excedrin prior to session  Has been in bed with migraine and vertigo since Saturday  She states that she attempted olfactory retraining but it exacerbated her migraine  Eye pain at start of session 2-3/10  HA at start of session 2-3/10  Neck pain at start of session 5/10      Objective: See treatment below   -Hot pack provided at start of session for neck pain and OM exercises completed with this in place  Pt educated on precautions and indicated understanding  After hot pack reports 1/10 neck pain  NMRE:  -Ocular ROM in preparation for OM exercises  -Marker push-ups 3x3 focusing on OM control for convergence  Pt reports increased eye strain that improves with short rest breaks  Educated on performing as part of HEP and she indicated understanding   -Lazy 8's 3x8 focusing on OM control for pursuits  Educated on how to grade and to complete for HEP  Pt indicated understanding  Therapeutic Activities:  -Visual logic word fill-ins puzzle completed with key on slant board focusing on sustained attention, EF, dynamic OM control  Pt self corrected errors, and stated that activity was challenging  Additional worksheet given for HEP      Assessment: Tolerated treatment well  Neck pain significantly improved with hot pack  Limited tolerance for convergence exercises, but eye strain decreased with short rest breaks  Recommend continued OT services to address OM control and cognitive function for life roles  Plan: Continued skilled OT per POC  Goals to be added:  Pt will demonstrate improved oculomotor control for convergence to 5 5" in order to return to work and participate in leisure activities  Pt will demonstrate improved oculomotor control for pursuits to smooth in all planes in order to participate in life roles       Current OM HEP:   -Marker push-ups  -Lazy 8's

## 2022-07-26 NOTE — TELEPHONE ENCOUNTER
Type Date User Summary Attachment   General 08/04/2021  8:46 AM Maria Antonia Ortiz care coordination -   Note    Botox - authorization # [id number] - valid 4 visits - 1st visit - valid dates 8/1/2021 - 8/1/2022   Please use our stock      Thanks  Paolo Benoit There are no Wet Read(s) to document.

## 2022-07-29 ENCOUNTER — APPOINTMENT (OUTPATIENT)
Dept: OCCUPATIONAL THERAPY | Facility: CLINIC | Age: 42
End: 2022-07-29
Payer: COMMERCIAL

## 2022-08-02 ENCOUNTER — OFFICE VISIT (OUTPATIENT)
Dept: OCCUPATIONAL THERAPY | Facility: CLINIC | Age: 42
End: 2022-08-02
Payer: COMMERCIAL

## 2022-08-02 ENCOUNTER — APPOINTMENT (OUTPATIENT)
Dept: LAB | Facility: CLINIC | Age: 42
End: 2022-08-02
Payer: COMMERCIAL

## 2022-08-02 DIAGNOSIS — N95.1 SYMPTOMATIC MENOPAUSAL OR FEMALE CLIMACTERIC STATES: ICD-10-CM

## 2022-08-02 DIAGNOSIS — U09.9 POST-COVID SYNDROME: Primary | ICD-10-CM

## 2022-08-02 DIAGNOSIS — R41.89 BRAIN FOG: ICD-10-CM

## 2022-08-02 DIAGNOSIS — Z11.3 SCREENING EXAMINATION FOR VENEREAL DISEASE: ICD-10-CM

## 2022-08-02 LAB
FSH SERPL-ACNC: 7.8 MIU/ML
RPR SER QL: NORMAL

## 2022-08-02 PROCEDURE — 87389 HIV-1 AG W/HIV-1&-2 AB AG IA: CPT

## 2022-08-02 PROCEDURE — 86696 HERPES SIMPLEX TYPE 2 TEST: CPT

## 2022-08-02 PROCEDURE — 80074 ACUTE HEPATITIS PANEL: CPT

## 2022-08-02 PROCEDURE — 97530 THERAPEUTIC ACTIVITIES: CPT | Performed by: OCCUPATIONAL THERAPIST

## 2022-08-02 PROCEDURE — 97112 NEUROMUSCULAR REEDUCATION: CPT | Performed by: OCCUPATIONAL THERAPIST

## 2022-08-02 PROCEDURE — 86592 SYPHILIS TEST NON-TREP QUAL: CPT

## 2022-08-02 PROCEDURE — 86695 HERPES SIMPLEX TYPE 1 TEST: CPT

## 2022-08-02 PROCEDURE — 83001 ASSAY OF GONADOTROPIN (FSH): CPT

## 2022-08-02 PROCEDURE — 36415 COLL VENOUS BLD VENIPUNCTURE: CPT

## 2022-08-02 NOTE — PROGRESS NOTES
Daily Note     Today's date: 2022  Patient name: Rosalee Vargas  : 1980  MRN: [id number]  Referring provider: CROW Lehman  Dx:   Encounter Diagnoses   Name Primary?  Post-COVID syndrome Yes    Brain fog      POC expires Auth Status Total   Visits  Start date  Expiration date PT/OT + Visit Limit? Co-Insurance   7/19 10/19 24 7/13 12/31/22 120 PCY PT/OT/ST Yes                                           Visit Tracking  AUTH Status: Approved Date            Visits  Authed: 24 Used 1 1 1 1            Remaining   20               Start Time: 1150  Stop Time: 3467  Total time in clinic (min): 40 minutes    Precautions: standard  PN due visit 10  POC valid 10/19    Subjective: Pt reports that she was sick last week, but is feeling better now  Eye pain at start of session 0/10  HA at start of session 1/10  Neck pain at start of session 5/10      Objective: See treatment below  NMRE:  -Ocular ROM x8 in preparation for OM exercises  Reports increased R eye pain after this    -Alternating highlighters 2x20 reps focusing on convergence/divergence    -Saccades with visual tracking tube horizontal (reports increased eye strain with wider saccades), vertical, diagonal to improve motor control and tolerance for ocular ROM  Therapeutic Activities:  -Mod complexity word circles focusing on mental manipulation, saccades, with divided attention task of color code (expert level) to address  skills  Required min-mod cues for problem solving color code  Good divided attention noted  Assessment: Tolerated treatment fairly  Reported increased HA/eye strain with convergence and saccades exercises, and difficulty with EF and  activities  Recommend continued OT services to address OM control and cognitive function for life roles  Plan: Continued skilled OT per POC      Goals to be added:  Pt will demonstrate improved oculomotor control for convergence to 5 5" in order to return to work and participate in leisure activities  Pt will demonstrate improved oculomotor control for pursuits to smooth in all planes in order to participate in life roles       Current  HEP:   -Marker push-ups  -Lazy 8's

## 2022-08-03 DIAGNOSIS — G43.719 INTRACTABLE CHRONIC MIGRAINE WITHOUT AURA AND WITHOUT STATUS MIGRAINOSUS: ICD-10-CM

## 2022-08-03 DIAGNOSIS — G43.709 CHRONIC MIGRAINE WITHOUT AURA, NOT INTRACTABLE, WITHOUT STATUS MIGRAINOSUS: ICD-10-CM

## 2022-08-03 DIAGNOSIS — R42 VERTIGO: ICD-10-CM

## 2022-08-03 LAB
HAV IGM SER QL: ABNORMAL
HBV CORE IGM SER QL: ABNORMAL
HBV SURFACE AG SER QL: ABNORMAL
HCV AB SER QL: ABNORMAL
HIV 1+2 AB+HIV1 P24 AG SERPL QL IA: NORMAL

## 2022-08-03 RX ORDER — ONDANSETRON 4 MG/1
TABLET, ORALLY DISINTEGRATING ORAL
Qty: 9 TABLET | Refills: 0 | Status: CANCELLED | OUTPATIENT
Start: 2022-08-03

## 2022-08-04 LAB
HSV1 IGG SER IA-ACNC: <0.91 INDEX (ref 0–0.9)
HSV2 IGG SER IA-ACNC: 1.58 INDEX (ref 0–0.9)
HSV2 IGG SERPL QL IA: NEGATIVE

## 2022-08-04 RX ORDER — ONDANSETRON 4 MG/1
TABLET, ORALLY DISINTEGRATING ORAL
Qty: 9 TABLET | Refills: 0 | Status: SHIPPED | OUTPATIENT
Start: 2022-08-04 | End: 2022-08-08

## 2022-08-05 ENCOUNTER — OFFICE VISIT (OUTPATIENT)
Dept: INTERNAL MEDICINE CLINIC | Facility: CLINIC | Age: 42
End: 2022-08-05
Payer: COMMERCIAL

## 2022-08-05 ENCOUNTER — OFFICE VISIT (OUTPATIENT)
Dept: OCCUPATIONAL THERAPY | Facility: CLINIC | Age: 42
End: 2022-08-05
Payer: COMMERCIAL

## 2022-08-05 VITALS
WEIGHT: 197 LBS | SYSTOLIC BLOOD PRESSURE: 122 MMHG | BODY MASS INDEX: 26.68 KG/M2 | DIASTOLIC BLOOD PRESSURE: 76 MMHG | OXYGEN SATURATION: 98 % | HEIGHT: 72 IN | HEART RATE: 110 BPM | TEMPERATURE: 97.5 F

## 2022-08-05 DIAGNOSIS — U09.9 POST-COVID SYNDROME: Primary | ICD-10-CM

## 2022-08-05 DIAGNOSIS — R41.89 BRAIN FOG: ICD-10-CM

## 2022-08-05 PROCEDURE — 99214 OFFICE O/P EST MOD 30 MIN: CPT | Performed by: NURSE PRACTITIONER

## 2022-08-05 PROCEDURE — 97112 NEUROMUSCULAR REEDUCATION: CPT

## 2022-08-05 PROCEDURE — 97530 THERAPEUTIC ACTIVITIES: CPT

## 2022-08-05 NOTE — PROGRESS NOTES
Assessment/Plan:    Post-COVID syndrome  In OT and will be starting PT for symptoms including headaches, vision disturbances, and brain fog  She has noted some improvement over the last month  FMLA paperwork completed during the vision- tentative return to work date of 9/1  Will reevaluate in 3 weeks        Diagnoses and all orders for this visit:    Post-COVID syndrome          Subjective:      Patient ID: Sandi Rehman is a 40 y o  female  Laurie Ku is here today for follow up  She started the post covid recovery program about 3 weeks ago for brain fog, headaches, and vision disturbances  She has been out of work since having covid back in may  She has noticed some improvement although its been slow  She gets headaches when looking at a computer or reading  She has short term memory issues  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  She is unable to focus on the computer and maintain effective communication due to her symptoms  The following portions of the patient's history were reviewed and updated as appropriate: allergies, current medications, past family history, past medical history, past social history, past surgical history and problem list     Review of Systems   Constitutional: Positive for fatigue  Negative for activity change and appetite change  Eyes: Positive for visual disturbance  Respiratory: Negative for cough and shortness of breath  Cardiovascular: Negative for chest pain, palpitations and leg swelling  Neurological: Positive for dizziness, light-headedness and headaches  Psychiatric/Behavioral: Negative for dysphoric mood and sleep disturbance  The patient is not hyperactive  Objective:      /76   Pulse (!) 110   Temp 97 5 °F (36 4 °C)   Ht 6' 1" (1 854 m)   Wt 89 4 kg (197 lb)   SpO2 98%   BMI 25 99 kg/m²          Physical Exam  Vitals reviewed  Constitutional:       Appearance: Normal appearance  HENT:      Head: Normocephalic and atraumatic  Eyes:      Conjunctiva/sclera: Conjunctivae normal    Pulmonary:      Effort: Pulmonary effort is normal    Skin:     General: Skin is warm and dry  Neurological:      Mental Status: She is alert and oriented to person, place, and time  Psychiatric:         Mood and Affect: Mood normal          Behavior: Behavior normal        I have spent 30 minutes with Patient  today in which greater than 50% of this time was spent in counseling/coordination of care regarding Patient and family education and FMLA paperwork  Charu Stanton

## 2022-08-05 NOTE — PROGRESS NOTES
Daily Note     Today's date: 2022  Patient name: Verónica Reynoso  : 1980  MRN: [id number]  Referring provider: CROW Kelly  Dx:   Encounter Diagnoses   Name Primary?  Post-COVID syndrome Yes    Brain fog      POC expires Auth Status Total   Visits  Start date  Expiration date PT/OT + Visit Limit? Co-Insurance   7/19 10/19 24 7/13 12/31/22 120 PCY PT/OT/ST Yes                                           Visit Tracking  AUTH Status: Approved Date           Visits  Authed: 24 Used 1 1 1 1 1           Remaining  23  21 20 19                         Precautions: standard  PN due visit 10  POC valid 10/19    Subjective: Pt reports that she was sick last week, but is feeling better now  Eye pain at start of session 0/10  HA at start of session 1/10  Neck pain at start of session 5/10      Objective: See treatment below  NMRE:  -highlighter pushups x 20 reps focusing on convergence/divergence    -Saccades task of jeff string X focusing on saccades in  Horizontal, vertical, diagonal to improve motor control and tolerance for ocular ROM  -lazy 8 focusing on pursuits x 8 reps    Therapeutic Activities:  -performed crossword puzzle with arrows in standing focusing on EF skills,  skills, saccades required min VCs for   Provided with HEP for cognitive applications  Provided with HEP of line pursuit maps  Assessment: Tolerated treatment fairly  Reported difficulty maintaining attention during crossword puzzle  Recommend continued OT services to address OM control and cognitive function for life roles  Plan: Continued skilled OT per POC  Goals to be added:  Pt will demonstrate improved oculomotor control for convergence to 5 5" in order to return to work and participate in leisure activities  Pt will demonstrate improved oculomotor control for pursuits to smooth in all planes in order to participate in life roles       Current OM HEP:   -Marker push-ups  -Lazy 8's

## 2022-08-05 NOTE — ASSESSMENT & PLAN NOTE
In OT and will be starting PT for symptoms including headaches, vision disturbances, and brain fog  She has noted some improvement over the last month  [place] paperwork completed during the vision- tentative return to work date of 9/1     Will reevaluate in 3 weeks

## 2022-08-08 DIAGNOSIS — G43.709 CHRONIC MIGRAINE WITHOUT AURA, NOT INTRACTABLE, WITHOUT STATUS MIGRAINOSUS: ICD-10-CM

## 2022-08-08 RX ORDER — ONDANSETRON 4 MG/1
TABLET, ORALLY DISINTEGRATING ORAL
Qty: 9 TABLET | Refills: 0 | Status: SHIPPED | OUTPATIENT
Start: 2022-08-08

## 2022-08-09 ENCOUNTER — OFFICE VISIT (OUTPATIENT)
Dept: OCCUPATIONAL THERAPY | Facility: CLINIC | Age: 42
End: 2022-08-09
Payer: COMMERCIAL

## 2022-08-09 DIAGNOSIS — U09.9 POST-COVID SYNDROME: Primary | ICD-10-CM

## 2022-08-09 DIAGNOSIS — R41.89 BRAIN FOG: ICD-10-CM

## 2022-08-09 DIAGNOSIS — G43.809 VESTIBULAR MIGRAINE: ICD-10-CM

## 2022-08-09 PROCEDURE — 97112 NEUROMUSCULAR REEDUCATION: CPT | Performed by: OCCUPATIONAL THERAPIST

## 2022-08-09 PROCEDURE — 97530 THERAPEUTIC ACTIVITIES: CPT | Performed by: OCCUPATIONAL THERAPIST

## 2022-08-09 RX ORDER — DEXAMETHASONE 2 MG/1
TABLET ORAL
Qty: 5 TABLET | Refills: 0 | Status: SHIPPED | OUTPATIENT
Start: 2022-08-09 | End: 2022-08-30 | Stop reason: SDUPTHER

## 2022-08-09 NOTE — PROGRESS NOTES
Daily Note     Today's date: 2022  Patient name: Mendoza Menendez  : 1980  MRN: [id number]  Referring provider: CROW Hsieh  Dx:   Encounter Diagnoses   Name Primary?  Post-COVID syndrome Yes    Brain fog      POC expires Auth Status Total   Visits  Start date  Expiration date PT/OT + Visit Limit? Co-Insurance   7/19 10/19 24 7/13 12/31/22 120 PCY PT/OT/ST Yes                                           Visit Tracking  AUTH Status: Approved Date          Visits  Authed: 24 Used 1 1 1 1 1 1          Remaining  23 22 21 20 19 18             Start Time: 3780  Stop Time: 4814  Total time in clinic (min): 40 minutes    Precautions: standard  PN due visit 10  POC valid 10/19    Subjective: Pt reports that her paperwork was approved and she now is planning to go back to work in September  Eye pain at start of session 0/10  HA at start of session 0/10  Neck pain at start of session 0/10      Objective: See treatment below  NMRE:  -Pursuits in all directions x5  Pt noted with large deviation from target during vertical pursuits, slightly jerky diagonally   -Saccades in all directions with targets ~10" apart x5    -Highlighter pushups 2x5 reps focusing on convergence/divergence  Reports eye fatigue after this  Rudie  string X 2x5 x3 second holds  Pt reports Y with near suppression on first 8 reps, X on last 2  Therapeutic Activities:  -[place] block puzzle with mental math completed to address divided attention and working memory  -Dual tasking with sorting pairs in pears tiles and stating 10 items from various categories  Pt expressed frustration d/t difficulty multitasking  Assessment: Tolerated treatment fairly  Improvement with OM control for convergence noted during [facility] string exercise, but tolerance is limited  Difficulty with multitasking  Recommend continued OT services to address OM control and cognitive function for life roles       Plan: Continued skilled OT per POC  Goals to be added:  Pt will demonstrate improved oculomotor control for convergence to 5 5" in order to return to work and participate in leisure activities  Pt will demonstrate improved oculomotor control for pursuits to smooth in all planes in order to participate in life roles       Current OM HEP:   -Marker push-ups  -Lazy 8's

## 2022-08-11 NOTE — TELEPHONE ENCOUNTER
Pt requesting refill of meclizine  Next OV 9/8/22 with [address] [facility] [facility] [employer] - Rx entered  Please review and sign if in agreement

## 2022-08-12 RX ORDER — MECLIZINE HCL 12.5 MG/1
12.5 TABLET ORAL EVERY 8 HOURS PRN
Qty: 20 TABLET | Refills: 0 | Status: SHIPPED | OUTPATIENT
Start: 2022-08-12

## 2022-08-16 ENCOUNTER — APPOINTMENT (OUTPATIENT)
Dept: OCCUPATIONAL THERAPY | Facility: CLINIC | Age: 42
End: 2022-08-16
Payer: COMMERCIAL

## 2022-08-19 ENCOUNTER — APPOINTMENT (OUTPATIENT)
Dept: OCCUPATIONAL THERAPY | Facility: CLINIC | Age: 42
End: 2022-08-19
Payer: COMMERCIAL

## 2022-08-23 ENCOUNTER — APPOINTMENT (OUTPATIENT)
Dept: OCCUPATIONAL THERAPY | Facility: CLINIC | Age: 42
End: 2022-08-23
Payer: COMMERCIAL

## 2022-08-25 DIAGNOSIS — U07.1 COVID-19 VIRUS INFECTION: ICD-10-CM

## 2022-08-25 RX ORDER — ALBUTEROL SULFATE 90 UG/1
2 AEROSOL, METERED RESPIRATORY (INHALATION) 4 TIMES DAILY
Qty: 8.5 G | Refills: 0 | Status: SHIPPED | OUTPATIENT
Start: 2022-08-25

## 2022-08-26 ENCOUNTER — APPOINTMENT (OUTPATIENT)
Dept: OCCUPATIONAL THERAPY | Facility: CLINIC | Age: 42
End: 2022-08-26
Payer: COMMERCIAL

## 2022-08-30 ENCOUNTER — APPOINTMENT (OUTPATIENT)
Dept: OCCUPATIONAL THERAPY | Facility: CLINIC | Age: 42
End: 2022-08-30
Payer: COMMERCIAL

## 2022-08-30 DIAGNOSIS — G43.809 VESTIBULAR MIGRAINE: ICD-10-CM

## 2022-08-30 RX ORDER — DEXAMETHASONE 2 MG/1
2 TABLET ORAL
Qty: 5 TABLET | Refills: 0 | Status: SHIPPED | OUTPATIENT
Start: 2022-08-30 | End: 2022-09-23 | Stop reason: SDUPTHER

## 2022-09-08 ENCOUNTER — PROCEDURE VISIT (OUTPATIENT)
Dept: NEUROLOGY | Facility: CLINIC | Age: 42
End: 2022-09-08
Payer: COMMERCIAL

## 2022-09-08 VITALS — HEART RATE: 74 BPM | TEMPERATURE: 96.6 F | SYSTOLIC BLOOD PRESSURE: 149 MMHG | DIASTOLIC BLOOD PRESSURE: 84 MMHG

## 2022-09-08 DIAGNOSIS — G43.709 CHRONIC MIGRAINE W/O AURA W/O STATUS MIGRAINOSUS, NOT INTRACTABLE: Primary | ICD-10-CM

## 2022-09-08 PROCEDURE — 64615 CHEMODENERV MUSC MIGRAINE: CPT | Performed by: PHYSICIAN ASSISTANT

## 2022-09-08 NOTE — PROGRESS NOTES
Universal Protocol   Consent: Verbal consent obtained  Written consent obtained    Risks and benefits: risks, benefits and alternatives were discussed  Consent given by: patient  Patient understanding: patient states understanding of the procedure being performed  Patient consent: the patient's understanding of the procedure matches consent given  Procedure consent: procedure consent matches procedure scheduled        Chemodenervation     Date/Time 9/8/2022 9:41 AM     Performed by  Dioni Milner PA-C     Authorized by Dioni Milner PA-C        Pre-procedure details      Prepped With: Alcohol     Procedure details     Position:  Upright   Botox     Botox Type:  Type A    Brand:  Botox    mL's of Botulinum Toxin:  200    Final Concentration per CC:  100 units    Needle Gauge:  30 G 2 5 inch   Procedures     Botox Procedures: chronic headache      Indications: migraines     Injection Location      Head / Face:  L superior trapezius, R superior trapezius, L superior cervical paraspinal, R superior cervical paraspinal, L , R , procerus, L temporalis, R temporalis, R frontalis, L frontalis, R medial occipitalis and L medial occipitalis    L  injection amount:  5 unit(s)    R  injection amount:  5 unit(s)    L lateral frontalis:  5 unit(s)    R lateral frontalis:  5 unit(s)    L medial frontalis:  5 unit(s)    R medial frontalis:  5 unit(s)    L temporalis injection amount:  20 unit(s)    R temporalis injection amount:  20 unit(s)    Procerus injection amount:  5 unit(s)    L medial occipitalis injection amount:  15 unit(s)    R medial occipitalis injection amount:  15 unit(s)    L superior cervical paraspinal injection amount:  10 unit(s)    R superior cervical paraspinal injection amount:  10 unit(s)    L superior trapezius injection amount:  15 unit(s)    R superior trapezius injection amount:  15 unit(s)   Total Units     Total units used:  200    Total units discarded:  0 Post-procedure details      Chemodenervation:  Chronic migraine    Facial Nerve Location[de-identified]  Bilateral facial nerve    Patient tolerance of procedure: Tolerated well, no immediate complications   Comments      Extra units medically necessary:  - 15 R temporalis  - 10 R lateral occipitalis and behind R ear  - 10 R traps  - 10 L temporalis       Blood pressure 149/84, pulse 74, temperature (!) 96 6 °F (35 9 °C), temperature source Tympanic, not currently breastfeeding

## 2022-09-16 DIAGNOSIS — U07.1 COVID-19 VIRUS INFECTION: ICD-10-CM

## 2022-09-16 RX ORDER — ALBUTEROL SULFATE 90 UG/1
AEROSOL, METERED RESPIRATORY (INHALATION)
OUTPATIENT
Start: 2022-09-16

## 2022-09-23 DIAGNOSIS — G43.809 VESTIBULAR MIGRAINE: ICD-10-CM

## 2022-09-30 DIAGNOSIS — G43.809 VESTIBULAR MIGRAINE: ICD-10-CM

## 2022-09-30 RX ORDER — DEXAMETHASONE 2 MG/1
2 TABLET ORAL
Qty: 5 TABLET | Refills: 0 | Status: CANCELLED | OUTPATIENT
Start: 2022-09-30

## 2022-09-30 RX ORDER — DEXAMETHASONE 2 MG/1
2 TABLET ORAL
Qty: 5 TABLET | Refills: 0 | Status: SHIPPED | OUTPATIENT
Start: 2022-09-30

## 2022-10-31 DIAGNOSIS — G43.809 VESTIBULAR MIGRAINE: ICD-10-CM

## 2022-10-31 RX ORDER — DEXAMETHASONE 2 MG/1
2 TABLET ORAL
Qty: 5 TABLET | Refills: 0 | Status: CANCELLED | OUTPATIENT
Start: 2022-10-31

## 2022-11-01 RX ORDER — DEXAMETHASONE 2 MG/1
TABLET ORAL
Qty: 5 TABLET | Refills: 0 | Status: SHIPPED | OUTPATIENT
Start: 2022-11-01

## 2022-11-04 ENCOUNTER — TELEPHONE (OUTPATIENT)
Dept: NEUROLOGY | Facility: CLINIC | Age: 42
End: 2022-11-04

## 2022-11-04 NOTE — TELEPHONE ENCOUNTER
Submitted Retro-Authorization request via fax to [id number] for:     Botox-200 units, [id number], [id number]  Awaiting approval/denial response

## 2022-11-07 NOTE — TELEPHONE ENCOUNTER
Received the following authorization approval info via fax from [employer]:    Approved:   Botox-200 units  Auth# [id number]  Valid: 9/08/2022 until 9/08/2023  4 visits    Please use our Stock

## 2022-11-16 ENCOUNTER — CLINICAL SUPPORT (OUTPATIENT)
Dept: NEUROLOGY | Facility: CLINIC | Age: 42
End: 2022-11-16

## 2022-11-16 ENCOUNTER — TELEPHONE (OUTPATIENT)
Dept: INTERNAL MEDICINE CLINIC | Facility: CLINIC | Age: 42
End: 2022-11-16

## 2022-11-16 ENCOUNTER — TELEPHONE (OUTPATIENT)
Dept: NEUROLOGY | Facility: CLINIC | Age: 42
End: 2022-11-16

## 2022-11-16 DIAGNOSIS — G43.719 INTRACTABLE CHRONIC MIGRAINE WITHOUT AURA AND WITHOUT STATUS MIGRAINOSUS: Primary | ICD-10-CM

## 2022-11-16 DIAGNOSIS — G43.709 CHRONIC MIGRAINE W/O AURA W/O STATUS MIGRAINOSUS, NOT INTRACTABLE: Primary | ICD-10-CM

## 2022-11-16 RX ORDER — KETOROLAC TROMETHAMINE 30 MG/ML
60 INJECTION, SOLUTION INTRAMUSCULAR; INTRAVENOUS ONCE
Status: COMPLETED | OUTPATIENT
Start: 2022-11-16 | End: 2022-11-16

## 2022-11-16 RX ORDER — PROCHLORPERAZINE EDISYLATE 5 MG/ML
10 INJECTION INTRAMUSCULAR; INTRAVENOUS ONCE
Status: COMPLETED | OUTPATIENT
Start: 2022-11-16 | End: 2022-11-16

## 2022-11-16 RX ORDER — DIVALPROEX SODIUM 250 MG/1
TABLET, DELAYED RELEASE ORAL
Qty: 12 TABLET | Refills: 1 | Status: SHIPPED | OUTPATIENT
Start: 2022-11-16

## 2022-11-16 RX ADMIN — PROCHLORPERAZINE EDISYLATE 10 MG: 5 INJECTION INTRAMUSCULAR; INTRAVENOUS at 16:13

## 2022-11-16 RX ADMIN — KETOROLAC TROMETHAMINE 60 MG: 30 INJECTION, SOLUTION INTRAMUSCULAR; INTRAVENOUS at 16:14

## 2022-11-16 NOTE — TELEPHONE ENCOUNTER
Okay to have compazine injection also? Please enter order if in agreement, thank you  I will confirm pregnancy status

## 2022-11-16 NOTE — TELEPHONE ENCOUNTER
Can you ask her to come in for toradol injection if that is what she meant? I will sent depakote taper to take at night  No chance she is pregnant correct? Thanks

## 2022-11-16 NOTE — TELEPHONE ENCOUNTER
Patient of [facility], last botox 9/8, last office visit Joshua Esqueda, 7/29/2021    Patient is reporting migraine x 7 days, currently "8/10"  With  light/sound sensitivity, n/v;  hasn't been able to eat x 1 week however tolerating most fluids, vomiting once daily in AM despite using ondansetron (only temporary relief) past 3 days, sumatriptan, tizanidine, emgality monthly (last dose last week), No lokknger taking verapamil  Recently completed course of [id number] with temporary relief but migraine recurred 11/8  Feels like she was "punched in her face", with nasal  congestion, intermittent fevers, negative for covid; Also called PCP for advice to r/o possible sinus infection  Asking for toradol/compazine injection as was effective in the past      Please provide recommendation  Thank you

## 2022-11-16 NOTE — TELEPHONE ENCOUNTER
Please remind her that she had no showed to her recent appointments, last one was yesterday  If she's had a migraine for over 8 days, recommend ER as she may need IV medications

## 2022-11-16 NOTE — TELEPHONE ENCOUNTER
Pt has been out of work for about a week and a half due to vertigo and migraine  She says she feels like she's been punched in the face, congestion, nausea, vomitin, chills, body aches for about 8 days  Negative Covid test on Sunday  Tried Imitrex, zofran, and meclizine  NO relief

## 2022-11-16 NOTE — TELEPHONE ENCOUNTER
Osito,     Pt has called this morning requesting a steroid injection if possible for today due to a Migraine she's had for 8 days now and from 1-10 it's an 8 today  Pt also stated she is having vertigo, nausea, vomiting and the pain is excruciating and has been missing work      Can you please assist?    Thank you,     Duyen Pollock

## 2022-11-16 NOTE — TELEPHONE ENCOUNTER
Pt aware and in a terrible amount of pain and will be going to the ER  She also has a call out to neurology but thinks ER is appropriate

## 2022-11-17 ENCOUNTER — OFFICE VISIT (OUTPATIENT)
Dept: INTERNAL MEDICINE CLINIC | Facility: CLINIC | Age: 42
End: 2022-11-17

## 2022-11-17 VITALS
SYSTOLIC BLOOD PRESSURE: 118 MMHG | HEART RATE: 88 BPM | BODY MASS INDEX: 28.04 KG/M2 | WEIGHT: 207 LBS | TEMPERATURE: 97.1 F | DIASTOLIC BLOOD PRESSURE: 82 MMHG | OXYGEN SATURATION: 98 % | HEIGHT: 72 IN

## 2022-11-17 DIAGNOSIS — H81.03 MENIERE'S DISEASE OF BOTH EARS: Primary | ICD-10-CM

## 2022-11-17 DIAGNOSIS — J30.9 ALLERGIC RHINITIS, UNSPECIFIED SEASONALITY, UNSPECIFIED TRIGGER: ICD-10-CM

## 2022-11-17 DIAGNOSIS — G43.709 CHRONIC MIGRAINE W/O AURA W/O STATUS MIGRAINOSUS, NOT INTRACTABLE: ICD-10-CM

## 2022-11-17 NOTE — ASSESSMENT & PLAN NOTE
Symptoms are improving  Work accommodations made for increased screen/phone breaks as needed, up to 30 minutes every 4 hours

## 2022-11-17 NOTE — PROGRESS NOTES
Name: Verónica Reynoso      : 1980      MRN: [id number]  Encounter Provider: CROW Cruz  Encounter Date: 2022   Encounter department: [facility]     1  Meniere's disease of both ears  Assessment & Plan:  Symptoms are improving  Work accommodations made for increased screen/phone breaks as needed, up to 30 minutes every 4 hours  2  Chronic migraine w/o aura w/o status migrainosus, not intractable  Assessment & Plan:  Some improvement with toradol and compazine given yesterday by neurology  On depakote for 4 days  Scheduled with neurology  Due for botox this month  3  Allergic rhinitis, unspecified seasonality, unspecified trigger  Assessment & Plan:  Start flonase 1 spray in each nostril daily in AM and saline nasal spray in PM  Continue allegra  [place] Chris is here today with complaints of a migraine and sinus symptoms  She has been having symptoms for about 9 days  She thought initially it was a menieres attack  She had headache, photophobia, nausea, vomiting, and dizziness  Yesterday she received toradol and compazine by neurology  Was given depakote for 4 days  She notes some improvement in her nausea and headache however she feels increased sinus pressure, post nasal drip, and nasal congestion  She is taking allegra  She denies fevers  She has missed work since symptoms started  She needs paperwork filled out to return to work  She is requesting an accomodation to be able to take up to 30 minutes off screen/phone time every 4 hours  Review of Systems   Constitutional: Positive for fatigue  Negative for activity change, appetite change and fever  HENT: Positive for congestion, postnasal drip, rhinorrhea, sinus pressure and sinus pain  Negative for ear pain and sore throat  Eyes: Positive for photophobia  Negative for visual disturbance     Respiratory: Negative for cough and shortness of breath  Cardiovascular: Negative for chest pain  Gastrointestinal: Negative for abdominal pain, diarrhea, nausea and vomiting  Genitourinary: Negative for difficulty urinating  Neurological: Positive for dizziness and headaches  Negative for light-headedness  Current Outpatient Medications on File Prior to Visit   Medication Sig   • albuterol (PROVENTIL HFA,VENTOLIN HFA) 90 mcg/act inhaler Inhale 2 puffs 4 (four) times a day   • amphetamine-dextroamphetamine (ADDERALL) 20 mg tablet Take 20 mg by mouth 2 (two) times a day     • clonazePAM (KlonoPIN) 0 5 mg tablet Take 0 5 mg by mouth daily at bedtime   • dexamethasone (DECADRON) 2 mg tablet TAKE 1 TABLET BY MOUTH DAILY WITH BREAKFAST   • divalproex sodium (DEPAKOTE) 250 mg EC tablet 2 tabs q12 h x 2 days, then 1 tab q12 h x 2 days, then stop  Repeat if needed  • gabapentin (NEURONTIN) 600 MG tablet Take 1 tablet (600 mg total) by mouth 2 (two) times a day   • Galcanezumab-gnlm 120 MG/ML SOAJ Inject 120 mg under the skin every 30 (thirty) days   • ibuprofen (MOTRIN) 600 mg tablet TAKE 1 TABLET (600 MG TOTAL) BY MOUTH EVERY 6 (SIX) HOURS AS NEEDED FOR MILD PAIN   • meclizine (ANTIVERT) 12 5 MG tablet Take 1 tablet (12 5 mg total) by mouth every 8 (eight) hours as needed for dizziness   • meloxicam (Mobic) 7 5 mg tablet Take 1 tablet (7 5 mg total) by mouth daily   • montelukast (SINGULAIR) 10 mg tablet TAKE 1 TABLET BY MOUTH EVERYDAY AT BEDTIME   • onabotulinumtoxin A (BOTOX) 100 units one time  "for vertigo"   • ondansetron (ZOFRAN-ODT) 4 mg disintegrating tablet TAKE 1 TABLET BY MOUTH EVERY 8 HOURS AS NEEDED FOR NAUSEA AND VOMITING   • sertraline (ZOLOFT) 100 mg tablet Take 100 mg by mouth every morning   • SUMAtriptan (IMITREX) 50 mg tablet TAKE 1 TO 2 TABS BY MOUTH AT ONSET OF MIGRAINE  MAY REPEAT IN 2 HOURS IF NEEDED   MAX 4 TABS IN 24HRS   • tiZANidine (ZANAFLEX) 4 mg tablet TAKE 1-2 TABS BY MOUTH NIGHTLY   • traZODone (DESYREL) 100 mg tablet Take  mg by mouth daily at bedtime as needed   • verapamil (CALAN) 40 mg tablet 1 tab qhs   • Vraylar 1 5 MG capsule        Objective     /82   Pulse 88   Temp (!) 97 1 °F (36 2 °C)   Ht 6' 1" (1 854 m)   Wt 93 9 kg (207 lb)   SpO2 98%   BMI 27 31 kg/m²     Physical Exam  Vitals reviewed  Constitutional:       Appearance: Normal appearance  HENT:      Head: Normocephalic and atraumatic  Right Ear: Tympanic membrane, ear canal and external ear normal       Left Ear: Tympanic membrane, ear canal and external ear normal       Mouth/Throat:      Pharynx: Posterior oropharyngeal erythema present  No oropharyngeal exudate  Eyes:      Conjunctiva/sclera: Conjunctivae normal       Pupils: Pupils are equal, round, and reactive to light  Cardiovascular:      Rate and Rhythm: Normal rate and regular rhythm  Heart sounds: Normal heart sounds  Pulmonary:      Effort: Pulmonary effort is normal       Breath sounds: Normal breath sounds  Neurological:      General: No focal deficit present  Mental Status: She is alert and oriented to person, place, and time     Psychiatric:         Mood and Affect: Mood normal          Behavior: Behavior normal        CROW Roldan

## 2022-11-17 NOTE — ASSESSMENT & PLAN NOTE
Some improvement with toradol and compazine given yesterday by neurology  On depakote for 4 days  Scheduled with neurology  Due for botox this month  [FreeTextEntry3] : All medical record entries made by the Scribe were at my, Dr. Villagomez's, direction and personally dictated by me on [5/22/2019]. I have reviewed the chart and agree that the record accurately reflects my personal performance of the history, physical exam, assessment and plan. I have also personally directed, reviewed and agreed with the chart.

## 2022-11-21 DIAGNOSIS — M25.511 CHRONIC RIGHT SHOULDER PAIN: ICD-10-CM

## 2022-11-21 DIAGNOSIS — G89.29 CHRONIC RIGHT SHOULDER PAIN: ICD-10-CM

## 2022-11-22 RX ORDER — MELOXICAM 7.5 MG/1
TABLET ORAL
Qty: 30 TABLET | Refills: 0 | Status: SHIPPED | OUTPATIENT
Start: 2022-11-22

## 2022-11-24 DIAGNOSIS — J45.30 MILD PERSISTENT ASTHMA WITHOUT COMPLICATION: ICD-10-CM

## 2022-11-25 RX ORDER — MONTELUKAST SODIUM 10 MG/1
TABLET ORAL
Qty: 90 TABLET | Refills: 1 | Status: SHIPPED | OUTPATIENT
Start: 2022-11-25

## 2022-12-06 ENCOUNTER — OFFICE VISIT (OUTPATIENT)
Dept: INTERNAL MEDICINE CLINIC | Facility: CLINIC | Age: 42
End: 2022-12-06

## 2022-12-06 ENCOUNTER — APPOINTMENT (OUTPATIENT)
Dept: LAB | Facility: CLINIC | Age: 42
End: 2022-12-06

## 2022-12-06 VITALS
HEART RATE: 81 BPM | SYSTOLIC BLOOD PRESSURE: 116 MMHG | WEIGHT: 202 LBS | BODY MASS INDEX: 27.36 KG/M2 | TEMPERATURE: 97.6 F | HEIGHT: 72 IN | OXYGEN SATURATION: 99 % | DIASTOLIC BLOOD PRESSURE: 78 MMHG

## 2022-12-06 DIAGNOSIS — Z23 NEED FOR INFLUENZA VACCINATION: ICD-10-CM

## 2022-12-06 DIAGNOSIS — H81.03 MENIERE'S DISEASE OF BOTH EARS: ICD-10-CM

## 2022-12-06 DIAGNOSIS — R35.0 URINARY FREQUENCY: ICD-10-CM

## 2022-12-06 DIAGNOSIS — R42 LIGHTHEADEDNESS: ICD-10-CM

## 2022-12-06 DIAGNOSIS — E55.9 VITAMIN D DEFICIENCY: ICD-10-CM

## 2022-12-06 DIAGNOSIS — F32.A DEPRESSION, UNSPECIFIED DEPRESSION TYPE: ICD-10-CM

## 2022-12-06 DIAGNOSIS — R11.2 NAUSEA AND VOMITING, UNSPECIFIED VOMITING TYPE: Primary | ICD-10-CM

## 2022-12-06 DIAGNOSIS — F41.1 GENERALIZED ANXIETY DISORDER: ICD-10-CM

## 2022-12-06 DIAGNOSIS — R11.2 NAUSEA AND VOMITING, UNSPECIFIED VOMITING TYPE: ICD-10-CM

## 2022-12-06 LAB
25(OH)D3 SERPL-MCNC: 28.3 NG/ML (ref 30–100)
ALBUMIN SERPL BCP-MCNC: 4.2 G/DL (ref 3.5–5)
ALP SERPL-CCNC: 47 U/L (ref 34–104)
ALT SERPL W P-5'-P-CCNC: 10 U/L (ref 7–52)
ANION GAP SERPL CALCULATED.3IONS-SCNC: 5 MMOL/L (ref 4–13)
AST SERPL W P-5'-P-CCNC: 12 U/L (ref 13–39)
BACTERIA UR QL AUTO: NORMAL /HPF
BASOPHILS # BLD AUTO: 0.04 THOUSANDS/ÂΜL (ref 0–0.1)
BASOPHILS NFR BLD AUTO: 1 % (ref 0–1)
BILIRUB SERPL-MCNC: 0.4 MG/DL (ref 0.2–1)
BILIRUB UR QL STRIP: NEGATIVE
BUN SERPL-MCNC: 12 MG/DL (ref 5–25)
CALCIUM SERPL-MCNC: 9.5 MG/DL (ref 8.4–10.2)
CHLORIDE SERPL-SCNC: 106 MMOL/L (ref 96–108)
CLARITY UR: CLEAR
CO2 SERPL-SCNC: 27 MMOL/L (ref 21–32)
COLOR UR: COLORLESS
CREAT SERPL-MCNC: 0.91 MG/DL (ref 0.6–1.3)
EOSINOPHIL # BLD AUTO: 0.17 THOUSAND/ÂΜL (ref 0–0.61)
EOSINOPHIL NFR BLD AUTO: 2 % (ref 0–6)
ERYTHROCYTE [DISTWIDTH] IN BLOOD BY AUTOMATED COUNT: 12.2 % (ref 11.6–15.1)
GFR SERPL CREATININE-BSD FRML MDRD: 78 ML/MIN/1.73SQ M
GLUCOSE SERPL-MCNC: 95 MG/DL (ref 65–140)
GLUCOSE UR STRIP-MCNC: NEGATIVE MG/DL
HCT VFR BLD AUTO: 43 % (ref 34.8–46.1)
HGB BLD-MCNC: 14.3 G/DL (ref 11.5–15.4)
HGB UR QL STRIP.AUTO: ABNORMAL
IMM GRANULOCYTES # BLD AUTO: 0.02 THOUSAND/UL (ref 0–0.2)
IMM GRANULOCYTES NFR BLD AUTO: 0 % (ref 0–2)
KETONES UR STRIP-MCNC: NEGATIVE MG/DL
LEUKOCYTE ESTERASE UR QL STRIP: NEGATIVE
LYMPHOCYTES # BLD AUTO: 1.97 THOUSANDS/ÂΜL (ref 0.6–4.47)
LYMPHOCYTES NFR BLD AUTO: 27 % (ref 14–44)
MCH RBC QN AUTO: 31.4 PG (ref 26.8–34.3)
MCHC RBC AUTO-ENTMCNC: 33.3 G/DL (ref 31.4–37.4)
MCV RBC AUTO: 94 FL (ref 82–98)
MONOCYTES # BLD AUTO: 0.5 THOUSAND/ÂΜL (ref 0.17–1.22)
MONOCYTES NFR BLD AUTO: 7 % (ref 4–12)
NEUTROPHILS # BLD AUTO: 4.66 THOUSANDS/ÂΜL (ref 1.85–7.62)
NEUTS SEG NFR BLD AUTO: 63 % (ref 43–75)
NITRITE UR QL STRIP: NEGATIVE
NON-SQ EPI CELLS URNS QL MICRO: NORMAL /HPF
NRBC BLD AUTO-RTO: 0 /100 WBCS
PH UR STRIP.AUTO: 6 [PH]
PLATELET # BLD AUTO: 281 THOUSANDS/UL (ref 149–390)
PMV BLD AUTO: 10.2 FL (ref 8.9–12.7)
POTASSIUM SERPL-SCNC: 4.2 MMOL/L (ref 3.5–5.3)
PROT SERPL-MCNC: 7.1 G/DL (ref 6.4–8.4)
PROT UR STRIP-MCNC: NEGATIVE MG/DL
RBC # BLD AUTO: 4.56 MILLION/UL (ref 3.81–5.12)
RBC #/AREA URNS AUTO: NORMAL /HPF
SODIUM SERPL-SCNC: 138 MMOL/L (ref 135–147)
SP GR UR STRIP.AUTO: 1.01 (ref 1–1.03)
TSH SERPL DL<=0.05 MIU/L-ACNC: 1.49 UIU/ML (ref 0.45–4.5)
UROBILINOGEN UR STRIP-ACNC: <2 MG/DL
WBC # BLD AUTO: 7.36 THOUSAND/UL (ref 4.31–10.16)
WBC #/AREA URNS AUTO: NORMAL /HPF

## 2022-12-06 RX ORDER — ACETAMINOPHEN AND CODEINE PHOSPHATE 120; 12 MG/5ML; MG/5ML
SOLUTION ORAL
COMMUNITY
Start: 2022-12-03

## 2022-12-06 NOTE — ASSESSMENT & PLAN NOTE
Continues with intermittent symptoms  Takes meclizine and zofran with some relief  Scheduled with ENT 
Sees psychiatry  Has been in counseling in the past, recommend restarting  Discussed options for outpatient vs  Inpatient treatment  Advised to go to the ER for any suicidal thoughts  Options for counseling, innovations program, or walk in mental health services reviewed with patient 
[id number] Comprehensive

## 2022-12-06 NOTE — PROGRESS NOTES
Name: January Washburn      : 1980      MRN: [id number]  Encounter Provider: CROW Babcock  Encounter Date: 2022   Encounter department: [facility]     1  Nausea and vomiting, unspecified vomiting type  Comments:  check labs  she can start 14 day trial of omeprazole OTC  follow a low acid diet  Orders:  -     Comprehensive metabolic panel; Future  -     CBC and differential; Future  -     TSH, 3rd generation with Free T4 reflex; Future  -     H  pylori antigen, stool; Future  -     Ambulatory Referral to Gastroenterology; Future    2  Meniere's disease of both ears  Assessment & Plan:  Continues with intermittent symptoms  Takes meclizine and zofran with some relief  Scheduled with ENT  Orders:  -     Comprehensive metabolic panel; Future  -     CBC and differential; Future  -     TSH, 3rd generation with Free T4 reflex; Future    3  Lightheadedness  -     Iron Panel (Includes Ferritin, Iron Sat%, Iron, and TIBC); Future    4  Depression, unspecified depression type  Assessment & Plan:  Sees psychiatry  Has been in counseling in the past, recommend restarting  Discussed options for outpatient vs  Inpatient treatment  Advised to go to the ER for any suicidal thoughts  Options for counseling, innovations program, or walk in mental health services reviewed with patient  5  Generalized anxiety disorder    6  Urinary frequency  -     UA w Reflex to Microscopic w Reflex to Culture -Lab Collect; Future; Expected date: 2022    7  Vitamin D deficiency  -     Vitamin D 25 hydroxy; Future    8  Need for influenza vaccination  -     influenza vaccine, quadrivalent, 0 5 mL, preservative-free, for adult and pediatric patients 6 mos+ (AFLURIA, FLUARIX, FLULAVAL, FLUZONE)         Subjective      Susy Yang is here today with multiple concerns     She has had symptoms that have been progressing over the last year and worse over the last month    She has intermittent nausea and vomiting  She has issues eating most of the time  She denies any abdominal pain  She does occasionally get constipated but uses stool softeners or suppositories as needed  She has lost about 5 pounds over the last 2 months  She feels dizzy and lightheaded all the time  She does have menieres disease but hasn't had an 'attack' in a long time  She is scheduled with ENT  She is taking zofran and meclizine with some relief  Sipping on coke gives some relief as well  She does have migraines  She is getting botox this week and seeing neurology  She has increased depression since her sisters death this year  She is seeing psychiatry  She did complete grief counseling through her work  She is looking into other counseling options  She has been using different apps for group discussions and mood trackers  She admits to having suicidal thoughts about 1-2 weeks ago  She denies any current suicidal thoughts  She has ongoing urinary frequency but then only urinates a little bit  She denies burning or pain  Review of Systems   Constitutional: Positive for appetite change and fatigue  Negative for activity change and fever  HENT: Negative for congestion, sinus pressure and sinus pain  Eyes: Negative for visual disturbance  Respiratory: Negative for cough and shortness of breath  Cardiovascular: Negative for chest pain, palpitations and leg swelling  Gastrointestinal: Positive for constipation, nausea and vomiting  Negative for abdominal pain and diarrhea  Genitourinary: Positive for frequency  Negative for difficulty urinating, dysuria, hematuria and pelvic pain  Musculoskeletal: Positive for back pain  Neurological: Positive for dizziness, light-headedness and headaches  Negative for seizures, syncope, facial asymmetry, speech difficulty and numbness  Psychiatric/Behavioral: Positive for dysphoric mood and sleep disturbance   Negative for suicidal ideas  The patient is nervous/anxious  Current Outpatient Medications on File Prior to Visit   Medication Sig   • albuterol (PROVENTIL HFA,VENTOLIN HFA) 90 mcg/act inhaler Inhale 2 puffs 4 (four) times a day   • amphetamine-dextroamphetamine (ADDERALL) 20 mg tablet Take 20 mg by mouth 2 (two) times a day     • clonazePAM (KlonoPIN) 0 5 mg tablet Take 0 5 mg by mouth daily at bedtime   • dexamethasone (DECADRON) 2 mg tablet TAKE 1 TABLET BY MOUTH DAILY WITH BREAKFAST   • divalproex sodium (DEPAKOTE) 250 mg EC tablet 2 tabs q12 h x 2 days, then 1 tab q12 h x 2 days, then stop  Repeat if needed  • gabapentin (NEURONTIN) 600 MG tablet Take 1 tablet (600 mg total) by mouth 2 (two) times a day   • Galcanezumab-gnlm 120 MG/ML SOAJ Inject 120 mg under the skin every 30 (thirty) days   • ibuprofen (MOTRIN) 600 mg tablet TAKE 1 TABLET (600 MG TOTAL) BY MOUTH EVERY 6 (SIX) HOURS AS NEEDED FOR MILD PAIN   • meclizine (ANTIVERT) 12 5 MG tablet Take 1 tablet (12 5 mg total) by mouth every 8 (eight) hours as needed for dizziness   • meloxicam (MOBIC) 7 5 mg tablet TAKE 1 TABLET BY MOUTH EVERY DAY   • montelukast (SINGULAIR) 10 mg tablet TAKE 1 TABLET BY MOUTH EVERYDAY AT BEDTIME   • norethindrone (MICRONOR) 0 35 MG tablet    • onabotulinumtoxin A (BOTOX) 100 units one time  "for vertigo"   • ondansetron (ZOFRAN-ODT) 4 mg disintegrating tablet TAKE 1 TABLET BY MOUTH EVERY 8 HOURS AS NEEDED FOR NAUSEA AND VOMITING   • sertraline (ZOLOFT) 100 mg tablet Take 100 mg by mouth every morning   • SUMAtriptan (IMITREX) 50 mg tablet TAKE 1 TO 2 TABS BY MOUTH AT ONSET OF MIGRAINE  MAY REPEAT IN 2 HOURS IF NEEDED   MAX 4 TABS IN 24HRS   • tiZANidine (ZANAFLEX) 4 mg tablet TAKE 1-2 TABS BY MOUTH NIGHTLY   • traZODone (DESYREL) 100 mg tablet Take  mg by mouth daily at bedtime as needed   • verapamil (CALAN) 40 mg tablet 1 tab qhs   • Vraylar 1 5 MG capsule        Objective     /78   Pulse 81   Temp 97 6 °F (36 4 °C)   Ht 6' 1" (1 854 m)   Wt 91 6 kg (202 lb)   SpO2 99%   BMI 26 65 kg/m²     Physical Exam  Vitals reviewed  Constitutional:       Appearance: Normal appearance  HENT:      Head: Normocephalic and atraumatic  Eyes:      Conjunctiva/sclera: Conjunctivae normal       Pupils: Pupils are equal, round, and reactive to light  Cardiovascular:      Rate and Rhythm: Normal rate and regular rhythm  Heart sounds: Normal heart sounds  Pulmonary:      Effort: Pulmonary effort is normal       Breath sounds: Normal breath sounds  Abdominal:      General: Bowel sounds are normal       Palpations: Abdomen is soft  Tenderness: There is no abdominal tenderness  Musculoskeletal:         General: Normal range of motion  Right lower leg: No edema  Left lower leg: No edema  Skin:     General: Skin is warm and dry  Neurological:      General: No focal deficit present  Mental Status: She is alert and oriented to person, place, and time  Psychiatric:         Mood and Affect: Mood is anxious and depressed  Affect is tearful           Behavior: Behavior normal        CROW Vanessa

## 2022-12-08 ENCOUNTER — APPOINTMENT (OUTPATIENT)
Dept: LAB | Facility: CLINIC | Age: 42
End: 2022-12-08

## 2022-12-08 ENCOUNTER — PROCEDURE VISIT (OUTPATIENT)
Dept: NEUROLOGY | Facility: CLINIC | Age: 42
End: 2022-12-08

## 2022-12-08 VITALS — TEMPERATURE: 97.5 F | DIASTOLIC BLOOD PRESSURE: 96 MMHG | SYSTOLIC BLOOD PRESSURE: 131 MMHG | HEART RATE: 82 BPM

## 2022-12-08 DIAGNOSIS — R11.2 NAUSEA AND VOMITING, UNSPECIFIED VOMITING TYPE: ICD-10-CM

## 2022-12-08 DIAGNOSIS — G43.709 CHRONIC MIGRAINE W/O AURA W/O STATUS MIGRAINOSUS, NOT INTRACTABLE: Primary | ICD-10-CM

## 2022-12-08 DIAGNOSIS — R42 VERTIGO: ICD-10-CM

## 2022-12-08 PROBLEM — G43.719 INTRACTABLE CHRONIC MIGRAINE WITHOUT AURA AND WITHOUT STATUS MIGRAINOSUS: Status: ACTIVE | Noted: 2022-12-08

## 2022-12-08 RX ORDER — KETOROLAC TROMETHAMINE 30 MG/ML
INJECTION, SOLUTION INTRAMUSCULAR; INTRAVENOUS
Qty: 4 ML | Refills: 0 | Status: SHIPPED | OUTPATIENT
Start: 2022-12-08

## 2022-12-08 RX ORDER — PROCHLORPERAZINE EDISYLATE 5 MG/ML
10 INJECTION INTRAMUSCULAR; INTRAVENOUS ONCE
Status: COMPLETED | OUTPATIENT
Start: 2022-12-08 | End: 2022-12-08

## 2022-12-08 RX ORDER — PROMETHAZINE HYDROCHLORIDE 25 MG/ML
INJECTION, SOLUTION INTRAMUSCULAR; INTRAVENOUS
Qty: 4 ML | Refills: 0 | Status: SHIPPED | OUTPATIENT
Start: 2022-12-08

## 2022-12-08 RX ORDER — MECLIZINE HCL 12.5 MG/1
25 TABLET ORAL EVERY 8 HOURS PRN
Qty: 20 TABLET | Refills: 0 | Status: SHIPPED | OUTPATIENT
Start: 2022-12-08

## 2022-12-08 RX ADMIN — PROCHLORPERAZINE EDISYLATE 10 MG: 5 INJECTION INTRAMUSCULAR; INTRAVENOUS at 16:49

## 2022-12-08 NOTE — PROGRESS NOTES
Universal Protocol   Consent: Verbal consent obtained  Written consent obtained    Risks and benefits: risks, benefits and alternatives were discussed  Consent given by: patient  Patient understanding: patient states understanding of the procedure being performed  Patient consent: the patient's understanding of the procedure matches consent given  Procedure consent: procedure consent matches procedure scheduled        Chemodenervation     Date/Time 12/8/2022 10:49 AM     Performed by  Belkys Crain PA-C     Authorized by Belkys Crain PA-C        Pre-procedure details      Prepped With: Alcohol     Procedure details     Position:  Upright   Botox     Botox Type:  Type A    Brand:  Botox    mL's of Botulinum Toxin:  200    Final Concentration per CC:  100 units    Needle Gauge:  30 G 2 5 inch   Procedures     Botox Procedures: chronic headache      Indications: migraines     Injection Location      Head / Face:  L superior trapezius, R superior trapezius, L superior cervical paraspinal, R superior cervical paraspinal, L , R , procerus, L temporalis, R temporalis, R frontalis, L frontalis, R medial occipitalis and L medial occipitalis    L  injection amount:  5 unit(s)    R  injection amount:  5 unit(s)    L lateral frontalis:  5 unit(s)    R lateral frontalis:  5 unit(s)    L medial frontalis:  5 unit(s)    R medial frontalis:  5 unit(s)    L temporalis injection amount:  20 unit(s)    R temporalis injection amount:  20 unit(s)    Procerus injection amount:  5 unit(s)    L medial occipitalis injection amount:  15 unit(s)    R medial occipitalis injection amount:  15 unit(s)    L superior cervical paraspinal injection amount:  10 unit(s)    R superior cervical paraspinal injection amount:  10 unit(s)    L superior trapezius injection amount:  15 unit(s)    R superior trapezius injection amount:  15 unit(s)   Total Units     Total units used:  200    Total units discarded: 0   Post-procedure details      Chemodenervation:  Chronic migraine    Facial Nerve Location[de-identified]  Bilateral facial nerve    Patient tolerance of procedure: Tolerated well, no immediate complications   Comments      Extra units medically necessary: 45 R frontotemporal region and R apex/scalp  Blood pressure 131/96, pulse 82, temperature 97 5 °F (36 4 °C), temperature source Tympanic, not currently breastfeeding  She states her migraine headaches are worse in the summer, but this past November she reports more frequent migraine headaches  She thinks there is a significant wear off affect from the Botox, after Botox wears off she has significantly more migraine headaches  She did have a Toradol and Compazine injection in the office recently which helped  Pt not sure if emgality is causing night sweats, nightmares as a side effect  She was agreeable to switch to Aimovig  She is not sure if the Emgality is working to prevent migraines anyway  Side effects to Aimovig was reviewed in detail  At the onset of a migraine she was agreeable to use Toradol and Phenergan injections at home if the migraine is very severe and does not respond to p o  medication  This will prevent her from needing to come into the office for injections, or have to report to the ED  Patient reports Ménière's disease, meclizine helps but she was told in the past to only use it 2 to 3 days in a row and then stop  I did increase the dose from 12 5 to 25 mg see if this will help

## 2022-12-09 LAB — H PYLORI AG STL QL IA: NEGATIVE

## 2022-12-13 ENCOUNTER — OFFICE VISIT (OUTPATIENT)
Dept: INTERNAL MEDICINE CLINIC | Facility: CLINIC | Age: 42
End: 2022-12-13

## 2022-12-13 VITALS
DIASTOLIC BLOOD PRESSURE: 82 MMHG | OXYGEN SATURATION: 99 % | TEMPERATURE: 99.4 F | HEART RATE: 94 BPM | WEIGHT: 199 LBS | BODY MASS INDEX: 26.95 KG/M2 | HEIGHT: 72 IN | SYSTOLIC BLOOD PRESSURE: 138 MMHG

## 2022-12-13 DIAGNOSIS — R31.9 HEMATURIA, UNSPECIFIED TYPE: ICD-10-CM

## 2022-12-13 DIAGNOSIS — R68.89 FLU-LIKE SYMPTOMS: Primary | ICD-10-CM

## 2022-12-13 RX ORDER — BENZONATATE 100 MG/1
100 CAPSULE ORAL 3 TIMES DAILY PRN
Qty: 30 CAPSULE | Refills: 0 | Status: SHIPPED | OUTPATIENT
Start: 2022-12-13

## 2022-12-13 NOTE — PROGRESS NOTES
Name: Joy Villeda      : 1980      MRN: [id number]  Encounter Provider: CROW Portillo  Encounter Date: 2022   Encounter department: [address] [facility]     1  Flu-like symptoms  -     Covid/Flu- Office Collect  -     benzonatate (TESSALON PERLES) 100 mg capsule; Take 1 capsule (100 mg total) by mouth 3 (three) times a day as needed for cough    2  Hematuria, unspecified type  -     UA w Reflex to Microscopic w Reflex to Culture -Lab Collect; Future; Expected date: 2022    Covid/flu pending  Continue symptomatic treatment  Stay well hydrated  Nohemy Young is here today with flu like symptoms  Symptoms started 2 days ago  It started with a dry cough  Then started with body aches, fever, headache, and sore throat  Taking OTC cold medication  She had recent labs done which showed blood in her urine  She denies any visible blood  No burning or pain with urination  Review of Systems   Constitutional: Positive for activity change, appetite change, chills, fatigue and fever  HENT: Positive for congestion, rhinorrhea and sore throat  Respiratory: Positive for cough  Negative for chest tightness, shortness of breath and wheezing  Cardiovascular: Negative for chest pain  Gastrointestinal: Negative for abdominal pain, diarrhea, nausea and vomiting  Musculoskeletal: Positive for myalgias  Neurological: Positive for headaches         Current Outpatient Medications on File Prior to Visit   Medication Sig   • albuterol (PROVENTIL HFA,VENTOLIN HFA) 90 mcg/act inhaler Inhale 2 puffs 4 (four) times a day   • amphetamine-dextroamphetamine (ADDERALL) 20 mg tablet Take 20 mg by mouth 2 (two) times a day     • clonazePAM (KlonoPIN) 0 5 mg tablet Take 0 5 mg by mouth daily at bedtime   • dexamethasone (DECADRON) 2 mg tablet TAKE 1 TABLET BY MOUTH DAILY WITH BREAKFAST   • divalproex sodium (DEPAKOTE) 250 mg EC tablet 2 tabs q12 h x 2 days, then 1 tab q12 h x 2 days, then stop  Repeat if needed  • Erenumab-aooe 140 MG/ML SOAJ Inject 140 mg under the skin every 30 (thirty) days   • gabapentin (NEURONTIN) 600 MG tablet Take 1 tablet (600 mg total) by mouth 2 (two) times a day   • ibuprofen (MOTRIN) 600 mg tablet TAKE 1 TABLET (600 MG TOTAL) BY MOUTH EVERY 6 (SIX) HOURS AS NEEDED FOR MILD PAIN   • ketorolac (TORADOL) 30 mg/mL injection 1-2 ml IM injection once per 24 hours p r n  migraine  No more than 2 injections per week  • meclizine (ANTIVERT) 12 5 MG tablet Take 2 tablets (25 mg total) by mouth every 8 (eight) hours as needed for dizziness   • meloxicam (MOBIC) 7 5 mg tablet TAKE 1 TABLET BY MOUTH EVERY DAY   • montelukast (SINGULAIR) 10 mg tablet TAKE 1 TABLET BY MOUTH EVERYDAY AT BEDTIME   • norethindrone (MICRONOR) 0 35 MG tablet    • onabotulinumtoxin A (BOTOX) 100 units one time  "for vertigo"   • ondansetron (ZOFRAN-ODT) 4 mg disintegrating tablet TAKE 1 TABLET BY MOUTH EVERY 8 HOURS AS NEEDED FOR NAUSEA AND VOMITING   • promethazine (PHENERGAN) 25 mg/mL injection 1 IM injection with the toradol PRN  Max 1 per day  • Rimegepant Sulfate (NURTEC) 75 MG TBDP 1 tab at migraine onset  No more than one dose per 24 hours  Hold triptan  • sertraline (ZOLOFT) 100 mg tablet Take 100 mg by mouth every morning   • SUMAtriptan (IMITREX) 50 mg tablet TAKE 1 TO 2 TABS BY MOUTH AT ONSET OF MIGRAINE  MAY REPEAT IN 2 HOURS IF NEEDED  MAX 4 TABS IN 24HRS   • Syringe/Needle, Disp, (SYRINGE 3CC/15RM7-6/2") 25G X 1-1/2" 3 ML MISC Use for toradol IM injections     • tiZANidine (ZANAFLEX) 4 mg tablet TAKE 1-2 TABS BY MOUTH NIGHTLY   • traZODone (DESYREL) 100 mg tablet Take  mg by mouth daily at bedtime as needed   • verapamil (CALAN) 40 mg tablet 1 tab qhs   • Vraylar 1 5 MG capsule        Objective     /82 (BP Location: Left arm, Patient Position: Sitting, Cuff Size: Adult)   Pulse 94   Temp 99 4 °F (37 4 °C)   Ht 6' 1" (1 854 m) Wt 90 3 kg (199 lb)   SpO2 99%   BMI 26 25 kg/m²     Physical Exam  Vitals reviewed  Constitutional:       Appearance: Normal appearance  HENT:      Head: Normocephalic and atraumatic  Right Ear: Tympanic membrane, ear canal and external ear normal       Left Ear: Tympanic membrane, ear canal and external ear normal       Mouth/Throat:      Pharynx: Posterior oropharyngeal erythema present  No oropharyngeal exudate  Eyes:      Conjunctiva/sclera: Conjunctivae normal    Cardiovascular:      Rate and Rhythm: Normal rate and regular rhythm  Heart sounds: Normal heart sounds  Pulmonary:      Effort: Pulmonary effort is normal       Breath sounds: Normal breath sounds  Neurological:      Mental Status: She is alert and oriented to person, place, and time  Psychiatric:         Mood and Affect: Mood normal          Behavior: Behavior normal      reviewed recent labs with patient       [facility] Lily Wiggins

## 2022-12-14 LAB
FLUAV RNA RESP QL NAA+PROBE: POSITIVE
FLUBV RNA RESP QL NAA+PROBE: NEGATIVE
SARS-COV-2 RNA RESP QL NAA+PROBE: NEGATIVE

## 2022-12-21 ENCOUNTER — TELEPHONE (OUTPATIENT)
Dept: NEUROLOGY | Facility: CLINIC | Age: 42
End: 2022-12-21

## 2022-12-21 DIAGNOSIS — G43.709 CHRONIC MIGRAINE W/O AURA W/O STATUS MIGRAINOSUS, NOT INTRACTABLE: ICD-10-CM

## 2022-12-21 DIAGNOSIS — G43.709 CHRONIC MIGRAINE W/O AURA W/O STATUS MIGRAINOSUS, NOT INTRACTABLE: Primary | ICD-10-CM

## 2022-12-21 RX ORDER — PROCHLORPERAZINE EDISYLATE 5 MG/ML
INJECTION INTRAMUSCULAR; INTRAVENOUS
Qty: 10 ML | Refills: 0 | Status: SHIPPED | OUTPATIENT
Start: 2022-12-21 | End: 2022-12-21

## 2022-12-21 RX ORDER — PROMETHAZINE HYDROCHLORIDE 6.25 MG/5ML
12.5 SYRUP ORAL 4 TIMES DAILY PRN
Qty: 118 ML | Refills: 0 | Status: SHIPPED | OUTPATIENT
Start: 2022-12-21

## 2022-12-21 RX ORDER — PROCHLORPERAZINE EDISYLATE 5 MG/ML
INJECTION INTRAMUSCULAR; INTRAVENOUS
Qty: 10 ML | Refills: 0 | OUTPATIENT
Start: 2022-12-21

## 2022-12-21 NOTE — TELEPHONE ENCOUNTER
Received -Hi, yes, my name is Ashley Bueno, my date of birth is [birth year]  I'm calling because I was there a couple weeks ago and we changed my medicine  The pharmacy still waiting for a pre authorization on the emgality and the nurtec  And also the promethazine that she called in is out of stock and isn't available within 20 miles of me  They recommended i go to another pharmacy, but I was wondering if there was something else I could use instead of the promethazine  my insurance is no longer aetna, now it's through [employer]  So if you could just please call in that pre authorization for those other 2 medicines, that would be great  My number is [phone]  PA's were submitted to [employer]    Please advise on promethazine

## 2022-12-21 NOTE — TELEPHONE ENCOUNTER
PA's submitted for nurte and aimovig      [facility] - [place]: Adarsh Hahn [place]: [id number]

## 2022-12-22 NOTE — TELEPHONE ENCOUNTER
Both nurtec and aimovig were denied by [employer] as they show patient has alternate pharmacy benefits  Patient will need to call [facility] to address this and then notify office so PA's can be resubmitted  Detailed message left for patient informing of previous  Requested call back so PA's can be resubmitted

## 2022-12-26 DIAGNOSIS — M25.511 CHRONIC RIGHT SHOULDER PAIN: ICD-10-CM

## 2022-12-26 DIAGNOSIS — G89.29 CHRONIC RIGHT SHOULDER PAIN: ICD-10-CM

## 2022-12-27 RX ORDER — MELOXICAM 7.5 MG/1
TABLET ORAL
Qty: 30 TABLET | Refills: 0 | Status: SHIPPED | OUTPATIENT
Start: 2022-12-27

## 2022-12-27 NOTE — TELEPHONE ENCOUNTER
Patient called in regarding PA's for Aimovig and Nurtec  States she no longer has Aetna as she is out of work and  everything should be going through [employer]  Is not understanding why [facility] is not approving meds       Please call back  # [phone]

## 2022-12-27 NOTE — TELEPHONE ENCOUNTER
Patient said she is on leave of absence so thinks she only has [employer] insurance  I suggested she call her employer to determine coverage; if she is only covered by [employer] at this time, please call them and advise and then cb office to confirm either way so we know how to proceed

## 2023-01-06 ENCOUNTER — TELEPHONE (OUTPATIENT)
Dept: NEUROLOGY | Facility: CLINIC | Age: 43
End: 2023-01-06

## 2023-01-06 DIAGNOSIS — G43.009 MIGRAINE WITHOUT AURA AND WITHOUT STATUS MIGRAINOSUS, NOT INTRACTABLE: ICD-10-CM

## 2023-01-06 RX ORDER — SUMATRIPTAN 50 MG/1
TABLET, FILM COATED ORAL
Qty: 9 TABLET | Refills: 5 | Status: SHIPPED | OUTPATIENT
Start: 2023-01-06

## 2023-01-06 NOTE — TELEPHONE ENCOUNTER
[facility]  Key: [id number]    If Nick has not responded to your request within 24 hours, contact [facility] at [phone]

## 2023-01-06 NOTE — TELEPHONE ENCOUNTER
Received vm-Good morning  My name is praveena apodaca  My YOB: 1980  It's Friday about 11 o'clock on January 6th  I'm calling because I was in about a month ago  And the doctor changed my medicine from the emgality to [address] and from imitrex to nurtec  And there was for some reason, a problem with the prior authorization  I think you were trying to put it through the [employer], but just run everything through the [employer] and it should be fine  If someone could please call me back at [phone]  Because I don't have a monthly injection now and I need my medicine as soon as possible  Thank you  See encounter from 12/21 for more info   Called pt and confirmed that PA's are to be submitted through [place]  She confirmed this  Advised that we will complete PA's and will call her with determination  [phone] to leave detailed message    Aimovig PA completed on [facility]  [place]: Jonathan Brown   If Nick has not responded to your request within 24 hours, contact [facility] at [phone]      Will complete [facility] PA in separate encounter

## 2023-01-11 NOTE — TELEPHONE ENCOUNTER
Recd denial for nurtec; I called insurance with additional information for nurtec and aimovig    Determination pending; 24-72 hours

## 2023-01-13 ENCOUNTER — TELEPHONE (OUTPATIENT)
Dept: NEUROLOGY | Facility: CLINIC | Age: 43
End: 2023-01-13

## 2023-01-13 DIAGNOSIS — G43.709 CHRONIC MIGRAINE W/O AURA W/O STATUS MIGRAINOSUS, NOT INTRACTABLE: ICD-10-CM

## 2023-01-13 DIAGNOSIS — G43.709 CHRONIC MIGRAINE WITHOUT AURA, NOT INTRACTABLE, WITHOUT STATUS MIGRAINOSUS: ICD-10-CM

## 2023-01-13 DIAGNOSIS — G43.709 CHRONIC MIGRAINE WITHOUT AURA, NOT INTRACTABLE, WITHOUT STATUS MIGRAINOSUS: Primary | ICD-10-CM

## 2023-01-13 RX ORDER — ONDANSETRON 4 MG/1
TABLET, ORALLY DISINTEGRATING ORAL
Qty: 9 TABLET | Refills: 0 | Status: SHIPPED | OUTPATIENT
Start: 2023-01-13

## 2023-01-13 RX ORDER — PROMETHAZINE HYDROCHLORIDE 6.25 MG/5ML
12.5 SYRUP ORAL 4 TIMES DAILY PRN
Qty: 118 ML | Refills: 0 | Status: SHIPPED | OUTPATIENT
Start: 2023-01-13

## 2023-01-13 NOTE — TELEPHONE ENCOUNTER
See other task:PA denied on 1/11  [facility] was called same day  Nurse provided additional information       Called [facility] to check status  Rep states that PA still pending  Has been 48 hours, rep says to give it another 24 hours for determination          Called patient, reached , left detailed message

## 2023-01-13 NOTE — TELEPHONE ENCOUNTER
Called [facility] to check status of Nurtec and Aimovig  Rep informs still pending  Says will take up to 72 hours and today is 48 hours  Rep says to check status again in 24 hours

## 2023-01-13 NOTE — TELEPHONE ENCOUNTER
Pt left message  I am still waiting for prior authorization for my Aimovig and Nurtec it has been 5 weeks since I was in the office  I lost my job since I am unable to take my medication   I don't understand what the problem is with the insurance  I am very upset  I spoke to somebody about a week and a half ago in your office, and I told them this and they were supposed to be taken care of  And I don't understand what the problem is  Again was my number is [phone]

## 2023-01-13 NOTE — TELEPHONE ENCOUNTER
PA denied on 1/11  [facility] was called same day  Nurse provided additional information  Called [facility] to check status  Rep states that PA still pending  Has been 48 hours, rep says to give it another 24 hours for determination

## 2023-01-16 NOTE — TELEPHONE ENCOUNTER
I spoke to patient who confirmed she only has [employer] insurance  I suggested she call them to advise that as we already submitted PA to them and recd response that they were secondary which is no longer the case; possible reopen the PA   She will call with their response

## 2023-01-16 NOTE — TELEPHONE ENCOUNTER
Office is closed today but patient said she no longer has this insurance; see other task  Patient said she only has [facility] caritas (PA was submitted to them as well but they documented they were secondary and denied)  Patient will call [facility] and clarify and request they reopen the PA; she will advise outcome

## 2023-01-16 NOTE — TELEPHONE ENCOUNTER
Pt left message  I am calling about prior authorization for Aimovig and Nurtec and I did received your VM on Friday that it was denied,but I did loose my job, so I am not sure if should be going to [place] or [facility], so I am just calling to check on the status of everything because I need to do my monthly injection this week,and I have none,so I don't know of what is going on,please call me back at [phone]

## 2023-01-17 NOTE — TELEPHONE ENCOUNTER
received -Hi, my name is praveena apodaca   My phone number is [phone], date of birth  [birth year]  I'm calling because I spoke to the [employer] and everything's okay  So you can run the pre auth  You have to send them over another pre authorization for aimovig and nurtec  Thank you  Bye bye

## 2023-01-18 ENCOUNTER — CONSULT (OUTPATIENT)
Dept: GASTROENTEROLOGY | Facility: CLINIC | Age: 43
End: 2023-01-18

## 2023-01-18 ENCOUNTER — TELEPHONE (OUTPATIENT)
Dept: GASTROENTEROLOGY | Facility: CLINIC | Age: 43
End: 2023-01-18

## 2023-01-18 VITALS
DIASTOLIC BLOOD PRESSURE: 110 MMHG | SYSTOLIC BLOOD PRESSURE: 195 MMHG | HEART RATE: 102 BPM | WEIGHT: 209.1 LBS | BODY MASS INDEX: 27.59 KG/M2

## 2023-01-18 DIAGNOSIS — R11.2 NAUSEA AND VOMITING, UNSPECIFIED VOMITING TYPE: Primary | ICD-10-CM

## 2023-01-18 DIAGNOSIS — K59.00 CONSTIPATION, UNSPECIFIED CONSTIPATION TYPE: ICD-10-CM

## 2023-01-18 RX ORDER — POLYETHYLENE GLYCOL 3350, SODIUM SULFATE ANHYDROUS, SODIUM BICARBONATE, SODIUM CHLORIDE, POTASSIUM CHLORIDE 236; 22.74; 6.74; 5.86; 2.97 G/4L; G/4L; G/4L; G/4L; G/4L
4000 POWDER, FOR SOLUTION ORAL ONCE
Qty: 4000 ML | Refills: 0 | Status: SHIPPED | OUTPATIENT
Start: 2023-01-18 | End: 2023-01-18

## 2023-01-18 NOTE — TELEPHONE ENCOUNTER
PA's resubmitted for nurtec and aimovig using [facility] insurance information below  Awaiting determination      AIMOVIG: Key: Angie [place]: [id number]      ID: [id number]  GROUP:  N: [id number]  BIN: [id number]

## 2023-01-18 NOTE — PROGRESS NOTES
[facility] - Outpatient Consultation  Crystal Thomas 40 y o  female MRN: [id number]  Encounter: [id number]        ASSESSMENT AND PLAN:       Diagnoses and all orders for this visit:    Nausea and vomiting, unspecified vomiting type  We will plan to evaluate further with EGD with biopsies to look for H  pylori, peptic ulcer disease, etc   Continue to minimize NSAID use  -     EGD; Future    Constipation, unspecified constipation type  Recommend she try an over-the-counter fiber supplement or MiraLAX on a daily basis, titrated to effect  We will evaluate with colonoscopy and follow-up here afterward  -     Colonoscopy; Future  -     polyethylene glycol (Golytely) 4000 mL solution; Take 4,000 mL by mouth once for 1 dose Take 4000 mL by mouth once for 1 dose  Use as directed          ______________________________________________________________________    HPI: Patient resents with a number of GI symptoms including heartburn, nausea and vomiting, constipation  The symptoms have been ongoing for a long time but worsening recently  She has had no blood in her bowel movements, no unexplained weight loss, fever or chills, jaundice or rashes  Does have a family history of colon cancer in her maternal grandmother and reflux in her mother  She uses NSAIDs but tries to minimize this as much as possible        REVIEW OF SYSTEMS:    ROS     Historical Information   Past Medical History:   Diagnosis Date   • Allergic    • Anxiety    • Arthritis    • Asthma    • COVID-19 02/08/2021   • Depression    • ETOH abuse    • Headache(784 0)    • Herniated cervical disc    • Memory loss    • Meniere's disease    • Migraine    • Psychiatric disorder     anxiety/depression   • Scoliosis    • Sinusitis    • Stroke [facility])    • Thyroid disease    • Vertigo    • Vertigo      Past Surgical History:   Procedure Laterality Date   • BACK SURGERY      C5-6   • HIP SURGERY     • JOINT REPLACEMENT      right hip   • OTHER SURGICAL HISTORY      Hip Replacement (right) , Spinal  Diskectomy Cervical C5-C6     Social History   Social History     Substance and Sexual Activity   Alcohol Use Yes   • Alcohol/week: 3 0 standard drinks   • Types: 3 Glasses of wine per week    Comment: 3 glass wine per week     Social History     Substance and Sexual Activity   Drug Use Yes   • Frequency: 1 0 times per week   • Types: Marijuana     Social History     Tobacco Use   Smoking Status Never   • Passive exposure: Yes   Smokeless Tobacco Never     Family History   Problem Relation Age of Onset   • Hypertension Mother    • Thyroid disease Mother    • Anxiety disorder Mother    • Hypertension Father    • Colon cancer Maternal Grandmother    • Breast cancer Paternal Grandmother    • Dementia Paternal Grandmother    • Heart disease Paternal Grandfather    • No Known Problems Maternal Aunt    • No Known Problems Paternal Aunt    • Drug abuse Sister         Pasted away 2022       Meds/Allergies       Current Outpatient Medications:   •  albuterol (PROVENTIL HFA,VENTOLIN HFA) 90 mcg/act inhaler  •  amphetamine-dextroamphetamine (ADDERALL) 20 mg tablet  •  benzonatate (TESSALON PERLES) 100 mg capsule  •  clonazePAM (KlonoPIN) 0 5 mg tablet  •  dexamethasone (DECADRON) 2 mg tablet  •  divalproex sodium (DEPAKOTE) 250 mg EC tablet  •  Erenumab-aooe 140 MG/ML SOAJ  •  gabapentin (NEURONTIN) 600 MG tablet  •  ketorolac (TORADOL) 30 mg/mL injection  •  meclizine (ANTIVERT) 12 5 MG tablet  •  meloxicam (MOBIC) 7 5 mg tablet  •  montelukast (SINGULAIR) 10 mg tablet  •  norethindrone (MICRONOR) 0 35 MG tablet  •  onabotulinumtoxin A (BOTOX) 100 units  •  ondansetron (ZOFRAN-ODT) 4 mg disintegrating tablet  •  polyethylene glycol (Golytely) 4000 mL solution  •  prochlorperazine (COMPAZINE) 10 mg/2mL  •  promethazine (PHENERGAN) 12 5 mg/10 mL syrup  •  Rimegepant Sulfate (NURTEC) 75 MG TBDP  •  sertraline (ZOLOFT) 100 mg tablet  •  SUMAtriptan (IMITREX) 50 mg tablet  •  Syringe/Needle, Disp, (SYRINGE 3CC/36OQ8-0/2") 25G X 1-1/2" 3 ML MISC  •  tiZANidine (ZANAFLEX) 4 mg tablet  •  traZODone (DESYREL) 100 mg tablet  •  verapamil (CALAN) 40 mg tablet  •  Vraylar 1 5 MG capsule  •  ibuprofen (MOTRIN) 600 mg tablet    Allergies   Allergen Reactions   • Latuda [Lurasidone]      Reaction: Drug Psychosis   • Topamax [Topiramate]      psychosis   • Amoxicillin Rash   • Percocet [Oxycodone-Acetaminophen] Rash           Objective     Blood pressure (!) 195/110, pulse 102, weight 94 8 kg (209 lb 1 6 oz), not currently breastfeeding  Body mass index is 27 59 kg/m²  PHYSICAL EXAM:      Physical Exam  Vitals and nursing note reviewed  Constitutional:       General: She is not in acute distress  Appearance: She is not ill-appearing  HENT:      Head: Normocephalic and atraumatic  Eyes:      General: No scleral icterus  Extraocular Movements: Extraocular movements intact  Cardiovascular:      Rate and Rhythm: Normal rate and regular rhythm  Pulmonary:      Effort: Pulmonary effort is normal  No respiratory distress  Abdominal:      General: There is no distension  Palpations: Abdomen is soft  Tenderness: There is no abdominal tenderness  There is no guarding or rebound  Musculoskeletal:      Right lower leg: No edema  Left lower leg: No edema  Skin:     General: Skin is warm and dry  Coloration: Skin is not cyanotic  Findings: No erythema  Neurological:      General: No focal deficit present  Mental Status: She is alert and oriented to person, place, and time  Psychiatric:         Mood and Affect: Mood normal          Behavior: Behavior normal               Lab Results:   No visits with results within 1 Day(s) from this visit     Latest known visit with results is:   Office Visit on 12/13/2022   Component Date Value   • SARS-CoV-2 12/13/2022 Negative    • INFLUENZA A PCR 12/13/2022 Positive (A)    • INFLUENZA B PCR 12/13/2022 Negative          Radiology Results:   No results found

## 2023-01-23 RX ORDER — GALCANEZUMAB 120 MG/ML
INJECTION, SOLUTION SUBCUTANEOUS
Qty: 1 ML | Refills: 0 | Status: SHIPPED | OUTPATIENT
Start: 2023-01-23 | End: 2023-02-16

## 2023-01-23 NOTE — TELEPHONE ENCOUNTER
Palacios snot make sense to continue emgality if not helpful, if she finds it a little bit helpful for migraines she can take it

## 2023-01-23 NOTE — TELEPHONE ENCOUNTER
Patient said her insurance through her employer ([employer]) will    The PA for aimovig and nurtec was previously denied by [employer]  [facility]'s also [facility] for alternate coverage  Patient is requesting we send new [facility] [address]  the interim  Last dose was in December  After , we will submit the PA for aimovig and nurtec to [employer] as that will be her only insurance moving forward  Please send script for emgality if in agreement as this is currently approved through [employer]  Thank you

## 2023-01-24 DIAGNOSIS — M25.511 CHRONIC RIGHT SHOULDER PAIN: ICD-10-CM

## 2023-01-24 DIAGNOSIS — G89.29 CHRONIC RIGHT SHOULDER PAIN: ICD-10-CM

## 2023-01-24 RX ORDER — MELOXICAM 7.5 MG/1
TABLET ORAL
Qty: 30 TABLET | Refills: 0 | Status: SHIPPED | OUTPATIENT
Start: 2023-01-24

## 2023-02-01 NOTE — TELEPHONE ENCOUNTER
nurtec denied; formulary alternatives are Hattie Mena, emgality  aimovig approved 1/24/2023 - 4/24/2023    Called patient to advise; reached , left detailed message  Ava Rodriguez,  Did you want to prescribe ubrelvy as alternative to nurtec?

## 2023-02-02 ENCOUNTER — TELEPHONE (OUTPATIENT)
Dept: NEUROLOGY | Facility: CLINIC | Age: 43
End: 2023-02-02

## 2023-02-02 DIAGNOSIS — G43.709 CHRONIC MIGRAINE WITHOUT AURA, NOT INTRACTABLE, WITHOUT STATUS MIGRAINOSUS: ICD-10-CM

## 2023-02-02 DIAGNOSIS — G43.709 CHRONIC MIGRAINE W/O AURA W/O STATUS MIGRAINOSUS, NOT INTRACTABLE: ICD-10-CM

## 2023-02-02 NOTE — TELEPHONE ENCOUNTER
Patient left voicemail requesting PA for Gregoria Stephenson to be done through [facility]  ID: [id number]  GROUP: [id number]  PCN: [id number]  BIN: [id number]    Key: [id number]    PA submitted, awaiting determination  Call returned to patient  Message left notifying PA submitted to plan

## 2023-02-03 NOTE — TELEPHONE ENCOUNTER
Matthew Cavazoss denied by [employer] [place] [facility]\"" for alternative benefits  Called patient to advise; reached vm, left detailed message to please call plan to clarify and advise when resolved

## 2023-02-07 NOTE — TELEPHONE ENCOUNTER
I spoke to patient who said [employer] is primary as of 2/1; will need ubrlevy and emgality submitted via [employer]  I called [employer] who advised they still have patient listed with alternate coverage through [facility]; I let patient know; she wcb when resolved  She said for now she does have emgality and sumatriptan

## 2023-02-09 NOTE — TELEPHONE ENCOUNTER
Patient called back to let Soren Engel know that she spoke to [employer] and they will be removing pt's other insurance coverage they had on file  [facility] is submitting an Urgent request and told patient a TAT of 24 to 48 hours for request to process/update  Patient asked if you can resubmit on Monday everything should be straightened out    # [phone]    I called patient back and let her know VM was recv'd  Confirmed that [facility] told her it will take 24 to 48 hours to have the old [employer] plan removed from her [employer] account  She was told that our office would have to resubmit PA's for Emgality and Ubrelvy on Monday 2/13/23

## 2023-02-13 DIAGNOSIS — G43.709 CHRONIC MIGRAINE W/O AURA W/O STATUS MIGRAINOSUS, NOT INTRACTABLE: ICD-10-CM

## 2023-02-13 RX ORDER — KETOROLAC TROMETHAMINE 30 MG/ML
INJECTION, SOLUTION INTRAMUSCULAR; INTRAVENOUS
Qty: 4 ML | Refills: 0 | Status: SHIPPED | OUTPATIENT
Start: 2023-02-13

## 2023-02-14 ENCOUNTER — TELEPHONE (OUTPATIENT)
Dept: NEUROLOGY | Facility: CLINIC | Age: 43
End: 2023-02-14

## 2023-02-14 NOTE — TELEPHONE ENCOUNTER
10AM- called pt to verify medical insurance  Pt gave following insurance info:     Insurance provider: [employer]  Member ID# [id number]  Nora 30: 7      Called [facility] and spoke with rep who informed me that pt has insurance as a secondary insurance as noted in their system and needs to call member services to have the team verify that pt doesn't have another insurance as her primary      Rep also informed me that until this resolved, prior auths cannot be submitted      10:55AM- called pt to inform her of this matter   Pt states that she has called [facility] several times to resolve this issue      Advised pt to call again and speak with a supervisor to perhaps expedite this process         Pt states that she will contact myself or Mckayla Beaulieu regarding this matter should she get it resolved with

## 2023-02-14 NOTE — TELEPHONE ENCOUNTER
10AM- called pt to verify medical insurance  Pt gave following insurance info: Insurance provider: [employer]  Member ID# [id number]  Nora 30: 7     Called [facility] and spoke with rep who informed me that pt has insurance as a secondary insurance as noted in their system and needs to call member services to have the team verify that pt doesn't have another insurance as her primary  Rep also informed me that until this resolved, prior auths cannot be submitted  10:55AM- called pt to inform her of this matter  Pt states that she has called [facility] several times to resolve this issue  Advised pt to call again and speak with a supervisor to perhaps expedite this process         Pt states that she will contact myself or Cheli Maharaj regarding this matter should she get it resolved with [facility]

## 2023-02-16 DIAGNOSIS — G43.709 CHRONIC MIGRAINE WITHOUT AURA, NOT INTRACTABLE, WITHOUT STATUS MIGRAINOSUS: ICD-10-CM

## 2023-02-16 RX ORDER — GALCANEZUMAB 120 MG/ML
INJECTION, SOLUTION SUBCUTANEOUS
Qty: 1 ML | Refills: 11 | Status: SHIPPED | OUTPATIENT
Start: 2023-02-16

## 2023-02-16 NOTE — TELEPHONE ENCOUNTER
Good morning, my name is Laura Staples  My date of birth is [birth year]  I'm calling because I need my emgality called into the [facility] on [address] in [place]  Also I'm waiting for a pre auth for the aimovig and Nurtec  I don't know if you just wanna call those in, but I am due for my monthly injection today or tomorrow  So if you could call me back at [phone]  Thank you  Called pt and advised that emgality refill request was sent to [facility] for approval and will have MK's nurse follow up on Aimovig and Nurtec PA  Pt verbalized understanding     [phone], ok to leave a detailed message

## 2023-02-16 NOTE — TELEPHONE ENCOUNTER
Pt called again requesting a refill of emgality  She is due today or tmrw  Rx pending   Pls review and sign off    thanks

## 2023-02-20 ENCOUNTER — TELEPHONE (OUTPATIENT)
Dept: GASTROENTEROLOGY | Facility: CLINIC | Age: 43
End: 2023-02-20

## 2023-02-20 NOTE — TELEPHONE ENCOUNTER
----- Message from Bhavna Orlando sent at 2/20/2023 12:13 PM EST -----  Regarding: R/S  Please r/s pt has Jaimee as #2  Thank you!

## 2023-02-20 NOTE — TELEPHONE ENCOUNTER
Left message for patient that I canceled his flip with Dr Jen Cisse at [facility] due to secondary insurance  To please call and reschedule at [facility]  Will try calling again if he doesn't return call to reschedule  Thank you!

## 2023-02-22 NOTE — TELEPHONE ENCOUNTER
Pt left VM request update on PA status    Transcribed VM:   Hi, yes, this is [facility]  I'm calling about my Aimovig and Nurtec  I still have the prior auths for that and the Emgality that I used to be on needed a prior auth  But I was supposed to be switched to the Aimovig and the Nurtec  That was supposed to be my new plan since like the beginning of December  So I was just calling to check in and see if any of the prior auths have been approved yet  My number is [phone]  Thank you  Called pharmacy as Amber Sanchez is approved, however pharmacist states that with [employer] the injectables need to go through their specialty pharmacies

## 2023-02-22 NOTE — TELEPHONE ENCOUNTER
I spoke to [employer] prior auth team and was told her pharmacy benefits with [employer] still show her as having alternate coverage however insurance  received a request from patient to make [employer] primary insurance as she no longer has Faustine Liter, was received on 2/14 and may take up to 30 days to process  Patient said she was on imitrex and emgality for 1 year then neither was effective; nurtec and aimovig then ordered however nurtec was denied by her prior insurance so was replaced with Corrina Sims:  Please confirm meds you want us to submit prior auth for once her insurance is effective (nurtec or Corrina Ortiz and aimovig)  Scripts may have to be placed, thanks for your help  Patient will advise when her insurance is confirmed

## 2023-02-24 DIAGNOSIS — G43.809 VESTIBULAR MIGRAINE: ICD-10-CM

## 2023-02-24 NOTE — TELEPHONE ENCOUNTER
Response from Bertha is fine then  Thanks  Will submit for nurtec and aimovig when patient advises medication portion of insurance is effective

## 2023-02-24 NOTE — TELEPHONE ENCOUNTER
Hi, my name is Princess Elder  My YOB: 1980  I was calling about my prior auth for the Aimovig and the Nurtec or the Emgality  I've been dealing with Noelle Lopez mostly  My phone number is [phone]  The insurance company finally did take the other benefit off  So they are telling me that the doctor's office has to resubmit the prior auth and it should be good to go  So if you can get them in today, that would be great  Again, my name is Princess Elder, 1980  [phone]  Thank you  Called pt to let her know that I receive her message and will forward to Noelle Lopez to resubmit PA  Pt verbalized understanding

## 2023-02-27 RX ORDER — DEXAMETHASONE 2 MG/1
TABLET ORAL
Qty: 5 TABLET | Refills: 2 | Status: SHIPPED | OUTPATIENT
Start: 2023-02-27

## 2023-02-27 NOTE — TELEPHONE ENCOUNTER
Left message for patient to call and reschedule her Flip at [facility] with Dr Bradley Members since her secondary insurance is out of network at [facility]  Will call again in about 2 weeks if she doesn't return call

## 2023-03-01 RX ORDER — RIMEGEPANT SULFATE 75 MG/75MG
TABLET, ORALLY DISINTEGRATING ORAL
Qty: 8 TABLET | Refills: 11 | Status: SHIPPED | OUTPATIENT
Start: 2023-03-01 | End: 2023-03-13 | Stop reason: SDUPTHER

## 2023-03-01 NOTE — TELEPHONE ENCOUNTER
February 24, 2023  Nusrat Ken RN     GB    12:07 PM  Note   Hi, my name is Aidee Perera  My YOB: 1980  I was calling about my prior auth for the Aimovig and the Nurtec or the Emgality  I've been dealing with Elina Rosario mostly  My phone number is [phone]  The insurance company finally did take the other benefit off  So they are telling me that the doctor's office has to resubmit the prior auth and it should be good to go  So if you can get them in today, that would be great  Again, my name is Aidee Perera, 1980  [phone]  Thank you      Called pt to let her know that I receive her message and will forward to Elina Rosario to resubmit PA   Pt verbalized understanding

## 2023-03-01 NOTE — TELEPHONE ENCOUNTER
Received VM transcription:    Hi, my name is Noralyn Kayser   I'm calling about the prior authorization for my Aimovig and my Nurted  I still haven't heard anything back about the prior authorizations  If you could call me back at [phone]  Thank you   -----------------------------------------------------    See previous message from 2/24/23  Pt states: The insurance company finally did take the other benefit off  So they are telling me that the doctor's office has to resubmit the prior auth and it should be good to go  No script for Nurtec on file  Nurtec PA resubmitted on CMM  Perry: Odette TOLEDO resubmitted on CMM  Key: [id number]     Awaiting determination  Notified pt of same  [address] - Rx entered  Please review and sign if in agreement

## 2023-03-06 DIAGNOSIS — G43.709 CHRONIC MIGRAINE W/O AURA W/O STATUS MIGRAINOSUS, NOT INTRACTABLE: ICD-10-CM

## 2023-03-06 NOTE — TELEPHONE ENCOUNTER
Phone call to [phone]  ([phone])  Per Rep Leonarda Ellis , pt's primary health plan is [facility]  Will submit PA for Nurtec     -----------------------------------------------------    PA for Nurtec submitted via [facility]  Key: [id number]  Waiting for determination

## 2023-03-06 NOTE — TELEPHONE ENCOUNTER
Pt left message   I am calling in regards to Aimovig and Nurtec prior authorization , I still haven't kaley anything yet,also I have Botox on 3/13 and you might want to do PA for that like now through [employer] ,because I haven't had any medication for like month and half    CB# [phone]  Called pt back and made aware  Per chart review [facility] Pain approved   Dates-3/1/23-9/1/23 and prescription needs to be sent to perform specialty pharmacy -made pt aware  Nurtec- additional information requested by  Insurance    Botox please submitted new PA with new insurance

## 2023-03-07 ENCOUNTER — TELEPHONE (OUTPATIENT)
Dept: GASTROENTEROLOGY | Facility: CLINIC | Age: 43
End: 2023-03-07

## 2023-03-08 ENCOUNTER — TELEPHONE (OUTPATIENT)
Dept: NEUROLOGY | Facility: CLINIC | Age: 43
End: 2023-03-08

## 2023-03-13 ENCOUNTER — PROCEDURE VISIT (OUTPATIENT)
Dept: NEUROLOGY | Facility: CLINIC | Age: 43
End: 2023-03-13

## 2023-03-13 VITALS
DIASTOLIC BLOOD PRESSURE: 86 MMHG | HEART RATE: 84 BPM | BODY MASS INDEX: 29.47 KG/M2 | WEIGHT: 217.6 LBS | SYSTOLIC BLOOD PRESSURE: 132 MMHG | HEIGHT: 72 IN | TEMPERATURE: 97.6 F

## 2023-03-13 DIAGNOSIS — G43.709 CHRONIC MIGRAINE W/O AURA W/O STATUS MIGRAINOSUS, NOT INTRACTABLE: Primary | ICD-10-CM

## 2023-03-13 RX ORDER — RIMEGEPANT SULFATE 75 MG/75MG
TABLET, ORALLY DISINTEGRATING ORAL
Qty: 16 TABLET | Refills: 11 | Status: SHIPPED | OUTPATIENT
Start: 2023-03-13

## 2023-03-13 RX ORDER — SUMATRIPTAN 100 MG/1
TABLET, FILM COATED ORAL
Qty: 10 TABLET | Refills: 5 | Status: SHIPPED | OUTPATIENT
Start: 2023-03-13

## 2023-03-13 NOTE — PATIENT INSTRUCTIONS
Botox Therapy  Important Information    Our goal is to make sure you fully understand how Botox Therapy treatment may benefit you and to help you understand how you can play an active role in your treatments and ongoing care  Please review the following information below  Call our office IMMEDIATELY @ [phone] and speak to one of our Botox Coordinators if you have a change in insurance  (a prior authorization is required and accurate information is vital)  Please call at least 24 hours in advance if you can't make your appointment  Appointments are scheduled every 91 days (this will be scheduled in advance before leaving the office)  You must allow for at least 2-3 treatments to determine if Botox is right for you  It may take a few weeks to see a response from treatment  No hair dye or scalp massage or treatment within 24 hours of treatment  We encourage you to use a headache diary or journal to document your headache frequency and severity  You can also utilize the Migraine Gino kanu (downloadable on [employer])  Sign up for the Botox Savings Program  (commercial insurance patients may qualify) Sign up at [employer] or call [phone] Option: 4  To get more information on Botox therapy for Chronic Migraines and see frequently asked questions, please visit [employer]  If you have any questions or concerns, please speak to one of our Botox Coordinators at [phone]  We look forward to servicing you!

## 2023-03-13 NOTE — PROGRESS NOTES
Universal Protocol   Consent: Verbal consent obtained  Written consent obtained    Risks and benefits: risks, benefits and alternatives were discussed  Consent given by: patient  Patient understanding: patient states understanding of the procedure being performed  Patient consent: the patient's understanding of the procedure matches consent given  Procedure consent: procedure consent matches procedure scheduled        Chemodenervation     Date/Time 3/13/2023 1:04 PM     Performed by  Nasir Zhu PA-C     Authorized by Nasir Zhu PA-C        Pre-procedure details      Prepped With: Alcohol     Procedure details     Position:  Upright   Botox     Botox Type:  Type A    Brand:  Botox    mL's of Botulinum Toxin:  200    Final Concentration per CC:  100 units    Needle Gauge:  30 G 2 5 inch   Procedures     Botox Procedures: chronic headache      Indications: migraines     Injection Location      Head / Face:  L superior trapezius, R superior trapezius, L superior cervical paraspinal, R superior cervical paraspinal, L , R , procerus, L temporalis, R temporalis, R frontalis, L frontalis, R medial occipitalis and L medial occipitalis    L  injection amount:  5 unit(s)    R  injection amount:  5 unit(s)    L lateral frontalis:  5 unit(s)    R lateral frontalis:  5 unit(s)    L medial frontalis:  5 unit(s)    R medial frontalis:  5 unit(s)    L temporalis injection amount:  20 unit(s)    R temporalis injection amount:  20 unit(s)    Procerus injection amount:  5 unit(s)    L medial occipitalis injection amount:  15 unit(s)    R medial occipitalis injection amount:  15 unit(s)    L superior cervical paraspinal injection amount:  10 unit(s)    R superior cervical paraspinal injection amount:  10 unit(s)    L superior trapezius injection amount:  15 unit(s)    R superior trapezius injection amount:  15 unit(s)   Total Units     Total units used:  200    Total units discarded:  0 Post-procedure details      Chemodenervation:  Chronic migraine    Facial Nerve Location[de-identified]  Bilateral facial nerve    Patient tolerance of procedure: Tolerated well, no immediate complications   Comments      Extra units medically necessary: 45 R frontotemporal region and R apex/scalp  Blood pressure 132/86, pulse 84, temperature 97 6 °F (36 4 °C), temperature source Temporal, height 6' 1" (1 854 m), weight 98 7 kg (217 lb 9 6 oz), not currently breastfeeding  Pt continues to note reduction of migraines with botox  No s/e  Migraine headaches- R side, vertigo on L side, per pt history  She started aimovig, first injection so far  Will give this about 3 to 6 months depending on if she has any side effects  After that if Aimovig is not therapeutic, will switch back to [facility]  She comments that she noticed Emgality kicking in right away, such as immediately after the injection, but does not notice that effect with Aimovig  She tried Nurtec and it gave her relief of her migraine and associated vertigo for about 4 hours and then unfortunately the migraine came back the next day  She denies side effects to Nurtec  She feels that the sumatriptan worked better than the [employer] and would like to resume sumatriptan  Will increase the dose from 50 to 100 mg as needed migraine  S/e reviewed  She reports that the promethazine solution reduces her nausea, states she only had to take it 1 or 2 times

## 2023-03-14 NOTE — TELEPHONE ENCOUNTER
Valeria TOLEDO approved for 6 months: 3-1-23 through 9-1-23  Radha Pinzon has been faxed to [facility]   Nurtec PA Denied   Will file Appeal

## 2023-03-17 NOTE — TELEPHONE ENCOUNTER
I spoke to patient and she confirmed she now has her medications covered by insurance; no further needs at this time

## 2023-04-04 RX ORDER — LIDOCAINE HYDROCHLORIDE 10 MG/ML
0.5 INJECTION, SOLUTION EPIDURAL; INFILTRATION; INTRACAUDAL; PERINEURAL ONCE AS NEEDED
Status: CANCELLED | OUTPATIENT
Start: 2023-04-04

## 2023-04-04 RX ORDER — SODIUM CHLORIDE, SODIUM LACTATE, POTASSIUM CHLORIDE, CALCIUM CHLORIDE 600; 310; 30; 20 MG/100ML; MG/100ML; MG/100ML; MG/100ML
125 INJECTION, SOLUTION INTRAVENOUS CONTINUOUS
Status: CANCELLED | OUTPATIENT
Start: 2023-04-04

## 2023-04-05 ENCOUNTER — ANESTHESIA (OUTPATIENT)
Dept: GASTROENTEROLOGY | Facility: HOSPITAL | Age: 43
End: 2023-04-05

## 2023-04-05 ENCOUNTER — ANESTHESIA EVENT (OUTPATIENT)
Dept: GASTROENTEROLOGY | Facility: HOSPITAL | Age: 43
End: 2023-04-05

## 2023-04-05 RX ORDER — PROPOFOL 10 MG/ML
INJECTION, EMULSION INTRAVENOUS AS NEEDED
Status: DISCONTINUED | OUTPATIENT
Start: 2023-04-05 | End: 2023-04-05

## 2023-04-05 RX ORDER — LIDOCAINE HYDROCHLORIDE 20 MG/ML
INJECTION, SOLUTION EPIDURAL; INFILTRATION; INTRACAUDAL; PERINEURAL AS NEEDED
Status: DISCONTINUED | OUTPATIENT
Start: 2023-04-05 | End: 2023-04-05

## 2023-04-05 RX ADMIN — PROPOFOL 50 MG: 10 INJECTION, EMULSION INTRAVENOUS at 08:20

## 2023-04-05 RX ADMIN — PROPOFOL 50 MG: 10 INJECTION, EMULSION INTRAVENOUS at 08:30

## 2023-04-05 RX ADMIN — PROPOFOL 100 MG: 10 INJECTION, EMULSION INTRAVENOUS at 08:35

## 2023-04-05 RX ADMIN — PROPOFOL 50 MG: 10 INJECTION, EMULSION INTRAVENOUS at 08:25

## 2023-04-05 RX ADMIN — PROPOFOL 50 MG: 10 INJECTION, EMULSION INTRAVENOUS at 08:40

## 2023-04-05 RX ADMIN — LIDOCAINE HYDROCHLORIDE 100 MG: 20 INJECTION, SOLUTION EPIDURAL; INFILTRATION; INTRACAUDAL; PERINEURAL at 08:01

## 2023-04-05 RX ADMIN — PROPOFOL 50 MG: 10 INJECTION, EMULSION INTRAVENOUS at 08:15

## 2023-04-05 RX ADMIN — PROPOFOL 100 MG: 10 INJECTION, EMULSION INTRAVENOUS at 08:04

## 2023-04-05 RX ADMIN — SODIUM CHLORIDE, SODIUM LACTATE, POTASSIUM CHLORIDE, AND CALCIUM CHLORIDE: .6; .31; .03; .02 INJECTION, SOLUTION INTRAVENOUS at 07:56

## 2023-04-05 RX ADMIN — PROPOFOL 50 MG: 10 INJECTION, EMULSION INTRAVENOUS at 08:10

## 2023-04-05 RX ADMIN — PROPOFOL 50 MG: 10 INJECTION, EMULSION INTRAVENOUS at 08:07

## 2023-04-05 RX ADMIN — PROPOFOL 100 MG: 10 INJECTION, EMULSION INTRAVENOUS at 08:01

## 2023-04-05 NOTE — ANESTHESIA PREPROCEDURE EVALUATION
Procedure:  COLONOSCOPY  EGD    Relevant Problems   CARDIO   (+) Chronic migraine w/o aura w/o status migrainosus, not intractable   (+) Intractable chronic migraine without aura and without status migrainosus   (+) Vestibular migraine      NEURO/PSYCH   (+) Chronic migraine w/o aura w/o status migrainosus, not intractable   (+) Chronic neck and back pain   (+) Chronic right shoulder pain   (+) Depression   (+) Generalized anxiety disorder   (+) Intractable chronic migraine without aura and without status migrainosus   (+) PTSD (post-traumatic stress disorder)   (+) Vestibular migraine      PULMONARY   (+) Mild persistent asthma        Physical Exam    Airway    Mallampati score: I  TM Distance: >3 FB  Neck ROM: full     Dental   No notable dental hx     Cardiovascular  Rhythm: regular, Rate: normal, Cardiovascular exam normal    Pulmonary  Pulmonary exam normal Breath sounds clear to auscultation,     Other Findings        Anesthesia Plan  ASA Score- 2     Anesthesia Type- IV sedation with anesthesia with ASA Monitors  Additional Monitors:   Airway Plan:     Comment: Discussed risks/benefits, including medication reactions, awareness, aspiration, and serious/life threatening complications  Plan to maintain native airway with IVGA, monitored with EtCO2  Plan Factors-Exercise tolerance (METS): >4 METS  Patient summary reviewed  Patient instructed to abstain from smoking on day of procedure  Patient did not smoke on day of surgery  Induction- intravenous  Postoperative Plan-     Informed Consent- Anesthetic plan and risks discussed with patient  I personally reviewed this patient with the CRNA  Discussed and agreed on the Anesthesia Plan with the CRNA  Mendel Oman

## 2023-04-05 NOTE — ANESTHESIA POSTPROCEDURE EVALUATION
Post-Op Assessment Note    CV Status:  Stable  Pain Score: 0    Pain management: adequate     Mental Status:  Awake and sleepy   Hydration Status:  Stable   PONV Controlled:  None   Airway Patency:  Patent      Post Op Vitals Reviewed: Yes      Staff: Anesthesiologist         No notable events documented      /81 (04/05/23 0848)    Temp (!) 97 4 °F (36 3 °C) (04/05/23 0848)    Pulse 79 (04/05/23 0848)   Resp 14 (04/05/23 0848)    SpO2 99 % (04/05/23 0848)

## 2023-04-24 ENCOUNTER — TELEPHONE (OUTPATIENT)
Dept: NEUROLOGY | Facility: CLINIC | Age: 43
End: 2023-04-24

## 2023-04-24 NOTE — TELEPHONE ENCOUNTER
Patient had to cancel reauthorization appointment for Botox due to procedure she had done and refused virtual due to that  Patient needs appointment to be rescheduled before botox  Is this necessary and would Lauren be able to put her in? No availability seen to reschedule before Botox appointment

## 2023-04-25 NOTE — TELEPHONE ENCOUNTER
Leticia Barros, would you be willing to perform re-auth visit with any openings you may have? Thanks

## 2023-04-26 ENCOUNTER — TELEMEDICINE (OUTPATIENT)
Dept: NEUROLOGY | Facility: CLINIC | Age: 43
End: 2023-04-26

## 2023-04-26 DIAGNOSIS — G43.809 VESTIBULAR MIGRAINE: ICD-10-CM

## 2023-04-26 DIAGNOSIS — H93.19 TINNITUS: Primary | ICD-10-CM

## 2023-04-26 DIAGNOSIS — G43.709 CHRONIC MIGRAINE W/O AURA W/O STATUS MIGRAINOSUS, NOT INTRACTABLE: ICD-10-CM

## 2023-04-26 RX ORDER — DEXAMETHASONE 2 MG/1
2 TABLET ORAL
Qty: 5 TABLET | Refills: 2 | Status: SHIPPED | OUTPATIENT
Start: 2023-04-26

## 2023-04-26 RX ORDER — SUMATRIPTAN 100 MG/1
TABLET, FILM COATED ORAL
Qty: 10 TABLET | Refills: 1 | Status: SHIPPED | OUTPATIENT
Start: 2023-04-26 | End: 2023-05-04 | Stop reason: SDUPTHER

## 2023-04-26 RX ORDER — ATOMOXETINE 25 MG/1
25 CAPSULE ORAL
COMMUNITY

## 2023-04-26 NOTE — PROGRESS NOTES
Virtual Regular Visit    Verification of patient location:    Patient is located at Home in the following state in which I hold an active license [place]      Assessment/Plan:    Problem List Items Addressed This Visit        Cardiovascular and Mediastinum    Chronic migraine w/o aura w/o status migrainosus, not intractable     Migraines well controlled over the past few months  She did have increased headache frequency and severity 3 months ago in which some of her medications were adjusted and has noticed some improvement  She has been utilizing Nurtec as an abortive and does find triptan more effective so we will discontinue Nurtec today  Since starting Botox she has had greatly improved migraine control  With botox has had a reduction of at least 7 migraine days with less abortive medication, less ER visits which correlates to headache diary  Given stability of headaches and recent improvement, will continue current migraine medications as noted below:      Preventative:  gabapentin 600 mg BID  tizanidine 4 mg qhs prn  aimovig monthly  sertraline 100 mg qd   botox q3 months     Abortive:   imitrex 100 mg  excedrin  zofran, promethazine (uses rarely)  meclizine (uses rarely for vertigo)                        Relevant Medications    SUMAtriptan (IMITREX) 100 mg tablet    Vestibular migraine    Relevant Medications    SUMAtriptan (IMITREX) 100 mg tablet    dexamethasone (DECADRON) 2 mg tablet   Other Visit Diagnoses     Tinnitus    -  Primary    Relevant Orders    Ambulatory Referral to Otolaryngology               Reason for visit is   Chief Complaint   Patient presents with   • Virtual Regular Visit        Encounter provider CROW Mehta    Provider located at [address] [facility] [address] [place] [address]  [phone]      Recent Visits  No visits were found meeting these conditions    Showing recent visits within past 7 days and "meeting all other requirements  Today's Visits  Date Type Provider Dept   04/26/23 Telemedicine Jose Escobedo, [address]   Showing today's visits and meeting all other requirements  Future Appointments  No visits were found meeting these conditions  Showing future appointments within next 150 days and meeting all other requirements       The patient was identified by name and date of birth  Howard Rangel was informed that this is a telemedicine visit and that the visit is being conducted through the [facility]  She agrees to proceed     My office door was closed  No one else was in the room  She acknowledged consent and understanding of privacy and security of the video platform  The patient has agreed to participate and understands they can discontinue the visit at any time  Patient is aware this is a billable service  Subjective  Howard Rangel is a 44 y o  female who presents for follow up  Dee DOMÍNGUEZ   Ms  Davin Hollis is a pleasant 43 yo female seen in follow up for chronic migraines, hx Meniere's disease- saw ENT in past      She started Botox 12/10/2018 and since that time has had greatly improved migraine control  Prior she would get vertigo daily, nonstop, daily headaches  Since starting Botox pt has had a reduction in migraine headaches by 7 days as correlated by a headache diary  Pt has had decreased trips to the ER and decreased use of abortive medications  She states over the past few months she switched from was emgality to HOREB as she had an increase in her headaches-she has 1 injection so far  She was also to try nurtec as needed but the nurtec did not work as well as the triptan, just really \"takes the edge off\"  She has not tried the higher dose of the imitrex yet  She has not needed to use the promethazine, zofran, compazine since December when she had a bad flare of migraines   She will utilize meclizine prn if she has vertigo with headache-has used " twice this year so far  She has been getting tinnitus more often the past few months-can be with or without a headache  She will utilize decadron towards the end of her botox to break headaches if they are lasting more then 2-3 days        current headache hx:  Location: right temple, right back of neck and extends to the side of the head  Frequency: 1-2 times per week will have mild headache, has not had a severe migraine since December (3-4 months ago)   Duration: 1-2 hours with medication  Severity: 1/10  Triggers: weather changes, stress, dehydration, salty foods  Aura: stars in vision, occurs with more severe headaches, has occurred once in the past few months  Assocatied symptoms: Sometimes gets vertigo, nausea, rare vomiting, photophobia          Current Medications:  Preventative:  gabapentin 600 mg BID  tizanidine 4 mg qhs prn  aimovig monthly  sertraline 100 mg qd   botox q3 months     Abortive:   imitrex 100 mg  excedrin  nurtec   zofran, promethazine   prochlorperazine inj   meclizine         bridge, decadron, toradol injection, depakote      previously tried-Nurtec (did not work as well as triptan)  emgaility , verapamil 40 mg qhs      Past Medical History:   Diagnosis Date   • Allergic    • Anxiety    • Arthritis    • Asthma    • COVID-19 02/08/2021   • Depression    • ETOH abuse    • Headache(784 0)    • Herniated cervical disc    • Memory loss    • Meniere's disease    • Migraine    • Psychiatric disorder     anxiety/depression   • Scoliosis    • Sinusitis    • Stroke [facility])    • Thyroid disease    • Vertigo    • Vertigo        Past Surgical History:   Procedure Laterality Date   • BACK SURGERY      C5-6   • HIP SURGERY     • JOINT REPLACEMENT      right hip   • OTHER SURGICAL HISTORY      Hip Replacement (right) , Spinal  Diskectomy Cervical C5-C6       Current Outpatient Medications   Medication Sig Dispense Refill   • albuterol (PROVENTIL HFA,VENTOLIN HFA) 90 mcg/act inhaler Inhale 2 puffs 4 "(four) times a day 8 5 g 0   • atoMOXetine (STRATTERA) 25 mg capsule Take 25 mg by mouth     • benzonatate (TESSALON PERLES) 100 mg capsule Take 1 capsule (100 mg total) by mouth 3 (three) times a day as needed for cough 30 capsule 0   • clonazePAM (KlonoPIN) 0 5 mg tablet Take 0 5 mg by mouth daily at bedtime     • dexamethasone (DECADRON) 2 mg tablet Take 1 tablet (2 mg total) by mouth daily with breakfast 5 tablet 2   • divalproex sodium (DEPAKOTE) 250 mg EC tablet 2 tabs q12 h x 2 days, then 1 tab q12 h x 2 days, then stop  Repeat if needed  12 tablet 1   • Erenumab-aooe 140 MG/ML SOAJ Inject 140 mg under the skin every 30 (thirty) days 1 mL 11   • gabapentin (NEURONTIN) 600 MG tablet Take 1 tablet (600 mg total) by mouth 2 (two) times a day 60 tablet 5   • ketorolac (TORADOL) 30 mg/mL injection I-2 ML INTRAMUSCULAR INJECTION ONCE PER 24 HOURS AS NEEDED FOR MIGRAINE  NO MORE THAN 2 INJECTIONS PER WEEK  4 mL 0   • meclizine (ANTIVERT) 12 5 MG tablet Take 2 tablets (25 mg total) by mouth every 8 (eight) hours as needed for dizziness 20 tablet 0   • meloxicam (MOBIC) 7 5 mg tablet TAKE 1 TABLET BY MOUTH EVERY DAY 30 tablet 0   • montelukast (SINGULAIR) 10 mg tablet TAKE 1 TABLET BY MOUTH EVERYDAY AT BEDTIME 90 tablet 1   • norethindrone (MICRONOR) 0 35 MG tablet      • omeprazole (PriLOSEC) 20 mg delayed release capsule Take 1 capsule (20 mg total) by mouth daily 60 capsule 1   • onabotulinumtoxin A (BOTOX) 100 units one time   \"for vertigo\"     • ondansetron (ZOFRAN-ODT) 4 mg disintegrating tablet TAKE 1 TABLET BY MOUTH EVERY 8 HOURS AS NEEDED FOR NAUSEA AND VOMITING 9 tablet 0   • prochlorperazine (COMPAZINE) 10 mg/2mL Inject 2 mL IM EVERY 12 HOURS AS NEEDED FOR MIGRAINE/NAUSEA/VOMITING - MAX 4 INJECTIONS PER WEEK 10 mL 0   • promethazine (PHENERGAN) 12 5 mg/10 mL syrup TAKE 10 ML (12 5 MG TOTAL) BY MOUTH 4 (FOUR) TIMES A DAY AS NEEDED FOR NAUSEA OR VOMITING (MIGRAINE) 118 mL 0   • sertraline (ZOLOFT) 100 mg " "tablet Take 100 mg by mouth every morning  1   • SUMAtriptan (IMITREX) 100 mg tablet 1 tab at migraine onset, repeat after 2 hours if needed  Max 2 per day and 4 per week  10 tablet 1   • Syringe/Needle, Disp, (SYRINGE 3CC/79KK1-6/2\") 25G X 1-1/2\" 3 ML MISC Use for toradol IM injections  12 each 2   • tiZANidine (ZANAFLEX) 4 mg tablet TAKE 1 TABLET (4 MG TOTAL) BY MOUTH DAILY AT BEDTIME AS NEEDED FOR MUSCLE SPASMS 90 tablet 0   • traZODone (DESYREL) 100 mg tablet Take  mg by mouth daily at bedtime as needed     • Vraylar 1 5 MG capsule      • amphetamine-dextroamphetamine (ADDERALL) 20 mg tablet Take 20 mg by mouth 2 (two) times a day   (Patient not taking: Reported on 4/26/2023)     • ibuprofen (MOTRIN) 600 mg tablet TAKE 1 TABLET (600 MG TOTAL) BY MOUTH EVERY 6 (SIX) HOURS AS NEEDED FOR MILD PAIN (Patient not taking: Reported on 1/18/2023) 30 tablet 0   • verapamil (CALAN) 40 mg tablet 1 tab qhs (Patient not taking: Reported on 4/26/2023) 30 tablet 2     No current facility-administered medications for this visit  Allergies   Allergen Reactions   • Latuda [Lurasidone]      Reaction: Drug Psychosis   • Topamax [Topiramate]      psychosis   • Amoxicillin Rash   • Percocet [Oxycodone-Acetaminophen] Rash     I have personally reviewed the ROS performed by the MA  Review of Systems   Constitutional: Negative  Negative for appetite change and fever  HENT: Negative  Negative for hearing loss, tinnitus, trouble swallowing and voice change  Eyes: Negative  Negative for photophobia, pain and visual disturbance  Respiratory: Negative  Negative for shortness of breath  Cardiovascular: Negative  Negative for palpitations  Gastrointestinal: Negative  Negative for nausea and vomiting  Endocrine: Negative  Negative for cold intolerance  Genitourinary: Negative  Negative for dysuria, frequency and urgency  Musculoskeletal: Negative  Negative for gait problem, myalgias and neck pain   " Skin: Negative  Negative for rash  Allergic/Immunologic: Negative  Neurological: Negative  Negative for dizziness, tremors, seizures, syncope, facial asymmetry, speech difficulty, weakness, light-headedness, numbness and headaches  Hematological: Negative  Does not bruise/bleed easily  Psychiatric/Behavioral: Negative  Negative for confusion, hallucinations and sleep disturbance  Video Exam    There were no vitals filed for this visit  Physical Exam   Awake and alert  Speech normal  Hearing and facial sensation intact  No facial droop, able to puff out cheeks, tongue midline  EOM intact, no nystagmus  Finger to nose normal,  Moves all extremities without difficulty antigravity, sensation intact to light touch in all extremities       Visit Time  Total Visit Duration: 21 minutes and additional 10 mins was spent in chart review and documentation

## 2023-04-26 NOTE — TELEPHONE ENCOUNTER
Patient will no longer being taking nurtec as it does not work as well as triptan-please assist with authorization

## 2023-04-26 NOTE — ASSESSMENT & PLAN NOTE
Migraines well controlled over the past few months  She did have increased headache frequency and severity 3 months ago in which some of her medications were adjusted and has noticed some improvement  She has been utilizing Nurtec as an abortive and does find triptan more effective so we will discontinue Nurtec today  Since starting Botox she has had greatly improved migraine control  With botox has had a reduction of at least 7 migraine days with less abortive medication, less ER visits which correlates to headache diary      Given stability of headaches and recent improvement, will continue current migraine medications as noted below:      Preventative:  gabapentin 600 mg BID  tizanidine 4 mg qhs prn  aimovig monthly  sertraline 100 mg qd   botox q3 months     Abortive:   imitrex 100 mg  excedrin  zofran, promethazine (uses rarely)  meclizine (uses rarely for vertigo)

## 2023-04-29 ENCOUNTER — HOSPITAL ENCOUNTER (EMERGENCY)
Facility: HOSPITAL | Age: 43
Discharge: HOME | End: 2023-04-29
Attending: EMERGENCY MEDICINE

## 2023-04-29 ENCOUNTER — APPOINTMENT (EMERGENCY)
Dept: RADIOLOGY | Facility: HOSPITAL | Age: 43
End: 2023-04-29

## 2023-04-29 VITALS
HEART RATE: 107 BPM | OXYGEN SATURATION: 99 % | RESPIRATION RATE: 16 BRPM | TEMPERATURE: 98.2 F | SYSTOLIC BLOOD PRESSURE: 165 MMHG | DIASTOLIC BLOOD PRESSURE: 99 MMHG

## 2023-04-29 DIAGNOSIS — S52.591A OTHER CLOSED FRACTURE OF DISTAL END OF RIGHT RADIUS, INITIAL ENCOUNTER: Primary | ICD-10-CM

## 2023-04-29 RX ORDER — OXYCODONE HYDROCHLORIDE 5 MG/1
5 TABLET ORAL EVERY 6 HOURS PRN
Qty: 12 TABLET | Refills: 0 | Status: SHIPPED | OUTPATIENT
Start: 2023-04-29 | End: 2023-05-02

## 2023-04-29 RX ORDER — IBUPROFEN 800 MG/1
800 TABLET ORAL 3 TIMES DAILY
Qty: 21 TABLET | Refills: 0 | Status: SHIPPED | OUTPATIENT
Start: 2023-04-29

## 2023-04-29 NOTE — ED PROVIDER NOTES
"History  No chief complaint on file  73-year-old female presents to the emergency department with complaints of right-sided wrist pain after a fall  States that she went to a skating rink yesterday and had been trying to rollerblade  States that she fell shortly after getting into the rink and landed on her right wrist   Complains of pain and swelling in the area which is worse with attempted range of motion  Patient is right-handed  No previous injuries to this wrist   Has not had any relief of pain with over-the-counter medications  History provided by:  Patient   used: No        Prior to Admission Medications   Prescriptions Last Dose Informant Patient Reported? Taking? Erenumab-aooe 140 MG/ML SOAJ   No No   Sig: Inject 140 mg under the skin every 30 (thirty) days   SUMAtriptan (IMITREX) 100 mg tablet   No No   Si tab at migraine onset, repeat after 2 hours if needed  Max 2 per day and 4 per week  Syringe/Needle, Disp, (SYRINGE 3CC/84FM0-2/2\") 25G X 1-/2\" 3 ML MISC   No No   Sig: Use for toradol IM injections  Vraylar 1 5 MG capsule   Yes No   albuterol (PROVENTIL HFA,VENTOLIN HFA) 90 mcg/act inhaler   No No   Sig: Inhale 2 puffs 4 (four) times a day   amphetamine-dextroamphetamine (ADDERALL) 20 mg tablet   Yes No   Sig: Take 20 mg by mouth 2 (two) times a day     Patient not taking: Reported on 2023   atoMOXetine (STRATTERA) 25 mg capsule   Yes No   Sig: Take 25 mg by mouth   benzonatate (TESSALON PERLES) 100 mg capsule   No No   Sig: Take 1 capsule (100 mg total) by mouth 3 (three) times a day as needed for cough   clonazePAM (KlonoPIN) 0 5 mg tablet   Yes No   Sig: Take 0 5 mg by mouth daily at bedtime   dexamethasone (DECADRON) 2 mg tablet   No No   Sig: Take 1 tablet (2 mg total) by mouth daily with breakfast   divalproex sodium (DEPAKOTE) 250 mg EC tablet   No No   Si tabs q12 h x 2 days, then 1 tab q12 h x 2 days, then stop  Repeat if needed     gabapentin " "(NEURONTIN) 600 MG tablet   No No   Sig: Take 1 tablet (600 mg total) by mouth 2 (two) times a day   ibuprofen (MOTRIN) 600 mg tablet   No No   Sig: TAKE 1 TABLET (600 MG TOTAL) BY MOUTH EVERY 6 (SIX) HOURS AS NEEDED FOR MILD PAIN   Patient not taking: Reported on 2023   ketorolac (TORADOL) 30 mg/mL injection   No No   Sig: I-2 ML INTRAMUSCULAR INJECTION ONCE PER 24 HOURS AS NEEDED FOR MIGRAINE  NO MORE THAN 2 INJECTIONS PER WEEK  meclizine (ANTIVERT) 12 5 MG tablet   No No   Sig: Take 2 tablets (25 mg total) by mouth every 8 (eight) hours as needed for dizziness   meloxicam (MOBIC) 7 5 mg tablet   No No   Sig: TAKE 1 TABLET BY MOUTH EVERY DAY   montelukast (SINGULAIR) 10 mg tablet   No No   Sig: TAKE 1 TABLET BY MOUTH EVERYDAY AT BEDTIME   norethindrone (MICRONOR) 0 35 MG tablet   Yes No   omeprazole (PriLOSEC) 20 mg delayed release capsule   No No   Sig: Take 1 capsule (20 mg total) by mouth daily   onabotulinumtoxin A (BOTOX) 100 units   Yes No   Sig: one time   \"for vertigo\"   ondansetron (ZOFRAN-ODT) 4 mg disintegrating tablet   No No   Sig: TAKE 1 TABLET BY MOUTH EVERY 8 HOURS AS NEEDED FOR NAUSEA AND VOMITING   prochlorperazine (COMPAZINE) 10 mg/2mL   No No   Sig: Inject 2 mL IM EVERY 12 HOURS AS NEEDED FOR MIGRAINE/NAUSEA/VOMITING - MAX 4 INJECTIONS PER WEEK   promethazine (PHENERGAN) 12 5 mg/10 mL syrup   No No   Sig: TAKE 10 ML (12 5 MG TOTAL) BY MOUTH 4 (FOUR) TIMES A DAY AS NEEDED FOR NAUSEA OR VOMITING (MIGRAINE)   sertraline (ZOLOFT) 100 mg tablet   Yes No   Sig: Take 100 mg by mouth every morning   tiZANidine (ZANAFLEX) 4 mg tablet   No No   Sig: TAKE 1 TABLET (4 MG TOTAL) BY MOUTH DAILY AT BEDTIME AS NEEDED FOR MUSCLE SPASMS   traZODone (DESYREL) 100 mg tablet   Yes No   Sig: Take  mg by mouth daily at bedtime as needed   verapamil (CALAN) 40 mg tablet   No No   Si tab qhs   Patient not taking: Reported on 2023      Facility-Administered Medications: None       Past Medical " History:   Diagnosis Date    Allergic     Anxiety     Arthritis     Asthma     COVID-19 02/08/2021    Depression     ETOH abuse     Headache(784 0)     Herniated cervical disc     Memory loss     Meniere's disease     Migraine     Psychiatric disorder     anxiety/depression    Scoliosis     Sinusitis     Stroke ([address] )     Thyroid disease     Vertigo     Vertigo        Past Surgical History:   Procedure Laterality Date    BACK SURGERY      C5-6    HIP SURGERY      JOINT REPLACEMENT      right hip    OTHER SURGICAL HISTORY      Hip Replacement (right) , Spinal  Diskectomy Cervical C5-C6       Family History   Problem Relation Age of Onset    Hypertension Mother     Thyroid disease Mother     Anxiety disorder Mother     Hypertension Father     Colon cancer Maternal Grandmother     Breast cancer Paternal Grandmother     Dementia Paternal Grandmother     Heart disease Paternal Grandfather     No Known Problems Maternal Aunt     No Known Problems Paternal Aunt     Drug abuse Sister         Pasted away 2022     I have reviewed and agree with the history as documented  E-Cigarette/Vaping    E-Cigarette Use Never User      E-Cigarette/Vaping Substances    Nicotine No     THC No     CBD No     Flavoring No     Other No     Unknown No      Social History     Tobacco Use    Smoking status: Never     Passive exposure: Yes    Smokeless tobacco: Never   Vaping Use    Vaping Use: Never used   Substance Use Topics    Alcohol use: Yes     Alcohol/week: 3 0 standard drinks     Types: 3 Glasses of wine per week     Comment: 3 glass wine per week    Drug use: Yes     Frequency: 1 0 times per week     Types: Marijuana       Review of Systems   Constitutional: Negative for chills and fever  HENT: Negative for congestion, dental problem and sore throat  Respiratory: Negative for cough  Cardiovascular: Negative for chest pain  Gastrointestinal: Negative for abdominal pain  Musculoskeletal: Positive for arthralgias (wrist pain)  Negative for back pain and neck pain  Skin: Negative for rash and wound  All other systems reviewed and are negative  Physical Exam  Physical Exam  Vitals and nursing note reviewed  Constitutional:       Appearance: She is well-developed  HENT:      Head: Normocephalic and atraumatic  Cardiovascular:      Rate and Rhythm: Normal rate and regular rhythm  Pulmonary:      Effort: Pulmonary effort is normal  No respiratory distress  Breath sounds: No wheezing, rhonchi or rales  Musculoskeletal:      Right wrist: Swelling, tenderness and bony tenderness present  No deformity, effusion, lacerations or crepitus  Decreased range of motion  Normal pulse  Skin:     General: Skin is warm and dry  Neurological:      Mental Status: She is alert and oriented to person, place, and time     Psychiatric:         Mood and Affect: Mood normal          Behavior: Behavior normal          Vital Signs  ED Triage Vitals [04/29/23 0933]   Temperature Pulse Respirations Blood Pressure SpO2   98 2 °F (36 8 °C) (!) 107 16 165/99 99 %      Temp Source Heart Rate Source Patient Position - Orthostatic VS BP Location FiO2 (%)   Oral Monitor Sitting Right arm --      Pain Score       --           Vitals:    04/29/23 0933   BP: 165/99   Pulse: (!) 107   Patient Position - Orthostatic VS: Sitting         Visual Acuity      ED Medications  Medications - No data to display    Diagnostic Studies  Results Reviewed     None                 XR wrist 3+ views RIGHT   ED Interpretation by Moraima Orosco PA-C (04/29 1008)   Intraarticular distal radial fracture                 Procedures  Splint application    Date/Time: 4/29/2023 9:58 AM  Performed by: Moraima Orosco PA-C  Authorized by: Moraima Orosco PA-C   Universal Protocol:  Procedure performed by:  Consent given by: patient  Patient understanding: patient states understanding of the procedure being performed  Patient identity confirmed: verbally with patient      Pre-procedure details:     Sensation:  Normal  Procedure details:     Laterality:  Right    Location:  Wrist    Wrist:  R wrist    Splint type:  Volar short arm    Supplies:  Cotton padding, elastic bandage and Ortho-Glass  Post-procedure details:     Pain:  Improved    Sensation:  Normal    Patient tolerance of procedure: Tolerated well, no immediate complications             ED Course                                             Medical Decision Making  Differential diagnosis includes but not limited to: Wrist fracture, wrist contusion, wrist sprain    Other closed fracture of distal end of right radius, initial encounter: acute illness or injury  Amount and/or Complexity of Data Reviewed  Radiology: ordered and independent interpretation performed  Decision-making details documented in ED Course  Risk  Prescription drug management  Disposition  Final diagnoses:   Other closed fracture of distal end of right radius, initial encounter     Time reflects when diagnosis was documented in both MDM as applicable and the Disposition within this note     Time User Action Codes Description Comment    4/29/2023  9:57 AM Charisse Travis Darlin Allison Other closed fracture of distal end of right radius, initial encounter       ED Disposition     ED Disposition   Discharge    Condition   Stable    Date/Time   Sat Apr 29, 2023  9:58 AM    Comment   Chnadra Grant discharge to home/self care                 Follow-up Information     Follow up With Specialties Details Why Contact Info Additional [id number] [facility] [place] [facility] Schedule an appointment as soon as possible for a visit   Jennifer [address] [place] [address], [address] [place] [address], [address], [address] [place] [address] Internal Medicine, Nurse Practitioner   Devora Osei [address]  [facility]  [phone]             Discharge Medication List as of 4/29/2023 10:09 AM      START taking these medications    Details   !! ibuprofen (MOTRIN) 800 mg tablet Take 1 tablet (800 mg total) by mouth 3 (three) times a day, Starting Sat 4/29/2023, Normal      oxyCODONE (Roxicodone) 5 immediate release tablet Take 1 tablet (5 mg total) by mouth every 6 (six) hours as needed for moderate pain for up to 3 days Max Daily Amount: 20 mg, Starting Sat 4/29/2023, Until Tue 5/2/2023 at 2359, Normal       !! - Potential duplicate medications found  Please discuss with provider  CONTINUE these medications which have NOT CHANGED    Details   albuterol (PROVENTIL HFA,VENTOLIN HFA) 90 mcg/act inhaler Inhale 2 puffs 4 (four) times a day, Starting Thu 8/25/2022, Normal      amphetamine-dextroamphetamine (ADDERALL) 20 mg tablet Take 20 mg by mouth 2 (two) times a day  , Starting Thu 4/27/2017, Historical Med      atoMOXetine (STRATTERA) 25 mg capsule Take 25 mg by mouth, Historical Med      benzonatate (TESSALON PERLES) 100 mg capsule Take 1 capsule (100 mg total) by mouth 3 (three) times a day as needed for cough, Starting Tue 12/13/2022, Normal      clonazePAM (KlonoPIN) 0 5 mg tablet Take 0 5 mg by mouth daily at bedtime, Starting Sun 7/25/2021, Historical Med      dexamethasone (DECADRON) 2 mg tablet Take 1 tablet (2 mg total) by mouth daily with breakfast, Starting Wed 4/26/2023, Normal      divalproex sodium (DEPAKOTE) 250 mg EC tablet 2 tabs q12 h x 2 days, then 1 tab q12 h x 2 days, then stop   Repeat if needed , Normal      Erenumab-aooe 140 MG/ML SOAJ Inject 140 mg under the skin every 30 (thirty) days, Starting Mon 3/6/2023, Normal      gabapentin (NEURONTIN) 600 MG tablet Take 1 tablet (600 mg total) by mouth 2 (two) times a day, Starting Wed 4/12/2023, Normal      !! ibuprofen (MOTRIN) 600 mg tablet TAKE 1 TABLET (600 MG TOTAL) BY MOUTH EVERY 6 "(SIX) HOURS AS NEEDED FOR MILD PAIN, Starting Fri 4/29/2022, Normal      ketorolac (TORADOL) 30 mg/mL injection I-2 ML INTRAMUSCULAR INJECTION ONCE PER 24 HOURS AS NEEDED FOR MIGRAINE  NO MORE THAN 2 INJECTIONS PER WEEK , Normal      meclizine (ANTIVERT) 12 5 MG tablet Take 2 tablets (25 mg total) by mouth every 8 (eight) hours as needed for dizziness, Starting Thu 12/8/2022, Normal      meloxicam (MOBIC) 7 5 mg tablet TAKE 1 TABLET BY MOUTH EVERY DAY, Normal      montelukast (SINGULAIR) 10 mg tablet TAKE 1 TABLET BY MOUTH EVERYDAY AT BEDTIME, Normal      norethindrone (MICRONOR) 0 35 MG tablet Starting Sat 12/3/2022, Historical Med      omeprazole (PriLOSEC) 20 mg delayed release capsule Take 1 capsule (20 mg total) by mouth daily, Starting Wed 4/5/2023, Normal      onabotulinumtoxin A (BOTOX) 100 units one time  \"for vertigo\", Historical Med      ondansetron (ZOFRAN-ODT) 4 mg disintegrating tablet TAKE 1 TABLET BY MOUTH EVERY 8 HOURS AS NEEDED FOR NAUSEA AND VOMITING, Normal      prochlorperazine (COMPAZINE) 10 mg/2mL Inject 2 mL IM EVERY 12 HOURS AS NEEDED FOR MIGRAINE/NAUSEA/VOMITING - MAX 4 INJECTIONS PER WEEK, Normal      promethazine (PHENERGAN) 12 5 mg/10 mL syrup TAKE 10 ML (12 5 MG TOTAL) BY MOUTH 4 (FOUR) TIMES A DAY AS NEEDED FOR NAUSEA OR VOMITING (MIGRAINE), Starting Fri 1/13/2023, Normal      sertraline (ZOLOFT) 100 mg tablet Take 100 mg by mouth every morning, Starting Mon 6/25/2018, Historical Med      SUMAtriptan (IMITREX) 100 mg tablet 1 tab at migraine onset, repeat after 2 hours if needed  Max 2 per day and 4 per week , Normal      Syringe/Needle, Disp, (SYRINGE 3CC/53IG8-2/2\") 25G X 1-1/2\" 3 ML MISC Use for toradol IM injections  , Normal      tiZANidine (ZANAFLEX) 4 mg tablet TAKE 1 TABLET (4 MG TOTAL) BY MOUTH DAILY AT BEDTIME AS NEEDED FOR MUSCLE SPASMS, Starting Tue 3/21/2023, Normal      traZODone (DESYREL) 100 mg tablet Take  mg by mouth daily at bedtime as needed, Starting Sat " 3/12/2022, Historical Med      verapamil (CALAN) 40 mg tablet 1 tab qhs, Normal      Vraylar 1 5 MG capsule Starting Thu 6/2/2022, Historical Med       !! - Potential duplicate medications found  Please discuss with provider                PDMP Review       Value Time User    PDMP Reviewed  Yes 3/25/2022  2:48 PM Manjeet Jaimes, [address]          ED Provider  Electronically Signed by           Naga Wright PA-C  04/29/23 0556

## 2023-05-01 ENCOUNTER — OFFICE VISIT (OUTPATIENT)
Dept: OBGYN CLINIC | Facility: CLINIC | Age: 43
End: 2023-05-01

## 2023-05-01 VITALS
HEART RATE: 53 BPM | DIASTOLIC BLOOD PRESSURE: 92 MMHG | BODY MASS INDEX: 31.48 KG/M2 | WEIGHT: 232.4 LBS | SYSTOLIC BLOOD PRESSURE: 154 MMHG | HEIGHT: 72 IN

## 2023-05-01 DIAGNOSIS — S52.591A OTHER CLOSED FRACTURE OF DISTAL END OF RIGHT RADIUS, INITIAL ENCOUNTER: ICD-10-CM

## 2023-05-01 NOTE — PROGRESS NOTES
ASSESSMENT/PLAN:    Assessment:   Right extra-articular nondisplaced distal radius fracture    Plan:   X-rays were reviewed with the patient in detail  Patient was placed into a short arm cast and tolerated procedure well  Cast precautions were reviewed  She will follow-up in 1 week for reevaluation and x-rays in cast of the right wrist    Follow Up:  1 week    To Do Next Visit:  X-rays in cast right wrist    General Discussions:  Fracture - Nonoperative Care: The physiology of a fractured bone was discussed with the patient today  With nondisplaced or minimally displaced fractures, conservative treatment often results in a functional recovery  Typically, these fractures are immobilized in either a cast or splint depending on the pattern  Radiographs are typically taken at intervals throughout the fracture healing to ensure that muscular forces do not cause loss of reduction or alignment  If the fracture loses its alignment, surgical intervention may be required to stabilize it  Medical conditions such as diabetes, osteoporosis, vitamin D deficiency, and a history of or exposure to smoking may delay or prevent fracture healing         _____________________________________________________  CHIEF COMPLAINT:  Chief Complaint   Patient presents with    Right Wrist - Fracture     DOI- 4/28/23  XR- 4/29/23         SUBJECTIVE:  Jose Zimmerman is a 44 y o  female who presents right wrist pain  She states that she was rollerblading on 4/28/2023 when she fell going onto the rink  She landed on the outstretched hand  She went to the emergency room the next morning and had x-rays which demonstrated a distal radius fracture  She was placed into a splint directed follow-up with orthopedics  Today she presents with pain over the wrist as well as swelling  She denies any numbness or tingling      PAST MEDICAL HISTORY:  Past Medical History:   Diagnosis Date    Allergic     Anxiety     Arthritis     Asthma     COVID-19 02/08/2021    Depression     ETOH abuse     Headache(784 0)     Herniated cervical disc     Memory loss     Meniere's disease     Migraine     Psychiatric disorder     anxiety/depression    Scoliosis     Sinusitis     Stroke ([address] )     Thyroid disease     Vertigo     Vertigo        PAST SURGICAL HISTORY:  Past Surgical History:   Procedure Laterality Date    BACK SURGERY      C5-6    HIP SURGERY      JOINT REPLACEMENT      right hip    OTHER SURGICAL HISTORY      Hip Replacement (right) , Spinal  Diskectomy Cervical C5-C6       FAMILY HISTORY:  Family History   Problem Relation Age of Onset    Hypertension Mother     Thyroid disease Mother     Anxiety disorder Mother     Hypertension Father     Colon cancer Maternal Grandmother     Breast cancer Paternal Grandmother     Dementia Paternal Grandmother     Heart disease Paternal Grandfather     No Known Problems Maternal Aunt     No Known Problems Paternal Aunt     Drug abuse Sister         Pasted away 2022       SOCIAL HISTORY:  Social History     Tobacco Use    Smoking status: Never     Passive exposure: Yes    Smokeless tobacco: Never   Vaping Use    Vaping Use: Never used   Substance Use Topics    Alcohol use:  Yes     Alcohol/week: 3 0 standard drinks     Types: 3 Glasses of wine per week     Comment: 3 glass wine per week    Drug use: Yes     Frequency: 1 0 times per week     Types: Marijuana       MEDICATIONS:    Current Outpatient Medications:     albuterol (PROVENTIL HFA,VENTOLIN HFA) 90 mcg/act inhaler, Inhale 2 puffs 4 (four) times a day, Disp: 8 5 g, Rfl: 0    atoMOXetine (STRATTERA) 25 mg capsule, Take 25 mg by mouth, Disp: , Rfl:     benzonatate (TESSALON PERLES) 100 mg capsule, Take 1 capsule (100 mg total) by mouth 3 (three) times a day as needed for cough, Disp: 30 capsule, Rfl: 0    clonazePAM (KlonoPIN) 0 5 mg tablet, Take 0 5 mg by mouth daily at bedtime, Disp: , Rfl:     dexamethasone (DECADRON) 2 mg tablet, "Take 1 tablet (2 mg total) by mouth daily with breakfast, Disp: 5 tablet, Rfl: 2    divalproex sodium (DEPAKOTE) 250 mg EC tablet, 2 tabs q12 h x 2 days, then 1 tab q12 h x 2 days, then stop  Repeat if needed  , Disp: 12 tablet, Rfl: 1    Erenumab-aooe 140 MG/ML SOAJ, Inject 140 mg under the skin every 30 (thirty) days, Disp: 1 mL, Rfl: 11    gabapentin (NEURONTIN) 600 MG tablet, Take 1 tablet (600 mg total) by mouth 2 (two) times a day, Disp: 60 tablet, Rfl: 5    ibuprofen (MOTRIN) 800 mg tablet, Take 1 tablet (800 mg total) by mouth 3 (three) times a day, Disp: 21 tablet, Rfl: 0    ketorolac (TORADOL) 30 mg/mL injection, I-2 ML INTRAMUSCULAR INJECTION ONCE PER 24 HOURS AS NEEDED FOR MIGRAINE  NO MORE THAN 2 INJECTIONS PER WEEK , Disp: 4 mL, Rfl: 0    meclizine (ANTIVERT) 12 5 MG tablet, Take 2 tablets (25 mg total) by mouth every 8 (eight) hours as needed for dizziness, Disp: 20 tablet, Rfl: 0    meloxicam (MOBIC) 7 5 mg tablet, TAKE 1 TABLET BY MOUTH EVERY DAY, Disp: 30 tablet, Rfl: 0    montelukast (SINGULAIR) 10 mg tablet, TAKE 1 TABLET BY MOUTH EVERYDAY AT BEDTIME, Disp: 90 tablet, Rfl: 1    norethindrone (MICRONOR) 0 35 MG tablet, , Disp: , Rfl:     omeprazole (PriLOSEC) 20 mg delayed release capsule, Take 1 capsule (20 mg total) by mouth daily, Disp: 60 capsule, Rfl: 1    onabotulinumtoxin A (BOTOX) 100 units, one time   \"for vertigo\", Disp: , Rfl:     ondansetron (ZOFRAN-ODT) 4 mg disintegrating tablet, TAKE 1 TABLET BY MOUTH EVERY 8 HOURS AS NEEDED FOR NAUSEA AND VOMITING, Disp: 9 tablet, Rfl: 0    oxyCODONE (Roxicodone) 5 immediate release tablet, Take 1 tablet (5 mg total) by mouth every 6 (six) hours as needed for moderate pain for up to 3 days Max Daily Amount: 20 mg, Disp: 12 tablet, Rfl: 0    prochlorperazine (COMPAZINE) 10 mg/2mL, Inject 2 mL IM EVERY 12 HOURS AS NEEDED FOR MIGRAINE/NAUSEA/VOMITING - MAX 4 INJECTIONS PER WEEK, Disp: 10 mL, Rfl: 0    promethazine (PHENERGAN) 12 5 mg/10 " "mL syrup, TAKE 10 ML (12 5 MG TOTAL) BY MOUTH 4 (FOUR) TIMES A DAY AS NEEDED FOR NAUSEA OR VOMITING (MIGRAINE), Disp: 118 mL, Rfl: 0    sertraline (ZOLOFT) 100 mg tablet, Take 100 mg by mouth every morning, Disp: , Rfl: 1    SUMAtriptan (IMITREX) 100 mg tablet, 1 tab at migraine onset, repeat after 2 hours if needed  Max 2 per day and 4 per week , Disp: 10 tablet, Rfl: 1    Syringe/Needle, Disp, (SYRINGE 3CC/18QA1-8/2\") 25G X 1-1/2\" 3 ML MISC, Use for toradol IM injections  , Disp: 12 each, Rfl: 2    tiZANidine (ZANAFLEX) 4 mg tablet, TAKE 1 TABLET (4 MG TOTAL) BY MOUTH DAILY AT BEDTIME AS NEEDED FOR MUSCLE SPASMS, Disp: 90 tablet, Rfl: 0    traZODone (DESYREL) 100 mg tablet, Take  mg by mouth daily at bedtime as needed, Disp: , Rfl:     Vraylar 1 5 MG capsule, , Disp: , Rfl:     amphetamine-dextroamphetamine (ADDERALL) 20 mg tablet, Take 20 mg by mouth 2 (two) times a day   (Patient not taking: Reported on 4/26/2023), Disp: , Rfl:     ibuprofen (MOTRIN) 600 mg tablet, TAKE 1 TABLET (600 MG TOTAL) BY MOUTH EVERY 6 (SIX) HOURS AS NEEDED FOR MILD PAIN (Patient not taking: Reported on 1/18/2023), Disp: 30 tablet, Rfl: 0    verapamil (CALAN) 40 mg tablet, 1 tab qhs (Patient not taking: Reported on 4/26/2023), Disp: 30 tablet, Rfl: 2    ALLERGIES:  Allergies   Allergen Reactions    Latuda [Lurasidone]      Reaction: Drug Psychosis    Topamax [Topiramate]      psychosis    Amoxicillin Rash    Percocet [Oxycodone-Acetaminophen] Rash       REVIEW OF SYSTEMS:  Pertinent items are noted in HPI  A comprehensive review of systems was negative      LABS:  HgA1c: No results found for: HGBA1C  BMP:   Lab Results   Component Value Date    CALCIUM 9 5 12/06/2022    K 4 2 12/06/2022    CO2 27 12/06/2022     12/06/2022    BUN 12 12/06/2022    CREATININE 0 91 12/06/2022       _____________________________________________________  PHYSICAL EXAMINATION:  Vital signs: /92   Pulse (!) 53   Ht 6' 1\" (1 854 m) " Wt 105 kg (232 lb 6 4 oz)   LMP 04/29/2023 (Exact Date)   BMI 30 66 kg/m²   General: well developed and well nourished, alert, oriented times 3 and appears comfortable  Psychiatric: Normal  HEENT: Trachea Midline, No torticollis  Cardiovascular: No discernable arrhythmia  Pulmonary: No wheezing or stridor  Abdomen: No rebound or guarding  Extremities: No peripheral edema  Skin: No masses, erythema, lacerations, fluctation, ulcerations  Neurovascular: Sensation Intact to the Median, Ulnar, Radial Nerve, Motor Intact to the Median, Ulnar, Radial Nerve and Pulses Intact    MUSCULOSKELETAL EXAMINATION:  Right wrist  Mild swelling noted over the distal radius, no erythema or ecchymosis noted  Tenderness to palpation over fracture site  Patient is able to make a full composite fist  2+ radial pulse  Capillary refill less than 2 seconds    _____________________________________________________  STUDIES REVIEWED:  Images were reviewed in PACS by Dr Estefania Diaz and demonstrate: X-rays of the right wrist demonstrated a extra-articular nondisplaced distal radius fracture      PROCEDURES PERFORMED:  Fracture / Dislocation Treatment    Date/Time: 5/1/2023 10:00 AM  Performed by: Yaneth Vick MD  Authorized by: Yaneth Vick MD     Patient Location:  [facility] Protocol:  Consent: Verbal consent obtained    Risks and benefits: risks, benefits and alternatives were discussed  Consent given by: patient  Patient understanding: patient states understanding of the procedure being performed  Patient consent: the patient's understanding of the procedure matches consent given  Site marked: the operative site was marked  Patient identity confirmed: verbally with patient      Injury location:  Wrist  Location details:  Right wrist  Injury type:  Fracture  Fracture type: distal radius    Fracture type: distal radius    Neurovascular status: Neurovascularly intact    Distal perfusion: normal    Neurological function: normal Range of motion: normal    Manipulation performed?: No    Immobilization:  Cast  Cast type:  Short arm  Supplies used:  Cotton padding and fiberglass  Neurovascular status: Neurovascularly intact    Distal perfusion: normal    Neurological function: normal    Range of motion: normal    Patient tolerance:  Patient tolerated the procedure well with no immediate complications           Scribe Attestation    I,:  Moise Shankar PA-C am acting as a scribe while in the presence of the attending physician :       I,:  Kurt Washington MD personally performed the services described in this documentation    as scribed in my presence :

## 2023-05-02 RX ORDER — SUMATRIPTAN 100 MG/1
TABLET, FILM COATED ORAL
Qty: 10 TABLET | Refills: 1 | OUTPATIENT
Start: 2023-05-02

## 2023-05-02 NOTE — TELEPHONE ENCOUNTER
Called pharmacy to make them aware patient will no longer be using Nurtec  Nurtec has already been discontinued on the pharmacy side  States patient picked up Nurtec on 4/17 so maybe the insurance will not allow a fill at this time     Submitted PA on [facility], Key: [id number] -  Please d/c the mediation request below, this was already sent on 4/26  Duplicate   Thank you

## 2023-05-03 ENCOUNTER — APPOINTMENT (OUTPATIENT)
Dept: LAB | Facility: CLINIC | Age: 43
End: 2023-05-03

## 2023-05-03 ENCOUNTER — OFFICE VISIT (OUTPATIENT)
Dept: INTERNAL MEDICINE CLINIC | Facility: CLINIC | Age: 43
End: 2023-05-03

## 2023-05-03 ENCOUNTER — OFFICE VISIT (OUTPATIENT)
Dept: LAB | Facility: CLINIC | Age: 43
End: 2023-05-03

## 2023-05-03 VITALS
TEMPERATURE: 97.7 F | DIASTOLIC BLOOD PRESSURE: 92 MMHG | BODY MASS INDEX: 31.56 KG/M2 | HEART RATE: 102 BPM | HEIGHT: 72 IN | WEIGHT: 233 LBS | SYSTOLIC BLOOD PRESSURE: 142 MMHG | OXYGEN SATURATION: 99 %

## 2023-05-03 DIAGNOSIS — I10 PRIMARY HYPERTENSION: ICD-10-CM

## 2023-05-03 DIAGNOSIS — I10 PRIMARY HYPERTENSION: Primary | ICD-10-CM

## 2023-05-03 LAB
ALBUMIN SERPL BCP-MCNC: 3.9 G/DL (ref 3.5–5)
ALP SERPL-CCNC: 51 U/L (ref 34–104)
ALT SERPL W P-5'-P-CCNC: 16 U/L (ref 7–52)
ANION GAP SERPL CALCULATED.3IONS-SCNC: 6 MMOL/L (ref 4–13)
AST SERPL W P-5'-P-CCNC: 17 U/L (ref 13–39)
ATRIAL RATE: 101 BPM
BILIRUB SERPL-MCNC: 0.28 MG/DL (ref 0.2–1)
BUN SERPL-MCNC: 15 MG/DL (ref 5–25)
CALCIUM SERPL-MCNC: 8.8 MG/DL (ref 8.4–10.2)
CHLORIDE SERPL-SCNC: 105 MMOL/L (ref 96–108)
CO2 SERPL-SCNC: 24 MMOL/L (ref 21–32)
CREAT SERPL-MCNC: 0.7 MG/DL (ref 0.6–1.3)
FERRITIN SERPL-MCNC: 112 NG/ML (ref 8–388)
GFR SERPL CREATININE-BSD FRML MDRD: 107 ML/MIN/1.73SQ M
GLUCOSE SERPL-MCNC: 99 MG/DL (ref 65–140)
IRON SATN MFR SERPL: 22 % (ref 15–50)
IRON SERPL-MCNC: 79 UG/DL (ref 50–170)
P AXIS: 58 DEGREES
POTASSIUM SERPL-SCNC: 4.2 MMOL/L (ref 3.5–5.3)
PR INTERVAL: 148 MS
PROT SERPL-MCNC: 6.9 G/DL (ref 6.4–8.4)
QRS AXIS: 14 DEGREES
QRSD INTERVAL: 72 MS
QT INTERVAL: 336 MS
QTC INTERVAL: 435 MS
SODIUM SERPL-SCNC: 135 MMOL/L (ref 135–147)
T WAVE AXIS: 40 DEGREES
TIBC SERPL-MCNC: 363 UG/DL (ref 250–450)
VENTRICULAR RATE: 101 BPM

## 2023-05-03 RX ORDER — LISINOPRIL 5 MG/1
5 TABLET ORAL DAILY
Qty: 30 TABLET | Refills: 0 | Status: SHIPPED | OUTPATIENT
Start: 2023-05-03

## 2023-05-03 NOTE — ASSESSMENT & PLAN NOTE
Start lisinopril 5 mg daily- reviewed side effects of the medication  Continue home blood pressure monitoring  Check labs and ekg  RTO in 2 weeks

## 2023-05-03 NOTE — PROGRESS NOTES
Name: Laura Lyon      : 1980      MRN: [id number]  Encounter Provider: CROW Danielson  Encounter Date: 5/3/2023   Encounter department: [facility]     1  Primary hypertension  Assessment & Plan:  Start lisinopril 5 mg daily- reviewed side effects of the medication  Continue home blood pressure monitoring  Check labs and ekg  RTO in 2 weeks  Orders:  -     Comprehensive metabolic panel; Future  -     ECG 12 lead; Future  -     lisinopril (ZESTRIL) 5 mg tablet; Take 1 tablet (5 mg total) by mouth daily         Subjective      Josie Le is here today with complaints of elevated blood pressure  She has noticed the last few doctors appointments, her blood pressure has been elevated  She thought it was her birth control so she stopped it and hasn't noticed much improvement  She used to take blood pressure medication in her 20's but stopped it years ago  At home her blood pressures have been 140's/90's  She has intermittent lightheadedness  She feels her heart racing at times  She did gain some weight and is wondering if that's causing it  No new supplements  She fell last week and broke her right wrist      Hypertension  This is a new problem  The current episode started yesterday  The problem has been waxing and waning since onset  The problem is resistant  Associated symptoms include malaise/fatigue, neck pain, palpitations and sweats  Pertinent negatives include no anxiety, blurred vision, chest pain, headaches, orthopnea, peripheral edema, PND or shortness of breath  Agents associated with hypertension include oral contraceptives  Risk factors for coronary artery disease include family history, obesity, sedentary lifestyle and stress  Compliance problems include diet and exercise  Review of Systems   Constitutional: Positive for malaise/fatigue  Negative for activity change, appetite change, fatigue and fever     Eyes: Negative "for blurred vision  Respiratory: Negative for cough and shortness of breath  Cardiovascular: Positive for palpitations  Negative for chest pain, orthopnea and PND  Gastrointestinal: Negative for abdominal pain  Genitourinary: Negative for difficulty urinating  Musculoskeletal: Positive for neck pain  Neurological: Positive for light-headedness  Negative for dizziness and headaches  Current Outpatient Medications on File Prior to Visit   Medication Sig    albuterol (PROVENTIL HFA,VENTOLIN HFA) 90 mcg/act inhaler Inhale 2 puffs 4 (four) times a day    atoMOXetine (STRATTERA) 25 mg capsule Take 25 mg by mouth    clonazePAM (KlonoPIN) 0 5 mg tablet Take 0 5 mg by mouth daily at bedtime    dexamethasone (DECADRON) 2 mg tablet Take 1 tablet (2 mg total) by mouth daily with breakfast    divalproex sodium (DEPAKOTE) 250 mg EC tablet 2 tabs q12 h x 2 days, then 1 tab q12 h x 2 days, then stop  Repeat if needed   Erenumab-aooe 140 MG/ML SOAJ Inject 140 mg under the skin every 30 (thirty) days    gabapentin (NEURONTIN) 600 MG tablet Take 1 tablet (600 mg total) by mouth 2 (two) times a day    ibuprofen (MOTRIN) 800 mg tablet Take 1 tablet (800 mg total) by mouth 3 (three) times a day    ketorolac (TORADOL) 30 mg/mL injection I-2 ML INTRAMUSCULAR INJECTION ONCE PER 24 HOURS AS NEEDED FOR MIGRAINE  NO MORE THAN 2 INJECTIONS PER WEEK   meclizine (ANTIVERT) 12 5 MG tablet Take 2 tablets (25 mg total) by mouth every 8 (eight) hours as needed for dizziness    meloxicam (MOBIC) 7 5 mg tablet TAKE 1 TABLET BY MOUTH EVERY DAY    montelukast (SINGULAIR) 10 mg tablet TAKE 1 TABLET BY MOUTH EVERYDAY AT BEDTIME    norethindrone (MICRONOR) 0 35 MG tablet     omeprazole (PriLOSEC) 20 mg delayed release capsule Take 1 capsule (20 mg total) by mouth daily    onabotulinumtoxin A (BOTOX) 100 units one time   \"for vertigo\"    ondansetron (ZOFRAN-ODT) 4 mg disintegrating tablet TAKE 1 TABLET BY MOUTH EVERY 8 " "HOURS AS NEEDED FOR NAUSEA AND VOMITING    prochlorperazine (COMPAZINE) 10 mg/2mL Inject 2 mL IM EVERY 12 HOURS AS NEEDED FOR MIGRAINE/NAUSEA/VOMITING - MAX 4 INJECTIONS PER WEEK    promethazine (PHENERGAN) 12 5 mg/10 mL syrup TAKE 10 ML (12 5 MG TOTAL) BY MOUTH 4 (FOUR) TIMES A DAY AS NEEDED FOR NAUSEA OR VOMITING (MIGRAINE)    sertraline (ZOLOFT) 100 mg tablet Take 100 mg by mouth every morning    SUMAtriptan (IMITREX) 100 mg tablet 1 tab at migraine onset, repeat after 2 hours if needed  Max 2 per day and 4 per week   Syringe/Needle, Disp, (SYRINGE 3CC/60GJ9-9/2\") 25G X 1-\" 3 ML MISC Use for toradol IM injections   tiZANidine (ZANAFLEX) 4 mg tablet TAKE 1 TABLET (4 MG TOTAL) BY MOUTH DAILY AT BEDTIME AS NEEDED FOR MUSCLE SPASMS    traZODone (DESYREL) 100 mg tablet Take  mg by mouth daily at bedtime as needed    Vraylar 1 5 MG capsule     amphetamine-dextroamphetamine (ADDERALL) 20 mg tablet Take 20 mg by mouth 2 (two) times a day   (Patient not taking: Reported on 2023)    benzonatate (TESSALON PERLES) 100 mg capsule Take 1 capsule (100 mg total) by mouth 3 (three) times a day as needed for cough (Patient not taking: Reported on 5/3/2023)    ibuprofen (MOTRIN) 600 mg tablet TAKE 1 TABLET (600 MG TOTAL) BY MOUTH EVERY 6 (SIX) HOURS AS NEEDED FOR MILD PAIN (Patient not taking: Reported on 5/3/2023)    [] oxyCODONE (Roxicodone) 5 immediate release tablet Take 1 tablet (5 mg total) by mouth every 6 (six) hours as needed for moderate pain for up to 3 days Max Daily Amount: 20 mg    verapamil (CALAN) 40 mg tablet 1 tab qhs (Patient not taking: Reported on 2023)       Objective     /92   Pulse 102   Temp 97 7 °F (36 5 °C)   Ht 6' 1\" (1 854 m)   Wt 106 kg (233 lb)   LMP 2023 (Exact Date)   SpO2 99%   BMI 30 74 kg/m²     Physical Exam  Vitals reviewed  Constitutional:       Appearance: Normal appearance  HENT:      Head: Normocephalic and atraumatic   " Eyes:      Conjunctiva/sclera: Conjunctivae normal    Cardiovascular:      Rate and Rhythm: Normal rate and regular rhythm  Heart sounds: Normal heart sounds  Pulmonary:      Effort: Pulmonary effort is normal       Breath sounds: Normal breath sounds  Musculoskeletal:      Comments: Cast on right wrist   Neurological:      Mental Status: She is alert and oriented to person, place, and time     Psychiatric:         Mood and Affect: Mood normal          Behavior: Behavior normal        CROW Garcias

## 2023-05-04 ENCOUNTER — TELEPHONE (OUTPATIENT)
Dept: INTERNAL MEDICINE CLINIC | Facility: CLINIC | Age: 43
End: 2023-05-04

## 2023-05-04 DIAGNOSIS — G43.709 CHRONIC MIGRAINE W/O AURA W/O STATUS MIGRAINOSUS, NOT INTRACTABLE: ICD-10-CM

## 2023-05-04 DIAGNOSIS — R00.0 SINUS TACHYCARDIA: ICD-10-CM

## 2023-05-04 DIAGNOSIS — R94.31 ABNORMAL EKG: Primary | ICD-10-CM

## 2023-05-04 RX ORDER — SUMATRIPTAN 100 MG/1
TABLET, FILM COATED ORAL
Qty: 9 TABLET | Refills: 1 | Status: SHIPPED | OUTPATIENT
Start: 2023-05-04

## 2023-05-04 NOTE — TELEPHONE ENCOUNTER
EKG does show some changes from 2017  HR was faster as well  I ordered an echo stress test to have done to further evaluate

## 2023-05-04 NOTE — TELEPHONE ENCOUNTER
Submitted PA  Sumatriptan denied  10 tabs per 30 days is more than the plan allows       If agreeable, please send script for 9 per 30 days

## 2023-05-06 DIAGNOSIS — R11.2 NAUSEA AND VOMITING, UNSPECIFIED VOMITING TYPE: ICD-10-CM

## 2023-05-06 DIAGNOSIS — K20.90 ESOPHAGITIS: ICD-10-CM

## 2023-05-06 RX ORDER — OMEPRAZOLE 20 MG/1
CAPSULE, DELAYED RELEASE ORAL
Qty: 90 CAPSULE | Refills: 2 | Status: SHIPPED | OUTPATIENT
Start: 2023-05-06

## 2023-05-08 ENCOUNTER — APPOINTMENT (OUTPATIENT)
Dept: RADIOLOGY | Facility: CLINIC | Age: 43
End: 2023-05-08

## 2023-05-08 ENCOUNTER — TELEPHONE (OUTPATIENT)
Dept: OBGYN CLINIC | Facility: CLINIC | Age: 43
End: 2023-05-08

## 2023-05-08 ENCOUNTER — OFFICE VISIT (OUTPATIENT)
Dept: OBGYN CLINIC | Facility: CLINIC | Age: 43
End: 2023-05-08

## 2023-05-08 VITALS
SYSTOLIC BLOOD PRESSURE: 140 MMHG | WEIGHT: 233.4 LBS | DIASTOLIC BLOOD PRESSURE: 92 MMHG | HEIGHT: 72 IN | BODY MASS INDEX: 31.61 KG/M2

## 2023-05-08 DIAGNOSIS — S52.591A OTHER CLOSED FRACTURE OF DISTAL END OF RIGHT RADIUS, INITIAL ENCOUNTER: ICD-10-CM

## 2023-05-08 DIAGNOSIS — S52.591A OTHER CLOSED FRACTURE OF DISTAL END OF RIGHT RADIUS, INITIAL ENCOUNTER: Primary | ICD-10-CM

## 2023-05-08 DIAGNOSIS — G43.709 CHRONIC MIGRAINE W/O AURA W/O STATUS MIGRAINOSUS, NOT INTRACTABLE: ICD-10-CM

## 2023-05-08 RX ORDER — IBUPROFEN 600 MG/1
600 TABLET ORAL EVERY 6 HOURS PRN
Qty: 30 TABLET | Refills: 0 | Status: SHIPPED | OUTPATIENT
Start: 2023-05-08

## 2023-05-08 NOTE — TELEPHONE ENCOUNTER
Franchesca Ma needs a 5 week FX right wrist , MRN [id number]   82, Best number [phone] [id number]

## 2023-05-08 NOTE — PROGRESS NOTES
ASSESSMENT/PLAN:    Assessment:   Right extra-articular nondisplaced distal radius fracture sustained on 4/28/2023      Plan:   Discussed the pain she is experiencing is common and expected this early out from a distal radius fracture  Continue short arm cast, continue elevation, and continue finger motion  Refill of ibuprofen 800 mg TID provided  She may take that in conjunction with tylenol arthritis to control her pain  Follow Up:  5  week(s)    To Do Next Visit:  X-rays of the  right  wrist out of cast    General Discussions:  Fracture - Nonoperative Care: The physiology of a fractured bone was discussed with the patient today  With nondisplaced or minimally displaced fractures, conservative treatment often results in a functional recovery  Typically, these fractures are immobilized in either a cast or splint depending on the pattern  Radiographs are typically taken at intervals throughout the fracture healing to ensure that muscular forces do not cause loss of reduction or alignment  If the fracture loses its alignment, surgical intervention may be required to stabilize it  Medical conditions such as diabetes, osteoporosis, vitamin D deficiency, and a history of or exposure to smoking may delay or prevent fracture healing     _____________________________________________________  CHIEF COMPLAINT:  Chief Complaint   Patient presents with   • Right Wrist - Fracture, Follow-up     DOI- 4/29/23         SUBJECTIVE:  Horris Klinefelter is a 44 y o  female who presents for follow up regarding right extra-articular nondisplaced distal radius fracture sustained on 4/28/2023  Last visit she was placed in a short arm cast   She continues to have right thumb pain that goes up to her lateral elbow  This was made worse with thumb motion  She denies any numbness or tingling    Handedness: right    PAST MEDICAL HISTORY:  Past Medical History:   Diagnosis Date   • Allergic    • Anxiety    • Arthritis    • Asthma    • COVID-19 02/08/2021   • Depression    • ETOH abuse    • Headache(784 0)    • Herniated cervical disc    • Memory loss    • Meniere's disease    • Migraine    • Psychiatric disorder     anxiety/depression   • Scoliosis    • Sinusitis    • Stroke [facility])    • Thyroid disease    • Vertigo    • Vertigo        PAST SURGICAL HISTORY:  Past Surgical History:   Procedure Laterality Date   • BACK SURGERY      C5-6   • HIP SURGERY     • JOINT REPLACEMENT      right hip   • OTHER SURGICAL HISTORY      Hip Replacement (right) , Spinal  Diskectomy Cervical C5-C6       FAMILY HISTORY:  Family History   Problem Relation Age of Onset   • Hypertension Mother    • Thyroid disease Mother    • Anxiety disorder Mother    • Hypertension Father    • Colon cancer Maternal Grandmother    • Breast cancer Paternal Grandmother    • Dementia Paternal Grandmother    • Heart disease Paternal Grandfather    • No Known Problems Maternal Aunt    • No Known Problems Paternal Aunt    • Drug abuse Sister         Pasted away 2022       SOCIAL HISTORY:  Social History     Tobacco Use   • Smoking status: Never     Passive exposure: Yes   • Smokeless tobacco: Never   Vaping Use   • Vaping Use: Never used   Substance Use Topics   • Alcohol use:  Yes     Alcohol/week: 3 0 standard drinks     Types: 3 Glasses of wine per week     Comment: 3 glass wine per week   • Drug use: Yes     Frequency: 1 0 times per week     Types: Marijuana       MEDICATIONS:    Current Outpatient Medications:   •  albuterol (PROVENTIL HFA,VENTOLIN HFA) 90 mcg/act inhaler, Inhale 2 puffs 4 (four) times a day, Disp: 8 5 g, Rfl: 0  •  atoMOXetine (STRATTERA) 25 mg capsule, Take 25 mg by mouth, Disp: , Rfl:   •  clonazePAM (KlonoPIN) 0 5 mg tablet, Take 0 5 mg by mouth daily at bedtime, Disp: , Rfl:   •  dexamethasone (DECADRON) 2 mg tablet, Take 1 tablet (2 mg total) by mouth daily with breakfast, Disp: 5 tablet, Rfl: 2  •  divalproex sodium (DEPAKOTE) 250 mg EC tablet, 2 tabs q12 h x 2 "days, then 1 tab q12 h x 2 days, then stop  Repeat if needed  , Disp: 12 tablet, Rfl: 1  •  Erenumab-aooe 140 MG/ML SOAJ, Inject 140 mg under the skin every 30 (thirty) days, Disp: 1 mL, Rfl: 11  •  gabapentin (NEURONTIN) 600 MG tablet, Take 1 tablet (600 mg total) by mouth 2 (two) times a day, Disp: 60 tablet, Rfl: 5  •  ibuprofen (MOTRIN) 800 mg tablet, Take 1 tablet (800 mg total) by mouth 3 (three) times a day, Disp: 21 tablet, Rfl: 0  •  ketorolac (TORADOL) 30 mg/mL injection, I-2 ML INTRAMUSCULAR INJECTION ONCE PER 24 HOURS AS NEEDED FOR MIGRAINE  NO MORE THAN 2 INJECTIONS PER WEEK , Disp: 4 mL, Rfl: 0  •  lisinopril (ZESTRIL) 5 mg tablet, Take 1 tablet (5 mg total) by mouth daily, Disp: 30 tablet, Rfl: 0  •  meclizine (ANTIVERT) 12 5 MG tablet, Take 2 tablets (25 mg total) by mouth every 8 (eight) hours as needed for dizziness, Disp: 20 tablet, Rfl: 0  •  meloxicam (MOBIC) 7 5 mg tablet, TAKE 1 TABLET BY MOUTH EVERY DAY, Disp: 30 tablet, Rfl: 0  •  montelukast (SINGULAIR) 10 mg tablet, TAKE 1 TABLET BY MOUTH EVERYDAY AT BEDTIME, Disp: 90 tablet, Rfl: 1  •  norethindrone (MICRONOR) 0 35 MG tablet, , Disp: , Rfl:   •  omeprazole (PriLOSEC) 20 mg delayed release capsule, TAKE 1 CAPSULE BY MOUTH EVERY DAY, Disp: 90 capsule, Rfl: 2  •  onabotulinumtoxin A (BOTOX) 100 units, one time   \"for vertigo\", Disp: , Rfl:   •  ondansetron (ZOFRAN-ODT) 4 mg disintegrating tablet, TAKE 1 TABLET BY MOUTH EVERY 8 HOURS AS NEEDED FOR NAUSEA AND VOMITING, Disp: 9 tablet, Rfl: 0  •  prochlorperazine (COMPAZINE) 10 mg/2mL, Inject 2 mL IM EVERY 12 HOURS AS NEEDED FOR MIGRAINE/NAUSEA/VOMITING - MAX 4 INJECTIONS PER WEEK, Disp: 10 mL, Rfl: 0  •  promethazine (PHENERGAN) 12 5 mg/10 mL syrup, TAKE 10 ML (12 5 MG TOTAL) BY MOUTH 4 (FOUR) TIMES A DAY AS NEEDED FOR NAUSEA OR VOMITING (MIGRAINE), Disp: 118 mL, Rfl: 0  •  sertraline (ZOLOFT) 100 mg tablet, Take 100 mg by mouth every morning, Disp: , Rfl: 1  •  SUMAtriptan (IMITREX) 100 mg " "tablet, 1 tab at migraine onset, repeat after 2 hours if needed  Max 2 per day and 4 per week , Disp: 9 tablet, Rfl: 1  •  Syringe/Needle, Disp, (SYRINGE 3CC/31VD8-9/2\") 25G X 1-1/2\" 3 ML MISC, Use for toradol IM injections  , Disp: 12 each, Rfl: 2  •  tiZANidine (ZANAFLEX) 4 mg tablet, TAKE 1 TABLET (4 MG TOTAL) BY MOUTH DAILY AT BEDTIME AS NEEDED FOR MUSCLE SPASMS, Disp: 90 tablet, Rfl: 0  •  traZODone (DESYREL) 100 mg tablet, Take  mg by mouth daily at bedtime as needed, Disp: , Rfl:   •  Vraylar 1 5 MG capsule, , Disp: , Rfl:   •  amphetamine-dextroamphetamine (ADDERALL) 20 mg tablet, Take 20 mg by mouth 2 (two) times a day  , Disp: , Rfl:   •  benzonatate (TESSALON PERLES) 100 mg capsule, Take 1 capsule (100 mg total) by mouth 3 (three) times a day as needed for cough (Patient not taking: Reported on 5/3/2023), Disp: 30 capsule, Rfl: 0  •  ibuprofen (MOTRIN) 600 mg tablet, TAKE 1 TABLET (600 MG TOTAL) BY MOUTH EVERY 6 (SIX) HOURS AS NEEDED FOR MILD PAIN (Patient not taking: Reported on 5/3/2023), Disp: 30 tablet, Rfl: 0  •  verapamil (CALAN) 40 mg tablet, 1 tab qhs (Patient not taking: Reported on 4/26/2023), Disp: 30 tablet, Rfl: 2    ALLERGIES:  Allergies   Allergen Reactions   • Latuda [Lurasidone]      Reaction: Drug Psychosis   • Topamax [Topiramate]      psychosis   • Amoxicillin Rash       REVIEW OF SYSTEMS:  Pertinent items are noted in HPI  A comprehensive review of systems was negative      LABS:  HgA1c: No results found for: HGBA1C  BMP:   Lab Results   Component Value Date    CALCIUM 8 8 05/03/2023    K 4 2 05/03/2023    CO2 24 05/03/2023     05/03/2023    BUN 15 05/03/2023    CREATININE 0 70 05/03/2023       _____________________________________________________  PHYSICAL EXAMINATION:  Vital signs: /92   Ht 6' 1\" (1 854 m)   Wt 106 kg (233 lb 6 4 oz)   LMP 04/29/2023 (Exact Date)   BMI 30 79 kg/m²   General: well developed and well nourished, alert, oriented times 3 and appears " comfortable  Psychiatric: Normal  HEENT: Trachea Midline, No torticollis  Cardiovascular: No discernable arrhythmia  Pulmonary: No wheezing or stridor  Abdomen: No rebound or guarding  Extremities: No peripheral edema  Skin: No masses, erythema, lacerations, fluctation, ulcerations  Neurovascular: Sensation Intact to the Median, Ulnar, Radial Nerve, Motor Intact to the Median, Ulnar, Radial Nerve and Pulses Intact    MUSCULOSKELETAL EXAMINATION:  Right wrist: Sensation intact, full digit motion    _____________________________________________________  STUDIES REVIEWED:  Images 5/8/2023 were reviewed in PACS by Dr Raymond Chávez and demonstrate: Extra-articular nondisplaced distal radius fracture in good alignment when compared to x-rays from 4/29/2023        PROCEDURES PERFORMED:  Procedures  No Procedures performed today   Scribe Attestation    I,:  Daniela Watson am acting as a scribe while in the presence of the attending physician :       I,:  Aris Vallejo MD personally performed the services described in this documentation    as scribed in my presence :

## 2023-05-09 ENCOUNTER — TELEPHONE (OUTPATIENT)
Dept: NEUROLOGY | Facility: CLINIC | Age: 43
End: 2023-05-09

## 2023-05-09 NOTE — TELEPHONE ENCOUNTER
Pt upcoming appointment for [facility] is scheduled in our [facility] location  Pt will need to be rescheduled in one of [place] locations [place] [address]  Non-par with [place]  Please contact pt asap to reschedule  Thank you

## 2023-05-12 ENCOUNTER — TELEPHONE (OUTPATIENT)
Dept: OBGYN CLINIC | Facility: HOSPITAL | Age: 43
End: 2023-05-12

## 2023-05-12 NOTE — TELEPHONE ENCOUNTER
Caller: Patient     Doctor: Deinse Yung     Reason for call: Patient states she felt a pop and her thumb is not able to move     I did offer an appointment for today but patient declined    Call back#: [phone]

## 2023-05-12 NOTE — TELEPHONE ENCOUNTER
Called and spoke w/pt and informed of JOSEPH Carlos's msg  She states that she thinks she will just watch it and if it gets worse will go to ED tomorrow

## 2023-05-12 NOTE — TELEPHONE ENCOUNTER
We could see the patient on Monday in [facility] for evaluation  If she is unable to do so and would like to go to the emergency room, she is more than welcome to do so

## 2023-05-16 NOTE — TELEPHONE ENCOUNTER
"Called and spoke w/pt and she states that thumb is able to move  She is still having pain level 7  Taking ibuprofen and pain level 2   Pt would liked to see Dr Vijay Persaud sooner than 6/21/23 as her thumb, while moving is not right \"funky\"  She did not end up in ED  Please call to schedule  Thanks        [phone]  "

## 2023-05-16 NOTE — TELEPHONE ENCOUNTER
Caller:Nhung    Doctor: Billy Dubose    Reason for call: Winnie Ogden RN back    Call back#: [phone]

## 2023-05-16 NOTE — TELEPHONE ENCOUNTER
CLM for pt to return call to get update on pt's thumb from her encounter 4 days ago since we have not heard from pt

## 2023-05-17 ENCOUNTER — OFFICE VISIT (OUTPATIENT)
Dept: OBGYN CLINIC | Facility: HOSPITAL | Age: 43
End: 2023-05-17

## 2023-05-17 ENCOUNTER — TELEPHONE (OUTPATIENT)
Dept: OBGYN CLINIC | Facility: HOSPITAL | Age: 43
End: 2023-05-17

## 2023-05-17 ENCOUNTER — HOSPITAL ENCOUNTER (OUTPATIENT)
Dept: MRI IMAGING | Facility: HOSPITAL | Age: 43
Discharge: HOME | End: 2023-05-17
Attending: ORTHOPAEDIC SURGERY

## 2023-05-17 ENCOUNTER — HOSPITAL ENCOUNTER (OUTPATIENT)
Dept: RADIOLOGY | Facility: HOSPITAL | Age: 43
Discharge: HOME | End: 2023-05-17
Attending: ORTHOPAEDIC SURGERY

## 2023-05-17 VITALS
SYSTOLIC BLOOD PRESSURE: 129 MMHG | WEIGHT: 233 LBS | BODY MASS INDEX: 31.56 KG/M2 | HEIGHT: 72 IN | HEART RATE: 94 BPM | DIASTOLIC BLOOD PRESSURE: 90 MMHG

## 2023-05-17 DIAGNOSIS — S66.811A HAND AND WRIST EXTENSOR TENDON RUPTURE, RIGHT, INITIAL ENCOUNTER: ICD-10-CM

## 2023-05-17 DIAGNOSIS — S52.501D CLOSED FRACTURE OF DISTAL END OF RIGHT RADIUS WITH ROUTINE HEALING, UNSPECIFIED FRACTURE MORPHOLOGY, SUBSEQUENT ENCOUNTER: Primary | ICD-10-CM

## 2023-05-17 DIAGNOSIS — S52.591A OTHER CLOSED FRACTURE OF DISTAL END OF RIGHT RADIUS, INITIAL ENCOUNTER: ICD-10-CM

## 2023-05-17 DIAGNOSIS — S52.501D CLOSED FRACTURE OF DISTAL END OF RIGHT RADIUS WITH ROUTINE HEALING, UNSPECIFIED FRACTURE MORPHOLOGY, SUBSEQUENT ENCOUNTER: ICD-10-CM

## 2023-05-17 RX ORDER — DULOXETIN HYDROCHLORIDE 30 MG/1
CAPSULE, DELAYED RELEASE ORAL
COMMUNITY
Start: 2023-05-16 | End: 2023-05-18

## 2023-05-17 RX ORDER — TRAMADOL HYDROCHLORIDE 50 MG/1
50 TABLET ORAL EVERY 6 HOURS PRN
Qty: 8 TABLET | Refills: 0 | Status: SHIPPED | OUTPATIENT
Start: 2023-05-17 | End: 2023-05-19 | Stop reason: SDUPTHER

## 2023-05-17 RX ORDER — METHYLPHENIDATE HYDROCHLORIDE 20 MG/1
CAPSULE, EXTENDED RELEASE ORAL
COMMUNITY
Start: 2023-05-05

## 2023-05-17 RX ORDER — NALOXONE HYDROCHLORIDE 4 MG/.1ML
SPRAY NASAL
Qty: 1 EACH | Refills: 1 | Status: SHIPPED | OUTPATIENT
Start: 2023-05-17 | End: 2024-05-16

## 2023-05-17 NOTE — TELEPHONE ENCOUNTER
Caller: patient    Doctor: [facility]    Reason for call: pt called stating she was seen today and the doctor told the pt that he was going to send in a pain medication    When she got to the pharmacy there was nothing there for her    Call back#: [phone]

## 2023-05-17 NOTE — PROGRESS NOTES
ASSESSMENT/PLAN:    Assessment:   Right extra-articular nondisplaced distal radius fracture sustained on 4/28/2023    Plan:   · Cast removed today, physical exam performed, and patient placed into new short arm cast  · New X-rays obtained today demonstrate no change in alignment or new acute osseous abnormalities  · Order placed for MRI to further evaluate for extensor tendon rupture, DeQuervain's tenosynovitis  · Follow up after MRI    Follow Up: After MRI    To Do Next Visit:  Review MRI      _____________________________________________________  CHIEF COMPLAINT:  No chief complaint on file  SUBJECTIVE:  Citlali Meneses is a 44 y o  female who presents for follow up regarding right extra-articular nondisplaced distal radius fracture sustained 4/28/2023, treated non-operatively with closed immobilization in a short arm cast   Since last visit, Citlali Meneses states that on 5/12/2023 she was pulling up her pants and felt a pop in her thumb that was painful  She rates her pain 7/10 without medication, and 5/10 with 1200mg of Ibuprofen  Pain radiates from the thumb to the area of her distal radius fracture  She denies paraesthesias, swelling, ecchymosis           PAST MEDICAL HISTORY:  Past Medical History:   Diagnosis Date   • Allergic    • Anxiety    • Arthritis    • Asthma    • COVID-19 02/08/2021   • Depression    • ETOH abuse    • Headache(784 0)    • Herniated cervical disc    • Memory loss    • Meniere's disease    • Migraine    • Psychiatric disorder     anxiety/depression   • Scoliosis    • Sinusitis    • Stroke [facility])    • Thyroid disease    • Vertigo    • Vertigo        PAST SURGICAL HISTORY:  Past Surgical History:   Procedure Laterality Date   • BACK SURGERY      C5-6   • HIP SURGERY     • JOINT REPLACEMENT      right hip   • OTHER SURGICAL HISTORY      Hip Replacement (right) , Spinal  Diskectomy Cervical C5-C6       FAMILY HISTORY:  Family History   Problem Relation Age of Onset   • Hypertension Mother    • Thyroid disease Mother    • Anxiety disorder Mother    • Hypertension Father    • Colon cancer Maternal Grandmother    • Breast cancer Paternal Grandmother    • Dementia Paternal Grandmother    • Heart disease Paternal Grandfather    • No Known Problems Maternal Aunt    • No Known Problems Paternal Aunt    • Drug abuse Sister         Pasted away 2022       SOCIAL HISTORY:  Social History     Tobacco Use   • Smoking status: Never     Passive exposure: Yes   • Smokeless tobacco: Never   Vaping Use   • Vaping Use: Never used   Substance Use Topics   • Alcohol use: Yes     Alcohol/week: 3 0 standard drinks     Types: 3 Glasses of wine per week     Comment: 3 glass wine per week   • Drug use: Yes     Frequency: 1 0 times per week     Types: Marijuana       MEDICATIONS:    Current Outpatient Medications:   •  albuterol (PROVENTIL HFA,VENTOLIN HFA) 90 mcg/act inhaler, Inhale 2 puffs 4 (four) times a day, Disp: 8 5 g, Rfl: 0  •  amphetamine-dextroamphetamine (ADDERALL) 20 mg tablet, Take 20 mg by mouth 2 (two) times a day  , Disp: , Rfl:   •  atoMOXetine (STRATTERA) 25 mg capsule, Take 25 mg by mouth, Disp: , Rfl:   •  benzonatate (TESSALON PERLES) 100 mg capsule, Take 1 capsule (100 mg total) by mouth 3 (three) times a day as needed for cough (Patient not taking: Reported on 5/3/2023), Disp: 30 capsule, Rfl: 0  •  clonazePAM (KlonoPIN) 0 5 mg tablet, Take 0 5 mg by mouth daily at bedtime, Disp: , Rfl:   •  dexamethasone (DECADRON) 2 mg tablet, Take 1 tablet (2 mg total) by mouth daily with breakfast, Disp: 5 tablet, Rfl: 2  •  divalproex sodium (DEPAKOTE) 250 mg EC tablet, 2 tabs q12 h x 2 days, then 1 tab q12 h x 2 days, then stop  Repeat if needed  , Disp: 12 tablet, Rfl: 1  •  DULoxetine (CYMBALTA) 30 mg delayed release capsule, , Disp: , Rfl:   •  Erenumab-aooe 140 MG/ML SOAJ, Inject 140 mg under the skin every 30 (thirty) days, Disp: 1 mL, Rfl: 11  •  gabapentin (NEURONTIN) 600 MG tablet, Take 1 tablet (600 "mg total) by mouth 2 (two) times a day, Disp: 60 tablet, Rfl: 5  •  ibuprofen (MOTRIN) 600 mg tablet, Take 1 tablet (600 mg total) by mouth every 6 (six) hours as needed for mild pain, Disp: 30 tablet, Rfl: 0  •  ibuprofen (MOTRIN) 800 mg tablet, Take 1 tablet (800 mg total) by mouth 3 (three) times a day, Disp: 21 tablet, Rfl: 0  •  ketorolac (TORADOL) 30 mg/mL injection, I-2 ML INTRAMUSCULAR INJECTION ONCE PER 24 HOURS AS NEEDED FOR MIGRAINE  NO MORE THAN 2 INJECTIONS PER WEEK , Disp: 4 mL, Rfl: 0  •  lisinopril (ZESTRIL) 5 mg tablet, Take 1 tablet (5 mg total) by mouth daily, Disp: 30 tablet, Rfl: 0  •  meclizine (ANTIVERT) 12 5 MG tablet, Take 2 tablets (25 mg total) by mouth every 8 (eight) hours as needed for dizziness, Disp: 20 tablet, Rfl: 0  •  meloxicam (MOBIC) 7 5 mg tablet, TAKE 1 TABLET BY MOUTH EVERY DAY, Disp: 30 tablet, Rfl: 0  •  methylphenidate (RITALIN LA) 20 MG 24 hr capsule, TAKE 1 CAPSULE BY MOUTH EVERY DAY IN THE MORNING, Disp: , Rfl:   •  montelukast (SINGULAIR) 10 mg tablet, TAKE 1 TABLET BY MOUTH EVERYDAY AT BEDTIME, Disp: 90 tablet, Rfl: 1  •  norethindrone (MICRONOR) 0 35 MG tablet, , Disp: , Rfl:   •  omeprazole (PriLOSEC) 20 mg delayed release capsule, TAKE 1 CAPSULE BY MOUTH EVERY DAY, Disp: 90 capsule, Rfl: 2  •  onabotulinumtoxin A (BOTOX) 100 units, one time   \"for vertigo\", Disp: , Rfl:   •  ondansetron (ZOFRAN-ODT) 4 mg disintegrating tablet, TAKE 1 TABLET BY MOUTH EVERY 8 HOURS AS NEEDED FOR NAUSEA AND VOMITING, Disp: 9 tablet, Rfl: 0  •  prochlorperazine (COMPAZINE) 10 mg/2mL, Inject 2 mL IM EVERY 12 HOURS AS NEEDED FOR MIGRAINE/NAUSEA/VOMITING - MAX 4 INJECTIONS PER WEEK, Disp: 10 mL, Rfl: 0  •  promethazine (PHENERGAN) 12 5 mg/10 mL syrup, TAKE 10 ML (12 5 MG TOTAL) BY MOUTH 4 (FOUR) TIMES A DAY AS NEEDED FOR NAUSEA OR VOMITING (MIGRAINE), Disp: 118 mL, Rfl: 0  •  sertraline (ZOLOFT) 100 mg tablet, Take 100 mg by mouth every morning, Disp: , Rfl: 1  •  SUMAtriptan (IMITREX) 100 " "mg tablet, 1 tab at migraine onset, repeat after 2 hours if needed  Max 2 per day and 4 per week , Disp: 9 tablet, Rfl: 1  •  Syringe/Needle, Disp, (SYRINGE 3CC/33NT5-5/2\") 25G X 1-1/2\" 3 ML MISC, Use for toradol IM injections  , Disp: 12 each, Rfl: 2  •  tiZANidine (ZANAFLEX) 4 mg tablet, TAKE 1 TABLET (4 MG TOTAL) BY MOUTH DAILY AT BEDTIME AS NEEDED FOR MUSCLE SPASMS, Disp: 90 tablet, Rfl: 0  •  traZODone (DESYREL) 100 mg tablet, Take  mg by mouth daily at bedtime as needed, Disp: , Rfl:   •  verapamil (CALAN) 40 mg tablet, 1 tab qhs (Patient not taking: Reported on 4/26/2023), Disp: 30 tablet, Rfl: 2  •  Vraylar 1 5 MG capsule, , Disp: , Rfl:     ALLERGIES:  Allergies   Allergen Reactions   • Latuda [Lurasidone]      Reaction: Drug Psychosis   • Topamax [Topiramate]      psychosis   • Amoxicillin Rash       REVIEW OF SYSTEMS:  Pertinent items are noted in HPI  A comprehensive review of systems was negative      LABS:  HgA1c: No results found for: HGBA1C  BMP:   Lab Results   Component Value Date    CALCIUM 8 8 05/03/2023    K 4 2 05/03/2023    CO2 24 05/03/2023     05/03/2023    BUN 15 05/03/2023    CREATININE 0 70 05/03/2023           _____________________________________________________  PHYSICAL EXAMINATION:  Vital signs: /90   Pulse 94   Ht 6' 1\" (1 854 m)   Wt 106 kg (233 lb)   LMP 04/29/2023 (Exact Date)   BMI 30 74 kg/m²   General: well developed and well nourished, alert, oriented times 3 and appears comfortable  Psychiatric: Normal  HEENT: Trachea Midline, No torticollis  Cardiovascular: No discernable arrhythmia  Pulmonary: No wheezing or stridor  Abdomen: No rebound or guarding  Extremities: No peripheral edema  Skin: No masses, erythema, lacerations, fluctation, ulcerations  Neurovascular: Sensation Intact to the Median, Ulnar, Radial Nerve, Motor Intact to the Median, Ulnar, Radial Nerve and Pulses Intact    MUSCULOSKELETAL EXAMINATION:  Right wrist Exam:  No obvious deformity, " "edema, ecchymosis   Skin intact, well perfused, no evidence of trauma or infection  No tenderness with palpation of the first A1 pulley  Tender with palpation of first dorsal compartment   Able to achieve active flexion,  opposition of the thumb  Able to achieve minimal extension, abduction  Brisk capillary refill  Sensation intact to Ax/R/M/U nerve distributions      _____________________________________________________  STUDIES REVIEWED:  Images were reviewed in PACS by Dr Cici Llanes and demonstrate: maintained alignment of her fracture when compared to previously obtained images      PROCEDURES PERFORMED:  Cast application    Date/Time: 5/17/2023 8:30 AM  Performed by: Oneal Brunner MD  Authorized by: Oneal Brunner MD   Universal Protocol:  Consent: Verbal consent obtained  Risks and benefits: risks, benefits and alternatives were discussed  Consent given by: patient  Time out: Immediately prior to procedure a \"time out\" was called to verify the correct patient, procedure, equipment, support staff and site/side marked as required  Patient understanding: patient states understanding of the procedure being performed  Site marked: the operative site was marked    Pre-procedure details:     Sensation:  Normal  Procedure details:     Laterality:  Right    Location:  Wrist    Wrist:  R wristCast type:  Short arm      Supplies:  Cotton padding, plaster and skin protective strip  Post-procedure details:     Pain:  Unchanged    Sensation:  Normal    Patient tolerance of procedure: Tolerated well, no immediate complications           Scribe Attestation    I,:  Deann Humphreys am acting as a scribe while in the presence of the attending physician :       I,:  Oneal Brunner MD personally performed the services described in this documentation    as scribed in my presence  :           "

## 2023-05-18 ENCOUNTER — OFFICE VISIT (OUTPATIENT)
Dept: INTERNAL MEDICINE CLINIC | Facility: CLINIC | Age: 43
End: 2023-05-18

## 2023-05-18 VITALS
HEART RATE: 89 BPM | SYSTOLIC BLOOD PRESSURE: 140 MMHG | TEMPERATURE: 96.8 F | DIASTOLIC BLOOD PRESSURE: 88 MMHG | HEIGHT: 72 IN | BODY MASS INDEX: 31.42 KG/M2 | OXYGEN SATURATION: 99 % | WEIGHT: 232 LBS

## 2023-05-18 DIAGNOSIS — S62.101A CLOSED FRACTURE OF RIGHT WRIST, INITIAL ENCOUNTER: ICD-10-CM

## 2023-05-18 DIAGNOSIS — R00.2 PALPITATIONS: ICD-10-CM

## 2023-05-18 DIAGNOSIS — I10 PRIMARY HYPERTENSION: Primary | ICD-10-CM

## 2023-05-18 DIAGNOSIS — R94.31 ABNORMAL EKG: ICD-10-CM

## 2023-05-18 RX ORDER — LISINOPRIL 10 MG/1
10 TABLET ORAL DAILY
Qty: 30 TABLET | Refills: 0 | Status: SHIPPED | OUTPATIENT
Start: 2023-05-18

## 2023-05-18 NOTE — PROGRESS NOTES
Name: Ghislaine Pacheco      : 1980      MRN: [id number]  Encounter Provider: CROW Stroud  Encounter Date: 2023   Encounter department: [facility] Nguyen     1  Primary hypertension  Assessment & Plan:  Blood pressures still elevated  Increase lisinopril to 10 mg daily  Start walking 20 minutes daily  Follow a low sodium diet  RTO in 2 weeks, continue home blood pressure monitoring  Orders:  -     lisinopril (ZESTRIL) 10 mg tablet; Take 1 tablet (10 mg total) by mouth daily    2  Abnormal EKG  Assessment & Plan:  Scheduled for echo stress test    Will also check holter monitor to assess for arrhthymias since she is having palpitations  3  Palpitations  -     Holter monitor; Future; Expected date: 2023    4  Closed fracture of right wrist, initial encounter  Comments:  recent MRI  sees orthopedics tomorrow  Letty Azul is here today for follow up  She was started on lisinopril about 2 weeks ago for a new diagnosis of hypertension  Her blood pressures have continued to run high at home- 130-170/80-90's  She denies any side effect of the medication  She was also having lightheadedness and palpitations  Her EKG noted some changes  She is scheduled for an echo/stress test      She recently fell and broke her right wrist  She was wearing a cast but felt a pop in her thumb  She had an MRI yesterday  She is taking tramadol for pain  She is seeing orthopedics  Review of Systems   Constitutional: Negative for activity change, appetite change and fatigue  Respiratory: Negative for shortness of breath  Cardiovascular: Negative for chest pain and palpitations  Gastrointestinal: Negative for abdominal pain  Musculoskeletal: Positive for arthralgias  Neurological: Positive for light-headedness  Negative for dizziness and headaches         Current Outpatient Medications on File Prior to Visit   Medication "Sig   • albuterol (PROVENTIL HFA,VENTOLIN HFA) 90 mcg/act inhaler Inhale 2 puffs 4 (four) times a day   • atoMOXetine (STRATTERA) 25 mg capsule Take 25 mg by mouth   • clonazePAM (KlonoPIN) 0 5 mg tablet Take 0 5 mg by mouth daily at bedtime   • dexamethasone (DECADRON) 2 mg tablet Take 1 tablet (2 mg total) by mouth daily with breakfast   • divalproex sodium (DEPAKOTE) 250 mg EC tablet 2 tabs q12 h x 2 days, then 1 tab q12 h x 2 days, then stop  Repeat if needed  • Erenumab-aooe 140 MG/ML SOAJ Inject 140 mg under the skin every 30 (thirty) days   • gabapentin (NEURONTIN) 600 MG tablet Take 1 tablet (600 mg total) by mouth 2 (two) times a day   • ibuprofen (MOTRIN) 600 mg tablet Take 1 tablet (600 mg total) by mouth every 6 (six) hours as needed for mild pain   • ibuprofen (MOTRIN) 800 mg tablet Take 1 tablet (800 mg total) by mouth 3 (three) times a day   • ketorolac (TORADOL) 30 mg/mL injection I-2 ML INTRAMUSCULAR INJECTION ONCE PER 24 HOURS AS NEEDED FOR MIGRAINE  NO MORE THAN 2 INJECTIONS PER WEEK  • meclizine (ANTIVERT) 12 5 MG tablet Take 2 tablets (25 mg total) by mouth every 8 (eight) hours as needed for dizziness   • meloxicam (MOBIC) 7 5 mg tablet TAKE 1 TABLET BY MOUTH EVERY DAY   • methylphenidate (RITALIN LA) 20 MG 24 hr capsule TAKE 1 CAPSULE BY MOUTH EVERY DAY IN THE MORNING   • montelukast (SINGULAIR) 10 mg tablet TAKE 1 TABLET BY MOUTH EVERYDAY AT BEDTIME   • naloxone (NARCAN) 4 mg/0 1 mL nasal spray Administer 1 spray into a nostril  If no response after 2-3 minutes, give another dose in the other nostril using a new spray  • norethindrone (MICRONOR) 0 35 MG tablet    • omeprazole (PriLOSEC) 20 mg delayed release capsule TAKE 1 CAPSULE BY MOUTH EVERY DAY   • onabotulinumtoxin A (BOTOX) 100 units one time   \"for vertigo\"   • ondansetron (ZOFRAN-ODT) 4 mg disintegrating tablet TAKE 1 TABLET BY MOUTH EVERY 8 HOURS AS NEEDED FOR NAUSEA AND VOMITING   • prochlorperazine (COMPAZINE) 10 mg/2mL " "Inject 2 mL IM EVERY 12 HOURS AS NEEDED FOR MIGRAINE/NAUSEA/VOMITING - MAX 4 INJECTIONS PER WEEK   • promethazine (PHENERGAN) 12 5 mg/10 mL syrup TAKE 10 ML (12 5 MG TOTAL) BY MOUTH 4 (FOUR) TIMES A DAY AS NEEDED FOR NAUSEA OR VOMITING (MIGRAINE)   • sertraline (ZOLOFT) 100 mg tablet Take 100 mg by mouth every morning   • SUMAtriptan (IMITREX) 100 mg tablet 1 tab at migraine onset, repeat after 2 hours if needed  Max 2 per day and 4 per week  • Syringe/Needle, Disp, (SYRINGE 3CC/65EN4-8/2\") 25G X 1-1/2\" 3 ML MISC Use for toradol IM injections  • tiZANidine (ZANAFLEX) 4 mg tablet TAKE 1 TABLET (4 MG TOTAL) BY MOUTH DAILY AT BEDTIME AS NEEDED FOR MUSCLE SPASMS   • traMADol (Ultram) 50 mg tablet Take 1 tablet (50 mg total) by mouth every 6 (six) hours as needed for moderate pain   • traZODone (DESYREL) 100 mg tablet Take  mg by mouth daily at bedtime as needed   • Vraylar 1 5 MG capsule    • [DISCONTINUED] lisinopril (ZESTRIL) 5 mg tablet Take 1 tablet (5 mg total) by mouth daily   • amphetamine-dextroamphetamine (ADDERALL) 20 mg tablet Take 20 mg by mouth 2 (two) times a day   (Patient not taking: Reported on 5/18/2023)   • benzonatate (TESSALON PERLES) 100 mg capsule Take 1 capsule (100 mg total) by mouth 3 (three) times a day as needed for cough (Patient not taking: Reported on 5/3/2023)   • verapamil (CALAN) 40 mg tablet 1 tab qhs (Patient not taking: Reported on 4/26/2023)   • [DISCONTINUED] DULoxetine (CYMBALTA) 30 mg delayed release capsule        Objective     /88   Pulse 89   Temp (!) 96 8 °F (36 °C)   Ht 6' 1\" (1 854 m)   Wt 105 kg (232 lb)   LMP 04/29/2023 (Exact Date)   SpO2 99%   BMI 30 61 kg/m²     Physical Exam  Vitals reviewed  Constitutional:       Appearance: Normal appearance  HENT:      Head: Normocephalic and atraumatic  Eyes:      Conjunctiva/sclera: Conjunctivae normal    Cardiovascular:      Rate and Rhythm: Normal rate and regular rhythm     Pulmonary:      Effort: " Pulmonary effort is normal       Breath sounds: Normal breath sounds  Musculoskeletal:      Comments: Cast on right wrist   Neurological:      Mental Status: She is alert and oriented to person, place, and time     Psychiatric:         Mood and Affect: Mood normal          Behavior: Behavior normal        CROW Zimmerman

## 2023-05-18 NOTE — ASSESSMENT & PLAN NOTE
Blood pressures still elevated  Increase lisinopril to 10 mg daily  Start walking 20 minutes daily  Follow a low sodium diet  RTO in 2 weeks, continue home blood pressure monitoring

## 2023-05-18 NOTE — ASSESSMENT & PLAN NOTE
Scheduled for echo stress test    Will also check holter monitor to assess for arrhthymias since she is having palpitations

## 2023-05-19 ENCOUNTER — TELEPHONE (OUTPATIENT)
Dept: OBGYN CLINIC | Facility: HOSPITAL | Age: 43
End: 2023-05-19

## 2023-05-19 ENCOUNTER — OFFICE VISIT (OUTPATIENT)
Dept: OBGYN CLINIC | Facility: HOSPITAL | Age: 43
End: 2023-05-19

## 2023-05-19 VITALS
DIASTOLIC BLOOD PRESSURE: 86 MMHG | BODY MASS INDEX: 31.42 KG/M2 | SYSTOLIC BLOOD PRESSURE: 122 MMHG | HEART RATE: 97 BPM | HEIGHT: 72 IN | WEIGHT: 232 LBS

## 2023-05-19 DIAGNOSIS — S66.911A TENDON RUPTURE OF WRIST, RIGHT, INITIAL ENCOUNTER: Primary | ICD-10-CM

## 2023-05-19 DIAGNOSIS — G43.709 CHRONIC MIGRAINE WITHOUT AURA, NOT INTRACTABLE, WITHOUT STATUS MIGRAINOSUS: ICD-10-CM

## 2023-05-19 DIAGNOSIS — S52.501D CLOSED FRACTURE OF DISTAL END OF RIGHT RADIUS WITH ROUTINE HEALING, UNSPECIFIED FRACTURE MORPHOLOGY, SUBSEQUENT ENCOUNTER: ICD-10-CM

## 2023-05-19 DIAGNOSIS — M65.4 TENDINITIS, DE QUERVAIN'S: ICD-10-CM

## 2023-05-19 RX ORDER — TRAMADOL HYDROCHLORIDE 50 MG/1
50 TABLET ORAL EVERY 6 HOURS PRN
Qty: 12 TABLET | Refills: 0 | Status: SHIPPED | OUTPATIENT
Start: 2023-05-19

## 2023-05-19 RX ORDER — ONDANSETRON 4 MG/1
TABLET, ORALLY DISINTEGRATING ORAL
Qty: 9 TABLET | Refills: 0 | Status: SHIPPED | OUTPATIENT
Start: 2023-05-19

## 2023-05-19 RX ORDER — CLINDAMYCIN PHOSPHATE 900 MG/50ML
900 INJECTION INTRAVENOUS ONCE
Status: CANCELLED | OUTPATIENT
Start: 2023-05-19 | End: 2023-05-19

## 2023-05-19 NOTE — TELEPHONE ENCOUNTER
Caller: patient    Doctor: [facility]    Reason for call: patient called in to check the status of the TRAMADOL medication   Patient would like it to be sent to the [facility] on [address]    Call back#: n/a

## 2023-05-19 NOTE — H&P (VIEW-ONLY)
ASSESSMENT/PLAN:    Assessment:   Right extra-articular nondisplaced distal radius fracture sustained 4/28/2023 with EPL rupture, de Quervain's tendinitis, second extensor compartment tenosynovitis    Plan: It was discussed with the patient at length the MRI results  It was discussed that we can proceed with surgical intervention with a right distal radius ORIF, right de Quervain's release, right second extensor compartment tenosynovectomy and right EIC to the EPL tendon transfer  Patient would like to proceed with the above stated procedure  Detail consent was obtained  Risks and benefits of surgery were discussed with the patient at length  She will follow-up after surgery for suture removal     Follow Up: After Surgery    To Do Next Visit:  Sutures out and x-ray of right wrist    Operative Discussions:  Fracture Operative Treatment: The physiology of a fractured bone was discussed with the patient today  With displaced fractures, operative treatment often results in a functional recovery  Typically, these fractures undergo reduction either through percutaneous or open methods depending on the location and fracture pattern  Radiographs are typically taken at intervals throughout the fracture healing ensure maintenance of reduction and alignment  If the fracture loses its alignment, revision surgery may be required  Medical conditions such as diabetes, osteoporosis, vitamin D deficiency, and a history of or exposure to smoking may delay or prevent fracture healing  The risks and benefits of the procedure were explained to the patient, which include, but are not limited to: Bleeding, infection, recurrence, pain, scar, malunion, nonunion, damage to tendons, damage to nerves, and damage to blood vessels, and complications related to anesthesia, failure to give desire result, need for more surgery    These risks, along with alternative conservative treatment options, and postoperative protocols were voiced back and understood by the patient  All questions were answered to the patient's satisfaction  The patient agrees to comply with a standard postoperative protocol, and is willing to proceed  Education was provided via written and auditory forms  There were no barriers to learning  Written handouts regarding wound care, incision and scar care, and general preoperative information was provided to the patient  Prior to surgery, the patient may be requested to stop all anti-inflammatory medications  Prophylactic aspirin, Plavix, and Coumadin may be allowed to be continued  Medications including vitamin E , ginkgo, and fish oil are requested to be stopped approximately one week prior to surgery  Hypertensive medications and beta blockers, if taken, should be continued  Standard Consent: The risks and benefits of the procedure were explained to the patient, which include, but are not limited to: Bleeding, infection, recurrence, pain, scar, damage to tendons, damage to nerves, and damage to blood vessels, failure to give desired results and complications related to anesthesia  These risks, along with alternative conservative treatment options, and postoperative protocols were voiced back and understood by the patient  All questions were answered to the patient's satisfaction  The patient agrees to comply with a standard postoperative protocol, and is willing to proceed  Education was provided via written and auditory forms  There were no barriers to learning  Written handouts regarding wound care, incision and scar care, and general preoperative information was provided to the patient  Prior to surgery, the patient may be requested to stop all anti-inflammatory medications  Prophylactic aspirin, Plavix, and Coumadin may be allowed to be continued  Medications including vitamin E , ginkgo, and fish oil are requested to be stopped approximately one week prior to surgery    Hypertensive medications and beta blockers, if taken, should be continued  _____________________________________________________  CHIEF COMPLAINT:  Chief Complaint   Patient presents with   • Right Wrist - Fracture, Follow-up     MRI- 5/17/23         SUBJECTIVE:  Marc Norman is a 44 y o  female who presents for follow-up regarding right extra-articular nondisplaced distal radius fracture sustained 4/28/2023  She was treated conservatively with a short arm cast   Since that time she has noticed the inability to extend her thumb  She did obtain an MRI to evaluate for an EPL tendon rupture  She states that she has pain that starts in the wrist and radiates to the thumb  She denies any numbness or tingling  PAST MEDICAL HISTORY:  Past Medical History:   Diagnosis Date   • Allergic    • Anxiety    • Arthritis    • Asthma    • COVID-19 02/08/2021   • Depression    • ETOH abuse    • Headache(784 0)    • Herniated cervical disc    • Memory loss    • Meniere's disease    • Migraine    • Psychiatric disorder     anxiety/depression   • Scoliosis    • Sinusitis    • Stroke [facility])    • Thyroid disease    • Vertigo    • Vertigo        PAST SURGICAL HISTORY:  Past Surgical History:   Procedure Laterality Date   • BACK SURGERY      C5-6   • HIP SURGERY     • JOINT REPLACEMENT      right hip   • OTHER SURGICAL HISTORY      Hip Replacement (right) , Spinal  Diskectomy Cervical C5-C6       FAMILY HISTORY:  Family History   Problem Relation Age of Onset   • Hypertension Mother    • Thyroid disease Mother    • Anxiety disorder Mother    • Hypertension Father    • Colon cancer Maternal Grandmother    • Breast cancer Paternal Grandmother    • Dementia Paternal Grandmother    • Heart disease Paternal Grandfather    • No Known Problems Maternal Aunt    • No Known Problems Paternal Aunt    • Drug abuse Sister         Pasted away 2022       SOCIAL HISTORY:  Social History     Tobacco Use   • Smoking status: Never     Passive exposure:  Yes   • Smokeless tobacco: Never   Vaping Use   • Vaping Use: Never used   Substance Use Topics   • Alcohol use: Yes     Alcohol/week: 3 0 standard drinks     Types: 3 Glasses of wine per week     Comment: 3 glass wine per week   • Drug use: Yes     Frequency: 1 0 times per week     Types: Marijuana       MEDICATIONS:    Current Outpatient Medications:   •  albuterol (PROVENTIL HFA,VENTOLIN HFA) 90 mcg/act inhaler, Inhale 2 puffs 4 (four) times a day, Disp: 8 5 g, Rfl: 0  •  atoMOXetine (STRATTERA) 25 mg capsule, Take 25 mg by mouth, Disp: , Rfl:   •  clonazePAM (KlonoPIN) 0 5 mg tablet, Take 0 5 mg by mouth daily at bedtime, Disp: , Rfl:   •  dexamethasone (DECADRON) 2 mg tablet, Take 1 tablet (2 mg total) by mouth daily with breakfast, Disp: 5 tablet, Rfl: 2  •  divalproex sodium (DEPAKOTE) 250 mg EC tablet, 2 tabs q12 h x 2 days, then 1 tab q12 h x 2 days, then stop  Repeat if needed  , Disp: 12 tablet, Rfl: 1  •  Erenumab-aooe 140 MG/ML SOAJ, Inject 140 mg under the skin every 30 (thirty) days, Disp: 1 mL, Rfl: 11  •  gabapentin (NEURONTIN) 600 MG tablet, Take 1 tablet (600 mg total) by mouth 2 (two) times a day, Disp: 60 tablet, Rfl: 5  •  ibuprofen (MOTRIN) 600 mg tablet, Take 1 tablet (600 mg total) by mouth every 6 (six) hours as needed for mild pain, Disp: 30 tablet, Rfl: 0  •  ibuprofen (MOTRIN) 800 mg tablet, Take 1 tablet (800 mg total) by mouth 3 (three) times a day, Disp: 21 tablet, Rfl: 0  •  ketorolac (TORADOL) 30 mg/mL injection, I-2 ML INTRAMUSCULAR INJECTION ONCE PER 24 HOURS AS NEEDED FOR MIGRAINE   NO MORE THAN 2 INJECTIONS PER WEEK , Disp: 4 mL, Rfl: 0  •  lisinopril (ZESTRIL) 10 mg tablet, Take 1 tablet (10 mg total) by mouth daily, Disp: 30 tablet, Rfl: 0  •  meclizine (ANTIVERT) 12 5 MG tablet, Take 2 tablets (25 mg total) by mouth every 8 (eight) hours as needed for dizziness, Disp: 20 tablet, Rfl: 0  •  meloxicam (MOBIC) 7 5 mg tablet, TAKE 1 TABLET BY MOUTH EVERY DAY, Disp: 30 tablet, Rfl: 0  •  methylphenidate "(RITALIN LA) 20 MG 24 hr capsule, TAKE 1 CAPSULE BY MOUTH EVERY DAY IN THE MORNING, Disp: , Rfl:   •  montelukast (SINGULAIR) 10 mg tablet, TAKE 1 TABLET BY MOUTH EVERYDAY AT BEDTIME, Disp: 90 tablet, Rfl: 1  •  naloxone (NARCAN) 4 mg/0 1 mL nasal spray, Administer 1 spray into a nostril  If no response after 2-3 minutes, give another dose in the other nostril using a new spray , Disp: 1 each, Rfl: 1  •  norethindrone (MICRONOR) 0 35 MG tablet, , Disp: , Rfl:   •  omeprazole (PriLOSEC) 20 mg delayed release capsule, TAKE 1 CAPSULE BY MOUTH EVERY DAY, Disp: 90 capsule, Rfl: 2  •  onabotulinumtoxin A (BOTOX) 100 units, one time  \"for vertigo\", Disp: , Rfl:   •  prochlorperazine (COMPAZINE) 10 mg/2mL, Inject 2 mL IM EVERY 12 HOURS AS NEEDED FOR MIGRAINE/NAUSEA/VOMITING - MAX 4 INJECTIONS PER WEEK, Disp: 10 mL, Rfl: 0  •  promethazine (PHENERGAN) 12 5 mg/10 mL syrup, TAKE 10 ML (12 5 MG TOTAL) BY MOUTH 4 (FOUR) TIMES A DAY AS NEEDED FOR NAUSEA OR VOMITING (MIGRAINE), Disp: 118 mL, Rfl: 0  •  sertraline (ZOLOFT) 100 mg tablet, Take 100 mg by mouth every morning, Disp: , Rfl: 1  •  SUMAtriptan (IMITREX) 100 mg tablet, 1 tab at migraine onset, repeat after 2 hours if needed  Max 2 per day and 4 per week , Disp: 9 tablet, Rfl: 1  •  Syringe/Needle, Disp, (SYRINGE 3CC/86UX4-9/2\") 25G X 1-1/2\" 3 ML MISC, Use for toradol IM injections  , Disp: 12 each, Rfl: 2  •  tiZANidine (ZANAFLEX) 4 mg tablet, TAKE 1 TABLET (4 MG TOTAL) BY MOUTH DAILY AT BEDTIME AS NEEDED FOR MUSCLE SPASMS, Disp: 90 tablet, Rfl: 0  •  traMADol (Ultram) 50 mg tablet, Take 1 tablet (50 mg total) by mouth every 6 (six) hours as needed for moderate pain, Disp: 12 tablet, Rfl: 0  •  traZODone (DESYREL) 100 mg tablet, Take  mg by mouth daily at bedtime as needed, Disp: , Rfl:   •  Vraylar 1 5 MG capsule, , Disp: , Rfl:   •  amphetamine-dextroamphetamine (ADDERALL) 20 mg tablet, Take 20 mg by mouth 2 (two) times a day   (Patient not taking: Reported on " "5/18/2023), Disp: , Rfl:   •  benzonatate (TESSALON PERLES) 100 mg capsule, Take 1 capsule (100 mg total) by mouth 3 (three) times a day as needed for cough (Patient not taking: Reported on 5/3/2023), Disp: 30 capsule, Rfl: 0  •  ondansetron (ZOFRAN-ODT) 4 mg disintegrating tablet, Take 1 tab q8h prn for nausea, Disp: 9 tablet, Rfl: 0  •  verapamil (CALAN) 40 mg tablet, 1 tab qhs (Patient not taking: Reported on 4/26/2023), Disp: 30 tablet, Rfl: 2    ALLERGIES:  Allergies   Allergen Reactions   • Latuda [Lurasidone]      Reaction: Drug Psychosis   • Topamax [Topiramate]      psychosis   • Amoxicillin Rash       REVIEW OF SYSTEMS:  Pertinent items are noted in HPI  A comprehensive review of systems was negative      LABS:  HgA1c: No results found for: HGBA1C  BMP:   Lab Results   Component Value Date    CALCIUM 8 8 05/03/2023    K 4 2 05/03/2023    CO2 24 05/03/2023     05/03/2023    BUN 15 05/03/2023    CREATININE 0 70 05/03/2023       _____________________________________________________  PHYSICAL EXAMINATION:  Vital signs: /86   Pulse 97   Ht 6' 1\" (1 854 m)   Wt 105 kg (232 lb)   LMP 04/29/2023 (Exact Date)   BMI 30 61 kg/m²   General: well developed and well nourished, alert, oriented times 3 and appears comfortable  Psychiatric: Normal  HEENT: Trachea Midline, No torticollis  Cardiovascular: No discernable arrhythmia  Pulmonary: No wheezing or stridor  Abdomen: No rebound or guarding  Extremities: No peripheral edema  Skin: No masses, erythema, lacerations, fluctation, ulcerations  Neurovascular: Sensation Intact to the Median, Ulnar, Radial Nerve, Motor Intact to the Median, Ulnar, Radial Nerve and Pulses Intact    MUSCULOSKELETAL EXAMINATION:  Right wrist  No erythema edema or ecchymosis noted, skin is warm to touch  Cast is in appropriate position  Patient has the ability to extend the thumb  Sensation is intact to light touch  Capillary refill less than 2 " seconds    _____________________________________________________  STUDIES REVIEWED:  MRI of the right wrist demonstrated an EPL tendon rupture, de Quervain's tenosynovitis, second extensor compartment tenosynovitis, known distal radius fracture      PROCEDURES PERFORMED:  Procedures  No Procedures performed today

## 2023-05-19 NOTE — PROGRESS NOTES
ASSESSMENT/PLAN:    Assessment:   Right extra-articular nondisplaced distal radius fracture sustained 4/28/2023 with EPL rupture, de Quervain's tendinitis, second extensor compartment tenosynovitis    Plan: It was discussed with the patient at length the MRI results  It was discussed that we can proceed with surgical intervention with a right distal radius ORIF, right de Quervain's release, right second extensor compartment tenosynovectomy and right EIC to the EPL tendon transfer  Patient would like to proceed with the above stated procedure  Detail consent was obtained  Risks and benefits of surgery were discussed with the patient at length  She will follow-up after surgery for suture removal     Follow Up: After Surgery    To Do Next Visit:  Sutures out and x-ray of right wrist    Operative Discussions:  Fracture Operative Treatment: The physiology of a fractured bone was discussed with the patient today  With displaced fractures, operative treatment often results in a functional recovery  Typically, these fractures undergo reduction either through percutaneous or open methods depending on the location and fracture pattern  Radiographs are typically taken at intervals throughout the fracture healing ensure maintenance of reduction and alignment  If the fracture loses its alignment, revision surgery may be required  Medical conditions such as diabetes, osteoporosis, vitamin D deficiency, and a history of or exposure to smoking may delay or prevent fracture healing  The risks and benefits of the procedure were explained to the patient, which include, but are not limited to: Bleeding, infection, recurrence, pain, scar, malunion, nonunion, damage to tendons, damage to nerves, and damage to blood vessels, and complications related to anesthesia, failure to give desire result, need for more surgery    These risks, along with alternative conservative treatment options, and postoperative protocols were voiced back and understood by the patient  All questions were answered to the patient's satisfaction  The patient agrees to comply with a standard postoperative protocol, and is willing to proceed  Education was provided via written and auditory forms  There were no barriers to learning  Written handouts regarding wound care, incision and scar care, and general preoperative information was provided to the patient  Prior to surgery, the patient may be requested to stop all anti-inflammatory medications  Prophylactic aspirin, Plavix, and Coumadin may be allowed to be continued  Medications including vitamin E , ginkgo, and fish oil are requested to be stopped approximately one week prior to surgery  Hypertensive medications and beta blockers, if taken, should be continued  Standard Consent: The risks and benefits of the procedure were explained to the patient, which include, but are not limited to: Bleeding, infection, recurrence, pain, scar, damage to tendons, damage to nerves, and damage to blood vessels, failure to give desired results and complications related to anesthesia  These risks, along with alternative conservative treatment options, and postoperative protocols were voiced back and understood by the patient  All questions were answered to the patient's satisfaction  The patient agrees to comply with a standard postoperative protocol, and is willing to proceed  Education was provided via written and auditory forms  There were no barriers to learning  Written handouts regarding wound care, incision and scar care, and general preoperative information was provided to the patient  Prior to surgery, the patient may be requested to stop all anti-inflammatory medications  Prophylactic aspirin, Plavix, and Coumadin may be allowed to be continued  Medications including vitamin E , ginkgo, and fish oil are requested to be stopped approximately one week prior to surgery    Hypertensive medications and beta blockers, if taken, should be continued  _____________________________________________________  CHIEF COMPLAINT:  Chief Complaint   Patient presents with   • Right Wrist - Fracture, Follow-up     MRI- 5/17/23         SUBJECTIVE:  Thomas Benítez is a 44 y o  female who presents for follow-up regarding right extra-articular nondisplaced distal radius fracture sustained 4/28/2023  She was treated conservatively with a short arm cast   Since that time she has noticed the inability to extend her thumb  She did obtain an MRI to evaluate for an EPL tendon rupture  She states that she has pain that starts in the wrist and radiates to the thumb  She denies any numbness or tingling  PAST MEDICAL HISTORY:  Past Medical History:   Diagnosis Date   • Allergic    • Anxiety    • Arthritis    • Asthma    • COVID-19 02/08/2021   • Depression    • ETOH abuse    • Headache(784 0)    • Herniated cervical disc    • Memory loss    • Meniere's disease    • Migraine    • Psychiatric disorder     anxiety/depression   • Scoliosis    • Sinusitis    • Stroke [facility])    • Thyroid disease    • Vertigo    • Vertigo        PAST SURGICAL HISTORY:  Past Surgical History:   Procedure Laterality Date   • BACK SURGERY      C5-6   • HIP SURGERY     • JOINT REPLACEMENT      right hip   • OTHER SURGICAL HISTORY      Hip Replacement (right) , Spinal  Diskectomy Cervical C5-C6       FAMILY HISTORY:  Family History   Problem Relation Age of Onset   • Hypertension Mother    • Thyroid disease Mother    • Anxiety disorder Mother    • Hypertension Father    • Colon cancer Maternal Grandmother    • Breast cancer Paternal Grandmother    • Dementia Paternal Grandmother    • Heart disease Paternal Grandfather    • No Known Problems Maternal Aunt    • No Known Problems Paternal Aunt    • Drug abuse Sister         Pasted away 2022       SOCIAL HISTORY:  Social History     Tobacco Use   • Smoking status: Never     Passive exposure:  Yes   • Smokeless tobacco: Never   Vaping Use   • Vaping Use: Never used   Substance Use Topics   • Alcohol use: Yes     Alcohol/week: 3 0 standard drinks     Types: 3 Glasses of wine per week     Comment: 3 glass wine per week   • Drug use: Yes     Frequency: 1 0 times per week     Types: Marijuana       MEDICATIONS:    Current Outpatient Medications:   •  albuterol (PROVENTIL HFA,VENTOLIN HFA) 90 mcg/act inhaler, Inhale 2 puffs 4 (four) times a day, Disp: 8 5 g, Rfl: 0  •  atoMOXetine (STRATTERA) 25 mg capsule, Take 25 mg by mouth, Disp: , Rfl:   •  clonazePAM (KlonoPIN) 0 5 mg tablet, Take 0 5 mg by mouth daily at bedtime, Disp: , Rfl:   •  dexamethasone (DECADRON) 2 mg tablet, Take 1 tablet (2 mg total) by mouth daily with breakfast, Disp: 5 tablet, Rfl: 2  •  divalproex sodium (DEPAKOTE) 250 mg EC tablet, 2 tabs q12 h x 2 days, then 1 tab q12 h x 2 days, then stop  Repeat if needed  , Disp: 12 tablet, Rfl: 1  •  Erenumab-aooe 140 MG/ML SOAJ, Inject 140 mg under the skin every 30 (thirty) days, Disp: 1 mL, Rfl: 11  •  gabapentin (NEURONTIN) 600 MG tablet, Take 1 tablet (600 mg total) by mouth 2 (two) times a day, Disp: 60 tablet, Rfl: 5  •  ibuprofen (MOTRIN) 600 mg tablet, Take 1 tablet (600 mg total) by mouth every 6 (six) hours as needed for mild pain, Disp: 30 tablet, Rfl: 0  •  ibuprofen (MOTRIN) 800 mg tablet, Take 1 tablet (800 mg total) by mouth 3 (three) times a day, Disp: 21 tablet, Rfl: 0  •  ketorolac (TORADOL) 30 mg/mL injection, I-2 ML INTRAMUSCULAR INJECTION ONCE PER 24 HOURS AS NEEDED FOR MIGRAINE   NO MORE THAN 2 INJECTIONS PER WEEK , Disp: 4 mL, Rfl: 0  •  lisinopril (ZESTRIL) 10 mg tablet, Take 1 tablet (10 mg total) by mouth daily, Disp: 30 tablet, Rfl: 0  •  meclizine (ANTIVERT) 12 5 MG tablet, Take 2 tablets (25 mg total) by mouth every 8 (eight) hours as needed for dizziness, Disp: 20 tablet, Rfl: 0  •  meloxicam (MOBIC) 7 5 mg tablet, TAKE 1 TABLET BY MOUTH EVERY DAY, Disp: 30 tablet, Rfl: 0  •  methylphenidate "(RITALIN LA) 20 MG 24 hr capsule, TAKE 1 CAPSULE BY MOUTH EVERY DAY IN THE MORNING, Disp: , Rfl:   •  montelukast (SINGULAIR) 10 mg tablet, TAKE 1 TABLET BY MOUTH EVERYDAY AT BEDTIME, Disp: 90 tablet, Rfl: 1  •  naloxone (NARCAN) 4 mg/0 1 mL nasal spray, Administer 1 spray into a nostril  If no response after 2-3 minutes, give another dose in the other nostril using a new spray , Disp: 1 each, Rfl: 1  •  norethindrone (MICRONOR) 0 35 MG tablet, , Disp: , Rfl:   •  omeprazole (PriLOSEC) 20 mg delayed release capsule, TAKE 1 CAPSULE BY MOUTH EVERY DAY, Disp: 90 capsule, Rfl: 2  •  onabotulinumtoxin A (BOTOX) 100 units, one time  \"for vertigo\", Disp: , Rfl:   •  prochlorperazine (COMPAZINE) 10 mg/2mL, Inject 2 mL IM EVERY 12 HOURS AS NEEDED FOR MIGRAINE/NAUSEA/VOMITING - MAX 4 INJECTIONS PER WEEK, Disp: 10 mL, Rfl: 0  •  promethazine (PHENERGAN) 12 5 mg/10 mL syrup, TAKE 10 ML (12 5 MG TOTAL) BY MOUTH 4 (FOUR) TIMES A DAY AS NEEDED FOR NAUSEA OR VOMITING (MIGRAINE), Disp: 118 mL, Rfl: 0  •  sertraline (ZOLOFT) 100 mg tablet, Take 100 mg by mouth every morning, Disp: , Rfl: 1  •  SUMAtriptan (IMITREX) 100 mg tablet, 1 tab at migraine onset, repeat after 2 hours if needed  Max 2 per day and 4 per week , Disp: 9 tablet, Rfl: 1  •  Syringe/Needle, Disp, (SYRINGE 3CC/87XL7-7/2\") 25G X 1-1/2\" 3 ML MISC, Use for toradol IM injections  , Disp: 12 each, Rfl: 2  •  tiZANidine (ZANAFLEX) 4 mg tablet, TAKE 1 TABLET (4 MG TOTAL) BY MOUTH DAILY AT BEDTIME AS NEEDED FOR MUSCLE SPASMS, Disp: 90 tablet, Rfl: 0  •  traMADol (Ultram) 50 mg tablet, Take 1 tablet (50 mg total) by mouth every 6 (six) hours as needed for moderate pain, Disp: 12 tablet, Rfl: 0  •  traZODone (DESYREL) 100 mg tablet, Take  mg by mouth daily at bedtime as needed, Disp: , Rfl:   •  Vraylar 1 5 MG capsule, , Disp: , Rfl:   •  amphetamine-dextroamphetamine (ADDERALL) 20 mg tablet, Take 20 mg by mouth 2 (two) times a day   (Patient not taking: Reported on " "5/18/2023), Disp: , Rfl:   •  benzonatate (TESSALON PERLES) 100 mg capsule, Take 1 capsule (100 mg total) by mouth 3 (three) times a day as needed for cough (Patient not taking: Reported on 5/3/2023), Disp: 30 capsule, Rfl: 0  •  ondansetron (ZOFRAN-ODT) 4 mg disintegrating tablet, Take 1 tab q8h prn for nausea, Disp: 9 tablet, Rfl: 0  •  verapamil (CALAN) 40 mg tablet, 1 tab qhs (Patient not taking: Reported on 4/26/2023), Disp: 30 tablet, Rfl: 2    ALLERGIES:  Allergies   Allergen Reactions   • Latuda [Lurasidone]      Reaction: Drug Psychosis   • Topamax [Topiramate]      psychosis   • Amoxicillin Rash       REVIEW OF SYSTEMS:  Pertinent items are noted in HPI  A comprehensive review of systems was negative      LABS:  HgA1c: No results found for: HGBA1C  BMP:   Lab Results   Component Value Date    CALCIUM 8 8 05/03/2023    K 4 2 05/03/2023    CO2 24 05/03/2023     05/03/2023    BUN 15 05/03/2023    CREATININE 0 70 05/03/2023       _____________________________________________________  PHYSICAL EXAMINATION:  Vital signs: /86   Pulse 97   Ht 6' 1\" (1 854 m)   Wt 105 kg (232 lb)   LMP 04/29/2023 (Exact Date)   BMI 30 61 kg/m²   General: well developed and well nourished, alert, oriented times 3 and appears comfortable  Psychiatric: Normal  HEENT: Trachea Midline, No torticollis  Cardiovascular: No discernable arrhythmia  Pulmonary: No wheezing or stridor  Abdomen: No rebound or guarding  Extremities: No peripheral edema  Skin: No masses, erythema, lacerations, fluctation, ulcerations  Neurovascular: Sensation Intact to the Median, Ulnar, Radial Nerve, Motor Intact to the Median, Ulnar, Radial Nerve and Pulses Intact    MUSCULOSKELETAL EXAMINATION:  Right wrist  No erythema edema or ecchymosis noted, skin is warm to touch  Cast is in appropriate position  Patient has the ability to extend the thumb  Sensation is intact to light touch  Capillary refill less than 2 " seconds    _____________________________________________________  STUDIES REVIEWED:  MRI of the right wrist demonstrated an EPL tendon rupture, de Quervain's tenosynovitis, second extensor compartment tenosynovitis, known distal radius fracture      PROCEDURES PERFORMED:  Procedures  No Procedures performed today

## 2023-05-22 ENCOUNTER — ANESTHESIA EVENT (OUTPATIENT)
Dept: PERIOP | Facility: HOSPITAL | Age: 43
End: 2023-05-22

## 2023-05-22 NOTE — PRE-PROCEDURE INSTRUCTIONS
Pre-Surgery Instructions:   Medication Instructions   • albuterol (PROVENTIL HFA,VENTOLIN HFA) 90 mcg/act inhaler Uses PRN- OK to take day of surgery   • atoMOXetine (STRATTERA) 25 mg capsule Take day of surgery  • clonazePAM (KlonoPIN) 0 5 mg tablet Uses PRN- OK to take day of surgery   • divalproex sodium (DEPAKOTE) 250 mg EC tablet Take day of surgery  • Erenumab-aooe 140 MG/ML SOAJ Instructions provided by MD   • gabapentin (NEURONTIN) 600 MG tablet Take day of surgery  • ibuprofen (MOTRIN) 800 mg tablet Stop taking 7 days prior to surgery  • lisinopril (ZESTRIL) 10 mg tablet Hold day of surgery  • meclizine (ANTIVERT) 12 5 MG tablet Uses PRN- OK to take day of surgery   • meloxicam (MOBIC) 7 5 mg tablet Stop taking 7 days prior to surgery  • methylphenidate (RITALIN LA) 20 MG 24 hr capsule Take day of surgery  • montelukast (SINGULAIR) 10 mg tablet Take night before surgery   • norethindrone (MICRONOR) 0 35 MG tablet Take day of surgery  • omeprazole (PriLOSEC) 20 mg delayed release capsule Take day of surgery  • ondansetron (ZOFRAN-ODT) 4 mg disintegrating tablet Uses PRN- OK to take day of surgery   • sertraline (ZOLOFT) 100 mg tablet Take day of surgery  • tiZANidine (ZANAFLEX) 4 mg tablet Uses PRN- OK to take day of surgery   • traMADol (Ultram) 50 mg tablet Uses PRN- OK to take day of surgery   • traZODone (DESYREL) 100 mg tablet Take night before surgery   • Vraylar 1 5 MG capsule Instructions provided by MD    Medication instructions for day surgery reviewed  Please use only a sip of water to take your instructed medications  Avoid all over the counter vitamins, supplements and NSAIDS for one week prior to surgery per anesthesia guidelines  Tylenol is ok to take as needed  You will receive a call one business day prior to surgery with an arrival time and hospital directions   If your surgery is scheduled on a Monday, the hospital will be calling you on the Friday prior to your surgery  If you have not heard from anyone by 8pm, please call the hospital supervisor through the hospital  at [phone]  Elizabeth Garcia [phone])  Do not eat or drink anything after midnight the night before your surgery, including candy, mints, lifesavers, or chewing gum  Do not drink alcohol 24hrs before your surgery  Try not to smoke at least 24hrs before your surgery  Follow the pre surgery showering instructions as listed in the [facility] Surgical Experience Booklet” or otherwise provided by your surgeon's office  Do not shave the surgical area 24 hours before surgery  Do not apply any lotions, creams, including makeup, cologne, deodorant, or perfumes after showering on the day of your surgery  No contact lenses, eye make-up, or artificial eyelashes  Remove nail polish, including gel polish, and any artificial, gel, or acrylic nails if possible  Remove all jewelry including rings and body piercing jewelry  Wear causal clothing that is easy to take on and off  Consider your type of surgery  Keep any valuables, jewelry, piercings at home  Please bring any specially ordered equipment (sling, braces) if indicated  Arrange for a responsible person to drive you to and from the hospital on the day of your surgery  Visitor Guidelines discussed  Call the surgeon's office with any new illnesses, exposures, or additional questions prior to surgery  Please reference your [facility] Surgical Experience Booklet” for additional information to prepare for your upcoming surgery

## 2023-05-25 ENCOUNTER — ANESTHESIA (OUTPATIENT)
Dept: PERIOP | Facility: HOSPITAL | Age: 43
End: 2023-05-25

## 2023-05-25 ENCOUNTER — HOSPITAL ENCOUNTER (OUTPATIENT)
Facility: HOSPITAL | Age: 43
Setting detail: OUTPATIENT SURGERY
Discharge: HOME | End: 2023-05-25
Attending: ORTHOPAEDIC SURGERY | Admitting: ORTHOPAEDIC SURGERY

## 2023-05-25 ENCOUNTER — APPOINTMENT (OUTPATIENT)
Dept: RADIOLOGY | Facility: HOSPITAL | Age: 43
End: 2023-05-25

## 2023-05-25 VITALS
HEIGHT: 72 IN | DIASTOLIC BLOOD PRESSURE: 81 MMHG | BODY MASS INDEX: 31.03 KG/M2 | OXYGEN SATURATION: 96 % | TEMPERATURE: 98 F | SYSTOLIC BLOOD PRESSURE: 145 MMHG | RESPIRATION RATE: 16 BRPM | HEART RATE: 88 BPM | WEIGHT: 229.06 LBS

## 2023-05-25 DIAGNOSIS — S52.551A OTHER CLOSED EXTRA-ARTICULAR FRACTURE OF DISTAL END OF RIGHT RADIUS, INITIAL ENCOUNTER: Primary | ICD-10-CM

## 2023-05-25 LAB
EXT PREGNANCY TEST URINE: NEGATIVE
EXT. CONTROL: NORMAL

## 2023-05-25 RX ORDER — RIMEGEPANT SULFATE 75 MG/75MG
75 TABLET, ORALLY DISINTEGRATING ORAL DAILY PRN
COMMUNITY

## 2023-05-25 RX ORDER — SENNOSIDES 8.6 MG
650 CAPSULE ORAL EVERY 8 HOURS PRN
Qty: 30 TABLET | Refills: 0 | Status: SHIPPED | OUTPATIENT
Start: 2023-05-25 | End: 2023-06-02 | Stop reason: SDUPTHER

## 2023-05-25 RX ORDER — LIDOCAINE HYDROCHLORIDE 10 MG/ML
INJECTION, SOLUTION EPIDURAL; INFILTRATION; INTRACAUDAL; PERINEURAL AS NEEDED
Status: DISCONTINUED | OUTPATIENT
Start: 2023-05-25 | End: 2023-05-25

## 2023-05-25 RX ORDER — ONDANSETRON 2 MG/ML
4 INJECTION INTRAMUSCULAR; INTRAVENOUS ONCE AS NEEDED
Status: DISCONTINUED | OUTPATIENT
Start: 2023-05-25 | End: 2023-05-25 | Stop reason: HOSPADM

## 2023-05-25 RX ORDER — FENTANYL CITRATE/PF 50 MCG/ML
25 SYRINGE (ML) INJECTION
Status: DISCONTINUED | OUTPATIENT
Start: 2023-05-25 | End: 2023-05-25 | Stop reason: HOSPADM

## 2023-05-25 RX ORDER — FENTANYL CITRATE 50 UG/ML
INJECTION, SOLUTION INTRAMUSCULAR; INTRAVENOUS AS NEEDED
Status: DISCONTINUED | OUTPATIENT
Start: 2023-05-25 | End: 2023-05-25

## 2023-05-25 RX ORDER — PROPOFOL 10 MG/ML
INJECTION, EMULSION INTRAVENOUS AS NEEDED
Status: DISCONTINUED | OUTPATIENT
Start: 2023-05-25 | End: 2023-05-25

## 2023-05-25 RX ORDER — CLINDAMYCIN PHOSPHATE 900 MG/50ML
900 INJECTION INTRAVENOUS ONCE
Status: COMPLETED | OUTPATIENT
Start: 2023-05-25 | End: 2023-05-25

## 2023-05-25 RX ORDER — KETOROLAC TROMETHAMINE 30 MG/ML
INJECTION, SOLUTION INTRAMUSCULAR; INTRAVENOUS AS NEEDED
Status: DISCONTINUED | OUTPATIENT
Start: 2023-05-25 | End: 2023-05-25

## 2023-05-25 RX ORDER — ACETAMINOPHEN 325 MG/1
650 TABLET ORAL EVERY 6 HOURS PRN
Status: DISCONTINUED | OUTPATIENT
Start: 2023-05-25 | End: 2023-05-25 | Stop reason: HOSPADM

## 2023-05-25 RX ORDER — HYDROMORPHONE HCL/PF 1 MG/ML
0.25 SYRINGE (ML) INJECTION
Status: DISCONTINUED | OUTPATIENT
Start: 2023-05-25 | End: 2023-05-25 | Stop reason: HOSPADM

## 2023-05-25 RX ORDER — BUPIVACAINE HYDROCHLORIDE 5 MG/ML
INJECTION, SOLUTION EPIDURAL; INTRACAUDAL AS NEEDED
Status: DISCONTINUED | OUTPATIENT
Start: 2023-05-25 | End: 2023-05-25

## 2023-05-25 RX ORDER — ONDANSETRON 2 MG/ML
INJECTION INTRAMUSCULAR; INTRAVENOUS AS NEEDED
Status: DISCONTINUED | OUTPATIENT
Start: 2023-05-25 | End: 2023-05-25

## 2023-05-25 RX ORDER — MAGNESIUM HYDROXIDE 1200 MG/15ML
LIQUID ORAL AS NEEDED
Status: DISCONTINUED | OUTPATIENT
Start: 2023-05-25 | End: 2023-05-25 | Stop reason: HOSPADM

## 2023-05-25 RX ORDER — ONDANSETRON 2 MG/ML
4 INJECTION INTRAMUSCULAR; INTRAVENOUS EVERY 6 HOURS PRN
Status: DISCONTINUED | OUTPATIENT
Start: 2023-05-25 | End: 2023-05-25 | Stop reason: HOSPADM

## 2023-05-25 RX ORDER — LIDOCAINE HYDROCHLORIDE 20 MG/ML
INJECTION, SOLUTION EPIDURAL; INFILTRATION; INTRACAUDAL; PERINEURAL AS NEEDED
Status: DISCONTINUED | OUTPATIENT
Start: 2023-05-25 | End: 2023-05-25

## 2023-05-25 RX ORDER — MIDAZOLAM HYDROCHLORIDE 2 MG/2ML
INJECTION, SOLUTION INTRAMUSCULAR; INTRAVENOUS AS NEEDED
Status: DISCONTINUED | OUTPATIENT
Start: 2023-05-25 | End: 2023-05-25

## 2023-05-25 RX ORDER — COVID-19 ANTIGEN TEST
220 KIT MISCELLANEOUS 2 TIMES DAILY
Qty: 60 CAPSULE | Refills: 0 | Status: SHIPPED | OUTPATIENT
Start: 2023-05-25 | End: 2023-06-24

## 2023-05-25 RX ORDER — TRAMADOL HYDROCHLORIDE 50 MG/1
50 TABLET ORAL EVERY 6 HOURS PRN
Qty: 10 TABLET | Refills: 0 | Status: SHIPPED | OUTPATIENT
Start: 2023-05-25

## 2023-05-25 RX ORDER — SODIUM CHLORIDE, SODIUM LACTATE, POTASSIUM CHLORIDE, CALCIUM CHLORIDE 600; 310; 30; 20 MG/100ML; MG/100ML; MG/100ML; MG/100ML
INJECTION, SOLUTION INTRAVENOUS CONTINUOUS PRN
Status: DISCONTINUED | OUTPATIENT
Start: 2023-05-25 | End: 2023-05-25

## 2023-05-25 RX ORDER — TRAMADOL HYDROCHLORIDE 50 MG/1
50 TABLET ORAL EVERY 6 HOURS PRN
Status: DISCONTINUED | OUTPATIENT
Start: 2023-05-25 | End: 2023-05-25 | Stop reason: HOSPADM

## 2023-05-25 RX ORDER — DEXAMETHASONE SODIUM PHOSPHATE 10 MG/ML
INJECTION, SOLUTION INTRAMUSCULAR; INTRAVENOUS AS NEEDED
Status: DISCONTINUED | OUTPATIENT
Start: 2023-05-25 | End: 2023-05-25

## 2023-05-25 RX ADMIN — DEXAMETHASONE SODIUM PHOSPHATE 10 MG: 10 INJECTION, SOLUTION INTRAMUSCULAR; INTRAVENOUS at 13:56

## 2023-05-25 RX ADMIN — BUPIVACAINE 20 ML: 13.3 INJECTION, SUSPENSION, LIPOSOMAL INFILTRATION at 13:36

## 2023-05-25 RX ADMIN — FENTANYL CITRATE 25 MCG: 50 INJECTION INTRAMUSCULAR; INTRAVENOUS at 16:13

## 2023-05-25 RX ADMIN — BUPIVACAINE HYDROCHLORIDE 10 ML: 5 INJECTION, SOLUTION EPIDURAL; INTRACAUDAL; PERINEURAL at 13:36

## 2023-05-25 RX ADMIN — MIDAZOLAM 2 MG: 1 INJECTION INTRAMUSCULAR; INTRAVENOUS at 13:27

## 2023-05-25 RX ADMIN — SODIUM CHLORIDE, SODIUM LACTATE, POTASSIUM CHLORIDE, AND CALCIUM CHLORIDE: .6; .31; .03; .02 INJECTION, SOLUTION INTRAVENOUS at 14:38

## 2023-05-25 RX ADMIN — SODIUM CHLORIDE, SODIUM LACTATE, POTASSIUM CHLORIDE, AND CALCIUM CHLORIDE: .6; .31; .03; .02 INJECTION, SOLUTION INTRAVENOUS at 13:52

## 2023-05-25 RX ADMIN — ONDANSETRON 4 MG: 2 INJECTION INTRAMUSCULAR; INTRAVENOUS at 13:56

## 2023-05-25 RX ADMIN — FENTANYL CITRATE 100 MCG: 50 INJECTION, SOLUTION INTRAMUSCULAR; INTRAVENOUS at 13:27

## 2023-05-25 RX ADMIN — LIDOCAINE HYDROCHLORIDE 50 MG: 20 INJECTION, SOLUTION EPIDURAL; INFILTRATION; INTRACAUDAL; PERINEURAL at 13:56

## 2023-05-25 RX ADMIN — FENTANYL CITRATE 25 MCG: 50 INJECTION INTRAMUSCULAR; INTRAVENOUS at 16:05

## 2023-05-25 RX ADMIN — KETOROLAC TROMETHAMINE 15 MG: 30 INJECTION, SOLUTION INTRAMUSCULAR; INTRAVENOUS at 15:16

## 2023-05-25 RX ADMIN — LIDOCAINE HYDROCHLORIDE 4 ML: 10 INJECTION, SOLUTION EPIDURAL; INFILTRATION; INTRACAUDAL; PERINEURAL at 13:33

## 2023-05-25 RX ADMIN — CLINDAMYCIN IN 5 PERCENT DEXTROSE 900 MG: 18 INJECTION, SOLUTION INTRAVENOUS at 13:59

## 2023-05-25 RX ADMIN — PROPOFOL 250 MG: 10 INJECTION, EMULSION INTRAVENOUS at 13:56

## 2023-05-25 NOTE — INTERVAL H&P NOTE
H&P reviewed  After examining the patient I find no changes in the patients condition since the H&P had been written  Patient for open reduction internal fixation right distal radius fracture, right de Quervain's release, extensor tenosynovectomy of the second compartment, and right tendon transfer extensor indices proprius to extensor pollicis longus      Vitals:    05/25/23 1255   BP: 150/77   Pulse: 85   Resp: 16   Temp: (!) 97 4 °F (36 3 °C)   SpO2: 96%

## 2023-05-25 NOTE — DISCHARGE INSTR - AVS FIRST PAGE
Post Operative Instructions    You have had surgery on your arm today, please read and follow the information below:  Elevate your hand above your elbow during the next 24-48 hours to help with swelling  Place your hand and arm over your head with motion at your shoulder three times a day  Do not apply any cream/ointment/oil to your incisions including antibiotics  Do not soak your hands in standing water (dishwater, tubs, Jacuzzi's, pools, etc ) until given permission (typically 2-3 weeks after injury)    Call the office if you notice any:  Increased numbness or tingling of your hand or fingers that is not relieved with elevation  Increasing pain that is not controlled with medication  Difficulty chewing, breathing, swallowing  Chest pains or shortness of breath  Fever over 101 4 degrees  Bandage: Do NOT remove bandage until follow-up appointment  Motion: Move fingers into a fist 5 times a day, DO NOT move any splinted fingers  Weight bearing status: The operated extremity should be non-weight bearing until further notice  Ice: Ice for 10 minutes every hour as needed for swelling x 24 hours  Sling: Sling for comfort for 2-3 days  Medications:   Naproxen 220 mg two times a day   Tylenol Extended Release 650 mg every 8 hours  Tramadol 1 tab every 6 hours AS needed for pain     Follow-up Appointment: 7-10 days  Please call the office if you have any questions or concerns regarding your post-operative care

## 2023-05-25 NOTE — ANESTHESIA POSTPROCEDURE EVALUATION
Post-Op Assessment Note    CV Status:  Stable  Pain Score: 0    Pain management: adequate     Mental Status:  Awake and alert   Hydration Status:  Stable   PONV Controlled:  Controlled   Airway Patency:  Patent      Post Op Vitals Reviewed: Yes      Staff: CRNA         No notable events documented      BP   121/72   Temp 97 6   Pulse 77   Resp 14   SpO2 99

## 2023-05-25 NOTE — OP NOTE
OPERATIVE REPORT  PATIENT NAME: Shaun Reese  :  1980  MRN: [id number]  Pt Location: [facility] OR    SURGERY DATE: 23    Surgeon(s) and Role:     * Tao Swartz MD - Primary     * Marlen Templeton PA-C - Assisting    Pre-Op Diagnosis:  Closed fracture of distal end of right radius with routine healing, unspecified fracture morphology, subsequent encounter [S52 501D]  Tendon rupture of wrist, right, initial encounter [S66 911A]  Tendinitis, de Quervain's [M65 4]    Post-Op Diagnosis:   Closed fracture of distal end of right radius with routine healing, unspecified fracture morphology, subsequent encounter [S52 501D]  Tendon rupture of wrist, right, initial encounter [S66 1A]  Tendinitis, de Quervain's [M65 4]    Procedure(s) (LRB):  right de Quervain's release (Right)  right second extensor compartment tenosynovectomy (Right)  right EIP to EPL tendon transfer (Right)  Application of short arm thumb spica splint (Right)    Specimen(s):  No specimens collected during this procedure  Estimated Blood Loss:   Minimal      Anesthesia Type:   General    Operative Indications: The patient has a history of right distal radius fracture with attritional rupture of the extensor pollicis longus tendon, second extensor compartment tendinitis and Vazquez veins tenosynovitis that was recalcitrant to conservative management  The decision was made to bring the patient to the operating room for open reduction internal fixation right distal radius, extensor indices proprius to extensor pollicis longus tendon transfer, Vazquez veins release, and release of the second extensor compartment with tenosynovectomy  Risks of the procedure were explained which include, but are not limited to bleeding; infection; damage to nerves, arteries,veins, tendons; scar; pain; need for reoperation; failure to give desired result; and risks of anaesthesia  All questions were answered to satisfaction and they were willing to proceed  Operative Findings:  Healed distal radius fracture  Attritional rupture of the extensor pollicis longus tendon with successful transfer  De Quervain's stenosing tenosynovitis  Inflammation and swelling of the second dorsal extensor compartment    Complications:   None    Procedure and Technique:  After the patient, site, and procedure were identified, the patient was brought into the operating room in a supine position  Regional and general anaesthesia were provided  A well padded tourniquet was applied to the extremity, set at 250 mmHg  The  right upper extremity was then prepped and drapped in a normal, sterile, orthopedic fashion  Onto the patient, site, and procedure identified attention was turned towards the right arm  An Esmarch bandage was used to exsanguinate the limb and the tourniquet was inflated to 250 mmHg  A standard trans-FCR approach was made to the right distal radius  We dissected down through skin subcutaneous tissues maintaining hemostasis  Flexor pollicis longus was retracted ulnarly and pronator quadratus was released in a proximal and ulnar based direction  Brought us down to the level of distal radius fracture  We noticed union of the volar cortex of the distal radius  The wrist was stressed under fluoroscopic guidance and direct visualization as well as palpation took place was confirmed union of the distal radius fracture  Therefore open reduction and internal fixation not required  Through this incision, we elevated flaps towards the radial side with care taken to protect the superficial sensory branch of the radial nerve as well as radial artery  First dorsal extensor compartment was identified and was released in its entirety  Significant tenosynovitis was identified  We verified complete release of the extensor pollicis brevis  At this point loose stay sutures were then placed through the leaflets and tied over a freer elevator to prevent tendon subluxation  Attention was then turned towards the dorsal aspect of the hand and incision was then made over the dorsal ulnar aspect of the thumb metacarpal to allow for tendon transfer  We dissected down through skin subcutaneous tissues maintaining hemostasis and protecting the dorsal neurologic structures  Extensor pollicis longus tendon was identified  Transverse incision was made at the level of the wrist just distal to the extensor retinaculum  We dissected down through skin and subcutaneous tissues maintaining hemostasis  Identified the fourth dorsal extensor compartment and identified the extensor indices proprius tendon  We will also do identify the attritional rupture of the extensor pollicis longus tendon  This was debrided back to prevent any adhesions  Another incision was made transversely just proximal to the metacarpal head to the index finger  We dissected down through skin subcutaneous tissues and identified the 2 extensor tendons  The ulnar most extensor tendon, being the extensor indices proprius, was then sharply divided and delivered proximally into the wrist incision  This was then rounded appropriately around the tendons and a subcutaneous tunnel was then made to prevent impinging on any of the neurologic structures  Tendon was then brought through this tunnel to the extensor pollicis longus at her thumb incision  Thumb was appropriately held for tensioning and the extensor indices proprius tendon was then woven through the extensor pollicis longus utilizing a Pulvertaft weave x3 and secured with Ethibond sutures  Appropriate tensioning was checked after the first set of sutures as well as at the completion to verify appropriate tensioning for the tendon transfer    At this point time attention was brought back towards the transverse incision at the wrist   Identified the second extensor compartment, opened up the second extensor compartment approximately 50% of the way and identified significant tenosynovitis  At this point we released and abraded these tendons  Second extensor compartment was then closed over freer elevator  This completed our procedure for the extensor indices proprius to extensor pollicis longus tendon transfer, Vazquez veins release, second extensor compartment debridement  Tourniquet was deflated demonstrating rapid restoration of blood supply to the hand  At the completion of the procedure, hemostasis was obtained with cautery and direct pressure  The wounds were copiously irrigated with sterile solution  The wounds were closed with Vicryl, Monocryl and Steri-strips  Sterile dressings were applied, including Xeroform, gauze, tweeners, webril, ACE and Thumb Spica Splint  Please note, all sponge, needle, and instrument counts were correct prior to closure  Loupe magnification was utilized  The patient tolerated the procedure well  I was present for all critical portions of the procedure , A qualified resident physician was not available  and A physician assistant was required during the procedure for retraction, tissue handling, dissection and suturing      Patient Disposition:  PACU , hemodynamically stable and extubated and stable    SIGNATURE: Ifeanyi Engle MD  DATE: 05/25/23  TIME: 6:04 PM

## 2023-05-25 NOTE — ANESTHESIA PREPROCEDURE EVALUATION
Procedure:  Right distal radius ORIF right second (Right: Wrist)  right de Quervain's release (Right: Hand)  right second extensor compartment tenosynovectomy (Right: Hand)  right EIC to EPL tendon transfer (Right: Arm Lower)    Relevant Problems   ANESTHESIA (within normal limits)      CARDIO   (+) Chronic migraine w/o aura w/o status migrainosus, not intractable   (+) Intractable chronic migraine without aura and without status migrainosus   (+) Primary hypertension   (+) Vestibular migraine      NEURO/PSYCH   (+) Chronic migraine w/o aura w/o status migrainosus, not intractable   (+) Chronic neck and back pain   (+) Chronic right shoulder pain   (+) Depression   (+) Generalized anxiety disorder   (+) Intractable chronic migraine without aura and without status migrainosus   (+) PTSD (post-traumatic stress disorder)   (+) Vestibular migraine      PULMONARY   (+) Mild persistent asthma      Other   (+) Nicotine abuse     Lab Results   Component Value Date    HGB 14 3 12/06/2022     12/06/2022    WBC 7 36 12/06/2022     Lab Results   Component Value Date    BUN 15 05/03/2023    CREATININE 0 70 05/03/2023    EGFR 107 05/03/2023    K 4 2 05/03/2023    SODIUM 135 05/03/2023        Physical Exam    Airway    Mallampati score: I  TM Distance: >3 FB  Neck ROM: full     Dental   No notable dental hx     Cardiovascular  Cardiovascular exam normal    Pulmonary  Pulmonary exam normal     Other Findings        Anesthesia Plan  ASA Score- 2     Anesthesia Type- general with ASA Monitors  Additional Monitors:   Airway Plan: LMA  Comment: I discussed risks (reviewed with patient on the anesthesia consent form), benefits and alternatives for General Anesthesia  These risks did include breathing tube remaining in place if not strong enough, PONV, damage to lips and teeth, sore throat, eye injury or blindness, risk of heart attack or stroke that may lead to death      I discussed with patient the risks(reviewed with patient on the anesthesia consent form), benefits and alternatives of regional anesthesia also including possibilities of infection, temporary paralysis and nerve injury  I answered patient questions and obtained consent  Planned regional anesthesia supraclavicular block for postoperative pain control    Plan Factors-    Chart reviewed  EKG reviewed  Existing labs reviewed  Patient summary reviewed  Induction- intravenous  Postoperative Plan- Plan for postoperative opioid use  Informed Consent- Anesthetic plan and risks discussed with patient  I personally reviewed this patient with the CRNA  Discussed and agreed on the Anesthesia Plan with the CRNA  Nancy Murillo

## 2023-06-02 ENCOUNTER — OFFICE VISIT (OUTPATIENT)
Dept: OBGYN CLINIC | Facility: HOSPITAL | Age: 43
End: 2023-06-02

## 2023-06-02 ENCOUNTER — OFFICE VISIT (OUTPATIENT)
Dept: OCCUPATIONAL THERAPY | Facility: CLINIC | Age: 43
End: 2023-06-02

## 2023-06-02 ENCOUNTER — HOSPITAL ENCOUNTER (OUTPATIENT)
Dept: RADIOLOGY | Facility: HOSPITAL | Age: 43
Discharge: HOME | End: 2023-06-02

## 2023-06-02 VITALS
HEIGHT: 72 IN | SYSTOLIC BLOOD PRESSURE: 127 MMHG | WEIGHT: 229 LBS | HEART RATE: 86 BPM | BODY MASS INDEX: 31.02 KG/M2 | DIASTOLIC BLOOD PRESSURE: 84 MMHG

## 2023-06-02 DIAGNOSIS — S52.551A OTHER CLOSED EXTRA-ARTICULAR FRACTURE OF DISTAL END OF RIGHT RADIUS, INITIAL ENCOUNTER: ICD-10-CM

## 2023-06-02 DIAGNOSIS — S66.911D TENDON RUPTURE OF WRIST, RIGHT, SUBSEQUENT ENCOUNTER: Primary | ICD-10-CM

## 2023-06-02 DIAGNOSIS — Z48.89 AFTERCARE FOLLOWING SURGERY: Primary | ICD-10-CM

## 2023-06-02 DIAGNOSIS — Z48.89 AFTERCARE FOLLOWING SURGERY: ICD-10-CM

## 2023-06-02 DIAGNOSIS — S66.911A TENDON RUPTURE OF WRIST, RIGHT, INITIAL ENCOUNTER: ICD-10-CM

## 2023-06-02 RX ORDER — SENNOSIDES 8.6 MG
CAPSULE ORAL
Qty: 30 TABLET | Refills: 0 | Status: SHIPPED | OUTPATIENT
Start: 2023-06-02

## 2023-06-02 NOTE — PROGRESS NOTES
"Assessment:   S/P right de Quervain's release - Right, right second extensor compartment tenosynovectomy - Right, right EIP to EPL tendon transfer - Right, and Application of short arm thumb spica splint - Right on 5/25/2023    Plan:   Patient was placed back into her splint and advised to attend therapy to have a new custom splint fabricated that can be removed for hygiene purposes only  She was advised to keep the incision clean and dry and to avoid soaking  She was advised that she can use heat massage as demonstrated in the office  She will follow-up in 2 weeks for reevaluation    Follow Up:  2 weeks    To Do Next Visit:  Reevaluation      CHIEF COMPLAINT:  Chief Complaint   Patient presents with   • Right Wrist - Post-op, Suture / Staple Removal     ORIF & [place] Quervain's release- 5/25/23         SUBJECTIVE:  Rashard Umaña is a 44 y o  female who presents for follow up after right de Quervain's release - Right, right second extensor compartment tenosynovectomy - Right, right EIP to EPL tendon transfer - Right, and Application of short arm thumb spica splint - Right on 5/25/2023  Today patient has minimal pain and discomfort at this time  Earle Leonard        PHYSICAL EXAMINATION:  Vital signs: Ht 6' 1\" (1 854 m)   Wt 104 kg (229 lb)   BMI 30 21 kg/m²   General: well developed and well nourished, alert, oriented times 3 and appears comfortable  Psychiatric: Normal    MUSCULOSKELETAL EXAMINATION:  Incision: Clean, dry, intact  Range of Motion: As expected and Patient is able to actively extend the thumb, minimal motion tested due to tendon transfer  Neurovascular status: Neuro intact, good cap refill  Activity Restrictions: Cast/splint restrictions  Done today: Sutures out     DASH Score: 76      STUDIES REVIEWED:  X-rays of the right wrist were reviewed which demonstrated healing distal radius fracture with callus formation noted      PROCEDURES PERFORMED:  Procedures  No Procedures performed today    "

## 2023-06-02 NOTE — PROGRESS NOTES
Orthosis    Diagnosis:   1  Tendon rupture of wrist, right, subsequent encounter          Indication: Tendon repair    Location: Right  wrist, hand and thumb  Supplies: Custom Fit Orthotic  Orthosis type: Forearm based thumb spica  Wearing Schedule: Remove for hygiene only  Describe Position: Wrist in extension, thumb extended, forearm based    Precautions: Tendon repair,    Patient or Caregiver expresses understanding of wearing Schedule and Precautions? Yes  Patient or Caregiver able to don/doff orthotic independently? Yes    Written orders provided to patient?  Yes  Orders Obtained: Written  Orders Obtained from: Dr Mullins Gain     Return for evaluation and treatment Yes

## 2023-06-03 DIAGNOSIS — J45.30 MILD PERSISTENT ASTHMA WITHOUT COMPLICATION: ICD-10-CM

## 2023-06-03 RX ORDER — MONTELUKAST SODIUM 10 MG/1
TABLET ORAL
Qty: 90 TABLET | Refills: 1 | Status: SHIPPED | OUTPATIENT
Start: 2023-06-03

## 2023-06-06 DIAGNOSIS — M54.2 CERVICALGIA: ICD-10-CM

## 2023-06-06 RX ORDER — TIZANIDINE 4 MG/1
4 TABLET ORAL
Qty: 90 TABLET | Refills: 0 | Status: SHIPPED | OUTPATIENT
Start: 2023-06-06

## 2023-06-07 ENCOUNTER — TELEPHONE (OUTPATIENT)
Dept: NEUROLOGY | Facility: CLINIC | Age: 43
End: 2023-06-07

## 2023-06-07 NOTE — TELEPHONE ENCOUNTER
New Verbal Script needed as per requested for:     Botox- 200 UNITS  Qty  1  DX: G43 709, Chronic Migraine  Sig: Inject up to 200 UNITS I M into the various sites in the head and neck once every three months for one year with  Refills: 3     If you agree to this order transcribed above please respond directly if you agree or not, so I may proceed further with ordering the Botox medication with [facility]      Thank you

## 2023-06-10 DIAGNOSIS — I10 PRIMARY HYPERTENSION: ICD-10-CM

## 2023-06-10 RX ORDER — LISINOPRIL 10 MG/1
TABLET ORAL
Qty: 90 TABLET | Refills: 1 | Status: SHIPPED | OUTPATIENT
Start: 2023-06-10

## 2023-06-13 NOTE — TELEPHONE ENCOUNTER
Received call from [facility] with [employer] with delivery set for 6/15/2023  Botox 200 UNITS  Qty  1  Scheduled: 6/15/2023  Location:[facility]   Via: Ups/[employer]    Please advise if Botox does not arrive  Thank you

## 2023-06-14 ENCOUNTER — APPOINTMENT (OUTPATIENT)
Dept: OCCUPATIONAL THERAPY | Facility: CLINIC | Age: 43
End: 2023-06-14
Payer: COMMERCIAL

## 2023-06-14 NOTE — PROGRESS NOTES
OT Evaluation     Today's date: 2023  Patient name: Rico Quezada  : 1980  MRN: [id number]  Referring provider: Dorota Anne PA-C  Dx: No diagnosis found                 Assessment/Plan    Subjective    Objective           Precautions: S/P right de Quervain's release, right second extensor compartment tenosynovectomy and right EIC to the EPL tendon transfer 23    - EPL Tendon Transfer Protocol (23)  Forearm based thumb spica at all times other than exercises and hygiene  Cleared for AROM no PROM at this time   Avoid composite wrist flexion w/ thumb flexion and wrist extension with thumb extension  No strengthening     - Closed Distal Radius Fracture (23)  Cleared for AROM (No PROM or strengthening until cleared by orthopedic surgeon)    Manuals             IASTM             taping             cupping                          Neuro Re-Ed             Median nerve glide                                                                                           Ther Ex             HEP Wrist AROM    Tendon glide    opposition    1st dorsal stretch            Wrist/Digit AROM             Tendon Glide             1st dorsal stretch             Opposition marble/abd             Wrist maze             TB on Wall             Wrist isometrics                                                    Ther Activity             keypegs             Colored pegs                                                    Modalities             P

## 2023-06-15 ENCOUNTER — EVALUATION (OUTPATIENT)
Dept: OCCUPATIONAL THERAPY | Facility: CLINIC | Age: 43
End: 2023-06-15
Payer: COMMERCIAL

## 2023-06-15 DIAGNOSIS — S66.911D TENDON RUPTURE OF WRIST, RIGHT, SUBSEQUENT ENCOUNTER: Primary | ICD-10-CM

## 2023-06-15 PROCEDURE — 97165 OT EVAL LOW COMPLEX 30 MIN: CPT

## 2023-06-15 NOTE — TELEPHONE ENCOUNTER
Botox number of units: 200  Botox quantity: 1  Arrived at what location: [facility]  Botox at Correct Administering Location: yes  NDC number: [id number]  Lot number: [id number]  Expiration Date: 02/01/2026  Appt notes indicate correct medication: yes

## 2023-06-15 NOTE — PROGRESS NOTES
OT Evaluation     Today's date: 6/15/2023  Patient name: Chris Wiggins  : 1980  MRN: [id number]  Referring provider: Shefali Hodge PA-C  Dx:   Encounter Diagnosis     ICD-10-CM    1  Tendon rupture of wrist, right, subsequent encounter  S66 918F                      Assessment  Assessment details: Patient presents s/p right de Quervain's release, second extensor compartment tenosynovectomy and EIP to EPL tendon transfer  Patient also has a healing distal radius fracture  Patient surgery took place on 23 as a result of a fall that occurred on 23  Patient initial injury occurred on 23 when they fell while skating  Patient initially went to the ED and was referred to orthopedics where x-rays displayed a distal radius fracture, as a result the patient was placed in a short arm cast  Patient reported that on 23 they were pulling on their pants when they felt a pop in their wrist  Patient had an MRI on 23 that displayed a ruptured EPL tendon  Patient had surgery on 23 as a result  On 23 patient had their most recent follow up with orthopedics where the distal radius displayed adequate healing and the cast was removed  Occupational therapy fabricated a custom thumb based thumb spica on 23  Patient presented with decreased wrist and thumb ROM on this date and was unable to flex past short arc ROM  Therapist educated patient on the importance of not compositely flexing and extending the thumb and wrist to prevent placing to much tension on the transferred tendon  Patient surgical incisions appear to be healing very well with minimal scar tissue formation  Patient has significant edema across the wrist at this time  Patient reported only having pain during motion  Therapist provided patient with a custom HEP and instructed them on how to complete it  See below for a more detailed assessment     Impairments: abnormal coordination, abnormal or restricted ROM, activity intolerance, impaired physical strength and pain with function    Symptom irritability: lowUnderstanding of Dx/Px/POC: good   Prognosis: good    Goals  STGs:    Patient will increase ROM in wrist by 10-20 degrees in all planes in 4 weeks    Patient will display increased 1st digit ROM by 10-20 degrees in flexion in 4 weeks    Patient will report decreased pain during functional movement by 1-2 grades in 4 weeks    Patient will display decreased edema at the wrist crease by 1-3 mm in 4 weeks    Patient will demonstrate an understanding of HEP by end of initial session    LTGs:    Patient will display wrist ROM WFL in 8 weeks or discharge    Patient will display digit ROM WFL in 8 weeks or discharge    Patient will display the resolution of edema in the affected UE in 8 weeks or discharge    Patient will report resolution of pain symptoms during functional movement in 8 weeks or discharge        Plan  Plan details: Patient presenting to OP OT services due to a diagnosis of distal radius fracture and is s/p right de Quervain's release, second extensor compartment tenosynovectomy and EIP to EPL tendon transfer  Patient would benefit from skilled occupational therapy services 2 times per week for 8 weeks to return to prior level of function and achieve all of their established goals   Thank you for the referral    Patient would benefit from: custom splinting, skilled occupational therapy and OT eval  Referral necessary: No  Planned modality interventions: ultrasound, thermotherapy: hydrocollator packs and unattended electrical stimulation  Planned therapy interventions: IADL retraining, patient education, self care, manual therapy, orthotic fitting/training, strengthening, stretching, therapeutic activities, therapeutic exercise, home exercise program, functional ROM exercises and fine motor coordination training  Frequency: 2x week  Duration in visits: 10  Plan of Care beginning date: 6/15/2023  Plan of Care expiration "date: 8/15/2023  Treatment plan discussed with: patient        Subjective Evaluation    History of Present Illness  Date of onset: 2023  Date of surgery: 2023  Mechanism of injury: surgery  Mechanism of injury: Mechanism: Fall while roller blading    Subjective:  \"It's feeling better\"  Not a recurrent problem   Quality of life: good    Pain  Current pain ratin  At best pain ratin  At worst pain ratin  Location: Wrist and 1st digit  Quality: throbbing  Relieving factors: support and rest  Progression: improved    Social Support    Employment status: working  Hand dominance: right      Diagnostic Tests  X-ray: abnormal  MRI studies: abnormal  Treatments  Previous treatment: occupational therapy and immobilization  Current treatment: occupational therapy  Patient Goals  Patient goals for therapy: decreased edema, decreased pain, increased motion and increased strength          Objective     Observations     Right Wrist/Hand   Positive for edema and incision  Additional Observation Details  Incision  Volar Wrist: 5 cm  CMC: 2 5 cm  Dorsal Wrist: 2 cm      Tenderness     Right Wrist/Hand   Tenderness in the distal radioulnar joint       Neurological Testing     Sensation     Wrist/Hand   Left   Intact: light touch    Right   Intact: light touch    Active Range of Motion     Left Wrist   Wrist flexion: 70 degrees   Wrist extension: 80 degrees   Radial deviation: 20 degrees   Ulnar deviation: 25 degrees     Right Wrist   Wrist flexion: 15 degrees with pain  Wrist extension: 45 degrees with pain  Radial deviation: 5 degrees   Ulnar deviation: 10 degrees     Left Thumb   Flexion     MP: 64 degrees    DIP: 82 degrees  Opposition: Full opposition    Right Thumb   Flexion     MP: 28    DIP: 62  Extension     MP: 0    DIP: -4  Opposition: Full opposition    Passive Range of Motion     Additional Passive Range of Motion Details  B/L full composite fists    Strength/Myotome Testing     Additional " Strength Details  Unable to access /pinch strength secondary to precautions   Will access as able    Swelling     Left Wrist/Hand   Thumb     Proximal: 6 cm    Distal: 5 7 cm  Circumference MCP: 17 6 cm  Circumference wrist: 15 5 cm    Right Wrist/Hand   Thumb     Proximal: 6 5 cm    Distal: 6 cm  Circumference MCP: 18 cm  Circumference wrist: 17 cm             Precautions: S/P right de Quervain's release, right second extensor compartment tenosynovectomy and right EIC to the EPL tendon transfer 5/25/23    - EPL Tendon Transfer Protocol (5/25/23)  Forearm based thumb spica at all times other than exercises and hygiene  Cleared for AROM no PROM at this time   Avoid composite wrist flexion w/ thumb flexion and wrist extension with thumb extension  No strengthening     - Closed Distal Radius Fracture (4/29/23)  Cleared for AROM (No PROM or strengthening until cleared by orthopedic surgeon)    Manuals 6/15            STM             MEM                                       Neuro Re-Ed                                                                                                        Ther Ex             HEP Tenodysis    Tendon glide                  Wrist/Digit AROM             Tenodysis             Gentle AROM             Opposition marble/abd                                                                                           Ther Activity             keypegs             Colored pegs                                                    Modalities             P

## 2023-06-19 ENCOUNTER — APPOINTMENT (OUTPATIENT)
Dept: OCCUPATIONAL THERAPY | Facility: CLINIC | Age: 43
End: 2023-06-19
Payer: COMMERCIAL

## 2023-06-21 RX ORDER — RIMEGEPANT SULFATE 75 MG/75MG
75 TABLET, ORALLY DISINTEGRATING ORAL DAILY PRN
Refills: 0 | Status: CANCELLED | OUTPATIENT
Start: 2023-06-21

## 2023-06-22 ENCOUNTER — OFFICE VISIT (OUTPATIENT)
Dept: OCCUPATIONAL THERAPY | Facility: CLINIC | Age: 43
End: 2023-06-22
Payer: COMMERCIAL

## 2023-06-22 ENCOUNTER — TELEPHONE (OUTPATIENT)
Dept: OBGYN CLINIC | Facility: HOSPITAL | Age: 43
End: 2023-06-22

## 2023-06-22 DIAGNOSIS — S66.911D TENDON RUPTURE OF WRIST, RIGHT, SUBSEQUENT ENCOUNTER: Primary | ICD-10-CM

## 2023-06-22 PROCEDURE — 97110 THERAPEUTIC EXERCISES: CPT

## 2023-06-22 PROCEDURE — 97140 MANUAL THERAPY 1/> REGIONS: CPT

## 2023-06-22 NOTE — TELEPHONE ENCOUNTER
Caller: Patient    Doctor: [facility]    Reason for call: Patient's appt was cxl due to dr Clarissa Vincent  5 week FX right wrist sx 5/25/23-8/23/23  Needs to be after 7/5  Patient on vacation      Call back#: [phone]

## 2023-06-22 NOTE — PROGRESS NOTES
"Daily Note     Today's date: 2023  Patient name: Justa Cruz  : 1980  MRN: [id number]  Referring provider: Bibi Mendoza PA-C  Dx:   Encounter Diagnosis     ICD-10-CM    1  Tendon rupture of wrist, right, subsequent encounter  S66 911D                      Subjective: \"It's feeling a little better\"  \"The swelling is down\"  Objective: See treatment diary below      Assessment: Tolerated treatment well  Patient is overall doing well  Patient is following splint wear schedule  Edema is improving  Therapist performed scar massage on this date and began gentle tendolysis for the wrist  Patient tolerated gentle wrist motion well  Will continue to progress within the protocol  Plan: Continue per plan of care  Progress treatment as tolerated         Precautions: S/P right de Quervain's release, right second extensor compartment tenosynovectomy and right EIC to the EPL tendon transfer 23    - EPL Tendon Transfer Protocol (23)  Forearm based thumb spica at all times other than exercises and hygiene  Cleared for AROM no PROM at this time   Avoid composite wrist flexion w/ thumb flexion and wrist extension with thumb extension  No strengthening     - Closed Distal Radius Fracture (23)  Cleared for AROM (No PROM or strengthening until cleared by orthopedic surgeon)    Manuals 6/15 6/22           STM  8           MEM  3'                                     Neuro Re-Ed                                                                                                        Ther Ex             HEP Tenodysis    Tendon glide                  Wrist/Digit AROM  10' short arc wrist AROM           Tenodysis  x20           Gentle AROM thumb             Digit add  x20                                                                                         Ther Activity                                                                              Modalities             MHP  5'                      "

## 2023-06-23 DIAGNOSIS — G43.709 CHRONIC MIGRAINE WITHOUT AURA, NOT INTRACTABLE, WITHOUT STATUS MIGRAINOSUS: Primary | ICD-10-CM

## 2023-06-23 NOTE — TELEPHONE ENCOUNTER
Received VM transcription:    Hi, my name is Nhung Grant. My date of birth is [birth year]. I'm calling for a refill on my Nurtec to the [facility] pharmacy on [address] in [place]. My phone number is [phone]. Thank you.  --------------------------------------------------------    Spoke with pt and advised her of last virtual visit that Nurtec was discontinued for ineffectiveness and Sumatriptan was added. Pt says she was under the impression that she would have both medications available to her. Pt says she's been utilizing both (NOT at the same time). Pt states that she has an appointment on Monday and can discuss further at that time.  After call ended. Checked appt desk and found that next OV with MK for Botox.    Reyna BELL. Please advise.

## 2023-06-23 NOTE — TELEPHONE ENCOUNTER
From what I recall nurtec did not work as well as sumatriptan and that was why it was discontinued and she was to try a higher dose of sumatriptan. Not quite sure why she would want to take a medication if it does not work unless she is taking for mild headaches only. Not sure if insurance would approve both.    MK-can you please discuss at visit Monday, if not I can contact patient.

## 2023-06-26 NOTE — TELEPHONE ENCOUNTER
If nurtec is effective, she can use it, just not within the same day as imitrex.  Do you think will have difficulty approving again on this basis?  Thanks.

## 2023-06-27 ENCOUNTER — OFFICE VISIT (OUTPATIENT)
Dept: OCCUPATIONAL THERAPY | Facility: CLINIC | Age: 43
End: 2023-06-27
Payer: COMMERCIAL

## 2023-06-27 DIAGNOSIS — S66.911D TENDON RUPTURE OF WRIST, RIGHT, SUBSEQUENT ENCOUNTER: Primary | ICD-10-CM

## 2023-06-27 PROCEDURE — 97110 THERAPEUTIC EXERCISES: CPT

## 2023-06-27 PROCEDURE — 97140 MANUAL THERAPY 1/> REGIONS: CPT

## 2023-06-27 NOTE — PROGRESS NOTES
"Daily Note     Today's date: 2023  Patient name: Justa Cruz  : 1980  MRN: [id number]  Referring provider: Bibi Mendoza PA-C  Dx:   Encounter Diagnosis     ICD-10-CM    1  Tendon rupture of wrist, right, subsequent encounter  S66 000Z                      Subjective: \"It is feeling really good\"  Objective: See treatment diary below      Assessment: Tolerated treatment well  Patient is overall doing well  Therapist began gentle thumb AROM on this date  No extensor lag is present in the thumb  Patient is able to oppose to the 4th digit without pain  Wrist ROM is improving  Continued to avoid composite wrist and thumb flexion/extension  Plan: Continue per plan of care  Progress treatment as tolerated         Precautions: S/P right de Quervain's release, right second extensor compartment tenosynovectomy and right EIC to the EPL tendon transfer 23    - EPL Tendon Transfer Protocol (23)  Forearm based thumb spica at all times other than exercises and hygiene  Cleared for AROM no PROM at this time   Avoid composite wrist flexion w/ thumb flexion and wrist extension with thumb extension  No strengthening     - Closed Distal Radius Fracture (23)  Cleared for AROM (No PROM or strengthening until cleared by orthopedic surgeon)    Manuals 6/15 6/22 6/27          STM  8 8'          MEM  3'                                     Neuro Re-Ed                                                                                                        Ther Ex             HEP Tenodysis    Tendon glide                  Wrist/Digit AROM  10' short arc wrist AROM 10'           Tenodysis  x20 x20          Gentle AROM thumb   10'          Digit add  x20 x20          Digit Opp   x20 marbles to 4th digit                                                                           Ther Activity                                                                              Modalities             P  5' 5'      "

## 2023-06-27 NOTE — TELEPHONE ENCOUNTER
MK, Insurance requirements changes all the time so it's hard to tell. We currently have Nurtec (16 per 34 days) approval on file.     Called pt and advised of the below. She verbalized understanding. Pt states that Nurtec has been working. She is agreeable to  recommendation.   Requesting Nurtec script be send to [facility] pharmacy on file    Rx entered. Pls review and sign off    thanks

## 2023-06-28 ENCOUNTER — TELEPHONE (OUTPATIENT)
Dept: NEUROLOGY | Facility: CLINIC | Age: 43
End: 2023-06-28

## 2023-06-28 NOTE — TELEPHONE ENCOUNTER
Left message to see if she wanted to come in sooner for her botox appt, [address] has availability today 6/28 at 1:30

## 2023-06-30 ENCOUNTER — TELEPHONE (OUTPATIENT)
Dept: NEUROLOGY | Facility: CLINIC | Age: 43
End: 2023-06-30

## 2023-07-06 ENCOUNTER — APPOINTMENT (OUTPATIENT)
Dept: OCCUPATIONAL THERAPY | Facility: CLINIC | Age: 43
End: 2023-07-06
Payer: COMMERCIAL

## 2023-07-11 ENCOUNTER — APPOINTMENT (OUTPATIENT)
Dept: OCCUPATIONAL THERAPY | Facility: CLINIC | Age: 43
End: 2023-07-11
Payer: COMMERCIAL

## 2023-07-13 ENCOUNTER — APPOINTMENT (OUTPATIENT)
Dept: OCCUPATIONAL THERAPY | Facility: CLINIC | Age: 43
End: 2023-07-13
Payer: COMMERCIAL

## 2023-07-18 ENCOUNTER — OFFICE VISIT (OUTPATIENT)
Dept: OCCUPATIONAL THERAPY | Facility: CLINIC | Age: 43
End: 2023-07-18
Payer: COMMERCIAL

## 2023-07-18 DIAGNOSIS — S66.911D TENDON RUPTURE OF WRIST, RIGHT, SUBSEQUENT ENCOUNTER: Primary | ICD-10-CM

## 2023-07-18 PROCEDURE — 97140 MANUAL THERAPY 1/> REGIONS: CPT

## 2023-07-18 PROCEDURE — 97110 THERAPEUTIC EXERCISES: CPT

## 2023-07-18 NOTE — PROGRESS NOTES
Daily Note     Today's date: 2023  Patient name: Suzanne Dumont  : 1980  MRN: [id number]  Referring provider: Mell Harp PA-C  Dx:   Encounter Diagnosis     ICD-10-CM    1. Tendon rupture of wrist, right, subsequent encounter  S66.911D                      Subjective: "It is feeling really good". Objective: See treatment diary below      Assessment: Tolerated treatment well. Patient is overall doing well. Patient has full thumb extension with no signs of lag. Patient wrist AROM is improving well. Minimal pain noted during all motion and activates. Patient has a follow up with orthopedics on 23. Plan: Continue per plan of care. Progress treatment as tolerated.        Precautions: S/P right de Quervain's release, right second extensor compartment tenosynovectomy and right EIC to the EPL tendon transfer 23    - EPL Tendon Transfer Protocol (23)  Forearm based thumb spica at all times other than exercises and hygiene  Cleared for AROM no PROM at this time   Avoid composite wrist flexion w/ thumb flexion and wrist extension with thumb extension  No strengthening     - Closed Distal Radius Fracture (23)  Cleared for AROM (No PROM or strengthening until cleared by orthopedic surgeon)    Manuals 6/15 6/22 6/27 7/18         STM  8 8' 8'         MEM  3'                                     Neuro Re-Ed                                                                                                        Ther Ex             HEP Tenodysis    Tendon glide                  Wrist/Digit AROM  10' short arc wrist AROM 10'  10'         Tenodysis  x20 x20 x20         Gentle AROM thumb   10' 5'         Digit add  x20 x20 x20         Digit Opp   x20 marbles to 4th digit x20         Metal sphere    3'         Hook Fist    x5 downand bakc         TB rotations 2 axis    x5                                   Ther Activity Modalities             [facility]  5' 5' 5'

## 2023-07-20 ENCOUNTER — APPOINTMENT (OUTPATIENT)
Dept: OCCUPATIONAL THERAPY | Facility: CLINIC | Age: 43
End: 2023-07-20
Payer: COMMERCIAL

## 2023-07-24 ENCOUNTER — OFFICE VISIT (OUTPATIENT)
Dept: OBGYN CLINIC | Facility: CLINIC | Age: 43
End: 2023-07-24

## 2023-07-24 VITALS
SYSTOLIC BLOOD PRESSURE: 122 MMHG | HEIGHT: 72 IN | DIASTOLIC BLOOD PRESSURE: 88 MMHG | BODY MASS INDEX: 31.02 KG/M2 | WEIGHT: 229 LBS

## 2023-07-24 DIAGNOSIS — S52.501D CLOSED FRACTURE OF DISTAL END OF RIGHT RADIUS WITH ROUTINE HEALING, UNSPECIFIED FRACTURE MORPHOLOGY, SUBSEQUENT ENCOUNTER: ICD-10-CM

## 2023-07-24 DIAGNOSIS — Z98.890 S/P MUSCULOSKELETAL SYSTEM SURGERY: Primary | ICD-10-CM

## 2023-07-24 PROCEDURE — 99024 POSTOP FOLLOW-UP VISIT: CPT | Performed by: ORTHOPAEDIC SURGERY

## 2023-07-24 NOTE — PROGRESS NOTES
Assessment:   Approx. 2 months S/P right de Quervain's release - Right, right second extensor compartment tenosynovectomy - Right, right EIP to EPL tendon transfer - Right, and Application of short arm thumb spica splint - Right on 5/25/2023    Plan:   Overall Jerrod Mccormick is doing well. She has complete independence thumb extension as well as complete independent index finger extension. She is satisfied with her outcome as well as her functional utilization of her right hand at this point. She has no restrictions with activities of daily living. She will continue formal therapy. Her splint was discontinued approx. 1 week ago. Advised to perform scar massage and use sunscreen over her incisions. Follow Up:  PRN    To Do Next Visit:  Re-evaluation       CHIEF COMPLAINT:  Chief Complaint   Patient presents with   • Right Wrist - Post-op     S/P right de Quervain's release - Right, right second extensor compartment tenosynovectomy - Right, right EIP to EPL tendon transfer - Right, and Application of short arm thumb spica splint - Right on 5/25/2023         SUBJECTIVE:  Chapis Soto is a 44 y.o. female who presents for follow up after right de Quervain's release - Right, right second extensor compartment tenosynovectomy - Right, right EIP to EPL tendon transfer - Right, and Application of short arm thumb spica splint - Right on 5/25/2023. Today patient has No Complaints. She d/c the use of the splint last week. She is attending formal therapy with improvement in motion. She notes mild stiffness at times. She denies any pain.        PHYSICAL EXAMINATION:  Vital signs: /88   Ht 6' 1" (1.854 m)   Wt 104 kg (229 lb)   BMI 30.21 kg/m²   General: well developed and well nourished, alert, oriented times 3 and appears comfortable  Psychiatric: Normal    MUSCULOSKELETAL EXAMINATION:  Incision: healed  Range of Motion: As expected, able to give a thumbs up and point with the index finger, full composite fist possible thumb range of motion is approximately 90% normal extension. No evidence of tendinous adhesions, full tendon glide of the tendon transfer noted.   No reaccumulation of the extensor tenosynovitis noted to the dorsal aspect of the hand or wrist.  Neurovascular status: Neuro intact, good cap refill  Activity Restrictions: No restrictions    DASH SCORE: 7.5    STUDIES REVIEWED:  No Studies to review      PROCEDURES PERFORMED:  Procedures  No Procedures performed today    Scribe Attestation    I,:  Wendy Milligan am acting as a scribe while in the presence of the attending physician.:       I,:  Sara Garcia MD personally performed the services described in this documentation    as scribed in my presence.:

## 2023-07-28 ENCOUNTER — PROCEDURE VISIT (OUTPATIENT)
Dept: NEUROLOGY | Facility: CLINIC | Age: 43
End: 2023-07-28
Payer: COMMERCIAL

## 2023-07-28 VITALS
TEMPERATURE: 98.3 F | BODY MASS INDEX: 31.02 KG/M2 | HEIGHT: 72 IN | WEIGHT: 229 LBS | HEART RATE: 96 BPM | DIASTOLIC BLOOD PRESSURE: 76 MMHG | SYSTOLIC BLOOD PRESSURE: 133 MMHG

## 2023-07-28 DIAGNOSIS — G43.709 CHRONIC MIGRAINE W/O AURA W/O STATUS MIGRAINOSUS, NOT INTRACTABLE: Primary | ICD-10-CM

## 2023-07-28 PROCEDURE — 64615 CHEMODENERV MUSC MIGRAINE: CPT | Performed by: PHYSICIAN ASSISTANT

## 2023-07-28 NOTE — PROGRESS NOTES
Universal Protocol   Consent: Verbal consent obtained. Written consent obtained.   Risks and benefits: risks, benefits and alternatives were discussed  Consent given by: patient  Patient understanding: patient states understanding of the procedure being performed  Patient consent: the patient's understanding of the procedure matches consent given  Procedure consent: procedure consent matches procedure scheduled        Chemodenervation     Date/Time 7/28/2023 3:00 PM     Performed by  Glendy Leavitt PA-C   Authorized by Glendy Leavitt PA-C       Pre-procedure details      Prepped With: Alcohol     Procedure details     Position:  Upright   Botox     Botox Type:  Type A    Brand:  Botox    mL's of Botulinum Toxin:  200    Final Concentration per CC:  100 units    Needle Gauge:  30 G 2.5 inch   Procedures     Botox Procedures: chronic headache      Indications: migraines     Injection Location      Head / Face:  L superior trapezius, R superior trapezius, L superior cervical paraspinal, R superior cervical paraspinal, L , R , procerus, L temporalis, R temporalis, R frontalis, L frontalis, R medial occipitalis and L medial occipitalis    L  injection amount:  5 unit(s)    R  injection amount:  5 unit(s)    L lateral frontalis:  5 unit(s)    R lateral frontalis:  5 unit(s)    L medial frontalis:  5 unit(s)    R medial frontalis:  5 unit(s)    L temporalis injection amount:  20 unit(s)    R temporalis injection amount:  20 unit(s)    Procerus injection amount:  5 unit(s)    L medial occipitalis injection amount:  15 unit(s)    R medial occipitalis injection amount:  15 unit(s)    L superior cervical paraspinal injection amount:  10 unit(s)    R superior cervical paraspinal injection amount:  10 unit(s)    L superior trapezius injection amount:  15 unit(s)    R superior trapezius injection amount:  15 unit(s)   Total Units     Total units used:  200    Total units discarded:  0 Post-procedure details      Chemodenervation:  Chronic migraine    Facial Nerve Location[de-identified]  Bilateral facial nerve    Patient tolerance of procedure: Tolerated well, no immediate complications   Comments      Extra units medically necessary: 45 R frontotemporal region and R apex/scalp. Blood pressure 133/76, pulse 96, temperature 98.3 °F (36.8 °C), temperature source Temporal, height 6' 1" (1.854 m), weight 104 kg (229 lb), not currently breastfeeding. Pt continues to note reduction of migraines with botox. No s/e. Migraine headaches- R side, vertigo on L side, per pt history. However the patient feels that the Botox wears off after about 2 months, and she has worsening migraine headaches. Almost rebound headaches at that point. She is asking about Vyepti as a possibility, but she is currently on [address] which works well. Now she is considering using Aimovig only so she does not get rebound, will keep a journal of migraine headaches for the next 3 months until I see her again in October, at which point we will make a decision.

## 2023-07-28 NOTE — PATIENT INSTRUCTIONS
Botox Therapy  Important Information    Our goal is to make sure you fully understand how Botox Therapy treatment may benefit you and to help you understand how you can play an active role in your treatments and ongoing care. Please review the following information below. Call our office IMMEDIATELY @ [phone] and speak to one of our Botox Coordinators if you have a change in insurance. (a prior authorization is required and accurate information is vital)  Please call at least 24 hours in advance if you can't make your appointment. Appointments are scheduled every 91 days (this will be scheduled in advance before leaving the office)  You must allow for at least 2-3 treatments to determine if Botox is right for you. It may take a few weeks to see a response from treatment. No hair dye or scalp massage or treatment within 24 hours of treatment  We encourage you to use a headache diary or journal to document your headache frequency and severity. You can also utilize the Migraine Gino zach (downloadable on [employer])  Sign up for the Botox Savings Program. (commercial insurance patients may qualify) Sign up at [employer] or call [phone] Option: 4  To get more information on Botox therapy for Chronic Migraines and see frequently asked questions, please visit [employer]  If you have any questions or concerns, please speak to one of our Botox Coordinators at [phone]. We look forward to servicing you! Headache management instructions  - When patient has a moderate to severe headache, they should seek rest, initiate relaxation and apply cold compresses to the head. - Maintain regular sleep schedule. Adults need at least 7-8 hours of uninterrupted a night. - Limit over the counter medications such as Tylenol, Ibuprofen, Aleve, Excedrin. (No more than 2- 3 times a week or max 10 a month). - Maintain headache diary.   Free ZACH for a smart phone, which can be used is "Migraine nubia"  - Limit caffeine to 1-2 cups 8 to 16 oz a day or less. - Avoid dietary trigger. (aged cheese, peanuts, MSG, aspartame and nitrates). - Patient is to have regular frequent meals to prevent headache onset. - Please drink at least 64 ounces of water a day to help remain hydrated.

## 2023-08-10 ENCOUNTER — OFFICE VISIT (OUTPATIENT)
Dept: INTERNAL MEDICINE CLINIC | Facility: CLINIC | Age: 43
End: 2023-08-10
Payer: COMMERCIAL

## 2023-08-10 VITALS
TEMPERATURE: 98.7 F | WEIGHT: 247 LBS | DIASTOLIC BLOOD PRESSURE: 70 MMHG | HEIGHT: 72 IN | SYSTOLIC BLOOD PRESSURE: 130 MMHG | OXYGEN SATURATION: 98 % | BODY MASS INDEX: 33.46 KG/M2 | HEART RATE: 108 BPM

## 2023-08-10 DIAGNOSIS — E66.09 CLASS 1 OBESITY DUE TO EXCESS CALORIES WITHOUT SERIOUS COMORBIDITY WITH BODY MASS INDEX (BMI) OF 32.0 TO 32.9 IN ADULT: ICD-10-CM

## 2023-08-10 DIAGNOSIS — F41.1 GENERALIZED ANXIETY DISORDER: ICD-10-CM

## 2023-08-10 DIAGNOSIS — J68.3 REACTIVE AIRWAYS DYSFUNCTION SYNDROME (HCC): ICD-10-CM

## 2023-08-10 DIAGNOSIS — F98.8 ATTENTION DEFICIT DISORDER, UNSPECIFIED HYPERACTIVITY PRESENCE: ICD-10-CM

## 2023-08-10 DIAGNOSIS — J45.30 MILD PERSISTENT ASTHMA WITHOUT COMPLICATION: Primary | ICD-10-CM

## 2023-08-10 DIAGNOSIS — I10 PRIMARY HYPERTENSION: ICD-10-CM

## 2023-08-10 DIAGNOSIS — F32.A DEPRESSION, UNSPECIFIED DEPRESSION TYPE: ICD-10-CM

## 2023-08-10 PROCEDURE — 99214 OFFICE O/P EST MOD 30 MIN: CPT | Performed by: NURSE PRACTITIONER

## 2023-08-10 RX ORDER — FLUTICASONE FUROATE AND VILANTEROL 100; 25 UG/1; UG/1
1 POWDER RESPIRATORY (INHALATION) DAILY
Qty: 60 BLISTER | Refills: 1 | Status: SHIPPED | OUTPATIENT
Start: 2023-08-10 | End: 2023-08-15 | Stop reason: ALTCHOICE

## 2023-08-10 RX ORDER — CARIPRAZINE 1.5 MG/1
1.5 CAPSULE, GELATIN COATED ORAL DAILY
Qty: 90 CAPSULE | Refills: 0 | Status: SHIPPED | OUTPATIENT
Start: 2023-08-10

## 2023-08-10 RX ORDER — SERTRALINE HYDROCHLORIDE 100 MG/1
200 TABLET, FILM COATED ORAL EVERY MORNING
Qty: 90 TABLET | Refills: 0 | Status: SHIPPED | OUTPATIENT
Start: 2023-08-10

## 2023-08-10 RX ORDER — ONABOTULINUMTOXINA 200 [USP'U]/1
INJECTION, POWDER, LYOPHILIZED, FOR SOLUTION INTRADERMAL; INTRAMUSCULAR
COMMUNITY
Start: 2023-06-07

## 2023-08-10 RX ORDER — METHYLPHENIDATE HYDROCHLORIDE 20 MG/1
20 CAPSULE, EXTENDED RELEASE ORAL EVERY MORNING
Qty: 30 CAPSULE | Refills: 0 | Status: SHIPPED | OUTPATIENT
Start: 2023-08-10

## 2023-08-10 NOTE — ASSESSMENT & PLAN NOTE
Worsening symptoms recently.    Start breo once daily- rinse mouth after use  Continue singulair daily  Albuterol as needed  If still not improving, recommend imaging and pulmonary function test.

## 2023-08-10 NOTE — PROGRESS NOTES
Name: Candido Age      : 1980      MRN: [id number]  Encounter Provider: CROW Hunter  Encounter Date: 8/10/2023   Encounter department: [id number] [facility]     1. Mild persistent asthma without complication  Assessment & Plan:  Worsening symptoms recently. Start breo once daily- rinse mouth after use  Continue singulair daily  Albuterol as needed  If still not improving, recommend imaging and pulmonary function test.       2. Reactive airways dysfunction syndrome ([address])  Assessment & Plan:  See above    Orders:  -     Fluticasone Furoate-Vilanterol 100-25 mcg/actuation inhaler; Inhale 1 puff daily Rinse mouth after use. 3. Primary hypertension  Assessment & Plan:  Stable  On lisinopril       4. Generalized anxiety disorder  Assessment & Plan:  Stable  Continue as above  Referral provided for new psychiatrist    Orders:  -     Ambulatory Referral to Psychiatry; Future  -     Vraylar 1.5 MG capsule; Take 1 capsule (1.5 mg total) by mouth daily  -     sertraline (ZOLOFT) 100 mg tablet; Take 2 tablets (200 mg total) by mouth every morning    5. Depression, unspecified depression type  Assessment & Plan:  Stable  Agreeable to fill medications until seen by psychiatry   Continue sertraline and vraylar    Orders:  -     Ambulatory Referral to Psychiatry; Future  -     Vraylar 1.5 MG capsule; Take 1 capsule (1.5 mg total) by mouth daily  -     sertraline (ZOLOFT) 100 mg tablet; Take 2 tablets (200 mg total) by mouth every morning    6. Attention deficit disorder, unspecified hyperactivity presence  Assessment & Plan:  Stable  On ritalin. Agreed to refill until seen by new psychiatrist.     Orders:  -     methylphenidate (RITALIN LA) 20 MG 24 hr capsule; Take 1 capsule (20 mg total) by mouth every morning Max Daily Amount: 20 mg    7.  Class 1 obesity due to excess calories without serious comorbidity with body mass index (BMI) of 32.0 to 32.9 in adult  - Ambulatory Referral to Weight Management; Future  -     Comprehensive metabolic panel; Future  -     CBC and differential; Future  -     TSH, 3rd generation with Free T4 reflex; Future  -     Lipid Panel with Direct LDL reflex; Future    BMI Counseling: Body mass index is 32.59 kg/m². The BMI is above normal. Nutrition recommendations include decreasing portion sizes, encouraging healthy choices of fruits and vegetables and moderation in carbohydrate intake. Exercise recommendations include exercising 3-5 times per week. Patient referred to weight management. Rationale for BMI follow-up plan is due to patient being overweight or obese. Veronika Nieves is here today for follow up  She is doing ok  She reports worsening asthma symptoms the last few weeks. She has a dry cough, wheezing, and shortness of breath at times. She was using her albuterol inhaler daily up until she started taking her moms prednisone which has been helping. She is concerned about weight gain. She lost 70 lbs in the past following a reduced calorie program. She hasn't been able to focus on eating healthy and exercising. Her anxiety, depression, and ADD are currently well managed on her current medications. She is concerned as she was recently discharged from her psychiatry practice for not showing up to an appointment. Review of Systems   Constitutional: Negative for activity change, appetite change and fatigue. Respiratory: Positive for cough, shortness of breath and wheezing. Negative for chest tightness. Cardiovascular: Negative for chest pain, palpitations and leg swelling. Gastrointestinal: Negative for abdominal pain, constipation and diarrhea. Genitourinary: Negative for difficulty urinating. Neurological: Negative for dizziness, light-headedness and headaches. Psychiatric/Behavioral: Negative for decreased concentration, dysphoric mood and sleep disturbance. The patient is not nervous/anxious. Current Outpatient Medications on File Prior to Visit   Medication Sig   • albuterol (PROVENTIL HFA,VENTOLIN HFA) 90 mcg/act inhaler Inhale 2 puffs 4 (four) times a day   • clonazePAM (KlonoPIN) 0.5 mg tablet Take 0.5 mg by mouth daily at bedtime   • Erenumab-aooe 140 MG/ML SOAJ Inject 140 mg under the skin every 30 (thirty) days   • gabapentin (NEURONTIN) 600 MG tablet Take 1 tablet (600 mg total) by mouth 2 (two) times a day   • ibuprofen (MOTRIN) 600 mg tablet Take 1 tablet (600 mg total) by mouth every 6 (six) hours as needed for mild pain   • ibuprofen (MOTRIN) 800 mg tablet Take 1 tablet (800 mg total) by mouth 3 (three) times a day   • ketorolac (TORADOL) 30 mg/mL injection I-2 ML INTRAMUSCULAR INJECTION ONCE PER 24 HOURS AS NEEDED FOR MIGRAINE. NO MORE THAN 2 INJECTIONS PER WEEK. • lisinopril (ZESTRIL) 10 mg tablet TAKE 1 TABLET BY MOUTH EVERY DAY   • meclizine (ANTIVERT) 12.5 MG tablet Take 2 tablets (25 mg total) by mouth every 8 (eight) hours as needed for dizziness   • meloxicam (MOBIC) 7.5 mg tablet TAKE 1 TABLET BY MOUTH EVERY DAY   • montelukast (SINGULAIR) 10 mg tablet TAKE 1 TABLET BY MOUTH EVERYDAY AT BEDTIME   • naloxone (NARCAN) 4 mg/0.1 mL nasal spray Administer 1 spray into a nostril. If no response after 2-3 minutes, give another dose in the other nostril using a new spray. • norethindrone (MICRONOR) 0.35 MG tablet    • omeprazole (PriLOSEC) 20 mg delayed release capsule TAKE 1 CAPSULE BY MOUTH EVERY DAY   • onabotulinumtoxin A (BOTOX) 100 units one time.  "for vertigo"   • ondansetron (ZOFRAN-ODT) 4 mg disintegrating tablet Take 1 tab q8h prn for nausea   • prochlorperazine (COMPAZINE) 10 mg/2mL Inject 2 mL IM EVERY 12 HOURS AS NEEDED FOR MIGRAINE/NAUSEA/VOMITING - MAX 4 INJECTIONS PER WEEK   • promethazine (PHENERGAN) 12.5 mg/10 mL syrup TAKE 10 ML (12.5 MG TOTAL) BY MOUTH 4 (FOUR) TIMES A DAY AS NEEDED FOR NAUSEA OR VOMITING (MIGRAINE)   • rimegepant sulfate (NURTEC) 75 mg TBDP 1 tab QOD. No more than one dose per 24 hours. • SUMAtriptan (IMITREX) 100 mg tablet 1 tab at migraine onset, repeat after 2 hours if needed. Max 2 per day and 4 per week. • Syringe/Needle, Disp, (SYRINGE 3CC/10OQ0-4/2") 25G X 1-1/2" 3 ML MISC Use for toradol IM injections. • tiZANidine (ZANAFLEX) 4 mg tablet Take 1 tablet (4 mg total) by mouth daily at bedtime as needed for muscle spasms   • traMADol (Ultram) 50 mg tablet Take 1 tablet (50 mg total) by mouth every 6 (six) hours as needed for moderate pain   • traZODone (DESYREL) 100 mg tablet Take  mg by mouth daily at bedtime as needed   • [DISCONTINUED] divalproex sodium (DEPAKOTE) 250 mg EC tablet 2 tabs q12 h x 2 days, then 1 tab q12 h x 2 days, then stop. Repeat if needed. • [DISCONTINUED] methylphenidate (RITALIN LA) 20 MG 24 hr capsule TAKE 1 CAPSULE BY MOUTH EVERY DAY IN THE MORNING   • [DISCONTINUED] sertraline (ZOLOFT) 100 mg tablet Take 100 mg by mouth every morning   • [DISCONTINUED] Vraylar 1.5 MG capsule    • benzonatate (TESSALON PERLES) 100 mg capsule Take 1 capsule (100 mg total) by mouth 3 (three) times a day as needed for cough   • Botox 200 units SOLR    • [DISCONTINUED] dexamethasone (DECADRON) 2 mg tablet Take 1 tablet (2 mg total) by mouth daily with breakfast       Objective     /70   Pulse (!) 108   Temp 98.7 °F (37.1 °C)   Ht 6' 1" (1.854 m)   Wt 112 kg (247 lb)   SpO2 98%   BMI 32.59 kg/m²     Physical Exam  Vitals reviewed. Constitutional:       Appearance: Normal appearance. HENT:      Head: Normocephalic and atraumatic. Eyes:      Conjunctiva/sclera: Conjunctivae normal.   Cardiovascular:      Rate and Rhythm: Normal rate and regular rhythm. Heart sounds: Normal heart sounds. Pulmonary:      Effort: Pulmonary effort is normal.      Breath sounds: Normal breath sounds. Musculoskeletal:      Right lower leg: No edema. Left lower leg: No edema. Skin:     General: Skin is warm and dry. Neurological:      Mental Status: She is alert and oriented to person, place, and time.    Psychiatric:         Mood and Affect: Mood normal.         Behavior: Behavior normal.       CROW Altamirano

## 2023-08-11 ENCOUNTER — TELEPHONE (OUTPATIENT)
Dept: ADMINISTRATIVE | Facility: OTHER | Age: 43
End: 2023-08-11

## 2023-08-11 NOTE — TELEPHONE ENCOUNTER
----- Message from Lily Joe sent at 8/10/2023  1:13 PM EDT -----  Regarding: Care Gap Request  04/12/23 3:34 PM    Hello, our patient attached above on file. has had Mammogram completed/performed. Please assist in updating the patient chart by pulling the Care Everywhere (CE) document. The date of service is 4/23/23.      Thank you,  Sandy Abraham  [facility]

## 2023-08-11 NOTE — TELEPHONE ENCOUNTER
Upon review of the In Basket request we were able to locate, review, and update the patient chart as requested for Mammogram.    Any additional questions or concerns should be emailed to the Practice Liaisons via the appropriate education email address, please do not reply via In Basket.     Thank you  Mónica Brown MA

## 2023-08-14 ENCOUNTER — TELEPHONE (OUTPATIENT)
Dept: PSYCHIATRY | Facility: CLINIC | Age: 43
End: 2023-08-14

## 2023-08-15 DIAGNOSIS — J45.30 MILD PERSISTENT ASTHMA WITHOUT COMPLICATION: Primary | ICD-10-CM

## 2023-08-15 RX ORDER — FLUTICASONE PROPIONATE AND SALMETEROL 250; 50 UG/1; UG/1
1 POWDER RESPIRATORY (INHALATION) 2 TIMES DAILY
Qty: 60 BLISTER | Refills: 5 | Status: SHIPPED | OUTPATIENT
Start: 2023-08-15 | End: 2024-02-11

## 2023-08-15 RX ORDER — CLONAZEPAM 0.5 MG/1
0.5 TABLET ORAL
Refills: 0 | OUTPATIENT
Start: 2023-08-15

## 2023-08-22 DIAGNOSIS — F41.1 GENERALIZED ANXIETY DISORDER: Primary | ICD-10-CM

## 2023-08-22 NOTE — TELEPHONE ENCOUNTER
Patient left message on prescription line     She was told at last appointment with us   That we would fill this medication until she got a new psychiatrist

## 2023-08-24 ENCOUNTER — TELEPHONE (OUTPATIENT)
Dept: NEUROLOGY | Facility: CLINIC | Age: 43
End: 2023-08-24

## 2023-08-24 RX ORDER — CLONAZEPAM 0.5 MG/1
0.5 TABLET ORAL
Qty: 30 TABLET | Refills: 0 | Status: SHIPPED | OUTPATIENT
Start: 2023-08-24

## 2023-08-24 NOTE — TELEPHONE ENCOUNTER
Hi, this is Felix  [facility] calling for patient Nica Last, birthday [birth year] in regards to the aimovig injection, the medication requires a pre authorization at this time. We had sent the form over to the office. Please complete the form and send it back to the number on the form. Or if you have any questions, give us a call [phone]. Thank you.     Chart reviewed  Last office parag kessler/ Shola Rosas

## 2023-08-28 NOTE — TELEPHONE ENCOUNTER
received vm from 9:32am- good morning, this is [facility] calling in regards to patient. Emily Beebe, YOB: 1980 calling regards prescription you have here for the patients, aimovig 140 milligram auto injector. It is requiring a pre authorization renewal by the patient's plan [employer], we sent a fax to the doctor's office, and had done a call to verify They received her fax. We were following up again to check on the status. And once the office has a status update, if they could kindly call us back at [phone]. We would greatly appreciate it.  Thank you and have a great rest of your day.   ----------------------------------------  Aimovig PA completed on [facility]  Key-[id number]

## 2023-08-29 DIAGNOSIS — G43.709 CHRONIC MIGRAINE WITHOUT AURA, NOT INTRACTABLE, WITHOUT STATUS MIGRAINOSUS: ICD-10-CM

## 2023-08-29 DIAGNOSIS — G43.709 CHRONIC MIGRAINE W/O AURA W/O STATUS MIGRAINOSUS, NOT INTRACTABLE: ICD-10-CM

## 2023-08-29 RX ORDER — ONDANSETRON 4 MG/1
TABLET, ORALLY DISINTEGRATING ORAL
Qty: 9 TABLET | Refills: 0 | Status: SHIPPED | OUTPATIENT
Start: 2023-08-29

## 2023-08-29 NOTE — TELEPHONE ENCOUNTER
Patient left voicemail requesting refill of steroid and toradol injections. Please sign if agreeable.     [phone]

## 2023-08-30 DIAGNOSIS — M54.2 CERVICALGIA: ICD-10-CM

## 2023-08-30 RX ORDER — TIZANIDINE 4 MG/1
4 TABLET ORAL
Qty: 90 TABLET | Refills: 0 | Status: SHIPPED | OUTPATIENT
Start: 2023-08-30

## 2023-08-31 RX ORDER — TIZANIDINE 4 MG/1
4 TABLET ORAL
Qty: 30 TABLET | Refills: 2 | OUTPATIENT
Start: 2023-08-31

## 2023-09-01 DIAGNOSIS — G43.709 CHRONIC MIGRAINE W/O AURA W/O STATUS MIGRAINOSUS, NOT INTRACTABLE: ICD-10-CM

## 2023-09-01 RX ORDER — DEXAMETHASONE 2 MG/1
TABLET ORAL
Qty: 5 TABLET | Refills: 0 | Status: SHIPPED | OUTPATIENT
Start: 2023-09-01 | End: 2023-09-01 | Stop reason: SDUPTHER

## 2023-09-01 RX ORDER — KETOROLAC TROMETHAMINE 30 MG/ML
INJECTION, SOLUTION INTRAMUSCULAR; INTRAVENOUS
Qty: 4 ML | Refills: 0 | Status: SHIPPED | OUTPATIENT
Start: 2023-09-01 | End: 2023-09-01 | Stop reason: SDUPTHER

## 2023-09-05 RX ORDER — DEXAMETHASONE 2 MG/1
TABLET ORAL
Qty: 5 TABLET | Refills: 0 | Status: SHIPPED | OUTPATIENT
Start: 2023-09-05

## 2023-09-05 RX ORDER — KETOROLAC TROMETHAMINE 30 MG/ML
INJECTION, SOLUTION INTRAMUSCULAR; INTRAVENOUS
Qty: 4 ML | Refills: 0 | Status: SHIPPED | OUTPATIENT
Start: 2023-09-05

## 2023-09-06 DIAGNOSIS — F98.8 ATTENTION DEFICIT DISORDER, UNSPECIFIED HYPERACTIVITY PRESENCE: ICD-10-CM

## 2023-09-08 ENCOUNTER — TELEPHONE (OUTPATIENT)
Dept: INTERNAL MEDICINE CLINIC | Facility: CLINIC | Age: 43
End: 2023-09-08

## 2023-09-08 RX ORDER — METHYLPHENIDATE HYDROCHLORIDE 20 MG/1
20 CAPSULE, EXTENDED RELEASE ORAL EVERY MORNING
Qty: 30 CAPSULE | Refills: 0 | Status: SHIPPED | OUTPATIENT
Start: 2023-09-08

## 2023-10-04 DIAGNOSIS — F98.8 ATTENTION DEFICIT DISORDER, UNSPECIFIED HYPERACTIVITY PRESENCE: ICD-10-CM

## 2023-10-05 RX ORDER — METHYLPHENIDATE HYDROCHLORIDE 20 MG/1
20 CAPSULE, EXTENDED RELEASE ORAL EVERY MORNING
Qty: 30 CAPSULE | Refills: 0 | Status: SHIPPED | OUTPATIENT
Start: 2023-10-05

## 2023-10-13 ENCOUNTER — TELEPHONE (OUTPATIENT)
Age: 43
End: 2023-10-13

## 2023-10-13 DIAGNOSIS — Z96.649 HISTORY OF HIP REPLACEMENT, UNSPECIFIED LATERALITY: Primary | ICD-10-CM

## 2023-10-13 DIAGNOSIS — F32.A DEPRESSION, UNSPECIFIED DEPRESSION TYPE: ICD-10-CM

## 2023-10-13 DIAGNOSIS — F41.1 GENERALIZED ANXIETY DISORDER: ICD-10-CM

## 2023-10-13 RX ORDER — CLONAZEPAM 0.5 MG/1
0.5 TABLET ORAL
Qty: 30 TABLET | Refills: 0 | Status: SHIPPED | OUTPATIENT
Start: 2023-10-13

## 2023-10-13 RX ORDER — CLINDAMYCIN HYDROCHLORIDE 300 MG/1
600 CAPSULE ORAL ONCE
Qty: 2 CAPSULE | Refills: 0 | Status: SHIPPED | OUTPATIENT
Start: 2023-10-13 | End: 2023-10-13

## 2023-10-13 RX ORDER — CARIPRAZINE 1.5 MG/1
1.5 CAPSULE, GELATIN COATED ORAL DAILY
Qty: 90 CAPSULE | Refills: 0 | Status: SHIPPED | OUTPATIENT
Start: 2023-10-13

## 2023-10-13 NOTE — TELEPHONE ENCOUNTER
Reason for call:   [x] Refill   [] Prior Auth  [x] Other: Patient has upcoming dental appt and needs antibiotic. It is not on active med list for refill. Can you please send new prescription to pharmacy    Office:   [x] PCP/Provider - Karma  [] Speciality/Provider -     Medication: clinamycin    Dose/Frequency: 300mg / 2 caps 30 to 60 minutes before dental appt    Quantity: 2    Pharmacy: [facility] [address]    Does the patient have enough for 3 days?    [] Yes   [x] No - Send as HP to POD

## 2023-10-16 ENCOUNTER — APPOINTMENT (OUTPATIENT)
Dept: LAB | Facility: CLINIC | Age: 43
End: 2023-10-16
Payer: COMMERCIAL

## 2023-10-16 DIAGNOSIS — R31.9 HEMATURIA, UNSPECIFIED TYPE: ICD-10-CM

## 2023-10-16 DIAGNOSIS — E66.09 CLASS 1 OBESITY DUE TO EXCESS CALORIES WITHOUT SERIOUS COMORBIDITY WITH BODY MASS INDEX (BMI) OF 32.0 TO 32.9 IN ADULT: ICD-10-CM

## 2023-10-16 LAB
ALBUMIN SERPL BCP-MCNC: 4.1 G/DL (ref 3.5–5)
ALP SERPL-CCNC: 43 U/L (ref 34–104)
ALT SERPL W P-5'-P-CCNC: 27 U/L (ref 7–52)
ANION GAP SERPL CALCULATED.3IONS-SCNC: 7 MMOL/L
AST SERPL W P-5'-P-CCNC: 15 U/L (ref 13–39)
BASOPHILS # BLD AUTO: 0.04 THOUSANDS/ÂΜL (ref 0–0.1)
BASOPHILS NFR BLD AUTO: 1 % (ref 0–1)
BILIRUB SERPL-MCNC: 0.35 MG/DL (ref 0.2–1)
BUN SERPL-MCNC: 14 MG/DL (ref 5–25)
CALCIUM SERPL-MCNC: 9.4 MG/DL (ref 8.4–10.2)
CHLORIDE SERPL-SCNC: 103 MMOL/L (ref 96–108)
CHOLEST SERPL-MCNC: 196 MG/DL
CO2 SERPL-SCNC: 23 MMOL/L (ref 21–32)
CREAT SERPL-MCNC: 0.86 MG/DL (ref 0.6–1.3)
EOSINOPHIL # BLD AUTO: 0.13 THOUSAND/ÂΜL (ref 0–0.61)
EOSINOPHIL NFR BLD AUTO: 2 % (ref 0–6)
ERYTHROCYTE [DISTWIDTH] IN BLOOD BY AUTOMATED COUNT: 12.2 % (ref 11.6–15.1)
GFR SERPL CREATININE-BSD FRML MDRD: 83 ML/MIN/1.73SQ M
GLUCOSE P FAST SERPL-MCNC: 102 MG/DL (ref 65–99)
HCT VFR BLD AUTO: 43.1 % (ref 34.8–46.1)
HDLC SERPL-MCNC: 50 MG/DL
HGB BLD-MCNC: 14.5 G/DL (ref 11.5–15.4)
IMM GRANULOCYTES # BLD AUTO: 0.03 THOUSAND/UL (ref 0–0.2)
IMM GRANULOCYTES NFR BLD AUTO: 1 % (ref 0–2)
LDLC SERPL CALC-MCNC: 113 MG/DL (ref 0–100)
LYMPHOCYTES # BLD AUTO: 1.68 THOUSANDS/ÂΜL (ref 0.6–4.47)
LYMPHOCYTES NFR BLD AUTO: 28 % (ref 14–44)
MCH RBC QN AUTO: 30.8 PG (ref 26.8–34.3)
MCHC RBC AUTO-ENTMCNC: 33.6 G/DL (ref 31.4–37.4)
MCV RBC AUTO: 92 FL (ref 82–98)
MONOCYTES # BLD AUTO: 0.48 THOUSAND/ÂΜL (ref 0.17–1.22)
MONOCYTES NFR BLD AUTO: 8 % (ref 4–12)
NEUTROPHILS # BLD AUTO: 3.56 THOUSANDS/ÂΜL (ref 1.85–7.62)
NEUTS SEG NFR BLD AUTO: 60 % (ref 43–75)
NRBC BLD AUTO-RTO: 0 /100 WBCS
PLATELET # BLD AUTO: 264 THOUSANDS/UL (ref 149–390)
PMV BLD AUTO: 10 FL (ref 8.9–12.7)
POTASSIUM SERPL-SCNC: 4.2 MMOL/L (ref 3.5–5.3)
PROT SERPL-MCNC: 6.6 G/DL (ref 6.4–8.4)
RBC # BLD AUTO: 4.71 MILLION/UL (ref 3.81–5.12)
SODIUM SERPL-SCNC: 133 MMOL/L (ref 135–147)
TRIGL SERPL-MCNC: 165 MG/DL
TSH SERPL DL<=0.05 MIU/L-ACNC: 2.19 UIU/ML (ref 0.45–4.5)
WBC # BLD AUTO: 5.92 THOUSAND/UL (ref 4.31–10.16)

## 2023-10-16 PROCEDURE — 85025 COMPLETE CBC W/AUTO DIFF WBC: CPT

## 2023-10-16 PROCEDURE — 36415 COLL VENOUS BLD VENIPUNCTURE: CPT

## 2023-10-16 PROCEDURE — 80053 COMPREHEN METABOLIC PANEL: CPT

## 2023-10-16 PROCEDURE — 84443 ASSAY THYROID STIM HORMONE: CPT

## 2023-10-16 PROCEDURE — 80061 LIPID PANEL: CPT

## 2023-10-17 ENCOUNTER — TELEPHONE (OUTPATIENT)
Dept: NEUROLOGY | Facility: CLINIC | Age: 43
End: 2023-10-17

## 2023-10-17 NOTE — TELEPHONE ENCOUNTER
Patient is scheduled for [facility] 10/27 in [facility]. Spoke to Haylee Tinoco    Botox 200 UNITS  Qty. 1  Scheduled: 10/19  Location: [facility]  Via: Ups/Fedex       Please let us know if Botox does not arrive. Thanks!

## 2023-10-19 NOTE — TELEPHONE ENCOUNTER
Botox number of units: 200  Botox quantity: 1  Arrived at what location: [address]  Botox at [facility]: yes  NDC number: 5650-4548-50  Lot number: [id number]  Expiration Date: 04/30/26  Appt notes indicate correct medication: yes

## 2023-10-26 ENCOUNTER — OFFICE VISIT (OUTPATIENT)
Dept: INTERNAL MEDICINE CLINIC | Facility: CLINIC | Age: 43
End: 2023-10-26
Payer: COMMERCIAL

## 2023-10-26 VITALS
HEART RATE: 103 BPM | SYSTOLIC BLOOD PRESSURE: 126 MMHG | TEMPERATURE: 97.5 F | HEIGHT: 72 IN | BODY MASS INDEX: 34.95 KG/M2 | DIASTOLIC BLOOD PRESSURE: 82 MMHG | OXYGEN SATURATION: 96 % | WEIGHT: 258 LBS

## 2023-10-26 DIAGNOSIS — I10 PRIMARY HYPERTENSION: ICD-10-CM

## 2023-10-26 DIAGNOSIS — F98.8 ATTENTION DEFICIT DISORDER, UNSPECIFIED HYPERACTIVITY PRESENCE: ICD-10-CM

## 2023-10-26 DIAGNOSIS — K29.00 ACUTE GASTRITIS WITHOUT HEMORRHAGE, UNSPECIFIED GASTRITIS TYPE: Primary | ICD-10-CM

## 2023-10-26 DIAGNOSIS — F41.1 GENERALIZED ANXIETY DISORDER: ICD-10-CM

## 2023-10-26 DIAGNOSIS — J45.30 MILD PERSISTENT ASTHMA WITHOUT COMPLICATION: ICD-10-CM

## 2023-10-26 DIAGNOSIS — F32.A DEPRESSION, UNSPECIFIED DEPRESSION TYPE: ICD-10-CM

## 2023-10-26 PROBLEM — G89.29 CHRONIC RIGHT SHOULDER PAIN: Status: RESOLVED | Noted: 2022-03-16 | Resolved: 2023-10-26

## 2023-10-26 PROBLEM — M25.511 CHRONIC RIGHT SHOULDER PAIN: Status: RESOLVED | Noted: 2022-03-16 | Resolved: 2023-10-26

## 2023-10-26 PROBLEM — U09.9 POST-COVID SYNDROME: Status: RESOLVED | Noted: 2022-08-05 | Resolved: 2023-10-26

## 2023-10-26 PROBLEM — R94.31 ABNORMAL EKG: Status: RESOLVED | Noted: 2023-05-18 | Resolved: 2023-10-26

## 2023-10-26 PROCEDURE — 99214 OFFICE O/P EST MOD 30 MIN: CPT | Performed by: NURSE PRACTITIONER

## 2023-10-26 RX ORDER — SUCRALFATE 1 G/1
1 TABLET ORAL 4 TIMES DAILY
Qty: 56 TABLET | Refills: 0 | Status: SHIPPED | OUTPATIENT
Start: 2023-10-26 | End: 2023-11-09

## 2023-10-26 NOTE — PROGRESS NOTES
Name: Beatriz Velez      : 1980      MRN: [id number]  Encounter Provider: CROW Pelaez  Encounter Date: 10/26/2023   Encounter department: [id number] [facility]     1. Acute gastritis without hemorrhage, unspecified gastritis type  Comments:  increase omperazole to 20 mg twice daily for 1 month. carafate 4 times a day for 2 weeks. avoid high acid foods. schedule follow up with GI. Orders:  -     sucralfate (CARAFATE) 1 g tablet; Take 1 tablet (1 g total) by mouth 4 (four) times a day for 14 days    2. Mild persistent asthma without complication  Assessment & Plan:  Stable  Continue breo daily   Albuterol PRN      3. Primary hypertension  Assessment & Plan:  Stable   Continue lisinopril       4. Attention deficit disorder, unspecified hyperactivity presence  Assessment & Plan:  Stable  On ritalin. On the wait list for psychiatry. Advised to look into virtual options as well. 5. Depression, unspecified depression type  Assessment & Plan:  Stable  On sertraline and vraylar. On the wait list for psychiatry       6. Generalized anxiety disorder  Assessment & Plan:  Stable  see above             Subjective      Geradine Pouch is here today for follow up  She is doing ok. Her asthma is well controlled since starting on breo. She has not needed her albuterol inhaler. Still on the wait list for psychiatry. Stable on medications. About 1-2 weeks ago, she has started with intermittent reflux, nausea and vomiting. Symptoms are worse in the morning. She has been drinking less coffee and not eating high acid foods. Normal bowel movements. She is able to keep food down, mostly vomits in the morning. Denies abdominal pain but does have bloating   She has been using tums as needed. Review of Systems   Constitutional:  Negative for activity change, appetite change and fatigue.    Respiratory:  Negative for cough, chest tightness, shortness of breath and wheezing. Cardiovascular:  Negative for chest pain. Gastrointestinal:  Positive for nausea and vomiting. Negative for abdominal pain, blood in stool, constipation and diarrhea. Genitourinary:  Negative for difficulty urinating and dysuria. Neurological:  Negative for dizziness, light-headedness and headaches. Psychiatric/Behavioral:  Negative for decreased concentration, dysphoric mood and sleep disturbance. The patient is not nervous/anxious. Current Outpatient Medications on File Prior to Visit   Medication Sig    albuterol (PROVENTIL HFA,VENTOLIN HFA) 90 mcg/act inhaler Inhale 2 puffs 4 (four) times a day    benzonatate (TESSALON PERLES) 100 mg capsule Take 1 capsule (100 mg total) by mouth 3 (three) times a day as needed for cough    Botox 200 units SOLR     clonazePAM (KlonoPIN) 0.5 mg tablet Take 1 tablet (0.5 mg total) by mouth daily at bedtime    dexamethasone (DECADRON) 2 mg tablet 1 tablet daily. Erenumab-aooe 140 MG/ML SOAJ Inject 140 mg under the skin every 30 (thirty) days    Fluticasone-Salmeterol (Advair) 250-50 mcg/dose inhaler Inhale 1 puff 2 (two) times a day Rinse mouth after use.    gabapentin (NEURONTIN) 600 MG tablet Take 1 tablet (600 mg total) by mouth 2 (two) times a day    ibuprofen (MOTRIN) 600 mg tablet Take 1 tablet (600 mg total) by mouth every 6 (six) hours as needed for mild pain    ibuprofen (MOTRIN) 800 mg tablet Take 1 tablet (800 mg total) by mouth 3 (three) times a day    ketorolac (TORADOL) 30 mg/mL injection I-2 ML INTRAMUSCULAR INJECTION ONCE PER 24 HOURS AS NEEDED FOR MIGRAINE. NO MORE THAN 2 INJECTIONS PER WEEK.     lisinopril (ZESTRIL) 10 mg tablet TAKE 1 TABLET BY MOUTH EVERY DAY    meclizine (ANTIVERT) 12.5 MG tablet Take 2 tablets (25 mg total) by mouth every 8 (eight) hours as needed for dizziness    meloxicam (MOBIC) 7.5 mg tablet TAKE 1 TABLET BY MOUTH EVERY DAY    methylphenidate (RITALIN LA) 20 MG 24 hr capsule Take 1 capsule (20 mg total) by mouth every morning Max Daily Amount: 20 mg    montelukast (SINGULAIR) 10 mg tablet TAKE 1 TABLET BY MOUTH EVERYDAY AT BEDTIME    naloxone (NARCAN) 4 mg/0.1 mL nasal spray Administer 1 spray into a nostril. If no response after 2-3 minutes, give another dose in the other nostril using a new spray. norethindrone (MICRONOR) 0.35 MG tablet     omeprazole (PriLOSEC) 20 mg delayed release capsule TAKE 1 CAPSULE BY MOUTH EVERY DAY    onabotulinumtoxin A (BOTOX) 100 units one time. "for vertigo"    ondansetron (ZOFRAN-ODT) 4 mg disintegrating tablet TAKE 1 TABLET BY MOUTH EVERY 8 HOURS AS NEEDED FOR NAUSEA    prochlorperazine (COMPAZINE) 10 mg/2mL Inject 2 mL IM EVERY 12 HOURS AS NEEDED FOR MIGRAINE/NAUSEA/VOMITING - MAX 4 INJECTIONS PER WEEK    promethazine (PHENERGAN) 12.5 mg/10 mL syrup TAKE 10 ML (12.5 MG TOTAL) BY MOUTH 4 (FOUR) TIMES A DAY AS NEEDED FOR NAUSEA OR VOMITING (MIGRAINE)    rimegepant sulfate (NURTEC) 75 mg TBDP 1 tab QOD. No more than one dose per 24 hours. sertraline (ZOLOFT) 100 mg tablet Take 2 tablets (200 mg total) by mouth every morning    SUMAtriptan (IMITREX) 100 mg tablet 1 tab at migraine onset, repeat after 2 hours if needed. Max 2 per day and 4 per week. Syringe/Needle, Disp, (SYRINGE 3CC/08AW6-0/2") 25G X 1-1/2" 3 ML MISC Use for toradol IM injections. tiZANidine (ZANAFLEX) 4 mg tablet Take 1 tablet (4 mg total) by mouth daily at bedtime as needed for muscle spasms    traMADol (Ultram) 50 mg tablet Take 1 tablet (50 mg total) by mouth every 6 (six) hours as needed for moderate pain    traZODone (DESYREL) 100 mg tablet Take  mg by mouth daily at bedtime as needed    Vraylar 1.5 MG capsule Take 1 capsule (1.5 mg total) by mouth daily       Objective     /82   Pulse 103   Temp 97.5 °F (36.4 °C)   Ht 6' 1" (1.854 m)   Wt 117 kg (258 lb)   SpO2 96%   BMI 34.04 kg/m²     Physical Exam  Vitals reviewed. Constitutional:       Appearance: Normal appearance.    HENT: Head: Normocephalic and atraumatic. Eyes:      Conjunctiva/sclera: Conjunctivae normal.   Cardiovascular:      Rate and Rhythm: Normal rate and regular rhythm. Heart sounds: Normal heart sounds. Pulmonary:      Effort: Pulmonary effort is normal.      Breath sounds: Normal breath sounds. Abdominal:      General: Bowel sounds are normal.      Palpations: Abdomen is soft. Tenderness: There is no abdominal tenderness. Musculoskeletal:      Right lower leg: No edema. Left lower leg: No edema. Skin:     General: Skin is warm and dry. Neurological:      Mental Status: She is alert and oriented to person, place, and time.    Psychiatric:         Mood and Affect: Mood normal.         Behavior: Behavior normal.       CROW Schaefer

## 2023-10-27 ENCOUNTER — PROCEDURE VISIT (OUTPATIENT)
Dept: NEUROLOGY | Facility: CLINIC | Age: 43
End: 2023-10-27
Payer: COMMERCIAL

## 2023-10-27 VITALS — TEMPERATURE: 98.6 F | SYSTOLIC BLOOD PRESSURE: 144 MMHG | DIASTOLIC BLOOD PRESSURE: 82 MMHG

## 2023-10-27 DIAGNOSIS — G43.709 CHRONIC MIGRAINE W/O AURA W/O STATUS MIGRAINOSUS, NOT INTRACTABLE: Primary | ICD-10-CM

## 2023-10-27 PROCEDURE — 64615 CHEMODENERV MUSC MIGRAINE: CPT | Performed by: PHYSICIAN ASSISTANT

## 2023-10-27 NOTE — PROGRESS NOTES
Universal Protocol   Consent: Verbal consent obtained. Written consent obtained.   Risks and benefits: risks, benefits and alternatives were discussed  Consent given by: patient  Patient understanding: patient states understanding of the procedure being performed  Patient consent: the patient's understanding of the procedure matches consent given  Procedure consent: procedure consent matches procedure scheduled      Chemodenervation     Date/Time  10/27/2023 1:00 PM     Performed by  Pratima Wolf PA-C   Authorized by  Pratima Wolf PA-C     Pre-procedure details      Prepped With: Alcohol     Procedure details      Position:  Upright   Botox      Botox Type:  Type A    Brand:  Botox    mL's of Botulinum Toxin:  200    Final Concentration per CC:  100 units    Needle Gauge:  30 G 2.5 inch   Procedures      Botox Procedures: chronic headache      Indications: migraines     Injection Location      Head / Face:  L superior trapezius, R superior trapezius, L superior cervical paraspinal, R superior cervical paraspinal, L , R , procerus, L temporalis, R temporalis, R frontalis, L frontalis, R medial occipitalis and L medial occipitalis    L  injection amount:  5 unit(s)    R  injection amount:  5 unit(s)    L lateral frontalis:  5 unit(s)    R lateral frontalis:  5 unit(s)    L medial frontalis:  5 unit(s)    R medial frontalis:  5 unit(s)    L temporalis injection amount:  20 unit(s)    R temporalis injection amount:  20 unit(s)    Procerus injection amount:  5 unit(s)    L medial occipitalis injection amount:  15 unit(s)    R medial occipitalis injection amount:  15 unit(s)    L superior cervical paraspinal injection amount:  10 unit(s)    R superior cervical paraspinal injection amount:  10 unit(s)    L superior trapezius injection amount:  15 unit(s)    R superior trapezius injection amount:  15 unit(s)   Total Units      Total units used:  200    Total units discarded: 0   Post-procedure details      Chemodenervation:  Chronic migraine    Facial Nerve Location[de-identified]  Bilateral facial nerve    Patient tolerance of procedure: Tolerated well, no immediate complications   Comments       Extra units medically necessary: 45 R frontotemporal region and R apex/scalp. Blood pressure 144/82, temperature 98.6 °F (37 °C), not currently breastfeeding. Patient requesting prior authorization for Toradol injection, since she recently changed her insurance. Message sent to clinical team to assist if possible.

## 2023-11-02 ENCOUNTER — TELEPHONE (OUTPATIENT)
Dept: NEUROLOGY | Facility: CLINIC | Age: 43
End: 2023-11-02

## 2023-11-02 DIAGNOSIS — F98.8 ATTENTION DEFICIT DISORDER, UNSPECIFIED HYPERACTIVITY PRESENCE: ICD-10-CM

## 2023-11-02 NOTE — TELEPHONE ENCOUNTER
----- Message from Mir Lopez PA-C sent at 10/27/2023  1:26 PM EDT -----  Is it possible to perform PA for toradol inj? She states she change insurance. Thanks.

## 2023-11-02 NOTE — TELEPHONE ENCOUNTER
Will double check if pharmacy is processing 4 ml per 30 days    Called [facility] pharmacy, spoke to Mandy Connor and states that ketorolac 30 mg/ml inj, 4 ml per 30 days req PA through [facility]. PA initiated on CMM.  (Key: [id number])     Awaiting determination

## 2023-11-03 RX ORDER — METHYLPHENIDATE HYDROCHLORIDE 20 MG/1
20 CAPSULE, EXTENDED RELEASE ORAL EVERY MORNING
Qty: 30 CAPSULE | Refills: 0 | Status: SHIPPED | OUTPATIENT
Start: 2023-11-03

## 2023-11-06 ENCOUNTER — NURSE TRIAGE (OUTPATIENT)
Age: 43
End: 2023-11-06

## 2023-11-06 NOTE — TELEPHONE ENCOUNTER
Last ov 1/18/23, Procedure combo 4/5/23, Labs 10/16/23, scheduled for visit 11/8/23    FYI:  Patient has started with n/v past three weeks. She saw primary care and was ordered Carafate and to increase omeprazole to 20 mg twice a day. Patient feels issues started with hearburn. She did vomit today. I advised she increase the omeprazole to 40 mg twice a day until seen. Patient states she does have Zofran on hand for n/v. Patient instructed clears/bland diet and to report to ER or urgent care if high level of pain or vomiting increases. Reason for Disposition   Patient wants to be seen    Answer Assessment - Initial Assessment Questions  1. NAUSEA SEVERITY: "How bad is the nausea?" (e.g., mild, moderate, severe; dehydration, weight loss)    - MILD: loss of appetite without change in eating habits    - MODERATE: decreased oral intake without significant weight loss, dehydration, or malnutrition    - SEVERE: inadequate caloric or fluid intake, significant weight loss, symptoms of dehydration      moderate  2. ONSET: "When did the nausea begin?"      3 weeks   3. VOMITING: "Any vomiting?" If Yes, ask: "How many times today?"      Yes vomited today  4. RECURRENT SYMPTOM: "Have you had nausea before?" If Yes, ask: "When was the last time?" "What happened that time?"      Previous visit   5. CAUSE: "What do you think is causing the nausea?"      Unknown,   6.  PREGNANCY: "Is there any chance you are pregnant?" (e.g., unprotected intercourse, missed birth control pill, broken condom)      No LMP ended 5 days ago    Protocols used: Nausea-ADULT-OH

## 2023-11-06 NOTE — TELEPHONE ENCOUNTER
I agree with recommendations per RN, patient should keep follow-up with GI visit as scheduled in 2 days

## 2023-11-06 NOTE — TELEPHONE ENCOUNTER
----- Message from Jeremy River sent at 11/6/2023  9:02 AM EST -----  Pt has nausea and vomiting, taking carafate but still has symptoms

## 2023-11-08 ENCOUNTER — OFFICE VISIT (OUTPATIENT)
Dept: GASTROENTEROLOGY | Facility: CLINIC | Age: 43
End: 2023-11-08
Payer: COMMERCIAL

## 2023-11-08 VITALS
HEIGHT: 72 IN | WEIGHT: 258 LBS | SYSTOLIC BLOOD PRESSURE: 117 MMHG | DIASTOLIC BLOOD PRESSURE: 95 MMHG | BODY MASS INDEX: 34.95 KG/M2 | HEART RATE: 88 BPM

## 2023-11-08 DIAGNOSIS — R11.2 NAUSEA AND VOMITING, UNSPECIFIED VOMITING TYPE: Primary | ICD-10-CM

## 2023-11-08 DIAGNOSIS — R12 HEARTBURN: ICD-10-CM

## 2023-11-08 DIAGNOSIS — R68.81 EARLY SATIETY: ICD-10-CM

## 2023-11-08 DIAGNOSIS — K59.00 CONSTIPATION, UNSPECIFIED CONSTIPATION TYPE: ICD-10-CM

## 2023-11-08 PROCEDURE — 99214 OFFICE O/P EST MOD 30 MIN: CPT | Performed by: INTERNAL MEDICINE

## 2023-11-08 RX ORDER — OMEPRAZOLE 40 MG/1
40 CAPSULE, DELAYED RELEASE ORAL 2 TIMES DAILY
Qty: 60 CAPSULE | Refills: 3 | Status: SHIPPED | OUTPATIENT
Start: 2023-11-08

## 2023-11-08 RX ORDER — CLINDAMYCIN HYDROCHLORIDE 300 MG/1
CAPSULE ORAL
COMMUNITY
Start: 2023-10-13

## 2023-11-08 RX ORDER — OMEPRAZOLE 20 MG/1
40 CAPSULE, DELAYED RELEASE ORAL 2 TIMES DAILY
Qty: 60 CAPSULE | Refills: 3 | Status: SHIPPED | OUTPATIENT
Start: 2023-11-08 | End: 2023-11-08

## 2023-11-08 NOTE — PROGRESS NOTES
[facility] - Outpatient Follow-up Note  Cinthya Whalen 44 y.o. female MRN: [id number]  Encounter: [id number]          ASSESSMENT AND PLAN:          1. Nausea and vomiting, unspecified vomiting type  2. Heartburn  3. Early satiety  Certainly seems to have worsening reflux symptoms though unclear if this is the primary contributor to her symptoms or is secondary to something else, such as delayed gastric emptying. Differential diagnosis would also include biliary dyskinesia, etc.  We will evaluate further as below. Will increase omeprazole further to 40 mg twice daily temporarily to see if this affects her nausea and vomiting. Contingency might include CT of the abdomen and pelvis and/or further evaluation of reflux with pH testing    - omeprazole (PriLOSEC) 40 mg delayed release capsule; Take 1 capsule  by mouth 2 (two) times a day  Dispense: 60 capsule; Refill: 3  - NM gastric emptying; Future  - US right upper quadrant; Future  - NM hepatobiliary w rx; Future    4. Constipation  Symptoms improved with MiraLAX    ______________________________________________________________________    SUBJECTIVE: [place] returns in follow-up of nausea and vomiting. She had been doing reasonably well but symptoms got significantly worse 1 to 2 months ago. She describes fairly constant nausea and heartburn with vomiting primarily in the mornings after waking described as mucus but not significant food eaten previously. Describes some degree of early satiety. Was started on sucralfate and her dose of omeprazole was increased from 20 daily to 20 twice daily with improvement of heartburn but no significant effect on nausea and vomiting. Symptoms are worse when she consumes caffeine. She does have ondansetron for nausea associated with her migraines which does help. Has not had significant migraine symptoms recently and has not been using NSAIDs recently.   EGD performed by Dr. Bebo Valencia was unremarkable, as was colonoscopy. REVIEW OF SYSTEMS:    Review of Systems   Constitutional: Negative for fever and weight loss. Musculoskeletal:  Negative for myalgias. Gastrointestinal:  Positive for abdominal pain, anorexia, flatus, nausea and vomiting. Negative for constipation, diarrhea, hematochezia and melena. Genitourinary:  Negative for dysuria, frequency and hematuria. Neurological:  Positive for headaches.     Answers submitted by the patient for this visit:  Abdominal Pain Questionnaire (Submitted on 11/7/2023)  Chief Complaint: Abdominal pain  Chronicity: recurrent  Onset: 1 to 4 weeks ago  Onset quality: undetermined  Frequency: rarely  Episode duration: 9 Months  Progression since onset: unchanged  Pain location: generalized abdominal region  Pain - numeric: 0/10  Pain quality: burning  Radiates to: does not radiate, chest  arthralgias: No  belching: Yes  Aggravated by: certain positions, drinking alcohol, movement  Relieved by: certain positions, passing flatus, sitting up, vomiting  Diagnostic workup: lower endoscopy        Historical Information   Past Medical History:   Diagnosis Date    Allergic     Anxiety     Arthritis     Asthma     Chronic right shoulder pain 03/16/2022    COVID-19 02/08/2021    Depression     ETOH abuse     Headache(784.0)     Herniated cervical disc     Memory loss     Meniere's disease     Migraine     Post-COVID syndrome 08/05/2022    Psychiatric disorder     anxiety/depression    Scoliosis     Sinusitis     Stroke ([address])     Thyroid disease     Vertigo     Vertigo      Past Surgical History:   Procedure Laterality Date    BACK SURGERY      C5-6    HIP SURGERY      JOINT REPLACEMENT      right hip    OTHER SURGICAL HISTORY      Hip Replacement (right) , Spinal  Diskectomy Cervical S0-B1    [place] APPLICATION SHORT ARM SPLINT FOREARM-HAND STATIC Right 5/25/2023    Procedure: Application of short arm thumb spica splint;  Surgeon: Canary Goodpasture, MD;  Location:  MAIN OR; Service: Orthopedics    AR INCISION EXTENSOR TENDON SHEATH WRIST Right 5/25/2023    Procedure: right de Quervain's release;  Surgeon: uJan Diego Georges MD;  Location: [facility];  Service: Orthopedics    AR SYNOVECTOMY EXTENSOR TENDON [address] WRIST 1 CMPRT Right 5/25/2023    Procedure: right second extensor compartment tenosynovectomy;  Surgeon: Juan Diego Georges MD;  Location: [facility] OR;  Service: Orthopedics    AR TDN TRNSPLJ/TR FLXR/XTNSR F/ARM&/WRST 1/TDN GR Right 5/25/2023    Procedure: right EIP to EPL tendon transfer;  Surgeon: Juan Diego Georges MD;  Location: [facility] OR;  Service: Orthopedics     Social History   Social History     Substance and Sexual Activity   Alcohol Use Yes    Alcohol/week: 3.0 standard drinks of alcohol    Types: 3 Glasses of wine per week    Comment: 3 glass wine per week     Social History     Substance and Sexual Activity   Drug Use Yes    Frequency: 1.0 times per week    Types: Marijuana     Social History     Tobacco Use   Smoking Status Never    Passive exposure: Yes   Smokeless Tobacco Never     Family History   Problem Relation Age of Onset    Hypertension Mother     Thyroid disease Mother     Anxiety disorder Mother     Hypertension Father     Colon cancer Maternal Grandmother     Breast cancer Paternal Grandmother     Dementia Paternal Grandmother     Heart disease Paternal Grandfather     No Known Problems Maternal Aunt     No Known Problems Paternal Aunt     Drug abuse Sister         Pasted away 2022       Meds/Allergies       Current Outpatient Medications:     albuterol (PROVENTIL HFA,VENTOLIN HFA) 90 mcg/act inhaler    benzonatate (TESSALON PERLES) 100 mg capsule    Botox 200 units SOLR    clindamycin (CLEOCIN) 300 MG capsule    clonazePAM (KlonoPIN) 0.5 mg tablet    dexamethasone (DECADRON) 2 mg tablet    Erenumab-aooe 140 MG/ML SOAJ    Fluticasone-Salmeterol (Advair) 250-50 mcg/dose inhaler    gabapentin (NEURONTIN) 600 MG tablet    ibuprofen (MOTRIN) 600 mg tablet ketorolac (TORADOL) 30 mg/mL injection    lisinopril (ZESTRIL) 10 mg tablet    meclizine (ANTIVERT) 12.5 MG tablet    meloxicam (MOBIC) 7.5 mg tablet    methylphenidate (RITALIN LA) 20 MG 24 hr capsule    montelukast (SINGULAIR) 10 mg tablet    norethindrone (MICRONOR) 0.35 MG tablet    omeprazole (PriLOSEC) 20 mg delayed release capsule    onabotulinumtoxin A (BOTOX) 100 units    ondansetron (ZOFRAN-ODT) 4 mg disintegrating tablet    prochlorperazine (COMPAZINE) 10 mg/2mL    promethazine (PHENERGAN) 12.5 mg/10 mL syrup    rimegepant sulfate (NURTEC) 75 mg TBDP    sertraline (ZOLOFT) 100 mg tablet    sucralfate (CARAFATE) 1 g tablet    Syringe/Needle, Disp, (SYRINGE 3CC/12VH4-7/2") 25G X 1-1/2" 3 ML MISC    tiZANidine (ZANAFLEX) 4 mg tablet    traZODone (DESYREL) 100 mg tablet    Vraylar 1.5 MG capsule    ibuprofen (MOTRIN) 800 mg tablet    naloxone (NARCAN) 4 mg/0.1 mL nasal spray    SUMAtriptan (IMITREX) 100 mg tablet    traMADol (Ultram) 50 mg tablet    Allergies   Allergen Reactions    Latuda [Lurasidone]      Reaction: Drug Psychosis    Topamax [Topiramate]      psychosis    Amoxicillin Rash           Objective     Blood pressure 117/95, pulse 88, height 6' 1" (1.854 m), weight 117 kg (258 lb), not currently breastfeeding. Body mass index is 34.04 kg/m². PHYSICAL EXAM:      Physical Exam  Vitals and nursing note reviewed. Constitutional:       General: She is not in acute distress. Appearance: She is not ill-appearing. HENT:      Head: Normocephalic and atraumatic. Eyes:      General: No scleral icterus. Extraocular Movements: Extraocular movements intact. Cardiovascular:      Rate and Rhythm: Normal rate and regular rhythm. Pulmonary:      Effort: Pulmonary effort is normal. No respiratory distress. Abdominal:      General: There is no distension. Palpations: Abdomen is soft. Tenderness: There is no guarding or rebound.       Comments: Slightly tender in the left lower quadrant   Musculoskeletal:      Right lower leg: No edema. Left lower leg: No edema. Skin:     General: Skin is warm and dry. Coloration: Skin is not cyanotic. Findings: No erythema. Neurological:      General: No focal deficit present. Mental Status: She is alert and oriented to person, place, and time. Psychiatric:         Mood and Affect: Mood normal.         Behavior: Behavior normal.          Lab Results:   No visits with results within 1 Day(s) from this visit.    Latest known visit with results is:   Appointment on 10/16/2023   Component Date Value    Sodium 10/16/2023 133 (L)     Potassium 10/16/2023 4.2     Chloride 10/16/2023 103     CO2 10/16/2023 23     ANION GAP 10/16/2023 7     BUN 10/16/2023 14     Creatinine 10/16/2023 0.86     Glucose, Fasting 10/16/2023 102 (H)     Calcium 10/16/2023 9.4     AST 10/16/2023 15     ALT 10/16/2023 27     Alkaline Phosphatase 10/16/2023 43     Total Protein 10/16/2023 6.6     Albumin 10/16/2023 4.1     Total Bilirubin 10/16/2023 0.35     eGFR 10/16/2023 83     WBC 10/16/2023 5.92     RBC 10/16/2023 4.71     Hemoglobin 10/16/2023 14.5     Hematocrit 10/16/2023 43.1     MCV 10/16/2023 92     MCH 10/16/2023 30.8     MCHC 10/16/2023 33.6     RDW 10/16/2023 12.2     MPV 10/16/2023 10.0     Platelets 29/51/8781 264     nRBC 10/16/2023 0     Neutrophils Relative 10/16/2023 60     Immat GRANS % 10/16/2023 1     Lymphocytes Relative 10/16/2023 28     Monocytes Relative 10/16/2023 8     Eosinophils Relative 10/16/2023 2     Basophils Relative 10/16/2023 1     Neutrophils Absolute 10/16/2023 3.56     Immature Grans Absolute 10/16/2023 0.03     Lymphocytes Absolute 10/16/2023 1.68     Monocytes Absolute 10/16/2023 0.48     Eosinophils Absolute 10/16/2023 0.13     Basophils Absolute 10/16/2023 0.04     TSH 3RD GENERATON 10/16/2023 2.189     Cholesterol 10/16/2023 196     Triglycerides 10/16/2023 165 (H)     HDL, Direct 10/16/2023 50     LDL Calculated 10/16/2023 113 (H)          Radiology Results:   No results found.

## 2023-11-09 ENCOUNTER — VBI (OUTPATIENT)
Dept: ADMINISTRATIVE | Facility: OTHER | Age: 43
End: 2023-11-09

## 2023-11-11 DIAGNOSIS — M54.2 CERVICALGIA: ICD-10-CM

## 2023-11-11 DIAGNOSIS — G43.709 CHRONIC MIGRAINE WITHOUT AURA, NOT INTRACTABLE, WITHOUT STATUS MIGRAINOSUS: ICD-10-CM

## 2023-11-11 DIAGNOSIS — F41.1 GENERALIZED ANXIETY DISORDER: ICD-10-CM

## 2023-11-11 DIAGNOSIS — F32.A DEPRESSION, UNSPECIFIED DEPRESSION TYPE: Primary | ICD-10-CM

## 2023-11-13 RX ORDER — TIZANIDINE 4 MG/1
4 TABLET ORAL
Qty: 90 TABLET | Refills: 0 | Status: SHIPPED | OUTPATIENT
Start: 2023-11-13

## 2023-11-13 RX ORDER — CLONAZEPAM 0.5 MG/1
0.5 TABLET ORAL
Qty: 30 TABLET | Refills: 0 | Status: SHIPPED | OUTPATIENT
Start: 2023-11-13

## 2023-11-13 RX ORDER — ONDANSETRON 4 MG/1
TABLET, ORALLY DISINTEGRATING ORAL
Qty: 9 TABLET | Refills: 0 | Status: SHIPPED | OUTPATIENT
Start: 2023-11-13

## 2023-11-13 RX ORDER — TRAZODONE HYDROCHLORIDE 100 MG/1
50-100 TABLET ORAL
Qty: 90 TABLET | Refills: 0 | Status: SHIPPED | OUTPATIENT
Start: 2023-11-13

## 2023-11-16 DIAGNOSIS — J45.30 MILD PERSISTENT ASTHMA WITHOUT COMPLICATION: ICD-10-CM

## 2023-11-16 DIAGNOSIS — I10 PRIMARY HYPERTENSION: ICD-10-CM

## 2023-11-17 RX ORDER — MONTELUKAST SODIUM 10 MG/1
TABLET ORAL
Qty: 90 TABLET | Refills: 1 | Status: SHIPPED | OUTPATIENT
Start: 2023-11-17

## 2023-11-17 RX ORDER — LISINOPRIL 10 MG/1
TABLET ORAL
Qty: 90 TABLET | Refills: 1 | Status: SHIPPED | OUTPATIENT
Start: 2023-11-17

## 2023-11-24 DIAGNOSIS — G43.709 CHRONIC MIGRAINE W/O AURA W/O STATUS MIGRAINOSUS, NOT INTRACTABLE: ICD-10-CM

## 2023-11-24 DIAGNOSIS — R42 VERTIGO: ICD-10-CM

## 2023-11-24 RX ORDER — MECLIZINE HCL 12.5 MG/1
25 TABLET ORAL EVERY 8 HOURS PRN
Qty: 20 TABLET | Refills: 6 | Status: SHIPPED | OUTPATIENT
Start: 2023-11-24

## 2023-12-05 DIAGNOSIS — F41.1 GENERALIZED ANXIETY DISORDER: ICD-10-CM

## 2023-12-05 DIAGNOSIS — R11.2 NAUSEA AND VOMITING, UNSPECIFIED VOMITING TYPE: ICD-10-CM

## 2023-12-05 DIAGNOSIS — R12 HEARTBURN: ICD-10-CM

## 2023-12-05 DIAGNOSIS — F98.8 ATTENTION DEFICIT DISORDER, UNSPECIFIED HYPERACTIVITY PRESENCE: ICD-10-CM

## 2023-12-05 RX ORDER — OMEPRAZOLE 40 MG/1
40 CAPSULE, DELAYED RELEASE ORAL 2 TIMES DAILY
Qty: 180 CAPSULE | Refills: 1 | Status: SHIPPED | OUTPATIENT
Start: 2023-12-05

## 2023-12-07 RX ORDER — CLONAZEPAM 0.5 MG/1
0.5 TABLET ORAL
Qty: 30 TABLET | Refills: 0 | Status: SHIPPED | OUTPATIENT
Start: 2023-12-07

## 2023-12-07 RX ORDER — METHYLPHENIDATE HYDROCHLORIDE 20 MG/1
20 CAPSULE, EXTENDED RELEASE ORAL EVERY MORNING
Qty: 30 CAPSULE | Refills: 0 | Status: SHIPPED | OUTPATIENT
Start: 2023-12-07

## 2023-12-15 DIAGNOSIS — G43.709 CHRONIC MIGRAINE W/O AURA W/O STATUS MIGRAINOSUS, NOT INTRACTABLE: ICD-10-CM

## 2023-12-15 RX ORDER — KETOROLAC TROMETHAMINE 30 MG/ML
INJECTION, SOLUTION INTRAMUSCULAR; INTRAVENOUS
Qty: 4 ML | Refills: 0 | Status: SHIPPED | OUTPATIENT
Start: 2023-12-15

## 2023-12-26 DIAGNOSIS — F32.A DEPRESSION, UNSPECIFIED DEPRESSION TYPE: ICD-10-CM

## 2023-12-26 DIAGNOSIS — F41.1 GENERALIZED ANXIETY DISORDER: ICD-10-CM

## 2023-12-28 RX ORDER — CARIPRAZINE 1.5 MG/1
1.5 CAPSULE, GELATIN COATED ORAL DAILY
Qty: 90 CAPSULE | Refills: 0 | Status: SHIPPED | OUTPATIENT
Start: 2023-12-28

## 2024-01-01 NOTE — PROGRESS NOTES
Per Vandana Davidson- please use Botox 200 units our stock for patient's upcoming appointment 
I have personally seen and examined the patient. I have collaborated with and supervised the

## 2024-01-02 DIAGNOSIS — F41.1 GENERALIZED ANXIETY DISORDER: ICD-10-CM

## 2024-01-02 DIAGNOSIS — F98.8 ATTENTION DEFICIT DISORDER, UNSPECIFIED HYPERACTIVITY PRESENCE: ICD-10-CM

## 2024-01-09 RX ORDER — CLONAZEPAM 0.5 MG/1
0.5 TABLET ORAL
Qty: 30 TABLET | Refills: 0 | Status: SHIPPED | OUTPATIENT
Start: 2024-01-09

## 2024-01-09 RX ORDER — METHYLPHENIDATE HYDROCHLORIDE 20 MG/1
20 CAPSULE, EXTENDED RELEASE ORAL EVERY MORNING
Qty: 30 CAPSULE | Refills: 0 | Status: SHIPPED | OUTPATIENT
Start: 2024-01-09

## 2024-01-09 NOTE — TELEPHONE ENCOUNTER
Patient calling for the status of her refills, states she will be out of medication tomorrow. Patient aware scripts are pending, advised patient I would change them to urgent.     No change

## 2024-01-16 ENCOUNTER — TELEPHONE (OUTPATIENT)
Dept: NEUROLOGY | Facility: CLINIC | Age: 44
End: 2024-01-16

## 2024-01-16 NOTE — TELEPHONE ENCOUNTER
Called [facility] and scheduled delivery with Sonia for:     Botox-200 units  Qty:1  Delivery to [place]  Scheduled for 01/17/2024  Via: [employer]     Please advise if medication doesn't arrive.

## 2024-01-17 NOTE — TELEPHONE ENCOUNTER
Botox number of units: 200  Botox quantity: 1  Arrived at what location: [place]  Botox at [facility]: yes  NDC number: 8014236226  Lot number: [id number]  Expiration Date: 05/01/2026  Appt notes indicate correct medication: yes

## 2024-01-18 DIAGNOSIS — G43.709 CHRONIC MIGRAINE W/O AURA W/O STATUS MIGRAINOSUS, NOT INTRACTABLE: ICD-10-CM

## 2024-01-18 DIAGNOSIS — F41.1 GENERALIZED ANXIETY DISORDER: ICD-10-CM

## 2024-01-18 DIAGNOSIS — F32.A DEPRESSION, UNSPECIFIED DEPRESSION TYPE: ICD-10-CM

## 2024-01-18 RX ORDER — KETOROLAC TROMETHAMINE 30 MG/ML
INJECTION, SOLUTION INTRAMUSCULAR; INTRAVENOUS
Qty: 4 ML | Refills: 0 | Status: SHIPPED | OUTPATIENT
Start: 2024-01-18

## 2024-01-18 RX ORDER — SERTRALINE HYDROCHLORIDE 100 MG/1
200 TABLET, FILM COATED ORAL EVERY MORNING
Qty: 90 TABLET | Refills: 0 | Status: SHIPPED | OUTPATIENT
Start: 2024-01-18

## 2024-01-23 ENCOUNTER — HOSPITAL ENCOUNTER (OUTPATIENT)
Dept: NON INVASIVE DIAGNOSTICS | Facility: CLINIC | Age: 44
Discharge: HOME | End: 2024-01-23
Payer: COMMERCIAL

## 2024-01-23 DIAGNOSIS — R11.2 NAUSEA AND VOMITING, UNSPECIFIED VOMITING TYPE: ICD-10-CM

## 2024-01-23 PROCEDURE — 78264 GASTRIC EMPTYING IMG STUDY: CPT

## 2024-01-23 PROCEDURE — A9541 TC99M SULFUR COLLOID: HCPCS

## 2024-01-23 PROCEDURE — G1004 CDSM NDSC: HCPCS

## 2024-01-24 DIAGNOSIS — G43.709 CHRONIC MIGRAINE W/O AURA W/O STATUS MIGRAINOSUS, NOT INTRACTABLE: ICD-10-CM

## 2024-01-25 ENCOUNTER — OFFICE VISIT (OUTPATIENT)
Dept: GASTROENTEROLOGY | Facility: CLINIC | Age: 44
End: 2024-01-25
Payer: COMMERCIAL

## 2024-01-25 VITALS
DIASTOLIC BLOOD PRESSURE: 87 MMHG | HEIGHT: 72 IN | HEART RATE: 101 BPM | WEIGHT: 260 LBS | BODY MASS INDEX: 35.21 KG/M2 | SYSTOLIC BLOOD PRESSURE: 121 MMHG

## 2024-01-25 DIAGNOSIS — K31.84 GASTROPARESIS: Primary | ICD-10-CM

## 2024-01-25 DIAGNOSIS — K21.9 GASTROESOPHAGEAL REFLUX DISEASE WITHOUT ESOPHAGITIS: ICD-10-CM

## 2024-01-25 PROCEDURE — 99213 OFFICE O/P EST LOW 20 MIN: CPT | Performed by: INTERNAL MEDICINE

## 2024-01-25 RX ORDER — ERENUMAB-AOOE 140 MG/ML
INJECTION, SOLUTION SUBCUTANEOUS
Qty: 1 ML | Refills: 11 | Status: SHIPPED | OUTPATIENT
Start: 2024-01-25

## 2024-01-25 NOTE — PROGRESS NOTES
[facility] - Outpatient Follow-up Note  Nhung Grant 43 y.o. female MRN: [id number]  Encounter: [id number]          ASSESSMENT AND PLAN:      1. Gastroparesis  Discussed this diagnosis.  Discussed gastroparesis diet and other possible treatments if this is ineffective including medications such as metoclopramide or domperidone or more invasive follow-up measures such as pyloric Botox, pyloroplasty if necessary    2. Gastroesophageal reflux disease without esophagitis  Will continue pantoprazole 40 mg daily.  If she does well with gastroparesis diet may be able to wean down to 20 mg at her next visit in a few months    ______________________________________________________________________    SUBJECTIVE:    Continues to have heartburn or regurgitation, fullness, early satiety.  Gastric emptying scan was positive with a 66% emptying at 4 hours.      REVIEW OF SYSTEMS:    Review of Systems   Constitutional: Negative for fever and weight loss.   Musculoskeletal:  Negative for myalgias.   Gastrointestinal:  Positive for abdominal pain, constipation, flatus, nausea and vomiting. Negative for anorexia, diarrhea, hematochezia and melena.   Genitourinary:  Negative for dysuria, frequency and hematuria.   Neurological:  Positive for headaches.      Answers submitted by the patient for this visit:  Abdominal Pain Questionnaire (Submitted on 1/24/2024)  Chief Complaint: Abdominal pain  Chronicity: recurrent  Onset: more than 1 month ago  Onset quality: undetermined  Frequency: daily  Episode duration: 5 Hours  Progression since onset: unchanged  Pain location: epigastric region  Pain - numeric: 5/10  Pain quality: burning  Radiates to: epigastric region  arthralgias: No  belching: Yes  Aggravated by: being still, bowel movement, certain positions, coughing, drinking alcohol, eating  Relieved by: certain positions, passing flatus, sitting up, vomiting      Historical Information   Past Medical  History:   Diagnosis Date    Allergic     Anxiety     Arthritis     Asthma     Chronic right shoulder pain 03/16/2022    COVID-19 02/08/2021    Depression     ETOH abuse     Headache(784.0)     Herniated cervical disc     Memory loss     Meniere's disease     Migraine     Post-COVID syndrome 08/05/2022    Psychiatric disorder     anxiety/depression    Scoliosis     Sinusitis     Stroke (HCC)     Thyroid disease     Vertigo     Vertigo      Past Surgical History:   Procedure Laterality Date    BACK SURGERY      C5-6    HIP SURGERY      JOINT REPLACEMENT      right hip    OTHER SURGICAL HISTORY      Hip Replacement (right) , Spinal  Diskectomy Cervical C5-C6    [place] APPLICATION SHORT ARM SPLINT FOREARM-HAND STATIC Right 5/25/2023    Procedure: Application of short arm thumb spica splint;  Surgeon: Valeriy Escobedo MD;  Location: [facility];  Service: Orthopedics    WV INCISION EXTENSOR TENDON SHEATH WRIST Right 5/25/2023    Procedure: right de Quervain's release;  Surgeon: Valeriy Escobedo MD;  Location: [facility];  Service: Orthopedics    [place] SYNOVECTOMY EXTENSOR TENDON SHTH WRIST 1 CMPRT Right 5/25/2023    Procedure: right second extensor compartment tenosynovectomy;  Surgeon: Valeriy Escobedo MD;  Location: [facility];  Service: Orthopedics    WV TDN TRNSPLJ/TR FLXR/XTNSR F/ARM&/WRST 1/TDN GR Right 5/25/2023    Procedure: right EIP to EPL tendon transfer;  Surgeon: Valeriy Escobedo MD;  Location:  MAIN OR;  Service: Orthopedics     Social History   Social History     Substance and Sexual Activity   Alcohol Use Yes    Alcohol/week: 3.0 standard drinks of alcohol    Types: 3 Glasses of wine per week    Comment: 3 glass wine per week     Social History     Substance and Sexual Activity   Drug Use Yes    Frequency: 1.0 times per week    Types: Marijuana     Social History     Tobacco Use   Smoking Status Never    Passive exposure: Yes   Smokeless Tobacco Never     Family History   Problem Relation Age of Onset  "   Hypertension Mother     Thyroid disease Mother     Anxiety disorder Mother     Hypertension Father     Colon cancer Maternal Grandmother     Breast cancer Paternal Grandmother 60    Dementia Paternal Grandmother     Heart disease Paternal Grandfather     No Known Problems Maternal Aunt     No Known Problems Paternal Aunt     Drug abuse Sister         Pasted away 2022       Meds/Allergies       Current Outpatient Medications:     albuterol (PROVENTIL HFA,VENTOLIN HFA) 90 mcg/act inhaler    Botox 200 units SOLR    clindamycin (CLEOCIN) 300 MG capsule    clonazePAM (KlonoPIN) 0.5 mg tablet    dexamethasone (DECADRON) 2 mg tablet    Erenumab-aooe 140 MG/ML SOAJ    Fluticasone-Salmeterol (Advair) 250-50 mcg/dose inhaler    ketorolac (TORADOL) 30 mg/mL injection    lisinopril (ZESTRIL) 10 mg tablet    meclizine (ANTIVERT) 12.5 MG tablet    meloxicam (MOBIC) 7.5 mg tablet    methylphenidate (RITALIN LA) 20 MG 24 hr capsule    montelukast (SINGULAIR) 10 mg tablet    norethindrone (MICRONOR) 0.35 MG tablet    omeprazole (PriLOSEC) 40 MG capsule    onabotulinumtoxin A (BOTOX) 100 units    ondansetron (ZOFRAN-ODT) 4 mg disintegrating tablet    prochlorperazine (COMPAZINE) 10 mg/2mL    promethazine (PHENERGAN) 12.5 mg/10 mL syrup    rimegepant sulfate (NURTEC) 75 mg TBDP    sertraline (ZOLOFT) 100 mg tablet    Syringe/Needle, Disp, (SYRINGE 3CC/45XE7-0/2\") 25G X 1-1/2\" 3 ML MISC    tiZANidine (ZANAFLEX) 4 mg tablet    traZODone (DESYREL) 100 mg tablet    Vraylar 1.5 MG capsule    benzonatate (TESSALON PERLES) 100 mg capsule    gabapentin (NEURONTIN) 600 MG tablet    ibuprofen (MOTRIN) 600 mg tablet    ibuprofen (MOTRIN) 800 mg tablet    naloxone (NARCAN) 4 mg/0.1 mL nasal spray    sucralfate (CARAFATE) 1 g tablet    traMADol (Ultram) 50 mg tablet    Allergies   Allergen Reactions    Latuda [Lurasidone]      Reaction: Drug Psychosis    Topamax [Topiramate]      psychosis    Amoxicillin Rash           Objective     Blood " "pressure 121/87, pulse 101, height 6' 1\" (1.854 m), weight 118 kg (260 lb), not currently breastfeeding. Body mass index is 34.3 kg/m².      PHYSICAL EXAM:      Physical Exam  Vitals and nursing note reviewed.   Constitutional:       General: She is not in acute distress.     Appearance: She is not ill-appearing.   HENT:      Head: Normocephalic and atraumatic.   Eyes:      General: No scleral icterus.     Extraocular Movements: Extraocular movements intact.   Cardiovascular:      Rate and Rhythm: Normal rate and regular rhythm.   Pulmonary:      Effort: Pulmonary effort is normal. No respiratory distress.   Skin:     General: Skin is warm and dry.      Coloration: Skin is not cyanotic.      Findings: No erythema.   Neurological:      General: No focal deficit present.      Mental Status: She is alert and oriented to person, place, and time.   Psychiatric:         Mood and Affect: Mood normal.         Behavior: Behavior normal.          Lab Results:   No visits with results within 1 Day(s) from this visit.   Latest known visit with results is:   Appointment on 10/16/2023   Component Date Value    Sodium 10/16/2023 133 (L)     Potassium 10/16/2023 4.2     Chloride 10/16/2023 103     CO2 10/16/2023 23     ANION GAP 10/16/2023 7     BUN 10/16/2023 14     Creatinine 10/16/2023 0.86     Glucose, Fasting 10/16/2023 102 (H)     Calcium 10/16/2023 9.4     AST 10/16/2023 15     ALT 10/16/2023 27     Alkaline Phosphatase 10/16/2023 43     Total Protein 10/16/2023 6.6     Albumin 10/16/2023 4.1     Total Bilirubin 10/16/2023 0.35     eGFR 10/16/2023 83     WBC 10/16/2023 5.92     RBC 10/16/2023 4.71     Hemoglobin 10/16/2023 14.5     Hematocrit 10/16/2023 43.1     MCV 10/16/2023 92     MCH 10/16/2023 30.8     MCHC 10/16/2023 33.6     RDW 10/16/2023 12.2     MPV 10/16/2023 10.0     Platelets 10/16/2023 264     nRBC 10/16/2023 0     Neutrophils Relative 10/16/2023 60     Immat GRANS % 10/16/2023 1     Lymphocytes Relative " 10/16/2023 28     Monocytes Relative 10/16/2023 8     Eosinophils Relative 10/16/2023 2     Basophils Relative 10/16/2023 1     Neutrophils Absolute 10/16/2023 3.56     Immature Grans Absolute 10/16/2023 0.03     Lymphocytes Absolute 10/16/2023 1.68     Monocytes Absolute 10/16/2023 0.48     Eosinophils Absolute 10/16/2023 0.13     Basophils Absolute 10/16/2023 0.04     TSH 3RD GENERATON 10/16/2023 2.189     Cholesterol 10/16/2023 196     Triglycerides 10/16/2023 165 (H)     HDL, Direct 10/16/2023 50     LDL Calculated 10/16/2023 113 (H)          Radiology Results:   NM gastric emptying    Result Date: 1/24/2024  Narrative: GASTRIC EMPTYING STUDY INDICATION: History of nausea and vomiting as well as early satiety. Patient complains of heartburn. COMPARISON:  None available TECHNIQUE:   The study was performed following the oral administration of 1.1 mCi Tc-99m sulfur colloid combined with scrambled eggs, as part of a standard meal.  Following the meal, one minute anterior and posterior images were obtained immediately and at 0.25 hours, 0.5 hour, 1 hour, 1.5 hour, 2 hour, 3 hour and 4 hour intervals from the time of ingestion.  Geometric mean analyses were then performed. FINDINGS: Faint linear pattern of radiotracer activity oriented over the region of the esophagus at the 3-hour fidel suggestive of gastroesophageal reflux. Gastric emptying at 0.5 hours = 8% (N < 30%) Gastric emptying at 1 hour = 16% (N = 10 - 70%) Gastric emptying at 2 hours = 33% (N = > 40%) Gastric emptying at 3 hours = 49% (N = > 70%) Gastric emptying at 4 hours = 66% (N = >90%) Linear T-1/2 = 182.17 minutes     Impression: 1. Delayed rate of gastric emptying starting at the 2-hour fidel. 2. Finding suggestive of gastroesophageal reflux at 3 hours. Resident: MARIBEL ESPANA I, the attending radiologist, have reviewed the images and agree with the final report above. Workstation performed: [id number]

## 2024-01-26 ENCOUNTER — PROCEDURE VISIT (OUTPATIENT)
Dept: NEUROLOGY | Facility: CLINIC | Age: 44
End: 2024-01-26
Payer: COMMERCIAL

## 2024-01-26 VITALS — SYSTOLIC BLOOD PRESSURE: 137 MMHG | DIASTOLIC BLOOD PRESSURE: 63 MMHG | TEMPERATURE: 97.5 F

## 2024-01-26 DIAGNOSIS — G43.709 CHRONIC MIGRAINE W/O AURA W/O STATUS MIGRAINOSUS, NOT INTRACTABLE: Primary | ICD-10-CM

## 2024-01-26 PROCEDURE — 64615 CHEMODENERV MUSC MIGRAINE: CPT | Performed by: PHYSICIAN ASSISTANT

## 2024-01-26 NOTE — PROGRESS NOTES
Universal Protocol   Consent: Verbal consent obtained. Written consent obtained.  Risks and benefits: risks, benefits and alternatives were discussed  Consent given by: patient  Patient understanding: patient states understanding of the procedure being performed  Patient consent: the patient's understanding of the procedure matches consent given  Procedure consent: procedure consent matches procedure scheduled      Chemodenervation     Date/Time  1/26/2024 1:00 PM     Performed by  Ayesha Aguilar PA-C   Authorized by  Ayesha Aguilar PA-C     Pre-procedure details      Prepped With: Alcohol     Procedure details      Position:  Upright   Botox      Botox Type:  Type A    Brand:  Botox    mL's of Botulinum Toxin:  200    Final Concentration per CC:  100 units    Needle Gauge:  30 G 2.5 inch   Procedures      Botox Procedures: chronic headache      Indications: migraines     Injection Location      Head / Face:  L superior trapezius, R superior trapezius, L superior cervical paraspinal, R superior cervical paraspinal, L , R , procerus, L temporalis, R temporalis, R frontalis, L frontalis, R medial occipitalis and L medial occipitalis    L  injection amount:  5 unit(s)    R  injection amount:  5 unit(s)    L lateral frontalis:  5 unit(s)    R lateral frontalis:  5 unit(s)    L medial frontalis:  5 unit(s)    R medial frontalis:  5 unit(s)    L temporalis injection amount:  20 unit(s)    R temporalis injection amount:  20 unit(s)    Procerus injection amount:  5 unit(s)    L medial occipitalis injection amount:  15 unit(s)    R medial occipitalis injection amount:  15 unit(s)    L superior cervical paraspinal injection amount:  10 unit(s)    R superior cervical paraspinal injection amount:  10 unit(s)    L superior trapezius injection amount:  15 unit(s)    R superior trapezius injection amount:  15 unit(s)   Total Units      Total units used:  200    Total units discarded:  0    Post-procedure details      Chemodenervation:  Chronic migraine    Facial Nerve Location::  Bilateral facial nerve    Patient tolerance of procedure:  Tolerated well, no immediate complications   Comments       Extra units medically necessary: 45 R frontotemporal region and R apex/scalp.       Blood pressure 137/63, temperature 97.5 °F (36.4 °C), temperature source Temporal, not currently breastfeeding.    Pt doing well in winter time, worst season is summer.   Taking aimovig and nurtec with reduction of migraines along with the botox.

## 2024-02-08 ENCOUNTER — OFFICE VISIT (OUTPATIENT)
Dept: INTERNAL MEDICINE CLINIC | Facility: CLINIC | Age: 44
End: 2024-02-08
Payer: COMMERCIAL

## 2024-02-08 VITALS
HEART RATE: 102 BPM | BODY MASS INDEX: 35.35 KG/M2 | TEMPERATURE: 96.5 F | HEIGHT: 72 IN | SYSTOLIC BLOOD PRESSURE: 110 MMHG | DIASTOLIC BLOOD PRESSURE: 80 MMHG | WEIGHT: 261 LBS | OXYGEN SATURATION: 97 %

## 2024-02-08 DIAGNOSIS — I10 PRIMARY HYPERTENSION: ICD-10-CM

## 2024-02-08 DIAGNOSIS — F32.A DEPRESSION, UNSPECIFIED DEPRESSION TYPE: ICD-10-CM

## 2024-02-08 DIAGNOSIS — F98.8 ATTENTION DEFICIT DISORDER, UNSPECIFIED HYPERACTIVITY PRESENCE: ICD-10-CM

## 2024-02-08 DIAGNOSIS — G43.709 CHRONIC MIGRAINE WITHOUT AURA, NOT INTRACTABLE, WITHOUT STATUS MIGRAINOSUS: ICD-10-CM

## 2024-02-08 DIAGNOSIS — K21.9 GASTROESOPHAGEAL REFLUX DISEASE WITHOUT ESOPHAGITIS: ICD-10-CM

## 2024-02-08 DIAGNOSIS — J45.30 MILD PERSISTENT ASTHMA WITHOUT COMPLICATION: Primary | ICD-10-CM

## 2024-02-08 DIAGNOSIS — K31.84 GASTROPARESIS: ICD-10-CM

## 2024-02-08 DIAGNOSIS — Z72.0 NICOTINE ABUSE: ICD-10-CM

## 2024-02-08 DIAGNOSIS — F41.1 GENERALIZED ANXIETY DISORDER: ICD-10-CM

## 2024-02-08 PROBLEM — G43.719 INTRACTABLE CHRONIC MIGRAINE WITHOUT AURA AND WITHOUT STATUS MIGRAINOSUS: Status: RESOLVED | Noted: 2022-12-08 | Resolved: 2024-02-08

## 2024-02-08 PROBLEM — G43.809 VESTIBULAR MIGRAINE: Status: RESOLVED | Noted: 2020-06-24 | Resolved: 2024-02-08

## 2024-02-08 PROCEDURE — 99214 OFFICE O/P EST MOD 30 MIN: CPT | Performed by: NURSE PRACTITIONER

## 2024-02-08 RX ORDER — METHYLPHENIDATE HYDROCHLORIDE 20 MG/1
20 CAPSULE, EXTENDED RELEASE ORAL EVERY MORNING
Qty: 30 CAPSULE | Refills: 0 | Status: CANCELLED | OUTPATIENT
Start: 2024-02-08

## 2024-02-08 RX ORDER — CLONAZEPAM 0.5 MG/1
0.5 TABLET ORAL
Qty: 30 TABLET | Refills: 0 | Status: SHIPPED | OUTPATIENT
Start: 2024-02-08

## 2024-02-08 RX ORDER — TRAZODONE HYDROCHLORIDE 100 MG/1
50-100 TABLET ORAL
Qty: 90 TABLET | Refills: 1 | Status: SHIPPED | OUTPATIENT
Start: 2024-02-08

## 2024-02-08 RX ORDER — DEXTROAMPHETAMINE SACCHARATE, AMPHETAMINE ASPARTATE, DEXTROAMPHETAMINE SULFATE AND AMPHETAMINE SULFATE 5; 5; 5; 5 MG/1; MG/1; MG/1; MG/1
20 TABLET ORAL
Qty: 60 TABLET | Refills: 0 | Status: SHIPPED | OUTPATIENT
Start: 2024-02-08 | End: 2024-02-09

## 2024-02-08 NOTE — ASSESSMENT & PLAN NOTE
Was switched from adderall due ritalin due to the shortage. She does not feel it works as well. Will go back to adderall 20 mg twice daily.   She is on the wait list for psychiatry.

## 2024-02-08 NOTE — ASSESSMENT & PLAN NOTE
On advair once daily, advised to increase to twice daily. Rinse mouth after use.   No recent albuterol use.

## 2024-02-08 NOTE — PROGRESS NOTES
Name: Nhung Grant      : 1980      MRN: [id number]  Encounter Provider: CROW Felix  Encounter Date: 2024   Encounter department: [facility] INTERNAL MEDICINE    Assessment & Plan     1. Mild persistent asthma without complication  Assessment & Plan:  On advair once daily, advised to increase to twice daily. Rinse mouth after use.   No recent albuterol use.       2. Chronic migraine without aura, not intractable, without status migrainosus  Assessment & Plan:  Receives botox injections.   Aimovig every month.   Sees neurology.         3. Primary hypertension  Assessment & Plan:  Well controlled  On lisinopril       4. Attention deficit disorder, unspecified hyperactivity presence  Assessment & Plan:  Was switched from adderall due ritalin due to the shortage. She does not feel it works as well. Will go back to adderall 20 mg twice daily.   She is on the wait list for psychiatry.     Orders:  -     amphetamine-dextroamphetamine (ADDERALL, 20MG,) 20 mg tablet; Take 1 tablet (20 mg total) by mouth 2 (two) times a day Max Daily Amount: 40 mg    5. Depression, unspecified depression type  Assessment & Plan:  Stable  On sertraline and vraylar.         6. Nicotine abuse  Assessment & Plan:  Quit smoking 1 month ago!      7. Generalized anxiety disorder  Assessment & Plan:  See above.  Takes clonazepam one daily at bedtime.     Orders:  -     clonazePAM (KlonoPIN) 0.5 mg tablet; Take 1 tablet (0.5 mg total) by mouth daily at bedtime    8. Gastroparesis  Assessment & Plan:  Eat small meals, low fiber.         9. Gastroesophageal reflux disease without esophagitis  Assessment & Plan:  On omeprazole daily.   Sees GI             Subjective      Nhung is here today for follow up  She is doing ok.  She was recently diagnosed with gastroparesis. She was trying to eat more salads and vegetables to lose weight. She has now stopped eating salads and is following a low fiber diet.     She quit  smoking about 1 month ago.  Since then she's had an occasional dry cough and wheezing.     She was on adderall for years. With the shortage about a year ago she was switched to ritalin. She does not feel it works the same. She is requesting to back on adderall. She is on a few wait lists for psychiatry.             Review of Systems   Constitutional:  Negative for activity change, appetite change, fatigue and unexpected weight change.   Eyes:  Negative for visual disturbance.   Respiratory:  Positive for cough. Negative for shortness of breath.    Cardiovascular:  Negative for chest pain, palpitations and leg swelling.   Gastrointestinal:  Negative for abdominal pain, blood in stool, constipation and diarrhea.   Genitourinary:  Negative for difficulty urinating.   Musculoskeletal:  Negative for arthralgias.   Skin:  Negative for rash.   Neurological:  Negative for dizziness, light-headedness and headaches.   Psychiatric/Behavioral:  Negative for sleep disturbance.        Current Outpatient Medications on File Prior to Visit   Medication Sig    albuterol (PROVENTIL HFA,VENTOLIN HFA) 90 mcg/act inhaler Inhale 2 puffs 4 (four) times a day    benzonatate (TESSALON PERLES) 100 mg capsule Take 1 capsule (100 mg total) by mouth 3 (three) times a day as needed for cough (Patient not taking: Reported on 1/25/2024)    Botox 200 units SOLR     clindamycin (CLEOCIN) 300 MG capsule TAKE 2 CAPSULES (600 MG TOTAL) BY MOUTH 1 (ONE) TIME FOR 1 DOSE    dexamethasone (DECADRON) 2 mg tablet 1 tablet daily.    Erenumab-aooe (Aimovig) 140 MG/ML SOAJ Inject 140mg (1 Pen) under the skin every 30 days.    Fluticasone-Salmeterol (Advair) 250-50 mcg/dose inhaler Inhale 1 puff 2 (two) times a day Rinse mouth after use.    gabapentin (NEURONTIN) 600 MG tablet Take 1 tablet (600 mg total) by mouth 2 (two) times a day (Patient not taking: Reported on 1/25/2024)    ibuprofen (MOTRIN) 600 mg tablet Take 1 tablet (600 mg total) by mouth every 6  "(six) hours as needed for mild pain (Patient not taking: Reported on 1/25/2024)    ibuprofen (MOTRIN) 800 mg tablet Take 1 tablet (800 mg total) by mouth 3 (three) times a day (Patient not taking: Reported on 11/8/2023)    ketorolac (TORADOL) 30 mg/mL injection I-2 ML INTRAMUSCULAR INJECTION ONCE PER 24 HOURS AS NEEDED FOR MIGRAINE. NO MORE THAN 2 INJECTIONS PER WEEK.    lisinopril (ZESTRIL) 10 mg tablet TAKE 1 TABLET BY MOUTH EVERY DAY    meclizine (ANTIVERT) 12.5 MG tablet Take 2 tablets (25 mg total) by mouth every 8 (eight) hours as needed for dizziness    meloxicam (MOBIC) 7.5 mg tablet TAKE 1 TABLET BY MOUTH EVERY DAY    montelukast (SINGULAIR) 10 mg tablet TAKE 1 TABLET BY MOUTH EVERYDAY AT BEDTIME    naloxone (NARCAN) 4 mg/0.1 mL nasal spray Administer 1 spray into a nostril. If no response after 2-3 minutes, give another dose in the other nostril using a new spray. (Patient not taking: Reported on 11/8/2023)    norethindrone (MICRONOR) 0.35 MG tablet     omeprazole (PriLOSEC) 40 MG capsule TAKE 1 CAPSULE BY MOUTH TWICE A DAY    onabotulinumtoxin A (BOTOX) 100 units one time. \"for vertigo\"    ondansetron (ZOFRAN-ODT) 4 mg disintegrating tablet TAKE 1 TABLET BY MOUTH EVERY 8 HOURS AS NEEDED FOR NAUSEA    prochlorperazine (COMPAZINE) 10 mg/2mL Inject 2 mL IM EVERY 12 HOURS AS NEEDED FOR MIGRAINE/NAUSEA/VOMITING - MAX 4 INJECTIONS PER WEEK    promethazine (PHENERGAN) 12.5 mg/10 mL syrup TAKE 10 ML (12.5 MG TOTAL) BY MOUTH 4 (FOUR) TIMES A DAY AS NEEDED FOR NAUSEA OR VOMITING (MIGRAINE)    rimegepant sulfate (NURTEC) 75 mg TBDP 1 tab QOD. No more than one dose per 24 hours.    sertraline (ZOLOFT) 100 mg tablet Take 2 tablets (200 mg total) by mouth every morning    sucralfate (CARAFATE) 1 g tablet Take 1 tablet (1 g total) by mouth 4 (four) times a day for 14 days (Patient not taking: Reported on 1/25/2024)    Syringe/Needle, Disp, (SYRINGE 3CC/07AI9-2/2\") 25G X 1-1/2\" 3 ML MISC Use for toradol IM injections.    " "tiZANidine (ZANAFLEX) 4 mg tablet Take 1 tablet (4 mg total) by mouth daily at bedtime as needed for muscle spasms    traMADol (Ultram) 50 mg tablet Take 1 tablet (50 mg total) by mouth every 6 (six) hours as needed for moderate pain (Patient not taking: Reported on 11/8/2023)    Vraylar 1.5 MG capsule Take 1 capsule (1.5 mg total) by mouth daily    [DISCONTINUED] clonazePAM (KlonoPIN) 0.5 mg tablet Take 1 tablet (0.5 mg total) by mouth daily at bedtime    [DISCONTINUED] methylphenidate (RITALIN LA) 20 MG 24 hr capsule Take 1 capsule (20 mg total) by mouth every morning Max Daily Amount: 20 mg    [DISCONTINUED] traZODone (DESYREL) 100 mg tablet Take 0.5-1 tablets ( mg total) by mouth daily at bedtime as needed for sleep       Objective     /80   Pulse 102   Temp (!) 96.5 °F (35.8 °C)   Ht 6' 1\" (1.854 m)   Wt 118 kg (261 lb)   SpO2 97%   BMI 34.43 kg/m²     Physical Exam  Vitals reviewed.   Constitutional:       Appearance: Normal appearance. She is well-developed.   HENT:      Head: Normocephalic and atraumatic.   Eyes:      Conjunctiva/sclera: Conjunctivae normal.   Neck:      Thyroid: No thyromegaly.   Cardiovascular:      Rate and Rhythm: Normal rate and regular rhythm.      Heart sounds: Normal heart sounds.   Pulmonary:      Effort: Pulmonary effort is normal.      Breath sounds: Normal breath sounds.   Abdominal:      General: Bowel sounds are normal.      Palpations: Abdomen is soft.   Musculoskeletal:         General: Normal range of motion.      Right lower leg: No edema.      Left lower leg: No edema.   Skin:     General: Skin is warm and dry.   Neurological:      Mental Status: She is alert and oriented to person, place, and time.   Psychiatric:         Mood and Affect: Mood normal.         Behavior: Behavior normal.       CROW Felix    "

## 2024-02-09 ENCOUNTER — TELEPHONE (OUTPATIENT)
Dept: INTERNAL MEDICINE CLINIC | Facility: CLINIC | Age: 44
End: 2024-02-09

## 2024-02-09 DIAGNOSIS — F98.8 ATTENTION DEFICIT DISORDER, UNSPECIFIED HYPERACTIVITY PRESENCE: ICD-10-CM

## 2024-02-09 RX ORDER — METHYLPHENIDATE HYDROCHLORIDE 20 MG/1
20 CAPSULE, EXTENDED RELEASE ORAL EVERY MORNING
Qty: 30 CAPSULE | Refills: 0 | Status: SHIPPED | OUTPATIENT
Start: 2024-02-09

## 2024-02-09 NOTE — TELEPHONE ENCOUNTER
Spoke with patient, patient was returning call in regards to medication Ritalin. Patient stated it would be fine to call this in. Please advise.

## 2024-02-09 NOTE — TELEPHONE ENCOUNTER
Pharmacy states Amphetamine Salts 20mg tab is not covered for patient age. Alternative is requested.

## 2024-02-09 NOTE — TELEPHONE ENCOUNTER
Please call and let her know the pharmacy called and said her adderall 20 mg is not covered. Does she want me to reorder the ritalin?

## 2024-02-13 DIAGNOSIS — M54.2 CERVICALGIA: ICD-10-CM

## 2024-02-14 RX ORDER — TIZANIDINE 4 MG/1
4 TABLET ORAL
Qty: 90 TABLET | Refills: 0 | Status: SHIPPED | OUTPATIENT
Start: 2024-02-14

## 2024-02-27 ENCOUNTER — TELEPHONE (OUTPATIENT)
Dept: NEUROLOGY | Facility: CLINIC | Age: 44
End: 2024-02-27

## 2024-02-27 NOTE — TELEPHONE ENCOUNTER
02-, 8:38:57 am EST     Voicemail received from [facility] pharmacy noting they have been unable to reach patient regarding aimovig auto injector to schedule delivery. Requesting call back with assistance or atleast to confirm office of aware of noncompliance.    [facility]: [phone]

## 2024-02-29 NOTE — TELEPHONE ENCOUNTER
Called and advised pt of the below. She verbalized understanding. Pt states that she already called [facility] and schedule delivery.

## 2024-03-04 DIAGNOSIS — F41.1 GENERALIZED ANXIETY DISORDER: ICD-10-CM

## 2024-03-04 DIAGNOSIS — F98.8 ATTENTION DEFICIT DISORDER, UNSPECIFIED HYPERACTIVITY PRESENCE: ICD-10-CM

## 2024-03-07 NOTE — TELEPHONE ENCOUNTER
Patient called to request a refill for their clonazepam and methylphenidate  advised a refill was requested on 3/5/24 and is pending approval. Patient verbalized understanding and is in agreement.  Advised patient I would change the script request to high priority since she will be out of medication tomorrow.

## 2024-03-08 RX ORDER — CLONAZEPAM 0.5 MG/1
0.5 TABLET ORAL
Qty: 30 TABLET | Refills: 0 | Status: SHIPPED | OUTPATIENT
Start: 2024-03-08

## 2024-03-08 RX ORDER — METHYLPHENIDATE HYDROCHLORIDE 20 MG/1
20 CAPSULE, EXTENDED RELEASE ORAL EVERY MORNING
Qty: 30 CAPSULE | Refills: 0 | Status: SHIPPED | OUTPATIENT
Start: 2024-03-08

## 2024-03-09 DIAGNOSIS — G43.709 CHRONIC MIGRAINE WITHOUT AURA, NOT INTRACTABLE, WITHOUT STATUS MIGRAINOSUS: ICD-10-CM

## 2024-03-12 RX ORDER — ONDANSETRON 4 MG/1
TABLET, ORALLY DISINTEGRATING ORAL
Qty: 9 TABLET | Refills: 6 | Status: SHIPPED | OUTPATIENT
Start: 2024-03-12

## 2024-03-18 DIAGNOSIS — F41.1 GENERALIZED ANXIETY DISORDER: ICD-10-CM

## 2024-03-18 DIAGNOSIS — F32.A DEPRESSION, UNSPECIFIED DEPRESSION TYPE: ICD-10-CM

## 2024-03-19 RX ORDER — SERTRALINE HYDROCHLORIDE 100 MG/1
200 TABLET, FILM COATED ORAL EVERY MORNING
Qty: 90 TABLET | Refills: 1 | Status: SHIPPED | OUTPATIENT
Start: 2024-03-19

## 2024-04-02 DIAGNOSIS — F98.8 ATTENTION DEFICIT DISORDER, UNSPECIFIED HYPERACTIVITY PRESENCE: ICD-10-CM

## 2024-04-02 DIAGNOSIS — F41.1 GENERALIZED ANXIETY DISORDER: ICD-10-CM

## 2024-04-05 RX ORDER — METHYLPHENIDATE HYDROCHLORIDE 20 MG/1
20 CAPSULE, EXTENDED RELEASE ORAL EVERY MORNING
Qty: 30 CAPSULE | Refills: 0 | Status: SHIPPED | OUTPATIENT
Start: 2024-04-05

## 2024-04-05 RX ORDER — CLONAZEPAM 0.5 MG/1
0.5 TABLET ORAL
Qty: 30 TABLET | Refills: 0 | Status: SHIPPED | OUTPATIENT
Start: 2024-04-05

## 2024-04-09 DIAGNOSIS — F32.A DEPRESSION, UNSPECIFIED DEPRESSION TYPE: ICD-10-CM

## 2024-04-09 DIAGNOSIS — F41.1 GENERALIZED ANXIETY DISORDER: ICD-10-CM

## 2024-04-09 RX ORDER — CARIPRAZINE 1.5 MG/1
1.5 CAPSULE, GELATIN COATED ORAL DAILY
Qty: 90 CAPSULE | Refills: 0 | Status: SHIPPED | OUTPATIENT
Start: 2024-04-09

## 2024-04-12 ENCOUNTER — TELEPHONE (OUTPATIENT)
Dept: NEUROLOGY | Facility: CLINIC | Age: 44
End: 2024-04-12

## 2024-04-12 NOTE — TELEPHONE ENCOUNTER
Left message to see if she wanted to move her virtual appt for her reauth to today at 2pm with USMAN.

## 2024-04-24 ENCOUNTER — TELEPHONE (OUTPATIENT)
Dept: NEUROLOGY | Facility: CLINIC | Age: 44
End: 2024-04-24

## 2024-04-24 NOTE — TELEPHONE ENCOUNTER
Called [facility] and scheduled delivery with [place] for:     Botox-200 units  Qty:1  Delivery to [place]  Scheduled for 04/25/2024  Via: [employer]     Please advise if medication doesn't arrive.

## 2024-04-25 ENCOUNTER — TELEMEDICINE (OUTPATIENT)
Dept: NEUROLOGY | Facility: CLINIC | Age: 44
End: 2024-04-25
Payer: COMMERCIAL

## 2024-04-25 DIAGNOSIS — G43.809 VESTIBULAR MIGRAINE: ICD-10-CM

## 2024-04-25 DIAGNOSIS — G43.709 CHRONIC MIGRAINE WITHOUT AURA, NOT INTRACTABLE, WITHOUT STATUS MIGRAINOSUS: Primary | ICD-10-CM

## 2024-04-25 PROCEDURE — 99213 OFFICE O/P EST LOW 20 MIN: CPT | Performed by: PHYSICIAN ASSISTANT

## 2024-04-25 NOTE — PROGRESS NOTES
Virtual Regular Visit    Verification of patient location:    Patient is located at Home in the following state in which I hold an active license [place]      Assessment/Plan:    Problem List Items Addressed This Visit          Cardiovascular and Mediastinum    Chronic migraine without aura, not intractable, without status migrainosus - Primary    Vestibular migraine     Migraines are stable.  Continue botox for migraine prevention.  Since starting botox, the patient reports greater than 7 days of migraine relief from baseline, correlated with headache diary, decreased abortive medication use and decreased ER visits.    Hold Aimovig- nausea/ vomiting for about 1 week after taking this injection; she states at this time benefits do not outweigh risks and wants to hold this med for a while to see if no worsening migraines.    Meclizine +/- zofran at migraine onset, when experiencing vertigo.  Nurtec PRN if above fails.  Toradol injection reserved for very severe migraine (which usually always occurs around [place]).    The patient should not hesitate to call me prior to her follow up with any questions or concerns.           Reason for visit is   Chief Complaint   Patient presents with    Virtual Regular Visit          Encounter provider Ayesha Aguilar PA-C      Recent Visits  No visits were found meeting these conditions.  Showing recent visits within past 7 days and meeting all other requirements  Today's Visits  Date Type Provider Dept   04/25/24 Telemedicine Ayesha Aguilar PA-C [facility]   Showing today's visits and meeting all other requirements  Future Appointments  No visits were found meeting these conditions.  Showing future appointments within next 150 days and meeting all other requirements       The patient was identified by name and date of birth. Nhung Grant was informed that this is a telemedicine visit and that the visit is being conducted through the Epic Embedded platform. She agrees to  proceed..  My office door was closed. No one else was in the room.  She acknowledged consent and understanding of privacy and security of the video platform. The patient has agreed to participate and understands they can discontinue the visit at any time.    Patient is aware this is a billable service.     Subjective  Nhung Grant is a 44 y.o. female who is contacted via telemedicine for neurological follow-up.      HPI   hx Meniere's disease- saw ENT in past.  Recently diagnosed with gastroparesis.      Botox has been helping her migraines, it usually wears off by the third month and then has 2-3 vertigo attacks with headaches. Monthly injection also helps with her migraines.    She continues Botox injections every 3 months approximately for migraine prevention and she does very well with these.  She denies side effects.  It works well but wears off by the third month and then she has breakthrough migraines with vertigo.  She reports 2-3 vertigo attacks with her headaches approximately per month.  She continues Aimovig injection monthly which helps but she gets side effects of nausea and vomiting for an entire week following the injection.  She is thinking about stopping the Aimovig because the side effects outweigh the benefits.  Nurtec works well at the onset.  Prior to that she sometimes drinks Coke with caffeine, nondiet.  She also adds meclizine for vertigo, Zofran as needed nausea.  Nurtec is typically last resort.  She uses Toradol injection if she is very severe to the point where she cannot swallow anything due to nausea and vomiting, typically has a migraine like this around [place] time.    She is trying to avoid her triggers.  She states she gets enough sleep.  She started taking gabapentin 600 mg only at nighttime for sleep function and it seems to work well, denies side effects.    She stays away from certain foods that trigger her.  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  She has been working there for about a month.    She uses 25 mg of meclizine at the onset of vertigo, which she sometimes wakes up with, add Zofran as needed for nausea     Migraines:  Location: right temple, right back of neck and extends to the side of the head  Frequency: 2 to 3/month  Duration: 1-2 hours with medication  Triggers: weather changes, stress, dehydration, salty foods  Aura: stars in vision, occurs with more severe headaches, has occurred once in the past few months  Assocatied symptoms: Sometimes gets vertigo, nausea, rare vomiting, photophobia     Meds tried:  Preventative:  gabapentin 600 mg BID  tizanidine 4 mg qhs prn  aimovig-side effects as above  sertraline 100 mg qd   botox q3 months  Emgality-ineffective  Verapamil    Abortive:   imitrex 100 mg  excedrin  zofran, promethazine   prochlorperazine inj   meclizine  Nurtec  Decadron  Toradol injection  Depakote    Prior note:  She started Botox 12/10/2018 and since that time has had greatly improved migraine control. Prior she would get vertigo daily, nonstop, daily headaches. Since starting Botox pt has had a reduction in migraine headaches by 7 days as correlated by a headache diary.  Pt has had decreased trips to the ER and decreased use of abortive medications.      She states over the past few months she switched from was emgality to aimovig as she had an increase in her headaches-she has 1 injection so far. She was also to try nurtec as needed but the nurtec did not work as well as the triptan, just really \"takes the edge off\". She has not tried the higher dose of the imitrex yet. She has not needed to use the promethazine, zofran, compazine since December when she had a bad flare of migraines. She will utilize meclizine prn if she has vertigo with headache-has used twice this year so far. She has been getting tinnitus more often the past few months-can be with or without a headache.      She will " utilize decadron towards the end of her botox to break headaches if they are lasting more then 2-3 days.       Past Medical History:   Diagnosis Date    Allergic     Anxiety     Arthritis     Asthma     Chronic right shoulder pain 03/16/2022    COVID-19 02/08/2021    Depression     ETOH abuse     Headache(784.0)     Herniated cervical disc     Memory loss     Meniere's disease     Migraine     Post-COVID syndrome 08/05/2022    Psychiatric disorder     anxiety/depression    Scoliosis     Sinusitis     Stroke (HCC)     Thyroid disease     Vertigo     Vertigo        Past Surgical History:   Procedure Laterality Date    BACK SURGERY      C5-6    HIP SURGERY      JOINT REPLACEMENT      right hip    OTHER SURGICAL HISTORY      Hip Replacement (right) , Spinal  Diskectomy Cervical C5-C6    NE APPLICATION SHORT ARM SPLINT FOREARM-HAND STATIC Right 5/25/2023    Procedure: Application of short arm thumb spica splint;  Surgeon: Valeriy Escobedo MD;  Location: [facility];  Service: Orthopedics    NE INCISION EXTENSOR TENDON SHEATH WRIST Right 5/25/2023    Procedure: right de Quervain's release;  Surgeon: Valeriy Escobedo MD;  Location: [facility];  Service: Orthopedics    NE SYNOVECTOMY EXTENSOR TENDON SHTH WRIST 1 CMPRT Right 5/25/2023    Procedure: right second extensor compartment tenosynovectomy;  Surgeon: Valeriy Escobedo MD;  Location: [facility];  Service: Orthopedics    NE TDN TRNSPLJ/TR FLXR/XTNSR F/ARM&/WRST 1/TDN GR Right 5/25/2023    Procedure: right EIP to EPL tendon transfer;  Surgeon: Valeriy Escobedo MD;  Location: [facility];  Service: Orthopedics       Current Outpatient Medications   Medication Sig Dispense Refill    albuterol (PROVENTIL HFA,VENTOLIN HFA) 90 mcg/act inhaler Inhale 2 puffs 4 (four) times a day 8.5 g 0    Botox 200 units SOLR       clindamycin (CLEOCIN) 300 MG capsule TAKE 2 CAPSULES (600 MG TOTAL) BY MOUTH 1 (ONE) TIME FOR 1 DOSE      clonazePAM (KlonoPIN) 0.5 mg tablet Take 1 tablet  "(0.5 mg total) by mouth daily at bedtime 30 tablet 0    Fluticasone-Salmeterol (Advair) 250-50 mcg/dose inhaler Inhale 1 puff 2 (two) times a day Rinse mouth after use. 60 blister 5    gabapentin (NEURONTIN) 600 MG tablet Take 1 tablet (600 mg total) by mouth 2 (two) times a day 60 tablet 5    ketorolac (TORADOL) 30 mg/mL injection I-2 ML INTRAMUSCULAR INJECTION ONCE PER 24 HOURS AS NEEDED FOR MIGRAINE. NO MORE THAN 2 INJECTIONS PER WEEK. 4 mL 0    lisinopril (ZESTRIL) 10 mg tablet TAKE 1 TABLET BY MOUTH EVERY DAY 90 tablet 1    meclizine (ANTIVERT) 12.5 MG tablet Take 2 tablets (25 mg total) by mouth every 8 (eight) hours as needed for dizziness 20 tablet 6    meloxicam (MOBIC) 7.5 mg tablet TAKE 1 TABLET BY MOUTH EVERY DAY 30 tablet 0    methylphenidate (Ritalin LA) 20 MG 24 hr capsule Take 1 capsule (20 mg total) by mouth every morning Max Daily Amount: 20 mg 30 capsule 0    montelukast (SINGULAIR) 10 mg tablet TAKE 1 TABLET BY MOUTH EVERYDAY AT BEDTIME 90 tablet 1    norethindrone (MICRONOR) 0.35 MG tablet       omeprazole (PriLOSEC) 40 MG capsule TAKE 1 CAPSULE BY MOUTH TWICE A  capsule 1    ondansetron (ZOFRAN-ODT) 4 mg disintegrating tablet TAKE 1 TABLET BY MOUTH EVERY 8 HOURS AS NEEDED FOR NAUSEA 9 tablet 6    promethazine (PHENERGAN) 12.5 mg/10 mL syrup TAKE 10 ML (12.5 MG TOTAL) BY MOUTH 4 (FOUR) TIMES A DAY AS NEEDED FOR NAUSEA OR VOMITING (MIGRAINE) 118 mL 0    rimegepant sulfate (NURTEC) 75 mg TBDP 1 tab QOD. No more than one dose per 24 hours. 16 tablet 11    sertraline (ZOLOFT) 100 mg tablet Take 2 tablets (200 mg total) by mouth every morning 90 tablet 1    Syringe/Needle, Disp, (SYRINGE 3CC/39VV3-4/2\") 25G X 1-1/2\" 3 ML MISC Use for toradol IM injections. 12 each 2    tiZANidine (ZANAFLEX) 4 mg tablet TAKE 1 TABLET (4 MG TOTAL) BY MOUTH DAILY AT BEDTIME AS NEEDED FOR MUSCLE SPASMS 90 tablet 0    traZODone (DESYREL) 100 mg tablet TAKE 0.5-1 TABLETS ( MG TOTAL) BY MOUTH DAILY AT BEDTIME " "AS NEEDED FOR SLEEP 90 tablet 1    Vraylar 1.5 MG capsule Take 1 capsule (1.5 mg total) by mouth daily 90 capsule 0    benzonatate (TESSALON PERLES) 100 mg capsule Take 1 capsule (100 mg total) by mouth 3 (three) times a day as needed for cough (Patient not taking: Reported on 1/25/2024) 30 capsule 0    dexamethasone (DECADRON) 2 mg tablet 1 tablet daily. (Patient not taking: Reported on 4/25/2024) 5 tablet 0    ibuprofen (MOTRIN) 600 mg tablet Take 1 tablet (600 mg total) by mouth every 6 (six) hours as needed for mild pain (Patient not taking: Reported on 1/25/2024) 30 tablet 0    ibuprofen (MOTRIN) 800 mg tablet Take 1 tablet (800 mg total) by mouth 3 (three) times a day (Patient not taking: Reported on 11/8/2023) 21 tablet 0    naloxone (NARCAN) 4 mg/0.1 mL nasal spray Administer 1 spray into a nostril. If no response after 2-3 minutes, give another dose in the other nostril using a new spray. (Patient not taking: Reported on 4/25/2024) 1 each 1    onabotulinumtoxin A (BOTOX) 100 units one time. \"for vertigo\" (Patient not taking: Reported on 4/25/2024)      prochlorperazine (COMPAZINE) 10 mg/2mL Inject 2 mL IM EVERY 12 HOURS AS NEEDED FOR MIGRAINE/NAUSEA/VOMITING - MAX 4 INJECTIONS PER WEEK (Patient not taking: Reported on 4/25/2024) 10 mL 0    sucralfate (CARAFATE) 1 g tablet Take 1 tablet (1 g total) by mouth 4 (four) times a day for 14 days (Patient not taking: Reported on 1/25/2024) 56 tablet 0    traMADol (Ultram) 50 mg tablet Take 1 tablet (50 mg total) by mouth every 6 (six) hours as needed for moderate pain (Patient not taking: Reported on 11/8/2023) 12 tablet 0     No current facility-administered medications for this visit.        Allergies   Allergen Reactions    Latuda [Lurasidone]      Reaction: Drug Psychosis    Topamax [Topiramate]      psychosis    Amoxicillin Rash       Review of Systems   Constitutional:  Negative for appetite change, fatigue and fever.   HENT: Negative.  Negative for hearing " loss, tinnitus, trouble swallowing and voice change.    Eyes:  Positive for photophobia. Negative for pain and visual disturbance.   Respiratory: Negative.  Negative for shortness of breath.    Cardiovascular: Negative.  Negative for palpitations.   Gastrointestinal:  Positive for nausea and vomiting.   Endocrine: Negative.  Negative for cold intolerance.   Genitourinary: Negative.  Negative for dysuria, frequency and urgency.   Musculoskeletal:  Negative for back pain, gait problem, myalgias, neck pain and neck stiffness.   Skin: Negative.  Negative for rash.   Allergic/Immunologic: Negative.    Neurological:  Positive for dizziness and headaches. Negative for tremors, seizures, syncope, facial asymmetry, speech difficulty, weakness, light-headedness and numbness.   Hematological: Negative.  Does not bruise/bleed easily.   Psychiatric/Behavioral: Negative.  Negative for confusion, hallucinations and sleep disturbance.      ROS reviewed.      Video Exam    There were no vitals filed for this visit.    Physical Exam   Neurological exam:  On neurologic exam, the patient is alert and oriented to time and place. Speech is fluent and articulate, and the patient follows commands appropriately. Judgment and affect appear normal.  Extraocular muscles are intact without nystagmus. Face is symmetric. Hearing is intact bilaterally. Motor examination reveals adequate range of motion.      Visit Time  Total Visit Duration: 24 minutes

## 2024-04-25 NOTE — TELEPHONE ENCOUNTER
Botox number of units: 200  Botox quantity: 1  Arrived at what location: [facility]  Botox at [facility]: yes  NDC number: 2505-0260-16  Lot number: [id number]  Expiration Date: V8443O7  Appt notes indicate correct medication: yes

## 2024-04-26 ENCOUNTER — OFFICE VISIT (OUTPATIENT)
Dept: GASTROENTEROLOGY | Facility: CLINIC | Age: 44
End: 2024-04-26
Payer: COMMERCIAL

## 2024-04-26 ENCOUNTER — DOCUMENTATION (OUTPATIENT)
Dept: GASTROENTEROLOGY | Facility: CLINIC | Age: 44
End: 2024-04-26

## 2024-04-26 VITALS
SYSTOLIC BLOOD PRESSURE: 146 MMHG | DIASTOLIC BLOOD PRESSURE: 93 MMHG | HEART RATE: 99 BPM | HEIGHT: 72 IN | BODY MASS INDEX: 35.49 KG/M2 | WEIGHT: 262 LBS

## 2024-04-26 DIAGNOSIS — Z12.11 SCREENING FOR COLON CANCER: ICD-10-CM

## 2024-04-26 DIAGNOSIS — K31.84 GASTROPARESIS: Primary | ICD-10-CM

## 2024-04-26 DIAGNOSIS — K21.9 GASTROESOPHAGEAL REFLUX DISEASE WITHOUT ESOPHAGITIS: ICD-10-CM

## 2024-04-26 DIAGNOSIS — Z86.010 HX OF COLONIC POLYPS: ICD-10-CM

## 2024-04-26 DIAGNOSIS — Z80.0 FAMILY HISTORY OF COLON CANCER: ICD-10-CM

## 2024-04-26 PROCEDURE — 99214 OFFICE O/P EST MOD 30 MIN: CPT | Performed by: NURSE PRACTITIONER

## 2024-04-26 RX ORDER — ERENUMAB-AOOE 140 MG/ML
INJECTION, SOLUTION SUBCUTANEOUS
COMMUNITY
Start: 2024-04-25

## 2024-04-26 NOTE — PATIENT INSTRUCTIONS
Continue to follow gastroparesis diet 6 small meals a day low fiber low-fat  Gimoti I spray right nare 4 times daily 30 minutes before meals and at bedtime  If insurance approves medication use for 12 weeks hold for 1 week and then restart and do this in a continuous manner

## 2024-04-26 NOTE — PROGRESS NOTES
[facility] - Outpatient Follow-up Note  Nhung Grant 44 y.o. female MRN: [id number]  Encounter: [id number]          ASSESSMENT AND PLAN:      1. Gastroparesis  Gastric emptying study done 1/23/2024 showed delayed rate of gastric emptying starting at the 2-hour fidel.  Findings suggestive of gastroesophageal reflux at 3 hours.  Patient continues to report nausea and vomiting daily in the morning.  Patient is following gastroparesis diet with no improvement in symptoms.  She currently is taking Zofran as needed for nausea and vomiting  - Metoclopramide HCl 15 MG/ACT SOLN; 15 mg by Right Nare route 4 (four) times a day (before meals and at bedtime)  Dispense: 9.8 mL; Refill: 5  -Side effects of Gimoti explained to patient.  Patient explained how to administer medication.  Patient aware that if she has any involuntary movements-tardive dyskinesia she should stop medication immediately.  If patient is unable to tolerate medication or has any side effects she should notify office.  -If Gimoti is ineffective in controlling symptoms can consider pyloric Botox injections.     2. Gastroesophageal reflux disease without esophagitis  Patient has history of GERD.  Patient continues to have intermittent episodes of heartburn.  Patient denies acid reflux.  -Continue omeprazole  -Continue antireflux diet and measures    3. Screening for colon cancer  4. Family history of colon cancer  5.  History of colonic  polyps.    Positive family history colon cancer maternal grandmother.Colonoscopy done 4/5/2023 which showed 5 polyps removed.  Small internal hemorrhoids. Biopsy showed hyperplastic polyps x 4. Colon rectal polyp showed polypoid colonic mucosa with focal prominent lymphoid aggregate.  Negative for dysplasia or carcinoma  Recommendations were for repeat colonoscopy at age 46.  -Surveillance colonoscopy due 4/2026     Follow-up in 4- 6  months      ______________________________________________________________________    SUBJECTIVE: This is a pleasant 44-year-old female who presents to office for follow-up.  Patient reports she continues to experience nausea and vomiting in AM.  Patient is following gastroparesis diet low-fat low fiber 6 small meals a day.  She takes zofran as needed for nausea and vomiting.Previous treatments concerning gastroparesis were discussed with the patient by Dr. Martin on prior appointment.  Patient reports acid reflux is controlled but she does get intermittent episodes of heartburn. Patient is currently taking omeprazole 40 mg daily and occasionally will take it twice a day if the heartburn occurs.  Patient denies blood in stool, blood from rectal area or black tarry stool.  Patient reports bowel patterns are regular except for rare episodes of constipation.  Patient denies abdominal pain abdomen exam benign no abdominal tenderness or guarding.    Colonoscopy done 4/5/2023 which showed 5 polyps removed.  Small internal hemorrhoids. Biopsy showed hyperplastic polyps x 4. Colon rectal polyp showed polypoid colonic mucosa with focal prominent lymphoid aggregate.  Negative for dysplasia or carcinoma.  EGD done 4/5/2023 showed erythematous mucosa in antrum.  Irregular Z-line.  The remainder of detailed exam unremarkable.Biopsy antrum showed gastric antral type mucosa with reactive gastropathy like changes.  Negative for intestinal metaplasia, dysplasia or carcinoma.  No H. pylori.  Esophagus EG junction biopsy showed chronic inflammation.  Negative for intestinal metaplasia, dysplasia, or carcinoma.       REVIEW OF SYSTEMS IS OTHERWISE NEGATIVE.      Historical Information   Past Medical History:   Diagnosis Date    Allergic     Anxiety     Arthritis     Asthma     Chronic right shoulder pain 03/16/2022    COVID-19 02/08/2021    Depression     ETOH abuse     Headache(784.0)     Herniated cervical disc     Hypertension      Memory loss     Meniere's disease     Migraine     Post-COVID syndrome 2022    Psychiatric disorder     anxiety/depression    Scoliosis     Sinusitis     Stroke (HCC)     Thyroid disease     Vertigo     Vertigo      Past Surgical History:   Procedure Laterality Date    BACK SURGERY      C5-6    COLONOSCOPY      HIP SURGERY      JOINT REPLACEMENT      right hip    OTHER SURGICAL HISTORY      Hip Replacement (right) , Spinal  Diskectomy Cervical C5-C6    IL APPLICATION SHORT ARM SPLINT FOREARM-HAND STATIC Right 2023    Procedure: Application of short arm thumb spica splint;  Surgeon: Valeriy Escobedo MD;  Location: [facility];  Service: Orthopedics    IL INCISION EXTENSOR TENDON SHEATH WRIST Right 2023    Procedure: right de Quervain's release;  Surgeon: Valeriy Escobedo MD;  Location: [facility];  Service: Orthopedics    IL SYNOVECTOMY EXTENSOR TENDON SHTH WRIST 1 CMPRT Right 2023    Procedure: right second extensor compartment tenosynovectomy;  Surgeon: Valeriy Escobedo MD;  Location: [facility];  Service: Orthopedics    IL TDN TRNSPLJ/TR FLXR/XTNSR F/ARM&/WRST 1/TDN GR Right 2023    Procedure: right EIP to EPL tendon transfer;  Surgeon: Valeriy Escobedo MD;  Location: [facility];  Service: Orthopedics     Social History   Social History     Substance and Sexual Activity   Alcohol Use Not Currently    Alcohol/week: 3.0 standard drinks of alcohol    Types: 3 Glasses of wine per week    Comment: 3 glass wine per week     Social History     Substance and Sexual Activity   Drug Use Not Currently    Frequency: 1.0 times per week    Types: Marijuana     Social History     Tobacco Use   Smoking Status Former    Current packs/day: 0.00    Average packs/day: 0.3 packs/day for 25.0 years (6.3 ttl pk-yrs)    Types: Cigarettes    Start date: 1997    Quit date: 2022    Years since quittin.3    Passive exposure: Yes   Smokeless Tobacco Never     Family History   Problem Relation  "Age of Onset    Hypertension Mother     Thyroid disease Mother     Anxiety disorder Mother     Hypertension Father     Colon cancer Maternal Grandmother     Breast cancer Paternal Grandmother 60    Dementia Paternal Grandmother     Heart disease Paternal Grandfather     No Known Problems Maternal Aunt     No Known Problems Paternal Aunt     Drug abuse Sister         Pasted away 2022       Meds/Allergies       Current Outpatient Medications:     Aimovig 140 MG/ML SOAJ    albuterol (PROVENTIL HFA,VENTOLIN HFA) 90 mcg/act inhaler    Botox 200 units SOLR    clindamycin (CLEOCIN) 300 MG capsule    clonazePAM (KlonoPIN) 0.5 mg tablet    dexamethasone (DECADRON) 2 mg tablet    Fluticasone-Salmeterol (Advair) 250-50 mcg/dose inhaler    gabapentin (NEURONTIN) 600 MG tablet    ketorolac (TORADOL) 30 mg/mL injection    lisinopril (ZESTRIL) 10 mg tablet    meclizine (ANTIVERT) 12.5 MG tablet    meloxicam (MOBIC) 7.5 mg tablet    methylphenidate (Ritalin LA) 20 MG 24 hr capsule    Metoclopramide HCl 15 MG/ACT SOLN    montelukast (SINGULAIR) 10 mg tablet    norethindrone (MICRONOR) 0.35 MG tablet    omeprazole (PriLOSEC) 40 MG capsule    ondansetron (ZOFRAN-ODT) 4 mg disintegrating tablet    prochlorperazine (COMPAZINE) 10 mg/2mL    rimegepant sulfate (NURTEC) 75 mg TBDP    sertraline (ZOLOFT) 100 mg tablet    Syringe/Needle, Disp, (SYRINGE 3CC/37NU7-9/2\") 25G X 1-1/2\" 3 ML MISC    tiZANidine (ZANAFLEX) 4 mg tablet    traZODone (DESYREL) 100 mg tablet    Vraylar 1.5 MG capsule    onabotulinumtoxin A (BOTOX) 100 units    Allergies   Allergen Reactions    Latuda [Lurasidone]      Reaction: Drug Psychosis    Topamax [Topiramate]      psychosis    Amoxicillin Rash           Objective     Blood pressure 146/93, pulse 99, height 6' 1\" (1.854 m), weight 119 kg (262 lb), not currently breastfeeding. Body mass index is 34.57 kg/m².      PHYSICAL EXAM:      General Appearance:   Alert, cooperative, no distress   HEENT:   Normocephalic, " atraumatic, anicteric.     Neck:  Supple, symmetrical, trachea midline   Lungs:   Clear to auscultation bilaterally; no rales, rhonchi or wheezing; respirations unlabored    Heart::   Regular rate and rhythm; no murmur, rub, or gallop.   Abdomen:   Soft, non-tender, non-distended; normal bowel sounds; no masses, no organomegaly    Genitalia:   Deferred    Rectal:   Deferred    Extremities:  No cyanosis, clubbing or edema    Pulses:  2+ and symmetric    Skin:  No jaundice, rashes, or lesions    Lymph nodes:  No palpable cervical lymphadenopathy        Lab Results:   No visits with results within 1 Day(s) from this visit.   Latest known visit with results is:   Appointment on 10/16/2023   Component Date Value    Sodium 10/16/2023 133 (L)     Potassium 10/16/2023 4.2     Chloride 10/16/2023 103     CO2 10/16/2023 23     ANION GAP 10/16/2023 7     BUN 10/16/2023 14     Creatinine 10/16/2023 0.86     Glucose, Fasting 10/16/2023 102 (H)     Calcium 10/16/2023 9.4     AST 10/16/2023 15     ALT 10/16/2023 27     Alkaline Phosphatase 10/16/2023 43     Total Protein 10/16/2023 6.6     Albumin 10/16/2023 4.1     Total Bilirubin 10/16/2023 0.35     eGFR 10/16/2023 83     WBC 10/16/2023 5.92     RBC 10/16/2023 4.71     Hemoglobin 10/16/2023 14.5     Hematocrit 10/16/2023 43.1     MCV 10/16/2023 92     MCH 10/16/2023 30.8     MCHC 10/16/2023 33.6     RDW 10/16/2023 12.2     MPV 10/16/2023 10.0     Platelets 10/16/2023 264     nRBC 10/16/2023 0     Segmented % 10/16/2023 60     Immature Grans % 10/16/2023 1     Lymphocytes % 10/16/2023 28     Monocytes % 10/16/2023 8     Eosinophils Relative 10/16/2023 2     Basophils Relative 10/16/2023 1     Absolute Neutrophils 10/16/2023 3.56     Absolute Immature Grans 10/16/2023 0.03     Absolute Lymphocytes 10/16/2023 1.68     Absolute Monocytes 10/16/2023 0.48     Eosinophils Absolute 10/16/2023 0.13     Basophils Absolute 10/16/2023 0.04     TSH 3RD GENERATON 10/16/2023 2.189      Cholesterol 10/16/2023 196     Triglycerides 10/16/2023 165 (H)     HDL, Direct 10/16/2023 50     LDL Calculated 10/16/2023 113 (H)          Radiology Results:   No results found.  Answers submitted by the patient for this visit:  Abdominal Pain Questionnaire (Submitted on 4/25/2024)  Chief Complaint: Abdominal pain  Chronicity: chronic  Onset: more than 1 year ago  Onset quality: undetermined  Frequency: rarely  Progression since onset: resolved  anorexia: No  arthralgias: Yes  belching: Yes  constipation: No  diarrhea: No  dysuria: No  fever: No  flatus: No  frequency: No  headaches: Yes  hematochezia: No  hematuria: No  melena: No  myalgias: No  nausea: Yes  weight loss: No  vomiting: Yes

## 2024-04-27 DIAGNOSIS — F32.A DEPRESSION, UNSPECIFIED DEPRESSION TYPE: ICD-10-CM

## 2024-04-27 DIAGNOSIS — F41.1 GENERALIZED ANXIETY DISORDER: ICD-10-CM

## 2024-04-27 RX ORDER — SERTRALINE HYDROCHLORIDE 100 MG/1
200 TABLET, FILM COATED ORAL EVERY MORNING
Qty: 180 TABLET | Refills: 1 | Status: SHIPPED | OUTPATIENT
Start: 2024-04-27

## 2024-04-29 ENCOUNTER — PROCEDURE VISIT (OUTPATIENT)
Dept: NEUROLOGY | Facility: CLINIC | Age: 44
End: 2024-04-29
Payer: COMMERCIAL

## 2024-04-29 VITALS — HEART RATE: 85 BPM | SYSTOLIC BLOOD PRESSURE: 134 MMHG | DIASTOLIC BLOOD PRESSURE: 75 MMHG | TEMPERATURE: 98.3 F

## 2024-04-29 DIAGNOSIS — G43.709 CHRONIC MIGRAINE WITHOUT AURA, NOT INTRACTABLE, WITHOUT STATUS MIGRAINOSUS: Primary | ICD-10-CM

## 2024-04-29 PROCEDURE — 64615 CHEMODENERV MUSC MIGRAINE: CPT | Performed by: PHYSICIAN ASSISTANT

## 2024-04-29 NOTE — PROGRESS NOTES
Universal Protocol   Consent: Verbal consent obtained. Written consent obtained.  Risks and benefits: risks, benefits and alternatives were discussed  Consent given by: patient  Patient understanding: patient states understanding of the procedure being performed  Patient consent: the patient's understanding of the procedure matches consent given  Procedure consent: procedure consent matches procedure scheduled      Chemodenervation     Date/Time  4/29/2024 10:30 AM     Performed by  Ayesha Aguilar PA-C   Authorized by  Ayesha Aguilar PA-C     Pre-procedure details      Prepped With: Alcohol     Procedure details      Position:  Upright   Botox      Botox Type:  Type A    Brand:  Botox    mL's of Botulinum Toxin:  200    Final Concentration per CC:  100 units    Needle Gauge:  30 G 2.5 inch   Procedures      Botox Procedures: chronic headache      Indications: migraines     Injection Location      Head / Face:  L superior trapezius, R superior trapezius, L superior cervical paraspinal, R superior cervical paraspinal, L , R , procerus, L temporalis, R temporalis, R frontalis, L frontalis, R medial occipitalis and L medial occipitalis    L  injection amount:  5 unit(s)    R  injection amount:  5 unit(s)    L lateral frontalis:  5 unit(s)    R lateral frontalis:  5 unit(s)    L medial frontalis:  5 unit(s)    R medial frontalis:  5 unit(s)    L temporalis injection amount:  20 unit(s)    R temporalis injection amount:  20 unit(s)    Procerus injection amount:  5 unit(s)    L medial occipitalis injection amount:  15 unit(s)    R medial occipitalis injection amount:  15 unit(s)    L superior cervical paraspinal injection amount:  10 unit(s)    R superior cervical paraspinal injection amount:  10 unit(s)    L superior trapezius injection amount:  15 unit(s)    R superior trapezius injection amount:  15 unit(s)   Total Units      Total units used:  200    Total units discarded:  0    Post-procedure details      Chemodenervation:  Chronic migraine    Facial Nerve Location::  Bilateral facial nerve    Patient tolerance of procedure:  Tolerated well, no immediate complications   Comments       Extra units medically necessary: 25 R frontotemporal region and R apex/scalp, 10 left temporalis, 5 each masseter (10 total).       Blood pressure 134/75, pulse 85, temperature 98.3 °F (36.8 °C), temperature source Temporal, not currently breastfeeding.

## 2024-05-01 DIAGNOSIS — F98.8 ATTENTION DEFICIT DISORDER, UNSPECIFIED HYPERACTIVITY PRESENCE: ICD-10-CM

## 2024-05-01 DIAGNOSIS — F41.1 GENERALIZED ANXIETY DISORDER: ICD-10-CM

## 2024-05-02 ENCOUNTER — TELEPHONE (OUTPATIENT)
Dept: NEUROLOGY | Facility: CLINIC | Age: 44
End: 2024-05-02

## 2024-05-02 ENCOUNTER — TELEPHONE (OUTPATIENT)
Age: 44
End: 2024-05-02

## 2024-05-02 NOTE — TELEPHONE ENCOUNTER
I called and spoke to patient and advised her to call the pharmacy directly to ask about the medication. I told her it was confirmed by the pharmacy that they received the script. I provided the number for the pharmacy. She verbalized understanding.

## 2024-05-02 NOTE — TELEPHONE ENCOUNTER
Patients GI provider:  Frantz MARIA    Number to return call: [phone]    Reason for call: Pt calling to find out if her insurance will cover the Metoclopramide. She states she has not heard anything from the pharmacy and has not received the medication. No notes indicating prior auth needed    Scheduled procedure/appointment date if applicable: Appt 10/8/24

## 2024-05-02 NOTE — TELEPHONE ENCOUNTER
Called patient to let her know we received her Aimovig instead of being sent to her house. She said she wasn't taking it anyhow. I told her I will let MK know.

## 2024-05-03 ENCOUNTER — VBI (OUTPATIENT)
Dept: ADMINISTRATIVE | Facility: OTHER | Age: 44
End: 2024-05-03

## 2024-05-05 DIAGNOSIS — M54.2 CERVICALGIA: ICD-10-CM

## 2024-05-06 ENCOUNTER — TELEPHONE (OUTPATIENT)
Age: 44
End: 2024-05-06

## 2024-05-06 RX ORDER — CLONAZEPAM 0.5 MG/1
0.5 TABLET ORAL
Qty: 30 TABLET | Refills: 0 | Status: SHIPPED | OUTPATIENT
Start: 2024-05-06

## 2024-05-06 RX ORDER — METHYLPHENIDATE HYDROCHLORIDE 20 MG/1
20 CAPSULE, EXTENDED RELEASE ORAL EVERY MORNING
Qty: 30 CAPSULE | Refills: 0 | Status: SHIPPED | OUTPATIENT
Start: 2024-05-06

## 2024-05-06 RX ORDER — TIZANIDINE 4 MG/1
4 TABLET ORAL
Qty: 90 TABLET | Refills: 0 | Status: SHIPPED | OUTPATIENT
Start: 2024-05-06

## 2024-05-06 NOTE — TELEPHONE ENCOUNTER
Pt stated the [facility] pharmacy said she needs a prior auth for the methylphenidate (Ritalin LA) 20 MG 24 hr capsules.         Please contact pt when this is done. Thank you for your help.

## 2024-05-07 ENCOUNTER — TELEPHONE (OUTPATIENT)
Dept: GASTROENTEROLOGY | Facility: CLINIC | Age: 44
End: 2024-05-07

## 2024-05-07 NOTE — TELEPHONE ENCOUNTER
PARIS Kim     Submitted via    [x]CMM-KEY [id number]  []SurescriVpon-Case ID #   []Faxed to plan   []Other website   []Phone call Case ID #     Office notes sent, clinical questions answered. Awaiting determination    Turnaround time for your insurance to make a decision on your Prior Authorization can take 7-21 business days.

## 2024-05-08 NOTE — TELEPHONE ENCOUNTER
PA for Gimoti solution Approved     Date(s) approved until 5/7/2025    Case #    Patient advised by          [x] [employer]el[employer] Message  [] Phone call   []LMOM  []L/M to call office as no active Communication consent on file  []Unable to leave detailed message as VM not approved on Communication consent       Pharmacy advised by    [x]Fax  []Phone call    Approval letter scanned into Media Yes

## 2024-05-10 NOTE — TELEPHONE ENCOUNTER
PA for Methylphenidate (Ritalin LA) 20 MG 24 hr capsule    Submitted via    [x]CMM-KEY [id number]  []Surescripts-Case ID #   []Faxed to plan   []Other website   []Phone call Case ID #     Office notes sent, clinical questions answered. Awaiting determination    Turnaround time for your insurance to make a decision on your Prior Authorization can take 7-21 business days.

## 2024-05-11 NOTE — TELEPHONE ENCOUNTER
PA for Methylphenidate (Ritalin LA) 20 MG 24 hr capsule Approved     Date(s) approved 5- - 5-      Patient advised by          [x] JumpCloudhart Message  [] Phone call   []LMOM  []L/M to call office as no active Communication consent on file  []Unable to leave detailed message as VM not approved on Communication consent       Pharmacy advised by    [x]Fax  []Phone call    Approval letter scanned into Media Yes

## 2024-05-26 PROBLEM — Z12.11 SCREENING FOR COLON CANCER: Status: RESOLVED | Noted: 2024-04-26 | Resolved: 2024-05-26

## 2024-06-01 DIAGNOSIS — R12 HEARTBURN: ICD-10-CM

## 2024-06-01 DIAGNOSIS — R11.2 NAUSEA AND VOMITING, UNSPECIFIED VOMITING TYPE: ICD-10-CM

## 2024-06-01 DIAGNOSIS — F41.1 GENERALIZED ANXIETY DISORDER: ICD-10-CM

## 2024-06-01 DIAGNOSIS — F98.8 ATTENTION DEFICIT DISORDER, UNSPECIFIED HYPERACTIVITY PRESENCE: ICD-10-CM

## 2024-06-01 RX ORDER — OMEPRAZOLE 40 MG/1
40 CAPSULE, DELAYED RELEASE ORAL 2 TIMES DAILY
Qty: 60 CAPSULE | Refills: 5 | Status: SHIPPED | OUTPATIENT
Start: 2024-06-01

## 2024-06-02 DIAGNOSIS — I10 PRIMARY HYPERTENSION: ICD-10-CM

## 2024-06-02 DIAGNOSIS — J45.30 MILD PERSISTENT ASTHMA WITHOUT COMPLICATION: ICD-10-CM

## 2024-06-02 RX ORDER — MONTELUKAST SODIUM 10 MG/1
TABLET ORAL
Qty: 90 TABLET | Refills: 1 | Status: SHIPPED | OUTPATIENT
Start: 2024-06-02

## 2024-06-02 RX ORDER — LISINOPRIL 10 MG/1
TABLET ORAL
Qty: 90 TABLET | Refills: 1 | Status: SHIPPED | OUTPATIENT
Start: 2024-06-02

## 2024-06-04 RX ORDER — CLONAZEPAM 0.5 MG/1
0.5 TABLET ORAL
Qty: 30 TABLET | Refills: 0 | Status: SHIPPED | OUTPATIENT
Start: 2024-06-04

## 2024-06-04 RX ORDER — METHYLPHENIDATE HYDROCHLORIDE 20 MG/1
20 CAPSULE, EXTENDED RELEASE ORAL EVERY MORNING
Qty: 30 CAPSULE | Refills: 0 | Status: SHIPPED | OUTPATIENT
Start: 2024-06-04

## 2024-06-19 ENCOUNTER — TELEPHONE (OUTPATIENT)
Dept: NEUROLOGY | Facility: CLINIC | Age: 44
End: 2024-06-19

## 2024-06-19 NOTE — TELEPHONE ENCOUNTER
Received VM 6/19/24 132 pm    [facility] calling regarding Nhung Grant 1980 reference to prior auth for Nurtec ODT 75 written by doctor Aguilar , the phone number here is . Thank you.

## 2024-06-26 NOTE — TELEPHONE ENCOUNTER
Nurtec denied. Need documentation that headahces have improved    Sent patient mychart message requesting additional information     Awaiting response

## 2024-06-26 NOTE — TELEPHONE ENCOUNTER
Called [facility] at [phone] and spoke to Roxy.    Advised that nurtec is effective.    PA resubmitted-24 hour turn around time  Auth ID-[id number]    Will await determination

## 2024-06-26 NOTE — TELEPHONE ENCOUNTER
Nhung LEVINE [facility] Clinical (supporting Kaitlynn Castorena RN)         6/26/24  7:31 AM  Yes it helps immensely  I get about one a week

## 2024-07-01 DIAGNOSIS — F98.8 ATTENTION DEFICIT DISORDER, UNSPECIFIED HYPERACTIVITY PRESENCE: ICD-10-CM

## 2024-07-01 DIAGNOSIS — F41.1 GENERALIZED ANXIETY DISORDER: ICD-10-CM

## 2024-07-03 RX ORDER — CLONAZEPAM 0.5 MG/1
0.5 TABLET ORAL
Qty: 30 TABLET | Refills: 0 | Status: SHIPPED | OUTPATIENT
Start: 2024-07-03

## 2024-07-03 RX ORDER — METHYLPHENIDATE HYDROCHLORIDE 20 MG/1
20 CAPSULE, EXTENDED RELEASE ORAL EVERY MORNING
Qty: 30 CAPSULE | Refills: 0 | Status: SHIPPED | OUTPATIENT
Start: 2024-07-03

## 2024-07-05 DIAGNOSIS — J45.30 MILD PERSISTENT ASTHMA WITHOUT COMPLICATION: ICD-10-CM

## 2024-07-05 DIAGNOSIS — F41.1 GENERALIZED ANXIETY DISORDER: ICD-10-CM

## 2024-07-05 DIAGNOSIS — F32.A DEPRESSION, UNSPECIFIED DEPRESSION TYPE: ICD-10-CM

## 2024-07-05 RX ORDER — FLUTICASONE PROPIONATE AND SALMETEROL 250; 50 UG/1; UG/1
1 POWDER RESPIRATORY (INHALATION) 2 TIMES DAILY
Qty: 60 BLISTER | Refills: 0 | Status: SHIPPED | OUTPATIENT
Start: 2024-07-05 | End: 2025-01-01

## 2024-07-08 RX ORDER — CARIPRAZINE 1.5 MG/1
1.5 CAPSULE, GELATIN COATED ORAL DAILY
Qty: 90 CAPSULE | Refills: 0 | Status: SHIPPED | OUTPATIENT
Start: 2024-07-08

## 2024-07-09 ENCOUNTER — TELEPHONE (OUTPATIENT)
Dept: INTERNAL MEDICINE CLINIC | Facility: CLINIC | Age: 44
End: 2024-07-09

## 2024-07-18 ENCOUNTER — OFFICE VISIT (OUTPATIENT)
Dept: INTERNAL MEDICINE CLINIC | Facility: CLINIC | Age: 44
End: 2024-07-18
Payer: COMMERCIAL

## 2024-07-18 VITALS
HEIGHT: 72 IN | WEIGHT: 262.8 LBS | TEMPERATURE: 97.6 F | OXYGEN SATURATION: 96 % | HEART RATE: 91 BPM | DIASTOLIC BLOOD PRESSURE: 74 MMHG | BODY MASS INDEX: 35.59 KG/M2 | SYSTOLIC BLOOD PRESSURE: 122 MMHG

## 2024-07-18 DIAGNOSIS — G43.709 CHRONIC MIGRAINE WITHOUT AURA, NOT INTRACTABLE, WITHOUT STATUS MIGRAINOSUS: ICD-10-CM

## 2024-07-18 DIAGNOSIS — K21.9 GASTROESOPHAGEAL REFLUX DISEASE WITHOUT ESOPHAGITIS: ICD-10-CM

## 2024-07-18 DIAGNOSIS — F41.1 GENERALIZED ANXIETY DISORDER: ICD-10-CM

## 2024-07-18 DIAGNOSIS — Z12.31 ENCOUNTER FOR SCREENING MAMMOGRAM FOR MALIGNANT NEOPLASM OF BREAST: ICD-10-CM

## 2024-07-18 DIAGNOSIS — I10 PRIMARY HYPERTENSION: Primary | ICD-10-CM

## 2024-07-18 DIAGNOSIS — J45.30 MILD PERSISTENT ASTHMA WITHOUT COMPLICATION: ICD-10-CM

## 2024-07-18 DIAGNOSIS — F98.8 ATTENTION DEFICIT DISORDER, UNSPECIFIED HYPERACTIVITY PRESENCE: ICD-10-CM

## 2024-07-18 DIAGNOSIS — K31.84 GASTROPARESIS: ICD-10-CM

## 2024-07-18 DIAGNOSIS — F32.A DEPRESSION, UNSPECIFIED DEPRESSION TYPE: ICD-10-CM

## 2024-07-18 PROCEDURE — 99214 OFFICE O/P EST MOD 30 MIN: CPT | Performed by: NURSE PRACTITIONER

## 2024-07-18 RX ORDER — DEXTROAMPHETAMINE SACCHARATE, AMPHETAMINE ASPARTATE, DEXTROAMPHETAMINE SULFATE AND AMPHETAMINE SULFATE 5; 5; 5; 5 MG/1; MG/1; MG/1; MG/1
20 TABLET ORAL
Qty: 60 TABLET | Refills: 0 | Status: SHIPPED | OUTPATIENT
Start: 2024-07-18 | End: 2024-07-19

## 2024-07-18 RX ORDER — METOCLOPRAMIDE HYDROCHLORIDE 15 MG/.07ML
SPRAY NASAL
COMMUNITY
Start: 2024-06-19

## 2024-07-18 NOTE — ASSESSMENT & PLAN NOTE
On ritalin. Will switch back to adderall 20 mg twice daily which she was on prior to the shortage.   She has been on the wait list for psychiatry for over a year.

## 2024-07-18 NOTE — PROGRESS NOTES
Ambulatory Visit  Name: Nhung Grant      : 1980      MRN: [id number]  Encounter Provider: CROW Felix  Encounter Date: 2024   Encounter department: [facility] INTERNAL MEDICINE    Assessment & Plan   1. Primary hypertension  Assessment & Plan:  Stable  Continue lisinopril   Orders:  -     Comprehensive metabolic panel; Future  -     CBC and differential; Future  -     Lipid Panel with Direct LDL reflex; Future  -     TSH, 3rd generation with Free T4 reflex; Future  2. Chronic migraine without aura, not intractable, without status migrainosus  Assessment & Plan:  On aimovig and receives botox injections per neurology   3. Mild persistent asthma without complication  Assessment & Plan:  Stable  On advair twice daily.       4. Gastroesophageal reflux disease without esophagitis  Assessment & Plan:  Continue PPI twice daily   5. Generalized anxiety disorder  Assessment & Plan:  On clonazepam daily at .   6. Depression, unspecified depression type  Assessment & Plan:  Stable  Continue sertraline and vraylar.   7. Attention deficit disorder, unspecified hyperactivity presence  Assessment & Plan:  On ritalin. Will switch back to adderall 20 mg twice daily which she was on prior to the shortage.   She has been on the wait list for psychiatry for over a year.   Orders:  -     amphetamine-dextroamphetamine (ADDERALL, 20MG,) 20 mg tablet; Take 1 tablet (20 mg total) by mouth 2 (two) times a day Max Daily Amount: 40 mg  8. Gastroparesis  Assessment & Plan:  On Gimoti with some improvement.   9. Encounter for screening mammogram for malignant neoplasm of breast  -     Mammo screening bilateral w 3d & cad; Future       History of Present Illness     Nhung is here today for follow up  She is doing ok.  She has been having a lot of post nasal drip. She has a chronic runny nose.   She is taking claritin and singulair.     Her gastroparesis has been a little better. Trying to eat small meals.  "Still has nausea and vomiting if she overeats or eats close to bedtime.     Moods are stable.  Requesting to be put back on adderall. She was on ritalin due to the adderall shortage.   She has been on the wait list for psychiatry for over a year.             Review of Systems   Constitutional:  Negative for activity change, appetite change, fatigue and unexpected weight change.   Eyes:  Negative for visual disturbance.   Respiratory:  Negative for cough and shortness of breath.    Cardiovascular:  Negative for chest pain, palpitations and leg swelling.   Gastrointestinal:  Negative for abdominal pain, blood in stool, constipation and diarrhea.   Genitourinary:  Negative for difficulty urinating.   Musculoskeletal:  Negative for arthralgias.   Skin:  Negative for rash.   Neurological:  Negative for dizziness, light-headedness and headaches.   Psychiatric/Behavioral:  Negative for decreased concentration, dysphoric mood and sleep disturbance. The patient is not nervous/anxious.        Objective     /74 (BP Location: Right arm, Patient Position: Sitting, Cuff Size: Large)   Pulse 91   Temp 97.6 °F (36.4 °C) (Temporal)   Ht 6' 1\" (1.854 m)   Wt 119 kg (262 lb 12.8 oz)   LMP 07/11/2024   SpO2 96%   BMI 34.67 kg/m²     Physical Exam  Vitals reviewed.   Constitutional:       Appearance: Normal appearance. She is well-developed.   HENT:      Head: Normocephalic and atraumatic.   Eyes:      Conjunctiva/sclera: Conjunctivae normal.   Neck:      Thyroid: No thyromegaly.   Cardiovascular:      Rate and Rhythm: Normal rate and regular rhythm.      Heart sounds: Normal heart sounds.   Pulmonary:      Effort: Pulmonary effort is normal.      Breath sounds: Normal breath sounds.   Musculoskeletal:         General: Normal range of motion.      Right lower leg: No edema.      Left lower leg: No edema.   Skin:     General: Skin is warm and dry.   Neurological:      Mental Status: She is alert and oriented to person, " place, and time.   Psychiatric:         Mood and Affect: Mood normal.         Behavior: Behavior normal.       Administrative Statements

## 2024-07-19 ENCOUNTER — TELEPHONE (OUTPATIENT)
Dept: INTERNAL MEDICINE CLINIC | Facility: CLINIC | Age: 44
End: 2024-07-19

## 2024-07-19 DIAGNOSIS — F98.8 ATTENTION DEFICIT DISORDER, UNSPECIFIED HYPERACTIVITY PRESENCE: Primary | ICD-10-CM

## 2024-07-19 RX ORDER — METHYLPHENIDATE HYDROCHLORIDE 20 MG/1
20 CAPSULE, EXTENDED RELEASE ORAL EVERY MORNING
Qty: 30 CAPSULE
Start: 2024-07-19

## 2024-07-19 NOTE — TELEPHONE ENCOUNTER
Please let her know that the adderall isn't covered so I put the ritalin back on her medication list.

## 2024-07-24 DIAGNOSIS — M54.2 CERVICALGIA: ICD-10-CM

## 2024-07-24 RX ORDER — TIZANIDINE 4 MG/1
4 TABLET ORAL
Qty: 30 TABLET | Refills: 2 | Status: SHIPPED | OUTPATIENT
Start: 2024-07-24

## 2024-07-24 NOTE — TELEPHONE ENCOUNTER
PA for  Vraylar 1.5 mg submitted     Submitted via    [x]CMM-KEY [id number]  []ProjectSpeakerscriDiagnoplex-Case ID #   []Faxed to plan   []Other website   []Phone call Case ID #     Office notes sent, clinical questions answered. Awaiting determination    Turnaround time for your insurance to make a decision on your Prior Authorization can take 7-21 business days.

## 2024-07-25 RX ORDER — RIMEGEPANT SULFATE 75 MG/75MG
TABLET, ORALLY DISINTEGRATING ORAL
Qty: 16 TABLET | Refills: 11 | Status: SHIPPED | OUTPATIENT
Start: 2024-07-25

## 2024-07-25 NOTE — TELEPHONE ENCOUNTER
PA for Vraylar 1.5 MG capsule Approved     Date(s) approved 7- - 7-      Patient advised by          [x] [employer] Message  [] Phone call   []LMOM  []L/M to call office as no active Communication consent on file  []Unable to leave detailed message as VM not approved on Communication consent       Pharmacy advised by    [x]Fax  []Phone call    Approval letter scanned into Media Yes

## 2024-07-29 DIAGNOSIS — G43.709 CHRONIC MIGRAINE W/O AURA W/O STATUS MIGRAINOSUS, NOT INTRACTABLE: ICD-10-CM

## 2024-07-29 RX ORDER — KETOROLAC TROMETHAMINE 30 MG/ML
INJECTION, SOLUTION INTRAMUSCULAR; INTRAVENOUS
Qty: 4 ML | Refills: 0 | Status: SHIPPED | OUTPATIENT
Start: 2024-07-29

## 2024-07-29 NOTE — TELEPHONE ENCOUNTER
Medication: Toradol    Dose/Frequency: 1-2 ml, 1-2ml intramuscular injection once per 24 hours as needed for migraines. No more than 2 injections per week.     Quantity: 4 mL    Pharmacy: [facility] in [place]    Office:   [] PCP/Provider -   [x] Speciality/Provider - Neurology, Ayesha TOLEDO    Does the patient have enough for 3 days?   [] Yes   [x] No - Send as HP to POD       MaryCate: medication pended, pleas sign if agreeable

## 2024-07-30 ENCOUNTER — PATIENT MESSAGE (OUTPATIENT)
Dept: NEUROLOGY | Facility: CLINIC | Age: 44
End: 2024-07-30

## 2024-07-30 ENCOUNTER — TELEPHONE (OUTPATIENT)
Dept: NEUROLOGY | Facility: CLINIC | Age: 44
End: 2024-07-30

## 2024-07-30 ENCOUNTER — HOSPITAL ENCOUNTER (EMERGENCY)
Facility: HOSPITAL | Age: 44
Discharge: HOME | End: 2024-07-30
Attending: EMERGENCY MEDICINE
Payer: COMMERCIAL

## 2024-07-30 VITALS
RESPIRATION RATE: 18 BRPM | DIASTOLIC BLOOD PRESSURE: 88 MMHG | TEMPERATURE: 98.5 F | OXYGEN SATURATION: 96 % | HEART RATE: 97 BPM | SYSTOLIC BLOOD PRESSURE: 142 MMHG

## 2024-07-30 DIAGNOSIS — F98.8 ATTENTION DEFICIT DISORDER, UNSPECIFIED HYPERACTIVITY PRESENCE: ICD-10-CM

## 2024-07-30 DIAGNOSIS — F41.1 GENERALIZED ANXIETY DISORDER: ICD-10-CM

## 2024-07-30 DIAGNOSIS — R11.2 NAUSEA AND VOMITING: Primary | ICD-10-CM

## 2024-07-30 LAB
ALBUMIN SERPL BCG-MCNC: 4.5 G/DL (ref 3.5–5)
ALP SERPL-CCNC: 54 U/L (ref 34–104)
ALT SERPL W P-5'-P-CCNC: 27 U/L (ref 7–52)
ANION GAP SERPL CALCULATED.3IONS-SCNC: 8 MMOL/L (ref 4–13)
AST SERPL W P-5'-P-CCNC: 16 U/L (ref 13–39)
BACTERIA UR QL AUTO: ABNORMAL /HPF
BASOPHILS # BLD AUTO: 0.03 THOUSANDS/ÂΜL (ref 0–0.1)
BASOPHILS NFR BLD AUTO: 0 % (ref 0–1)
BILIRUB SERPL-MCNC: 0.59 MG/DL (ref 0.2–1)
BILIRUB UR QL STRIP: NEGATIVE
BUN SERPL-MCNC: 10 MG/DL (ref 5–25)
CALCIUM SERPL-MCNC: 9.5 MG/DL (ref 8.4–10.2)
CHLORIDE SERPL-SCNC: 103 MMOL/L (ref 96–108)
CLARITY UR: ABNORMAL
CO2 SERPL-SCNC: 24 MMOL/L (ref 21–32)
COLOR UR: YELLOW
CREAT SERPL-MCNC: 0.93 MG/DL (ref 0.6–1.3)
EOSINOPHIL # BLD AUTO: 0.05 THOUSAND/ÂΜL (ref 0–0.61)
EOSINOPHIL NFR BLD AUTO: 1 % (ref 0–6)
ERYTHROCYTE [DISTWIDTH] IN BLOOD BY AUTOMATED COUNT: 12.8 % (ref 11.6–15.1)
EXT PREGNANCY TEST URINE: NEGATIVE
EXT. CONTROL: NORMAL
GFR SERPL CREATININE-BSD FRML MDRD: 75 ML/MIN/1.73SQ M
GLUCOSE SERPL-MCNC: 106 MG/DL (ref 65–140)
GLUCOSE UR STRIP-MCNC: NEGATIVE MG/DL
HCT VFR BLD AUTO: 44.3 % (ref 34.8–46.1)
HGB BLD-MCNC: 14.9 G/DL (ref 11.5–15.4)
HGB UR QL STRIP.AUTO: ABNORMAL
IMM GRANULOCYTES # BLD AUTO: 0.02 THOUSAND/UL (ref 0–0.2)
IMM GRANULOCYTES NFR BLD AUTO: 0 % (ref 0–2)
KETONES UR STRIP-MCNC: NEGATIVE MG/DL
LEUKOCYTE ESTERASE UR QL STRIP: NEGATIVE
LIPASE SERPL-CCNC: 30 U/L (ref 11–82)
LYMPHOCYTES # BLD AUTO: 1.33 THOUSANDS/ÂΜL (ref 0.6–4.47)
LYMPHOCYTES NFR BLD AUTO: 20 % (ref 14–44)
MCH RBC QN AUTO: 30.1 PG (ref 26.8–34.3)
MCHC RBC AUTO-ENTMCNC: 33.6 G/DL (ref 31.4–37.4)
MCV RBC AUTO: 90 FL (ref 82–98)
MONOCYTES # BLD AUTO: 0.51 THOUSAND/ÂΜL (ref 0.17–1.22)
MONOCYTES NFR BLD AUTO: 8 % (ref 4–12)
NEUTROPHILS # BLD AUTO: 4.83 THOUSANDS/ÂΜL (ref 1.85–7.62)
NEUTS SEG NFR BLD AUTO: 71 % (ref 43–75)
NITRITE UR QL STRIP: NEGATIVE
NON-SQ EPI CELLS URNS QL MICRO: ABNORMAL /HPF
NRBC BLD AUTO-RTO: 0 /100 WBCS
PH UR STRIP.AUTO: 6.5 [PH]
PLATELET # BLD AUTO: 296 THOUSANDS/UL (ref 149–390)
PMV BLD AUTO: 10.3 FL (ref 8.9–12.7)
POTASSIUM SERPL-SCNC: 4.1 MMOL/L (ref 3.5–5.3)
PROT SERPL-MCNC: 7.7 G/DL (ref 6.4–8.4)
PROT UR STRIP-MCNC: ABNORMAL MG/DL
RBC # BLD AUTO: 4.95 MILLION/UL (ref 3.81–5.12)
RBC #/AREA URNS AUTO: ABNORMAL /HPF
SODIUM SERPL-SCNC: 135 MMOL/L (ref 135–147)
SP GR UR STRIP.AUTO: 1.02 (ref 1–1.03)
UROBILINOGEN UR STRIP-ACNC: <2 MG/DL
WBC # BLD AUTO: 6.77 THOUSAND/UL (ref 4.31–10.16)
WBC #/AREA URNS AUTO: ABNORMAL /HPF

## 2024-07-30 PROCEDURE — 96374 THER/PROPH/DIAG INJ IV PUSH: CPT

## 2024-07-30 PROCEDURE — 81001 URINALYSIS AUTO W/SCOPE: CPT

## 2024-07-30 PROCEDURE — 36415 COLL VENOUS BLD VENIPUNCTURE: CPT

## 2024-07-30 PROCEDURE — 96361 HYDRATE IV INFUSION ADD-ON: CPT

## 2024-07-30 PROCEDURE — 83690 ASSAY OF LIPASE: CPT

## 2024-07-30 PROCEDURE — 85025 COMPLETE CBC W/AUTO DIFF WBC: CPT

## 2024-07-30 PROCEDURE — 99284 EMERGENCY DEPT VISIT MOD MDM: CPT | Performed by: EMERGENCY MEDICINE

## 2024-07-30 PROCEDURE — 80053 COMPREHEN METABOLIC PANEL: CPT

## 2024-07-30 PROCEDURE — 81025 URINE PREGNANCY TEST: CPT

## 2024-07-30 PROCEDURE — 99283 EMERGENCY DEPT VISIT LOW MDM: CPT

## 2024-07-30 RX ORDER — ONDANSETRON 2 MG/ML
4 INJECTION INTRAMUSCULAR; INTRAVENOUS ONCE
Status: COMPLETED | OUTPATIENT
Start: 2024-07-30 | End: 2024-07-30

## 2024-07-30 RX ORDER — ONDANSETRON 4 MG/1
4 TABLET, ORALLY DISINTEGRATING ORAL EVERY 6 HOURS PRN
Qty: 20 TABLET | Refills: 0 | Status: SHIPPED | OUTPATIENT
Start: 2024-07-30 | End: 2024-07-30

## 2024-07-30 RX ORDER — ONDANSETRON 4 MG/1
4 TABLET, ORALLY DISINTEGRATING ORAL EVERY 6 HOURS PRN
Qty: 20 TABLET | Refills: 0 | Status: SHIPPED | OUTPATIENT
Start: 2024-07-30

## 2024-07-30 RX ADMIN — ONDANSETRON 4 MG: 2 INJECTION INTRAMUSCULAR; INTRAVENOUS at 07:57

## 2024-07-30 RX ADMIN — SODIUM CHLORIDE 1000 ML: 0.9 INJECTION, SOLUTION INTRAVENOUS at 07:56

## 2024-07-30 NOTE — ED PROVIDER NOTES
"History  Chief Complaint   Patient presents with    Vomiting     Nausea and vomiting x 2 days no other complaints      44-year-old female with significant PMH including vertigo and Ménière's disease, HTN, and asthma who presents to the ED for nausea and vomiting which started yesterday morning.  Patient states yesterday she had several episodes of vomiting and was unable to keep anything down such as food or water.  Patient took Zofran but she was unable to keep down as well as meclizine.  Patient states that the meclizine did help her symptoms mildly.  She also mentions that on Friday she started to have headache, dizziness, and blurry vision which is since resolved and is why she was taking the meclizine.  This morning she tried to take Toradol to help with her symptoms but still had the nausea and vomiting with about 5-6 episodes described as nonbloody and nonbilious.  Patient denies any changes in urinary or bowel habits.  No recent sick contacts or travel.  Yesterday was able to have minimal food just mashed potatoes and a .  No other acute concerns at this time. Denies chest pain, SOB, cough, abdominal pain, diarrhea, fever, chills, lightheadedness, dysuria, hematuria, hematochezia, or melena.               Prior to Admission Medications   Prescriptions Last Dose Informant Patient Reported? Taking?   Aimovig 140 MG/ML SOAJ  Self Yes No   Botox 200 units SOLR  Self Yes No   Fluticasone-Salmeterol (Advair) 250-50 mcg/dose inhaler   No No   Sig: Inhale 1 puff 2 (two) times a day Rinse mouth after use.   Gimoti 15 MG/ACT SOLN   Yes No   Sig: INSTILL ONE SPRAY INTO RIGHT NOSTRIL FOUR TIMES PER DAY 30 MINUTES BEFORE EACH MEAL AND AT BEDTIME   Nurtec 75 MG TBDP   No No   Sig: TAKE 1 TABLET BY MOUTH EVERY OTHER DAY NO MORE THAN 1 DOSE PER 24 HOURS   Syringe/Needle, Disp, (SYRINGE 3CC/83IK8-5/2\") 25G X 1-1/2\" 3 ML MISC  Self No No   Sig: Use for toradol IM injections.   Vraylar 1.5 MG capsule   No No " "  Sig: Take 1 capsule (1.5 mg total) by mouth daily   albuterol (PROVENTIL HFA,VENTOLIN HFA) 90 mcg/act inhaler  Self No No   Sig: Inhale 2 puffs 4 (four) times a day   clindamycin (CLEOCIN) 300 MG capsule  Self Yes No   Sig: TAKE 2 CAPSULES (600 MG TOTAL) BY MOUTH 1 (ONE) TIME FOR 1 DOSE   Patient not taking: Reported on 2024   clonazePAM (KlonoPIN) 0.5 mg tablet   No No   Sig: Take 1 tablet (0.5 mg total) by mouth daily at bedtime   dexamethasone (DECADRON) 2 mg tablet  Self No No   Si tablet daily.   gabapentin (NEURONTIN) 600 MG tablet  Self No No   Sig: Take 1 tablet (600 mg total) by mouth 2 (two) times a day   Patient not taking: Reported on 2024   ketorolac (TORADOL) 30 mg/mL injection   No No   Sig: I-2 ML INTRAMUSCULAR INJECTION ONCE PER 24 HOURS AS NEEDED FOR MIGRAINE. NO MORE THAN 2 INJECTIONS PER WEEK.   lisinopril (ZESTRIL) 10 mg tablet   No No   Sig: TAKE 1 TABLET BY MOUTH EVERY DAY   meclizine (ANTIVERT) 12.5 MG tablet  Self No No   Sig: Take 2 tablets (25 mg total) by mouth every 8 (eight) hours as needed for dizziness   meloxicam (MOBIC) 7.5 mg tablet  Self No No   Sig: TAKE 1 TABLET BY MOUTH EVERY DAY   methylphenidate (Ritalin LA) 20 MG 24 hr capsule   No No   Sig: Take 1 capsule (20 mg total) by mouth every morning Max Daily Amount: 20 mg   montelukast (SINGULAIR) 10 mg tablet   No No   Sig: TAKE 1 TABLET BY MOUTH EVERYDAY AT BEDTIME   norethindrone (MICRONOR) 0.35 MG tablet  Self Yes No   omeprazole (PriLOSEC) 40 MG capsule   No No   Sig: TAKE 1 CAPSULE BY MOUTH TWICE A DAY   onabotulinumtoxin A (BOTOX) 100 units  Self Yes No   Sig: one time. \"for vertigo\"   Patient not taking: Reported on 2024   ondansetron (ZOFRAN-ODT) 4 mg disintegrating tablet  Self No No   Sig: TAKE 1 TABLET BY MOUTH EVERY 8 HOURS AS NEEDED FOR NAUSEA   prochlorperazine (COMPAZINE) 10 mg/2mL  Self No No   Sig: Inject 2 mL IM EVERY 12 HOURS AS NEEDED FOR MIGRAINE/NAUSEA/VOMITING - MAX 4 INJECTIONS PER " WEEK   sertraline (ZOLOFT) 100 mg tablet   No No   Sig: TAKE 2 TABLETS BY MOUTH EVERY MORNING.   tiZANidine (ZANAFLEX) 4 mg tablet   No No   Sig: TAKE 1 TABLET (4 MG TOTAL) BY MOUTH DAILY AT BEDTIME AS NEEDED FOR MUSCLE SPASMS   traZODone (DESYREL) 100 mg tablet  Self No No   Sig: TAKE 0.5-1 TABLETS ( MG TOTAL) BY MOUTH DAILY AT BEDTIME AS NEEDED FOR SLEEP      Facility-Administered Medications: None       Past Medical History:   Diagnosis Date    Allergic     Anxiety     Arthritis     Asthma     Chronic right shoulder pain 03/16/2022    COVID-19 02/08/2021    Depression     ETOH abuse     Headache(784.0)     Herniated cervical disc     Hypertension     Memory loss     Meniere's disease     Migraine     Post-COVID syndrome 08/05/2022    Psychiatric disorder     anxiety/depression    Scoliosis     Sinusitis     Stroke (HCC)     Thyroid disease     Vertigo     Vertigo        Past Surgical History:   Procedure Laterality Date    BACK SURGERY      C5-6    COLONOSCOPY      HIP SURGERY      JOINT REPLACEMENT      right hip    OTHER SURGICAL HISTORY      Hip Replacement (right) , Spinal  Diskectomy Cervical C5-C6    DE APPLICATION SHORT ARM SPLINT FOREARM-HAND STATIC Right 05/25/2023    Procedure: Application of short arm thumb spica splint;  Surgeon: Valeriy Escobedo MD;  Location: [facility];  Service: Orthopedics    DE INCISION EXTENSOR TENDON SHEATH WRIST Right 05/25/2023    Procedure: right de Quervain's release;  Surgeon: Valeriy Escobedo MD;  Location: [facility];  Service: Orthopedics    DE SYNOVECTOMY EXTENSOR TENDON SHTH WRIST 1 CMPRT Right 05/25/2023    Procedure: right second extensor compartment tenosynovectomy;  Surgeon: Valeriy Escobedo MD;  Location: [facility];  Service: Orthopedics    DE TDN TRNSPLJ/TR FLXR/XTNSR F/ARM&/WRST 1/TDN GR Right 05/25/2023    Procedure: right EIP to EPL tendon transfer;  Surgeon: Valeriy Escobedo MD;  Location: [facility];  Service: Orthopedics       Family  History   Problem Relation Age of Onset    Hypertension Mother     Thyroid disease Mother     Anxiety disorder Mother     Hypertension Father     Colon cancer Maternal Grandmother     Breast cancer Paternal Grandmother 60    Dementia Paternal Grandmother     Heart disease Paternal Grandfather     No Known Problems Maternal Aunt     No Known Problems Paternal Aunt     Drug abuse Sister         Pasted away      I have reviewed and agree with the history as documented.    E-Cigarette/Vaping    E-Cigarette Use Never User      E-Cigarette/Vaping Substances    Nicotine No     THC No     CBD No     Flavoring No     Other No     Unknown No      Social History     Tobacco Use    Smoking status: Former     Current packs/day: 0.00     Average packs/day: 0.3 packs/day for 25.0 years (6.3 ttl pk-yrs)     Types: Cigarettes     Start date: 1997     Quit date: 2022     Years since quittin.5     Passive exposure: Yes    Smokeless tobacco: Never   Vaping Use    Vaping status: Never Used   Substance Use Topics    Alcohol use: Not Currently     Alcohol/week: 3.0 standard drinks of alcohol     Types: 3 Glasses of wine per week     Comment: 3 glass wine per week    Drug use: Not Currently     Frequency: 1.0 times per week     Types: Marijuana        Review of Systems   Constitutional:  Negative for appetite change, chills, diaphoresis, fatigue and fever.   HENT:  Negative for congestion, ear pain, postnasal drip, rhinorrhea, sore throat and trouble swallowing.    Eyes:  Negative for pain and visual disturbance.   Respiratory:  Negative for cough and shortness of breath.    Cardiovascular:  Negative for chest pain and palpitations.   Gastrointestinal:  Positive for nausea and vomiting. Negative for abdominal pain, constipation and diarrhea.   Genitourinary:  Negative for decreased urine volume, dysuria and hematuria.   Musculoskeletal:  Negative for arthralgias and back pain.   Skin:  Negative for color change and rash.    Neurological:  Negative for dizziness, seizures, syncope, weakness, light-headedness and headaches.   All other systems reviewed and are negative.      Physical Exam  ED Triage Vitals   Temperature Pulse Respirations Blood Pressure SpO2   07/30/24 0702 07/30/24 0700 07/30/24 0700 07/30/24 0700 07/30/24 0700   98.5 °F (36.9 °C) 97 18 142/88 96 %      Temp Source Heart Rate Source Patient Position - Orthostatic VS BP Location FiO2 (%)   07/30/24 0702 -- 07/30/24 0700 07/30/24 0700 --   Oral  Sitting Right arm       Pain Score       07/30/24 0700       No Pain             Orthostatic Vital Signs  Vitals:    07/30/24 0700   BP: 142/88   Pulse: 97   Patient Position - Orthostatic VS: Sitting       Physical Exam  Vitals and nursing note reviewed.   Constitutional:       General: She is not in acute distress.     Appearance: Normal appearance. She is well-developed and normal weight.   HENT:      Head: Normocephalic and atraumatic.      Right Ear: External ear normal.      Left Ear: External ear normal.      Nose: Nose normal.      Mouth/Throat:      Pharynx: Oropharynx is clear.   Eyes:      Conjunctiva/sclera: Conjunctivae normal.   Cardiovascular:      Rate and Rhythm: Normal rate and regular rhythm.      Pulses: Normal pulses.      Heart sounds: Normal heart sounds. No murmur heard.     Comments: RRR with +S1 and S2, no murmurs appreciated on exam. Peripheral pulses intact.    Pulmonary:      Effort: Pulmonary effort is normal. No respiratory distress.      Breath sounds: Normal breath sounds.      Comments: CTABL with no abnormal lung sounds such as wheezes or rales appreciated on exam.     Abdominal:      General: Abdomen is flat. Bowel sounds are normal. There is no distension.      Palpations: Abdomen is soft.      Tenderness: There is abdominal tenderness. There is no right CVA tenderness or left CVA tenderness.      Comments: Minimal tenderness to palpation of the LLQ.  Soft, normo-active bowel sounds. Without  rigidity, guarding, or distension.     Musculoskeletal:         General: No swelling. Normal range of motion.      Cervical back: Normal range of motion and neck supple.   Skin:     General: Skin is warm and dry.      Capillary Refill: Capillary refill takes less than 2 seconds.   Neurological:      General: No focal deficit present.      Mental Status: She is alert and oriented to person, place, and time. Mental status is at baseline.      Cranial Nerves: No cranial nerve deficit.      Sensory: No sensory deficit.      Motor: No weakness.      Coordination: Coordination normal.      Comments: CN grossly intact on visualization. No focal neurologic deficits noted on exam.  5/5 strength in all extremities. Neurovascularly intact with normal sensation and motor function.       Psychiatric:         Mood and Affect: Mood normal.         ED Medications  Medications   sodium chloride 0.9 % bolus 1,000 mL (0 mL Intravenous Stopped 7/30/24 1055)   ondansetron (ZOFRAN) injection 4 mg (4 mg Intravenous Given 7/30/24 0757)       Diagnostic Studies  Results Reviewed       Procedure Component Value Units Date/Time    Comprehensive metabolic panel [[id number]] Collected: 07/30/24 0757    Lab Status: Final result Specimen: Blood from Arm, Right Updated: 07/30/24 0828     Sodium 135 mmol/L      Potassium 4.1 mmol/L      Chloride 103 mmol/L      CO2 24 mmol/L      ANION GAP 8 mmol/L      BUN 10 mg/dL      Creatinine 0.93 mg/dL      Glucose 106 mg/dL      Calcium 9.5 mg/dL      AST 16 U/L      ALT 27 U/L      Alkaline Phosphatase 54 U/L      Total Protein 7.7 g/dL      Albumin 4.5 g/dL      Total Bilirubin 0.59 mg/dL      eGFR 75 ml/min/1.73sq m     Narrative:      National Kid[employer] Foundation guidelines for Chronic Kidney Disease (CKD):     Stage 1 with normal or high GFR (GFR > 90 mL/min/1.73 square meters)    Stage 2 Mild CKD (GFR = 60-89 mL/min/1.73 square meters)    Stage 3A Moderate CKD (GFR = 45-59 mL/min/1.73 square  meters)    Stage 3B Moderate CKD (GFR = 30-44 mL/min/1.73 square meters)    Stage 4 Severe CKD (GFR = 15-29 mL/min/1.73 square meters)    Stage 5 End Stage CKD (GFR <15 mL/min/1.73 square meters)  Note: GFR calculation is accurate only with a steady state creatinine    Lipase [[id number]]  (Normal) Collected: 07/30/24 0757    Lab Status: Final result Specimen: Blood from Arm, Right Updated: 07/30/24 0828     Lipase 30 u/L     Urine Microscopic [[id number]]  (Abnormal) Collected: 07/30/24 0756    Lab Status: Final result Specimen: Urine, Clean Catch Updated: 07/30/24 0821     RBC, UA 4-10 /hpf      WBC, UA 2-4 /hpf      Epithelial Cells Occasional /hpf      Bacteria, UA Occasional /hpf     UA w Reflex to Microscopic w Reflex to Culture [[id number]]  (Abnormal) Collected: 07/30/24 0756    Lab Status: Final result Specimen: Urine, Clean Catch Updated: 07/30/24 0817     Color, UA Yellow     Clarity, UA Turbid     Specific Gravity, UA 1.016     pH, UA 6.5     Leukocytes, UA Negative     Nitrite, UA Negative     Protein, UA Trace mg/dl      Glucose, UA Negative mg/dl      Ketones, UA Negative mg/dl      Urobilinogen, UA <2.0 mg/dl      Bilirubin, UA Negative     Occult Blood, UA Moderate    CBC and differential [[id number]] Collected: 07/30/24 0757    Lab Status: Final result Specimen: Blood from Arm, Right Updated: 07/30/24 0808     WBC 6.77 Thousand/uL      RBC 4.95 Million/uL      Hemoglobin 14.9 g/dL      Hematocrit 44.3 %      MCV 90 fL      MCH 30.1 pg      MCHC 33.6 g/dL      RDW 12.8 %      MPV 10.3 fL      Platelets 296 Thousands/uL      nRBC 0 /100 WBCs      Segmented % 71 %      Immature Grans % 0 %      Lymphocytes % 20 %      Monocytes % 8 %      Eosinophils Relative 1 %      Basophils Relative 0 %      Absolute Neutrophils 4.83 Thousands/µL      Absolute Immature Grans 0.02 Thousand/uL      Absolute Lymphocytes 1.33 Thousands/µL      Absolute Monocytes 0.51 Thousand/µL      Eosinophils Absolute 0.05 Thousand/µL       Basophils Absolute 0.03 Thousands/µL     POCT pregnancy, urine [[id number]]  (Normal) Resulted: 07/30/24 0757    Lab Status: Final result Updated: 07/30/24 0757     EXT Preg Test, Ur Negative     Control Valid                   No orders to display         Procedures  Procedures      ED Course  ED Course as of 07/30/24 1828 Tue Jul 30, 2024   0759 POCT pregnancy, urine  Negative   0810 Reevaluated at bedside and noted to have improvements in her symptoms with no episodes of vomiting.   0822 RBC Urine(!): 4-10   0822 Bacteria, UA: Occasional   0822 Blood, UA(!): Moderate   0837 LIPASE: 30   0837 No acute concerns on CBC or CMP   0916 Re-evaluated at bedside and still mildly nauseous. Will PO challenge prior to discharge   0947 Tolerated PO challenge and agrees with plan to be discharged                                       Medical Decision Making  Female patient who presented for nausea and vomiting for the past day.  Patient's labs showed no acute concerns at this time and she was given IV hydration as well as Zofran.  Patient was reevaluated at bedside and had improvement in her symptoms and was able to successfully pass a p.o. challenge.  Plan was for patient to follow-up outpatient as needed with her PCP and was also provided a prescription for Zofran.  Patient was also instructed to return to the ED if her symptoms worsen.    Amount and/or Complexity of Data Reviewed  Labs: ordered. Decision-making details documented in ED Course.    Risk  Prescription drug management.          Disposition  Final diagnoses:   Nausea and vomiting     Time reflects when diagnosis was documented in both MDM as applicable and the Disposition within this note       Time User Action Codes Description Comment    7/30/2024  9:48 AM Jacob Fulton Add [R11.2] Nausea and vomiting           ED Disposition       ED Disposition   Discharge    Condition   Stable    Date/Time   Tue Jul 30, 2024  9:48 AM    Comment   Nhung Grant discharge  to home/self care.                   Follow-up Information       Follow up With Specialties Details Why Contact Info Additional Information    [facility], Nurse Practitioner Call  As needed [address]  [address]  [facility] [address]  [phone]       Critical access hospital Emergency Department Emergency Medicine Go to  If symptoms worsen [address] [facility] [id number] [phone] Critical access hospital Emergency Department, [address] [facility], [place], [place], [address]            Discharge Medication List as of 7/30/2024  9:49 AM        CONTINUE these medications which have CHANGED    Details   !! ondansetron (ZOFRAN-ODT) 4 mg disintegrating tablet Take 1 tablet (4 mg total) by mouth every 6 (six) hours as needed for nausea or vomiting, Starting Tue 7/30/2024, Normal       !! - Potential duplicate medications found. Please discuss with provider.        CONTINUE these medications which have NOT CHANGED    Details   Aimovig 140 MG/ML SOAJ Historical Med      albuterol (PROVENTIL HFA,VENTOLIN HFA) 90 mcg/act inhaler Inhale 2 puffs 4 (four) times a day, Starting Thu 8/25/2022, Normal      !! Botox 200 units SOLR Starting Wed 6/7/2023, Historical Med      clindamycin (CLEOCIN) 300 MG capsule TAKE 2 CAPSULES (600 MG TOTAL) BY MOUTH 1 (ONE) TIME FOR 1 DOSE, Historical Med      clonazePAM (KlonoPIN) 0.5 mg tablet Take 1 tablet (0.5 mg total) by mouth daily at bedtime, Starting Wed 7/3/2024, Normal      dexamethasone (DECADRON) 2 mg tablet 1 tablet daily., Normal      Fluticasone-Salmeterol (Advair) 250-50 mcg/dose inhaler Inhale 1 puff 2 (two) times a day Rinse mouth after use., Starting Fri 7/5/2024, Until Wed 1/1/2025, Normal      gabapentin (NEURONTIN) 600 MG tablet Take 1 tablet (600 mg total) by mouth 2 (two) times a day, Starting Wed 4/12/2023, Normal      Gimoti 15 MG/ACT SOLN INSTILL ONE SPRAY INTO RIGHT NOSTRIL FOUR TIMES PER DAY  "30 MINUTES BEFORE EACH MEAL AND AT BEDTIME, Historical Med      ketorolac (TORADOL) 30 mg/mL injection I-2 ML INTRAMUSCULAR INJECTION ONCE PER 24 HOURS AS NEEDED FOR MIGRAINE. NO MORE THAN 2 INJECTIONS PER WEEK., Normal      lisinopril (ZESTRIL) 10 mg tablet TAKE 1 TABLET BY MOUTH EVERY DAY, Normal      meclizine (ANTIVERT) 12.5 MG tablet Take 2 tablets (25 mg total) by mouth every 8 (eight) hours as needed for dizziness, Starting Fri 11/24/2023, Normal      meloxicam (MOBIC) 7.5 mg tablet TAKE 1 TABLET BY MOUTH EVERY DAY, Normal      methylphenidate (Ritalin LA) 20 MG 24 hr capsule Take 1 capsule (20 mg total) by mouth every morning Max Daily Amount: 20 mg, Starting Fri 7/19/2024, No Print      montelukast (SINGULAIR) 10 mg tablet TAKE 1 TABLET BY MOUTH EVERYDAY AT BEDTIME, Normal      norethindrone (MICRONOR) 0.35 MG tablet Starting Sat 12/3/2022, Historical Med      Nurtec 75 MG TBDP TAKE 1 TABLET BY MOUTH EVERY OTHER DAY NO MORE THAN 1 DOSE PER 24 HOURS, Normal      omeprazole (PriLOSEC) 40 MG capsule TAKE 1 CAPSULE BY MOUTH TWICE A DAY, Starting Sat 6/1/2024, Normal      !! onabotulinumtoxin A (BOTOX) 100 units one time. \"for vertigo\", Historical Med      !! ondansetron (ZOFRAN-ODT) 4 mg disintegrating tablet TAKE 1 TABLET BY MOUTH EVERY 8 HOURS AS NEEDED FOR NAUSEA, Normal      prochlorperazine (COMPAZINE) 10 mg/2mL Inject 2 mL IM EVERY 12 HOURS AS NEEDED FOR MIGRAINE/NAUSEA/VOMITING - MAX 4 INJECTIONS PER WEEK, Normal      sertraline (ZOLOFT) 100 mg tablet TAKE 2 TABLETS BY MOUTH EVERY MORNING., Starting Sat 4/27/2024, Normal      Syringe/Needle, Disp, (SYRINGE 3CC/03RN2-0/2\") 25G X 1-1/2\" 3 ML MISC Use for toradol IM injections., Normal      tiZANidine (ZANAFLEX) 4 mg tablet TAKE 1 TABLET (4 MG TOTAL) BY MOUTH DAILY AT BEDTIME AS NEEDED FOR MUSCLE SPASMS, Starting Wed 7/24/2024, Normal      traZODone (DESYREL) 100 mg tablet TAKE 0.5-1 TABLETS ( MG TOTAL) BY MOUTH DAILY AT BEDTIME AS NEEDED FOR SLEEP, " Starting Thu 2/8/2024, Normal      Vraylar 1.5 MG capsule Take 1 capsule (1.5 mg total) by mouth daily, Starting Mon 7/8/2024, Normal       !! - Potential duplicate medications found. Please discuss with provider.        No discharge procedures on file.    PDMP Review         Value Time User    PDMP Reviewed  Yes 7/18/2024  9:38 AM [facility]             ED Provider  Attending physically available and evaluated Nhung Grant. I managed the patient along with the ED Attending.    Electronically Signed by           Jacob Fulton MD  07/30/24 8529

## 2024-07-30 NOTE — ED ATTENDING ATTESTATION
7/30/2024  I, Jelly Witt MD, saw and evaluated the patient. I have discussed the patient with the resident/non-physician practitioner and agree with the resident's/non-physician practitioner's findings, Plan of Care, and MDM as documented in the resident's/non-physician practitioner's note, except where noted. All available labs and Radiology studies were reviewed.  I was present for key portions of any procedure(s) performed by the resident/non-physician practitioner and I was immediately available to provide assistance.       At this point I agree with the current assessment done in the Emergency Department.  I have conducted an independent evaluation of this patient a history and physical is as follows:    Patient is a 44-year-old female presents to the emergency department for evaluation with nausea and vomiting.  Onset yesterday.  She has Only a tiny bit of food down since yesterday and does have associated lightheadedness and dizziness.  She is attempted taking ondansetron though vomits this as well as other fluids/foods ingested.  On Friday (4 days ago) she did experience some vertiginous symptoms consistent with her Ménière's disease.  She also relates that she started with a migraine that day.  She spent much of the weekend resting in bed but relays that from an intake standpoint she was fine and without nausea/vomiting.  She did take ketorolac with relief of her headache and at this point has only the nausea and vomiting.  She denies any abnormal bowel movements or difficulty/abnormalities with urination.  No fever.  No known bad food ingestion or sick contacts.  No history of abdominal surgeries.    On exam patient appears mildly malaised.  She is slightly pale.  No icterus.  Heart sounds regular, slightly rapid.  Lungs clear to auscultation bilaterally.  No CVA tenderness.  Abdomen is soft with normoactive bowel sounds and nontender.    Differential diagnosis includes but is not  limited to gastritis, gastroenteritis, foodborne enteritis, nausea and vomiting less likely related to migraine or Ménière's disease.  Doubt ACS, diverticulitis, colitis, UTI.    Hydrating, administering ondansetron via IV and checking blood and urine studies.  For reassessment.    ED Course         Critical Care Time  Procedures

## 2024-07-30 NOTE — Clinical Note
Nhung Grant was seen and treated in our emergency department on 7/30/2024.    No restrictions            Diagnosis:     Nhung  may return to work on return date.    She may return on this date: 08/01/2024         If you have any questions or concerns, please don't hesitate to call.      Jacob Fulton MD    ______________________________           _______________          _______________  Hospital Representative                              Date                                Time

## 2024-07-30 NOTE — TELEPHONE ENCOUNTER
Botox number of units: 200  Botox quantity: 1  Arrived at what location: [facility]  Botox at Correct Administering Location: YES  NDC number: 7266-6540-86  Lot number: [id number]  Expiration Date: 12/31/2026  Appt notes indicate correct medication: YES

## 2024-07-31 ENCOUNTER — TELEPHONE (OUTPATIENT)
Age: 44
End: 2024-07-31

## 2024-07-31 NOTE — TELEPHONE ENCOUNTER
July 30, 2024  [facility] (supporting Ayesha Aguilar PA-C)         7/30/24  6:07 PM  I need a prior authorization for my toradol perscription please

## 2024-08-01 ENCOUNTER — PROCEDURE VISIT (OUTPATIENT)
Dept: NEUROLOGY | Facility: CLINIC | Age: 44
End: 2024-08-01
Payer: COMMERCIAL

## 2024-08-01 VITALS — TEMPERATURE: 98.1 F | DIASTOLIC BLOOD PRESSURE: 95 MMHG | HEART RATE: 90 BPM | SYSTOLIC BLOOD PRESSURE: 135 MMHG

## 2024-08-01 DIAGNOSIS — G43.709 CHRONIC MIGRAINE W/O AURA W/O STATUS MIGRAINOSUS, NOT INTRACTABLE: Primary | ICD-10-CM

## 2024-08-01 PROCEDURE — 64615 CHEMODENERV MUSC MIGRAINE: CPT | Performed by: PHYSICIAN ASSISTANT

## 2024-08-01 RX ORDER — METHYLPHENIDATE HYDROCHLORIDE 20 MG/1
20 CAPSULE, EXTENDED RELEASE ORAL EVERY MORNING
Qty: 30 CAPSULE | Refills: 0 | Status: SHIPPED | OUTPATIENT
Start: 2024-08-01

## 2024-08-01 RX ORDER — CLONAZEPAM 0.5 MG/1
0.5 TABLET ORAL
Qty: 30 TABLET | Refills: 0 | Status: SHIPPED | OUTPATIENT
Start: 2024-08-01

## 2024-08-01 RX ORDER — VERAPAMIL HYDROCHLORIDE 40 MG/1
TABLET ORAL
Qty: 90 TABLET | Refills: 1 | Status: SHIPPED | OUTPATIENT
Start: 2024-08-01

## 2024-08-01 NOTE — PROGRESS NOTES
Universal Protocol   Consent: Verbal consent obtained. Written consent obtained.  Risks and benefits: risks, benefits and alternatives were discussed  Consent given by: patient  Patient understanding: patient states understanding of the procedure being performed  Patient consent: the patient's understanding of the procedure matches consent given  Procedure consent: procedure consent matches procedure scheduled      Chemodenervation     Date/Time  8/1/2024 1:00 PM     Performed by  Ayesha Aguilar PA-C   Authorized by  Ayesha Aguilar PA-C     Pre-procedure details      Prepped With: Alcohol     Procedure details      Position:  Upright   Botox      Botox Type:  Type A    Brand:  Botox    mL's of Botulinum Toxin:  200    Final Concentration per CC:  100 units    Needle Gauge:  30 G 2.5 inch   Procedures      Botox Procedures: chronic headache      Indications: migraines     Injection Location      Head / Face:  L superior trapezius, R superior trapezius, L superior cervical paraspinal, R superior cervical paraspinal, L , R , procerus, L temporalis, R temporalis, R frontalis, L frontalis, R medial occipitalis and L medial occipitalis    L  injection amount:  5 unit(s)    R  injection amount:  5 unit(s)    L lateral frontalis:  5 unit(s)    R lateral frontalis:  5 unit(s)    L medial frontalis:  5 unit(s)    R medial frontalis:  5 unit(s)    L temporalis injection amount:  20 unit(s)    R temporalis injection amount:  20 unit(s)    Procerus injection amount:  5 unit(s)    L medial occipitalis injection amount:  15 unit(s)    R medial occipitalis injection amount:  15 unit(s)    L superior cervical paraspinal injection amount:  10 unit(s)    R superior cervical paraspinal injection amount:  10 unit(s)    L superior trapezius injection amount:  15 unit(s)    R superior trapezius injection amount:  15 unit(s)   Total Units      Total units used:  200    Total units discarded:  0    Post-procedure details      Chemodenervation:  Chronic migraine    Facial Nerve Location::  Bilateral facial nerve    Patient tolerance of procedure:  Tolerated well, no immediate complications   Comments       Extra units medically necessary: 25 R frontotemporal region and R apex/scalp, 10 left temporalis, 5 each masseter (10 total).       Blood pressure 135/95, pulse 90, temperature 98.1 °F (36.7 °C), temperature source Temporal, last menstrual period 07/11/2024, not currently breastfeeding.    Added verapamil for migraines worse in summer time.  S/e reviewed.  Continue aimovig monthly.

## 2024-08-02 DIAGNOSIS — F32.A DEPRESSION, UNSPECIFIED DEPRESSION TYPE: ICD-10-CM

## 2024-08-02 RX ORDER — TRAZODONE HYDROCHLORIDE 100 MG/1
TABLET ORAL
Qty: 100 TABLET | Refills: 1 | Status: SHIPPED | OUTPATIENT
Start: 2024-08-02

## 2024-08-02 NOTE — TELEPHONE ENCOUNTER
PA approved through 7/31/25    Called [facility] pharmacy and left a detailed message on their answering machine  and made aware of the approval and for a call back if any questions.

## 2024-08-15 ENCOUNTER — TELEPHONE (OUTPATIENT)
Age: 44
End: 2024-08-15

## 2024-08-15 NOTE — TELEPHONE ENCOUNTER
"Contacted patient off of Medication Management  wait list to verify needs of services in attempts to offer appt. Pt stated that she wishes to stay in [place] and will wait until there is availabiity in [facility].     Behavioral Health Outpatient Intake Questions    Referred By   :     Please advise interviewee that they need to answer all questions truthfully to allow for best care, and any misrepresentations of information may affect their ability to be seen at this clinic   => Was this discussed? Yes     If Minor Child (under age 18)    Who is/are the legal guardian(s) of the child?     Is there a custody agreement? No     If \"YES\"- Custody orders must be obtained prior to scheduling the first appointment  In addition, Consent to Treatment must be signed by all legal guardians prior to scheduling the first appointment    If \"NO\"- Consent to Treatment must be signed by all legal guardians prior to scheduling the first appointment    Behavioral Health Outpatient Intake History -     Presenting Problem (in patient's own words): Anxiety & depression    Are there any communication barriers for this patient?     Yes                                               If yes, please describe barriers: ADD  If there is a unique situation, please refer to Emeterio He/Karine Mcqueen for final determination.    Are you taking any psychiatric medications? Yes     If \"YES\" -What are they Klonopin, Ridilin, Zoloft      If \"YES\" -Who prescribes? PCP     Has the Patient previously received outpatient Talk Therapy or Medication Management from [facility]  Yes        If \"YES\"- When, Where and with Whom? [facility] - decade         If \"NO\" -Has Patient received these services elsewhere?       If \"YES\" -When, Where, and with Whom?    Has the Patient abused alcohol or other substances in the last 6 months ? No  No concerns of substance abuse are reported.     If \"YES\" -What substance, How much, How often?     If illegal substance: Refer to " "[facility] (for ANGEL) or [facility] Offices.   If Alcohol in excess of 10 drinks per week:  Refer to [facility] (for ANGEL) or [facility] Offices    Legal History-     Is this treatment court ordered? No   If \"yes \"send to :  Talk Therapy : Send to Emeterio He/Karine Mcqueen for final determination   Med Management: Send to Dr Stephenson for final determination     Has the Patient been convicted of a felony?  No   If \"Yes\" send to -When, What?  Talk Therapy: Send to Emeterio Mcqueen for final determination   Med Management: Send to Dr Stephenson for final determination     ACCEPTED as a patient Yes  If \"Yes\" Appointment Date:     Referred Elsewhere?   If “Yes” - (Where? Ex: [facility], [facility], [facility], [facility], etc.)       Name of Insurance Co:[employer]  Insurance ID#:[id number]   Insurance Phone #  If ins is primary or secondary?  If patient is a minor, parents information such as Name, D.O.B of guarantor.  "

## 2024-08-20 DIAGNOSIS — J45.30 MILD PERSISTENT ASTHMA WITHOUT COMPLICATION: ICD-10-CM

## 2024-08-20 RX ORDER — FLUTICASONE PROPIONATE AND SALMETEROL 250; 50 UG/1; UG/1
POWDER RESPIRATORY (INHALATION)
Qty: 180 BLISTER | Refills: 1 | Status: SHIPPED | OUTPATIENT
Start: 2024-08-20

## 2024-09-02 DIAGNOSIS — F41.1 GENERALIZED ANXIETY DISORDER: ICD-10-CM

## 2024-09-02 DIAGNOSIS — F98.8 ATTENTION DEFICIT DISORDER, UNSPECIFIED HYPERACTIVITY PRESENCE: ICD-10-CM

## 2024-09-04 RX ORDER — CLONAZEPAM 0.5 MG/1
0.5 TABLET ORAL
Qty: 30 TABLET | Refills: 0 | Status: SHIPPED | OUTPATIENT
Start: 2024-09-04

## 2024-09-04 RX ORDER — METHYLPHENIDATE HYDROCHLORIDE 20 MG/1
20 CAPSULE, EXTENDED RELEASE ORAL EVERY MORNING
Qty: 30 CAPSULE | Refills: 0 | Status: SHIPPED | OUTPATIENT
Start: 2024-09-04

## 2024-09-11 ENCOUNTER — TELEPHONE (OUTPATIENT)
Dept: PSYCHIATRY | Facility: CLINIC | Age: 44
End: 2024-09-11

## 2024-09-11 DIAGNOSIS — I10 PRIMARY HYPERTENSION: ICD-10-CM

## 2024-09-11 DIAGNOSIS — F41.1 GENERALIZED ANXIETY DISORDER: ICD-10-CM

## 2024-09-11 DIAGNOSIS — F32.A DEPRESSION, UNSPECIFIED DEPRESSION TYPE: ICD-10-CM

## 2024-09-11 NOTE — TELEPHONE ENCOUNTER
Forms sent via [employer]    Spoke with pt. She is aware that the forms should be completed at least 1 week prior to appt.   Statement Selected

## 2024-09-12 DIAGNOSIS — G43.709 CHRONIC MIGRAINE WITHOUT AURA WITHOUT STATUS MIGRAINOSUS, NOT INTRACTABLE: Primary | ICD-10-CM

## 2024-09-12 RX ORDER — LISINOPRIL 10 MG/1
TABLET ORAL
Qty: 90 TABLET | Refills: 1 | Status: SHIPPED | OUTPATIENT
Start: 2024-09-12

## 2024-09-12 RX ORDER — SERTRALINE HYDROCHLORIDE 100 MG/1
200 TABLET, FILM COATED ORAL EVERY MORNING
Qty: 180 TABLET | Refills: 1 | Status: SHIPPED | OUTPATIENT
Start: 2024-09-12

## 2024-09-13 RX ORDER — ERENUMAB-AOOE 140 MG/ML
1 INJECTION, SOLUTION SUBCUTANEOUS
Qty: 1 ML | Refills: 11 | Status: SHIPPED | OUTPATIENT
Start: 2024-09-13

## 2024-09-18 ENCOUNTER — TELEPHONE (OUTPATIENT)
Age: 44
End: 2024-09-18

## 2024-09-18 NOTE — TELEPHONE ENCOUNTER
Arcenio collazo from [facility] requesting a Prior Auth for Aimovig. Please call  with update .

## 2024-09-30 DIAGNOSIS — M54.2 CERVICALGIA: ICD-10-CM

## 2024-09-30 DIAGNOSIS — Z96.649 HISTORY OF HIP REPLACEMENT, UNSPECIFIED LATERALITY: Primary | ICD-10-CM

## 2024-09-30 DIAGNOSIS — F41.1 GENERALIZED ANXIETY DISORDER: ICD-10-CM

## 2024-09-30 DIAGNOSIS — F98.8 ATTENTION DEFICIT DISORDER, UNSPECIFIED HYPERACTIVITY PRESENCE: ICD-10-CM

## 2024-10-02 RX ORDER — METHYLPHENIDATE HYDROCHLORIDE 20 MG/1
20 CAPSULE, EXTENDED RELEASE ORAL EVERY MORNING
Qty: 30 CAPSULE | Refills: 0 | Status: SHIPPED | OUTPATIENT
Start: 2024-10-02

## 2024-10-02 RX ORDER — CLONAZEPAM 0.5 MG/1
0.5 TABLET ORAL
Qty: 30 TABLET | Refills: 0 | Status: SHIPPED | OUTPATIENT
Start: 2024-10-02

## 2024-10-02 RX ORDER — CLINDAMYCIN HCL 300 MG
600 CAPSULE ORAL ONCE
Qty: 2 CAPSULE | Refills: 1 | Status: SHIPPED | OUTPATIENT
Start: 2024-10-02 | End: 2024-10-02

## 2024-10-08 ENCOUNTER — OFFICE VISIT (OUTPATIENT)
Dept: GASTROENTEROLOGY | Facility: CLINIC | Age: 44
End: 2024-10-08
Payer: COMMERCIAL

## 2024-10-08 ENCOUNTER — TELEPHONE (OUTPATIENT)
Dept: GASTROENTEROLOGY | Facility: CLINIC | Age: 44
End: 2024-10-08

## 2024-10-08 VITALS
HEIGHT: 72 IN | WEIGHT: 262 LBS | DIASTOLIC BLOOD PRESSURE: 92 MMHG | HEART RATE: 106 BPM | BODY MASS INDEX: 35.49 KG/M2 | SYSTOLIC BLOOD PRESSURE: 128 MMHG

## 2024-10-08 DIAGNOSIS — Z86.0100 HX OF COLONIC POLYPS: ICD-10-CM

## 2024-10-08 DIAGNOSIS — K21.9 GASTROESOPHAGEAL REFLUX DISEASE WITHOUT ESOPHAGITIS: ICD-10-CM

## 2024-10-08 DIAGNOSIS — K31.84 GASTROPARESIS: Primary | ICD-10-CM

## 2024-10-08 DIAGNOSIS — Z80.0 FAMILY HISTORY OF COLON CANCER: ICD-10-CM

## 2024-10-08 PROCEDURE — 99214 OFFICE O/P EST MOD 30 MIN: CPT | Performed by: NURSE PRACTITIONER

## 2024-10-08 RX ORDER — SUCRALFATE 1 G/1
1 TABLET ORAL
Qty: 120 TABLET | Refills: 1 | Status: SHIPPED | OUTPATIENT
Start: 2024-10-08 | End: 2024-11-07

## 2024-10-08 RX ORDER — METOCLOPRAMIDE 5 MG/1
10 TABLET ORAL
Qty: 240 TABLET | Refills: 1 | Status: SHIPPED | OUTPATIENT
Start: 2024-10-08 | End: 2024-11-07

## 2024-10-08 NOTE — ASSESSMENT & PLAN NOTE
Patient has history of GERD.  Patient continues to have intermittent episodes of breakthrough acid reflux despite being on omeprazole 2 times a day.  EGD done 4/5/2023 showed erythematous mucosa in antrum.  Irregular Z-line.  The remainder of detailed exam unremarkable.Biopsy antrum showed gastric antral type mucosa with reactive gastropathy like changes.  Negative for intestinal metaplasia, dysplasia or carcinoma.  No H. pylori.  Esophagus EG junction biopsy showed chronic inflammation.  Negative for intestinal metaplasia, dysplasia, or carcinoma.   Orders:    sucralfate (CARAFATE) 1 g tablet; Take 1 tablet (1 g total) by mouth 4 (four) times a day (before meals and at bedtime) Dissolve pill in 10 mL of water and take as slurry  -Continue omeprazole 40 mg 2 times daily  -Follow antireflux diet and measures

## 2024-10-08 NOTE — PATIENT INSTRUCTIONS
Follow gastroparesis diet low-fat low fiber 6 small meals a day discontinue Gimoti  Start metoclopramide  pills 10 mg 4 times a day 30 minutes before meals and at bedtime  Continue antireflux diet and measures  If no improvement in symptoms after Botox injection please reach out to office for appointment can be scheduled with Dr. Blanc as option for gastroparesis to discuss domperidone

## 2024-10-08 NOTE — ASSESSMENT & PLAN NOTE
Colonoscopy done 4/5/2023 which showed 5 polyps removed.  Small internal hemorrhoids. Biopsy showed hyperplastic polyps x 4. Colon rectal polyp showed polypoid colonic mucosa with focal prominent lymphoid aggregate.  Negative for dysplasia or carcinoma  Recommendations were for repeat colonoscopy at age 46.  Surveillance colonoscopy due 4/2026       Follow-up after EGD

## 2024-10-08 NOTE — PROGRESS NOTES
Ambulatory Visit  Name: Nhung Grant      : 1980      MRN: [id number]  Encounter Provider: CROW Leslie  Encounter Date: 10/8/2024   Encounter department: [facility] GASTROENTEROLOGY [address]    Assessment & Plan  Gastroparesis  Patient has history of gastroparesis. Patient is evaluated the hospital you not with following gastroparesis diet.  Patient was on Gimoti but reported increased nausea and vomiting and nasal sore with medication so she stopped the medication. Patient reports she will vomit daily in the morning clear secretions. She continues to use Zofran as needed for nausea and vomiting.   Orders:    metoclopramide (REGLAN) 5 mg tablet; Take 2 tablets (10 mg total) by mouth 4 (four) times a day (before meals and at bedtime) Take 30 minutes prior to meals    EGD Botox; Future  -Patient aware of side effects of metoclopramide.  -Continue gastroparesis diet low fat/low fiber 6 small meals a day  -Continue Zofran as needed for breakthrough nausea and vomiting  -Continue Omeprazole    sucralfate (CARAFATE) 1 g tablet; Take 1 tablet (1 g total) by mouth 4 (four) times a day (before meals and at bedtime) Dissolve pill in 10 mL of water and take as slurry  -If Botox ineffective in controlling gastroparesis nausea and vomiting will refer to Dr Blanc for consideration of Domperidine   Gastroesophageal reflux disease without esophagitis  Patient has history of GERD.  Patient continues to have intermittent episodes of breakthrough acid reflux despite being on omeprazole 2 times a day.  EGD done 2023 showed erythematous mucosa in antrum.  Irregular Z-line.  The remainder of detailed exam unremarkable.Biopsy antrum showed gastric antral type mucosa with reactive gastropathy like changes.  Negative for intestinal metaplasia, dysplasia or carcinoma.  No H. pylori.  Esophagus EG junction biopsy showed chronic inflammation.  Negative for intestinal metaplasia, dysplasia, or  carcinoma.   Orders:    sucralfate (CARAFATE) 1 g tablet; Take 1 tablet (1 g total) by mouth 4 (four) times a day (before meals and at bedtime) Dissolve pill in 10 mL of water and take as slurry  -Continue omeprazole 40 mg 2 times daily  -Follow antireflux diet and measures  Family history of colon cancer  Positive family history colon cancer maternal grandmother.  Colonoscopy screening up-to-date.       Hx of colonic polyps  Colonoscopy done 4/5/2023 which showed 5 polyps removed.  Small internal hemorrhoids. Biopsy showed hyperplastic polyps x 4. Colon rectal polyp showed polypoid colonic mucosa with focal prominent lymphoid aggregate.  Negative for dysplasia or carcinoma  Recommendations were for repeat colonoscopy at age 46.  Surveillance colonoscopy due 4/2026       Follow-up after EGD    History of Present Illness     Nhung Grant is a 44 y.o. female who presents to office for follow-up for gastroparesis, GERD, family history of colon cancer and history of colon polyps.  Patient was on Gimoti but reported increased nausea and vomiting and nasal sore with medication so she stopped the medication. Patient reports she will vomit daily in the morning clear secretions. She continues to use Zofran as needed for nausea and vomiting.  She continues to have intermitted acid reflux despite being on omeprazole 40 mg 2 times a day.  Patient denies heartburn, dysphagia, epigastric or abdominal pain.  Patient denies blood in stool, blood from rectal area, or black tarry stool.  Bowel patterns are regular. Abdomen exam benign no abdominal tenderness or guarding.  EGD done 4/5/2023 showed erythematous mucosa in antrum.  Irregular Z-line.  The remainder of detailed exam unremarkable.Biopsy antrum showed gastric antral type mucosa with reactive gastropathy like changes.  Negative for intestinal metaplasia, dysplasia or carcinoma.  No H. pylori.  Esophagus EG junction biopsy showed chronic inflammation.  Negative for intestinal  metaplasia, dysplasia, or carcinoma. Colonoscopy done 4/5/2023 which showed 5 polyps removed.  Small internal hemorrhoids. Biopsy showed hyperplastic polyps x 4. Colon rectal polyp showed polypoid colonic mucosa with focal prominent lymphoid aggregate.  Negative for dysplasia or carcinoma  Recommendations were for repeat colonoscopy at age 46.       Review of Systems   Constitutional:  Negative for chills and fever.   HENT:  Negative for ear pain and sore throat.    Eyes:  Negative for pain and visual disturbance.   Respiratory:  Negative for cough and shortness of breath.    Cardiovascular:  Negative for chest pain and palpitations.   Gastrointestinal:  Positive for nausea and vomiting. Negative for abdominal pain.   Genitourinary:  Negative for dysuria and hematuria.   Musculoskeletal:  Negative for arthralgias and back pain.   Skin:  Negative for color change and rash.   Neurological:  Negative for seizures and syncope.   All other systems reviewed and are negative.    Current Outpatient Medications on File Prior to Visit   Medication Sig Dispense Refill    Aimovig 140 MG/ML SOAJ Inject 1 mL under the skin every 30 (thirty) days 1 mL 11    albuterol (PROVENTIL HFA,VENTOLIN HFA) 90 mcg/act inhaler Inhale 2 puffs 4 (four) times a day 8.5 g 0    Botox 200 units SOLR       clonazePAM (KlonoPIN) 0.5 mg tablet Take 1 tablet (0.5 mg total) by mouth daily at bedtime 30 tablet 0    dexamethasone (DECADRON) 2 mg tablet 1 tablet daily. 5 tablet 0    Fluticasone-Salmeterol (Advair) 250-50 mcg/dose inhaler INHALE 1 PUFF 2 TIMES A DAY RINSE MOUTH AFTER USE. 180 blister 1    ketorolac (TORADOL) 30 mg/mL injection I-2 ML INTRAMUSCULAR INJECTION ONCE PER 24 HOURS AS NEEDED FOR MIGRAINE. NO MORE THAN 2 INJECTIONS PER WEEK. 4 mL 0    lisinopril (ZESTRIL) 10 mg tablet TAKE 1 TABLET BY MOUTH EVERY DAY 90 tablet 1    meclizine (ANTIVERT) 12.5 MG tablet Take 2 tablets (25 mg total) by mouth every 8 (eight) hours as needed for dizziness  "20 tablet 6    methylphenidate (Ritalin LA) 20 MG 24 hr capsule Take 1 capsule (20 mg total) by mouth every morning Max Daily Amount: 20 mg 30 capsule 0    montelukast (SINGULAIR) 10 mg tablet TAKE 1 TABLET BY MOUTH EVERYDAY AT BEDTIME 90 tablet 1    norethindrone (MICRONOR) 0.35 MG tablet       Nurtec 75 MG TBDP TAKE 1 TABLET BY MOUTH EVERY OTHER DAY NO MORE THAN 1 DOSE PER 24 HOURS 16 tablet 11    omeprazole (PriLOSEC) 40 MG capsule TAKE 1 CAPSULE BY MOUTH TWICE A DAY 60 capsule 5    ondansetron (ZOFRAN-ODT) 4 mg disintegrating tablet TAKE 1 TABLET BY MOUTH EVERY 8 HOURS AS NEEDED FOR NAUSEA 9 tablet 6    prochlorperazine (COMPAZINE) 10 mg/2mL Inject 2 mL IM EVERY 12 HOURS AS NEEDED FOR MIGRAINE/NAUSEA/VOMITING - MAX 4 INJECTIONS PER WEEK 10 mL 0    sertraline (ZOLOFT) 100 mg tablet TAKE 2 TABLETS BY MOUTH EVERY MORNING 180 tablet 1    Syringe/Needle, Disp, (SYRINGE 3CC/49VG5-9/2\") 25G X 1-1/2\" 3 ML MISC Use for toradol IM injections. 12 each 2    tiZANidine (ZANAFLEX) 4 mg tablet TAKE 1 TABLET (4 MG TOTAL) BY MOUTH DAILY AT BEDTIME AS NEEDED FOR MUSCLE SPASMS 30 tablet 2    traZODone (DESYREL) 100 mg tablet TAKE 1/2 TO 1 TABLETS ( MG TOTAL) BY MOUTH DAILY AT BEDTIME AS NEEDED FOR SLEEP 100 tablet 1    verapamil (CALAN) 40 mg tablet 1 tab qam 90 tablet 1    Vraylar 1.5 MG capsule Take 1 capsule (1.5 mg total) by mouth daily 90 capsule 0    [DISCONTINUED] Gimoti 15 MG/ACT SOLN INSTILL ONE SPRAY INTO RIGHT NOSTRIL FOUR TIMES PER DAY 30 MINUTES BEFORE EACH MEAL AND AT BEDTIME      gabapentin (NEURONTIN) 600 MG tablet Take 1 tablet (600 mg total) by mouth 2 (two) times a day (Patient not taking: Reported on 7/18/2024) 60 tablet 5    meloxicam (MOBIC) 7.5 mg tablet TAKE 1 TABLET BY MOUTH EVERY DAY (Patient not taking: Reported on 10/8/2024) 30 tablet 0    onabotulinumtoxin A (BOTOX) 100 units one time. \"for vertigo\" (Patient not taking: Reported on 4/25/2024)      ondansetron (ZOFRAN-ODT) 4 mg disintegrating " "tablet Take 1 tablet (4 mg total) by mouth every 6 (six) hours as needed for nausea or vomiting 20 tablet 0     No current facility-administered medications on file prior to visit.      Social History     Tobacco Use    Smoking status: Former     Current packs/day: 0.00     Average packs/day: 0.3 packs/day for 25.0 years (6.3 ttl pk-yrs)     Types: Cigarettes     Start date: 1997     Quit date: 2022     Years since quittin.7     Passive exposure: Yes    Smokeless tobacco: Never   Vaping Use    Vaping status: Never Used   Substance and Sexual Activity    Alcohol use: Not Currently     Alcohol/week: 3.0 standard drinks of alcohol     Types: 3 Glasses of wine per week     Comment: 3 glass wine per week    Drug use: Not Currently     Frequency: 1.0 times per week     Types: Marijuana    Sexual activity: Not Currently     Partners: Male     Birth control/protection: Abstinence         Objective     /92 (BP Location: Left arm, Patient Position: Sitting, Cuff Size: Standard)   Pulse (!) 106   Ht 6' 1\" (1.854 m)   Wt 119 kg (262 lb)   BMI 34.57 kg/m²     Physical Exam  Vitals and nursing note reviewed.   Constitutional:       General: She is not in acute distress.     Appearance: She is well-developed.   HENT:      Head: Normocephalic and atraumatic.   Eyes:      Conjunctiva/sclera: Conjunctivae normal.   Cardiovascular:      Rate and Rhythm: Normal rate and regular rhythm.      Pulses: Normal pulses.      Heart sounds: Normal heart sounds. No murmur heard.  Pulmonary:      Effort: Pulmonary effort is normal. No respiratory distress.      Breath sounds: Normal breath sounds. No stridor. No wheezing, rhonchi or rales.   Abdominal:      General: Bowel sounds are normal. There is no distension.      Palpations: Abdomen is soft. There is no mass.      Tenderness: There is no abdominal tenderness. There is no guarding.   Musculoskeletal:         General: No swelling.      Cervical back: Neck supple.      " Right lower leg: No edema.      Left lower leg: No edema.   Skin:     General: Skin is warm and dry.      Capillary Refill: Capillary refill takes less than 2 seconds.      Coloration: Skin is not jaundiced or pale.   Neurological:      Mental Status: She is alert and oriented to person, place, and time.   Psychiatric:         Mood and Affect: Mood normal.       To office for follow-up

## 2024-10-08 NOTE — ASSESSMENT & PLAN NOTE
Positive family history colon cancer maternal grandmother.  Colonoscopy screening up-to-date.

## 2024-10-08 NOTE — ASSESSMENT & PLAN NOTE
Patient has history of gastroparesis. Patient is evaluated the hospital you not with following gastroparesis diet.  Patient was on Gimoti but reported increased nausea and vomiting and nasal sore with medication so she stopped the medication. Patient reports she will vomit daily in the morning clear secretions. She continues to use Zofran as needed for nausea and vomiting.   Orders:    metoclopramide (REGLAN) 5 mg tablet; Take 2 tablets (10 mg total) by mouth 4 (four) times a day (before meals and at bedtime) Take 30 minutes prior to meals    EGD Botox; Future  -Patient aware of side effects of metoclopramide.  -Continue gastroparesis diet low fat/low fiber 6 small meals a day  -Continue Zofran as needed for breakthrough nausea and vomiting  -Continue Omeprazole    sucralfate (CARAFATE) 1 g tablet; Take 1 tablet (1 g total) by mouth 4 (four) times a day (before meals and at bedtime) Dissolve pill in 10 mL of water and take as slurry  -If Botox ineffective in controlling gastroparesis nausea and vomiting will refer to Dr Blanc for consideration of Domperidine

## 2024-10-08 NOTE — TELEPHONE ENCOUNTER
Scheduled date of EGD(as of today): 12/31/24  Physician performing EGD: Dr. Martin  Location of EGD:   Instructions reviewed with patient by: tl given at appt  Clearances:     EGD w botox 200 units

## 2024-10-22 DIAGNOSIS — M54.2 CERVICALGIA: ICD-10-CM

## 2024-10-24 ENCOUNTER — TELEPHONE (OUTPATIENT)
Dept: NEUROLOGY | Facility: CLINIC | Age: 44
End: 2024-10-24

## 2024-10-24 NOTE — TELEPHONE ENCOUNTER
Botox number of units: 200 units  Botox quantity: 1  Arrived at what location: [place]  Botox at Correct Administering Location: Yes  NDC number: 5508-4753-49  Lot number: [id number]  Expiration Date: 03/01/2027  Appt notes indicate correct medication: Yes

## 2024-10-27 DIAGNOSIS — F32.A DEPRESSION, UNSPECIFIED DEPRESSION TYPE: ICD-10-CM

## 2024-10-27 DIAGNOSIS — F41.1 GENERALIZED ANXIETY DISORDER: ICD-10-CM

## 2024-10-28 RX ORDER — CARIPRAZINE 1.5 MG/1
1.5 CAPSULE, GELATIN COATED ORAL DAILY
Qty: 90 CAPSULE | Refills: 0 | Status: SHIPPED | OUTPATIENT
Start: 2024-10-28

## 2024-10-30 DIAGNOSIS — K21.9 GASTROESOPHAGEAL REFLUX DISEASE WITHOUT ESOPHAGITIS: ICD-10-CM

## 2024-10-31 ENCOUNTER — PROCEDURE VISIT (OUTPATIENT)
Dept: NEUROLOGY | Facility: CLINIC | Age: 44
End: 2024-10-31
Payer: COMMERCIAL

## 2024-10-31 VITALS — HEART RATE: 76 BPM | SYSTOLIC BLOOD PRESSURE: 137 MMHG | TEMPERATURE: 98.5 F | DIASTOLIC BLOOD PRESSURE: 86 MMHG

## 2024-10-31 DIAGNOSIS — G43.709 CHRONIC MIGRAINE W/O AURA W/O STATUS MIGRAINOSUS, NOT INTRACTABLE: Primary | ICD-10-CM

## 2024-10-31 PROCEDURE — 64615 CHEMODENERV MUSC MIGRAINE: CPT | Performed by: PHYSICIAN ASSISTANT

## 2024-10-31 RX ORDER — SUCRALFATE 1 G/1
1 TABLET ORAL
Qty: 360 TABLET | Refills: 1 | Status: SHIPPED | OUTPATIENT
Start: 2024-10-31 | End: 2024-11-30

## 2024-10-31 NOTE — PROGRESS NOTES
Universal Protocol   Consent: Verbal consent obtained. Written consent obtained.  Risks and benefits: risks, benefits and alternatives were discussed  Consent given by: patient  Patient understanding: patient states understanding of the procedure being performed  Patient consent: the patient's understanding of the procedure matches consent given  Procedure consent: procedure consent matches procedure scheduled      Chemodenervation     Date/Time  10/31/2024 1:00 PM     Performed by  Ayesha Aguilar PA-C   Authorized by  Ayesha Aguilar PA-C     Pre-procedure details      Prepped With: Alcohol     Procedure details      Position:  Upright   Botox      Botox Type:  Type A    Brand:  Botox    mL's of Botulinum Toxin:  200    Final Concentration per CC:  100 units    Needle Gauge:  30 G 2.5 inch   Procedures      Botox Procedures: chronic headache      Indications: migraines     Injection Location      Head / Face:  L superior trapezius, R superior trapezius, L superior cervical paraspinal, R superior cervical paraspinal, L , R , procerus, L temporalis, R temporalis, R frontalis, L frontalis, R medial occipitalis and L medial occipitalis    L  injection amount:  5 unit(s)    R  injection amount:  5 unit(s)    L lateral frontalis:  5 unit(s)    R lateral frontalis:  5 unit(s)    L medial frontalis:  5 unit(s)    R medial frontalis:  5 unit(s)    L temporalis injection amount:  20 unit(s)    R temporalis injection amount:  20 unit(s)    Procerus injection amount:  5 unit(s)    L medial occipitalis injection amount:  15 unit(s)    R medial occipitalis injection amount:  15 unit(s)    L superior cervical paraspinal injection amount:  10 unit(s)    R superior cervical paraspinal injection amount:  10 unit(s)    L superior trapezius injection amount:  15 unit(s)    R superior trapezius injection amount:  15 unit(s)   Total Units      Total units used:  200    Total units discarded:  0    Post-procedure details      Chemodenervation:  Chronic migraine    Facial Nerve Location::  Bilateral facial nerve    Patient tolerance of procedure:  Tolerated well, no immediate complications   Comments       Extra units medically necessary: 25 R frontotemporal region and R apex/scalp, 10 left temporalis, 5 each masseter (10 total).       Blood pressure 137/86, pulse 76, temperature 98.5 °F (36.9 °C), temperature source Temporal, not currently breastfeeding.

## 2024-11-01 DIAGNOSIS — F98.8 ATTENTION DEFICIT DISORDER, UNSPECIFIED HYPERACTIVITY PRESENCE: ICD-10-CM

## 2024-11-01 DIAGNOSIS — F41.1 GENERALIZED ANXIETY DISORDER: ICD-10-CM

## 2024-11-01 RX ORDER — CLONAZEPAM 0.5 MG/1
0.5 TABLET ORAL
Qty: 30 TABLET | Refills: 0 | Status: SHIPPED | OUTPATIENT
Start: 2024-11-01

## 2024-11-01 RX ORDER — METHYLPHENIDATE HYDROCHLORIDE 20 MG/1
20 CAPSULE, EXTENDED RELEASE ORAL EVERY MORNING
Qty: 30 CAPSULE | Refills: 0 | Status: SHIPPED | OUTPATIENT
Start: 2024-11-01

## 2024-11-07 ENCOUNTER — VBI (OUTPATIENT)
Dept: ADMINISTRATIVE | Facility: OTHER | Age: 44
End: 2024-11-07

## 2024-11-07 NOTE — TELEPHONE ENCOUNTER
11/07/24 11:28 AM     Chart reviewed for   Controlling High Blood Pressure    was/were submitted to the patient's insurance.     Zulma Ramirez   [facility]

## 2024-11-12 DIAGNOSIS — G43.709 CHRONIC MIGRAINE W/O AURA W/O STATUS MIGRAINOSUS, NOT INTRACTABLE: ICD-10-CM

## 2024-11-14 RX ORDER — KETOROLAC TROMETHAMINE 30 MG/ML
INJECTION, SOLUTION INTRAMUSCULAR; INTRAVENOUS
Qty: 4 ML | Refills: 2 | Status: SHIPPED | OUTPATIENT
Start: 2024-11-14

## 2024-11-18 ENCOUNTER — OFFICE VISIT (OUTPATIENT)
Dept: PSYCHIATRY | Facility: CLINIC | Age: 44
End: 2024-11-18
Payer: COMMERCIAL

## 2024-11-18 DIAGNOSIS — F41.1 GENERALIZED ANXIETY DISORDER: ICD-10-CM

## 2024-11-18 DIAGNOSIS — F39 MOOD DISORDER (HCC): Primary | ICD-10-CM

## 2024-11-18 DIAGNOSIS — F90.1 ADHD, PREDOMINANTLY HYPERACTIVE TYPE: ICD-10-CM

## 2024-11-18 DIAGNOSIS — F32.A DEPRESSION, UNSPECIFIED DEPRESSION TYPE: ICD-10-CM

## 2024-11-18 PROCEDURE — 99204 OFFICE O/P NEW MOD 45 MIN: CPT | Performed by: PSYCHIATRY & NEUROLOGY

## 2024-11-18 RX ORDER — DEXTROAMPHETAMINE SACCHARATE, AMPHETAMINE ASPARTATE, DEXTROAMPHETAMINE SULFATE AND AMPHETAMINE SULFATE 2.5; 2.5; 2.5; 2.5 MG/1; MG/1; MG/1; MG/1
10 TABLET ORAL
Qty: 60 TABLET | Refills: 0 | Status: SHIPPED | OUTPATIENT
Start: 2024-11-18

## 2024-11-18 RX ORDER — SERTRALINE HYDROCHLORIDE 100 MG/1
200 TABLET, FILM COATED ORAL EVERY MORNING
Qty: 60 TABLET | Refills: 1 | Status: SHIPPED | OUTPATIENT
Start: 2024-11-18

## 2024-11-18 RX ORDER — CLONAZEPAM 0.5 MG/1
0.5 TABLET ORAL
Qty: 30 TABLET | Refills: 1 | Status: SHIPPED | OUTPATIENT
Start: 2024-11-18

## 2024-11-18 RX ORDER — TRAZODONE HYDROCHLORIDE 100 MG/1
100 TABLET ORAL
Qty: 30 TABLET | Refills: 1 | Status: SHIPPED | OUTPATIENT
Start: 2024-11-18
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
## 2024-11-18 NOTE — BH TREATMENT PLAN
TREATMENT PLAN (Medication Management Only)        [facility]    Name and Date of Birth:  Nhung Grant 44 y.o. 1980  Date of Treatment Plan: November 18, 2024  Diagnosis/Diagnoses:    1. Mood disorder (HCC)    2. Generalized anxiety disorder    3. Depression, unspecified depression type    4. ADHD, predominantly hyperactive type      Strengths/Personal Resources for Self-Care: supportive family, taking medications as prescribed, ability to communicate needs, ability to communicate well.  Area/Areas of need (in own words): mood instability  1. Long Term Goal: maintain mood stability.  Target Date:6 months - 5/18/2025  Person/Persons responsible for completion of goal: Nhung  2.  Short Term Objective (s) - How will we reach this goal?:   A. Provider new recommended medication/dosage changes and/or continue medication(s): continue current medications as prescribed.  B. N/A.  C. N/A.  Target Date:6 months - 5/18/2025  Person/Persons Responsible for Completion of Goal: Nhung  Progress Towards Goals: continuing treatment  Treatment Modality: medication management every 6 weeks  Review due 180 days from date of this plan: 6 months - 5/18/2025  Expected length of service: ongoing treatment  My Physician/PA/NP and I have developed this plan together and I agree to work on the goals and objectives. I understand the treatment goals that were developed for my treatment.

## 2024-11-21 ENCOUNTER — TELEPHONE (OUTPATIENT)
Age: 44
End: 2024-11-21

## 2024-11-21 NOTE — TELEPHONE ENCOUNTER
PA for  amphetamine-dextroamphetamine 10 mg tablet  APPROVED     Date(s) approved 11/21/2024 to 11/21/2025    Case #    Patient advised by          [x]MyChart Message  []Phone call   []LMOM  []L/M to call office as no active Communication consent on file  []Unable to leave detailed message as VM not approved on Communication consent       Pharmacy advised by    [x]Fax  []Phone call    Approval letter scanned into [employer] Yes

## 2024-11-21 NOTE — TELEPHONE ENCOUNTER
PA for amphetamine-dextroamphetamine 10 mg tablet SUBMITTED to [employer]     via    [x]CMM-KEY: [id number]  []Surescripts-Case ID #   []Availity-Auth ID # NDC #   []Faxed to plan   []Other website   []Phone call Case ID #     []PA sent as URGENT    All office notes, labs and other pertaining documents and studies sent. Clinical questions answered. Awaiting determination from insurance company.     Turnaround time for your insurance to make a decision on your Prior Authorization can take 7-21 business days.

## 2024-11-25 DIAGNOSIS — K31.84 GASTROPARESIS: ICD-10-CM

## 2024-11-26 RX ORDER — METOCLOPRAMIDE 5 MG/1
TABLET ORAL
Qty: 240 TABLET | Refills: 1 | Status: SHIPPED | OUTPATIENT
Start: 2024-11-26

## 2024-11-28 DIAGNOSIS — R12 HEARTBURN: ICD-10-CM

## 2024-11-28 DIAGNOSIS — R11.2 NAUSEA AND VOMITING, UNSPECIFIED VOMITING TYPE: ICD-10-CM

## 2024-11-29 DIAGNOSIS — J45.30 MILD PERSISTENT ASTHMA WITHOUT COMPLICATION: ICD-10-CM

## 2024-11-29 DIAGNOSIS — G43.709 CHRONIC MIGRAINE W/O AURA W/O STATUS MIGRAINOSUS, NOT INTRACTABLE: ICD-10-CM

## 2024-11-29 RX ORDER — MONTELUKAST SODIUM 10 MG/1
10 TABLET ORAL
Qty: 90 TABLET | Refills: 1 | Status: SHIPPED | OUTPATIENT
Start: 2024-11-29

## 2024-11-29 RX ORDER — VERAPAMIL HYDROCHLORIDE 40 MG/1
40 TABLET ORAL EVERY MORNING
Qty: 90 TABLET | Refills: 1 | Status: SHIPPED | OUTPATIENT
Start: 2024-11-29

## 2024-11-29 RX ORDER — OMEPRAZOLE 40 MG/1
40 CAPSULE, DELAYED RELEASE ORAL 2 TIMES DAILY
Qty: 60 CAPSULE | Refills: 5 | Status: SHIPPED | OUTPATIENT
Start: 2024-11-29

## 2024-12-03 ENCOUNTER — OFFICE VISIT (OUTPATIENT)
Dept: INTERNAL MEDICINE CLINIC | Facility: CLINIC | Age: 44
End: 2024-12-03
Payer: COMMERCIAL

## 2024-12-03 VITALS
HEART RATE: 105 BPM | DIASTOLIC BLOOD PRESSURE: 76 MMHG | TEMPERATURE: 95.9 F | HEIGHT: 72 IN | OXYGEN SATURATION: 98 % | RESPIRATION RATE: 16 BRPM | WEIGHT: 259 LBS | BODY MASS INDEX: 35.08 KG/M2 | SYSTOLIC BLOOD PRESSURE: 106 MMHG

## 2024-12-03 DIAGNOSIS — R05.1 ACUTE COUGH: ICD-10-CM

## 2024-12-03 LAB
SARS-COV-2 AG UPPER RESP QL IA: NEGATIVE
SL AMB POCT RAPID FLU A: NORMAL
SL AMB POCT RAPID FLU B: NORMAL
VALID CONTROL: NORMAL

## 2024-12-03 PROCEDURE — 87811 SARS-COV-2 COVID19 W/OPTIC: CPT | Performed by: NURSE PRACTITIONER

## 2024-12-03 PROCEDURE — 87804 INFLUENZA ASSAY W/OPTIC: CPT | Performed by: NURSE PRACTITIONER

## 2024-12-03 PROCEDURE — 99213 OFFICE O/P EST LOW 20 MIN: CPT | Performed by: NURSE PRACTITIONER

## 2024-12-03 NOTE — LETTER
December 3, 2024     Patient: Nhung Grant  YOB: 1980  Date of Visit: 12/3/2024      To Whom it May Concern:    Nhung Grant is under my professional care. Nhung was seen in my office on 12/3/2024. Please excuse her from work from 12/2-12/6/2024. Nhung may return to work on 12/9/2024 .    If you have any questions or concerns, please don't hesitate to call.         Sincerely,          CROW Felix

## 2024-12-03 NOTE — PROGRESS NOTES
"Name: Nhung Grant      : 1980      MRN: [id number]  Encounter Provider: CROW Felix  Encounter Date: 12/3/2024   Encounter department: [facility] INTERNAL MEDICINE  :  Assessment & Plan  Acute cough  Rapid covid and flu negative.   Recommend repeating covid test in 2 days.   Continue symptomatic treatment with plenty of fluids and rest. Ok to take tylenol and robitussin as needed.   Orders:    POCT Rapid Covid Ag    POCT rapid flu A and B           History of Present Illness     Nhung is here today with flu like symptoms.   Symptoms started yesterday  She has fever, chills, headache, post nasal drip, nasal congestion, cough, and a sore throat.   Taking OTC cold medication.      Cough  This is a new problem. The current episode started in the past 7 days. Associated symptoms include chills, a fever, headaches, nasal congestion, postnasal drip, rhinorrhea, a sore throat, shortness of breath and sweats. Pertinent negatives include no chest pain, ear congestion, ear pain, heartburn, hemoptysis, myalgias or rash.     Review of Systems   Constitutional:  Positive for chills and fever.   HENT:  Positive for postnasal drip, rhinorrhea and sore throat. Negative for ear pain.    Respiratory:  Positive for cough and shortness of breath. Negative for hemoptysis.    Cardiovascular:  Negative for chest pain.   Gastrointestinal:  Negative for heartburn.   Musculoskeletal:  Negative for myalgias.   Skin:  Negative for rash.   Neurological:  Positive for headaches.          Objective   /76 (BP Location: Left arm, Patient Position: Sitting, Cuff Size: Large)   Pulse 105   Temp (!) 95.9 °F (35.5 °C) (Temporal)   Resp 16   Ht 6' 1\" (1.854 m)   Wt 117 kg (259 lb)   LMP 2024 (Exact Date)   SpO2 98%   BMI 34.17 kg/m²      Physical Exam  Vitals reviewed.   Constitutional:       Appearance: Normal appearance.   HENT:      Head: Normocephalic and atraumatic.      Right Ear: Tympanic " membrane, ear canal and external ear normal.      Left Ear: Tympanic membrane, ear canal and external ear normal.      Nose: Congestion present.      Mouth/Throat:      Pharynx: Posterior oropharyngeal erythema present. No oropharyngeal exudate.   Eyes:      Conjunctiva/sclera: Conjunctivae normal.   Cardiovascular:      Rate and Rhythm: Normal rate and regular rhythm.      Heart sounds: Normal heart sounds.   Pulmonary:      Effort: Pulmonary effort is normal.      Breath sounds: Normal breath sounds.   Neurological:      Mental Status: She is alert and oriented to person, place, and time.   Psychiatric:         Mood and Affect: Mood normal.         Behavior: Behavior normal.     Depression Screening Follow-up Plan: Patient's depression screening was positive with a PHQ-2 score of . Their PHQ-9 score was 8. Continue regular follow-up with their psychologist/therapist/psychiatrist who is managing their mental health condition(s).

## 2024-12-19 ENCOUNTER — OFFICE VISIT (OUTPATIENT)
Dept: PSYCHIATRY | Facility: CLINIC | Age: 44
End: 2024-12-19
Payer: COMMERCIAL

## 2024-12-19 DIAGNOSIS — F90.1 ADHD, PREDOMINANTLY HYPERACTIVE TYPE: ICD-10-CM

## 2024-12-19 DIAGNOSIS — F41.1 GENERALIZED ANXIETY DISORDER: ICD-10-CM

## 2024-12-19 DIAGNOSIS — F39 MOOD DISORDER (HCC): ICD-10-CM

## 2024-12-19 DIAGNOSIS — F32.A DEPRESSION, UNSPECIFIED DEPRESSION TYPE: ICD-10-CM

## 2024-12-19 PROCEDURE — 99213 OFFICE O/P EST LOW 20 MIN: CPT | Performed by: PSYCHIATRY & NEUROLOGY

## 2024-12-19 RX ORDER — SERTRALINE HYDROCHLORIDE 100 MG/1
200 TABLET, FILM COATED ORAL EVERY MORNING
Qty: 60 TABLET | Refills: 1 | Status: SHIPPED | OUTPATIENT
Start: 2024-12-19

## 2024-12-19 RX ORDER — CLONAZEPAM 0.5 MG/1
0.5 TABLET ORAL
Qty: 30 TABLET | Refills: 1 | Status: SHIPPED | OUTPATIENT
Start: 2024-12-19

## 2024-12-19 RX ORDER — TRAZODONE HYDROCHLORIDE 100 MG/1
100 TABLET ORAL
Qty: 30 TABLET | Refills: 1 | Status: SHIPPED | OUTPATIENT
Start: 2024-12-19

## 2024-12-19 RX ORDER — DEXTROAMPHETAMINE SACCHARATE, AMPHETAMINE ASPARTATE, DEXTROAMPHETAMINE SULFATE AND AMPHETAMINE SULFATE 2.5; 2.5; 2.5; 2.5 MG/1; MG/1; MG/1; MG/1
10 TABLET ORAL
Qty: 60 TABLET | Refills: 0 | Status: SHIPPED | OUTPATIENT
Start: 2024-12-19

## 2024-12-19 RX ORDER — DEXTROAMPHETAMINE SACCHARATE, AMPHETAMINE ASPARTATE MONOHYDRATE, DEXTROAMPHETAMINE SULFATE AND AMPHETAMINE SULFATE 5; 5; 5; 5 MG/1; MG/1; MG/1; MG/1
20 CAPSULE, EXTENDED RELEASE ORAL EVERY MORNING
Qty: 60 CAPSULE | Refills: 0 | Status: SHIPPED | OUTPATIENT
Start: 2025-01-19

## 2024-12-19 NOTE — PSYCH
Nhung Grant 1980 [id number]     The patient was seen for continuing care and pharmacotherapy.  After starting the medications, she started feeling better after several days.  She is now sleeping and eating normally.  She is functioning at work.  No particular side effects with medications        ROS:  No complaints  Current Mental Status Evaluation:  Appearance:  Adequate hygiene and grooming and Good eye contact   Behavior:  Calm and Cooperative   Mood:  Euthymic   Affect: appropriate   Speech: Normal volume and Normal rate   Thought Process:  Goal directed and coherent   Thought Content:  Does not verbalize delusional material   Perceptual Disturbances: Denies hallucinations and does not appear to be responding to internal stimuli   Risk Potential: No suicidal or homicidal ideation   Orientation:        No diagnosis found.       Current Outpatient Medications   Medication Instructions    Aimovig 140 MG/ML SOAJ 1 mL, Subcutaneous, Every 30 days    albuterol (PROVENTIL HFA,VENTOLIN HFA) 90 mcg/act inhaler 2 puffs, Inhalation, 4 times daily    amphetamine-dextroamphetamine (ADDERALL, 10MG,) 10 mg tablet 10 mg, Oral, 2 times daily (morning and afternoon)    Botox 200 units SOLR No dose, route, or frequency recorded.    cariprazine (VRAYLAR) 3 mg, Oral, Daily    clonazePAM (KLONOPIN) 0.5 mg, Oral, Daily at bedtime    dexamethasone (DECADRON) 2 mg tablet 1 tablet daily.    Fluticasone-Salmeterol (Advair) 250-50 mcg/dose inhaler INHALE 1 PUFF 2 TIMES A DAY RINSE MOUTH AFTER USE.    ketorolac (TORADOL) 30 mg/mL injection I-2 ML INTRAMUSCULAR INJECTION ONCE PER 24 HOURS AS NEEDED FOR MIGRAINE. NO MORE THAN 2 INJECTIONS PER WEEK.    lisinopril (ZESTRIL) 10 mg tablet TAKE 1 TABLET BY MOUTH EVERY DAY    meclizine (ANTIVERT) 25 mg, Oral, Every 8 hours PRN    metoclopramide (REGLAN) 5 mg tablet TAKE 2 TABLETS BY MOUTH 4 TIMES A DAY 30 MINUTES PRIOR TO MEALS AND AT BEDTIME    montelukast (SINGULAIR) 10 mg, Oral, Daily at  "bedtime,       norethindrone (MICRONOR) 0.35 MG tablet Daily    Nurtec 75 MG TBDP TAKE 1 TABLET BY MOUTH EVERY OTHER DAY NO MORE THAN 1 DOSE PER 24 HOURS    omeprazole (PRILOSEC) 40 mg, Oral, 2 times daily    onabotulinumtoxin A (BOTOX) 100 units one time. \"for vertigo\"    ondansetron (ZOFRAN-ODT) 4 mg disintegrating tablet TAKE 1 TABLET BY MOUTH EVERY 8 HOURS AS NEEDED FOR NAUSEA    ondansetron (ZOFRAN-ODT) 4 mg, Oral, Every 6 hours PRN    prochlorperazine (COMPAZINE) 10 mg/2mL Inject 2 mL IM EVERY 12 HOURS AS NEEDED FOR MIGRAINE/NAUSEA/VOMITING - MAX 4 INJECTIONS PER WEEK    sertraline (ZOLOFT) 200 mg, Oral, Every morning    sucralfate (CARAFATE) 1 g, Oral, 4 times daily (before meals and at bedtime), Dissolve pill in 10 mL of water and take as slurry    Syringe/Needle, Disp, (SYRINGE 3CC/38QI6-3/2\") 25G X 1-1/2\" 3 ML MISC Use for toradol IM injections.    tiZANidine (ZANAFLEX) 4 mg, Oral, Daily at bedtime PRN    traZODone (DESYREL) 100 mg, Oral, Daily at bedtime    verapamil (CALAN) 40 mg, Oral, Every morning          Treatment Recommendations- Risks Benefits    We will continue with same medications.  Follow-up in 2 months  Risks, Benefits And Possible Side Effects Of Medications:  Risks, benefits, and possible side effects of medications explained to patient and patient verbalizes understanding    VIRTUAL VISIT DISCLAIMER    Nhung Grant verbally agrees to participate in Virtual Care Services. Pt is aware that Virtual Care Services could be limited without vital signs or the ability to perform a full hands-on physical exam. Nhung Grant understands she or the provider may request at any time to terminate the video visit and request the patient to seek care or treatment in person.     Candelario Ribeiro MD 12/19/24           "

## 2024-12-23 ENCOUNTER — TELEPHONE (OUTPATIENT)
Dept: GASTROENTEROLOGY | Facility: CLINIC | Age: 44
End: 2024-12-23

## 2024-12-23 ENCOUNTER — TELEPHONE (OUTPATIENT)
Dept: GASTROENTEROLOGY | Facility: MEDICAL CENTER | Age: 44
End: 2024-12-23

## 2024-12-23 NOTE — TELEPHONE ENCOUNTER
----- Message from Naomi NIELSEN sent at 12/23/2024  1:45 PM EST -----  Regarding: cancel procedure for 12/31  Please cancel procedure for 12/31 with Dr Martin.  Thank you!  ----- Message -----  From: Kelsi Hoyos PA-C  Sent: 12/23/2024   1:30 PM EST  To: Naomi Hubbard  Subject: RE: Denial-peer to peer request                  Please cancel procedure.  I called and spoke to patient and she is aware.  Thank you  ----- Message -----  From: Naomi Hubbard  Sent: 12/23/2024  10:37 AM EST  To: [facility] [address]  Provider  Subject: Denial-peer to peer request                      Please call [phone] for a peer to peer case # [id number]. Botox denied -not medically necessary.  Thank you!

## 2024-12-23 NOTE — TELEPHONE ENCOUNTER
----- Message from Radha SADIA sent at 2024  2:05 PM EST -----  Regarding: FW: Botox denied  Pt needs an appointment with Dr. Montalvo. Thank you!  ----- Message -----  From: Anika Tafoya  Sent: 2024   8:50 AM EST  To: [facility] [address]  Clerical  Subject: FW: Botox denied                                 Please see previous message, Pt needs appt with Dr. Montalvo. Thank you  ----- Message -----  From: Zelalem Martin MD  Sent: 2024   4:50 PM EST  To: Anika Michelet  Subject: RE: Botox denied                                 Botox denied.  Peer to peer will be a waste of time.  Please set up patient for an office visit with Dr. Montalvo to evaluate and discuss whether she might be a good candidate for G POEM.  ----- Message -----  From: Anika Tafoya  Sent: 2024   1:35 PM EST  To: Zelalem Martin MD  Subject: FW: Botox denied                                 Please see previous message, Thank you.  ----- Message -----  From: Naomi Hubbard  Sent: 2024   1:29 PM EST  To: [address]  Clinical  Subject: Botox denied                                     Please call to do a peer to [id number]  case # [id number]  will need patient name and .The Botox denied- not medically necessary.

## 2024-12-23 NOTE — TELEPHONE ENCOUNTER
Patient's EGD with Botox was not approved.  I called and spoke to patient about this.  She will continue with diet modifications and medications for now.  I offered getting her an appointment with Dr. Dean to discuss domperidone.  She would like to hold off for now continue with what she is doing however if she changes her mind she will reach out to us.

## 2024-12-31 ENCOUNTER — TELEPHONE (OUTPATIENT)
Dept: GASTROENTEROLOGY | Facility: MEDICAL CENTER | Age: 44
End: 2024-12-31

## 2024-12-31 NOTE — TELEPHONE ENCOUNTER
----- Message from Radha SADIA sent at 2024  2:05 PM EST -----  Regarding: FW: Botox denied  Pt needs an appointment with Dr. Montalvo. Thank you!  ----- Message -----  From: Anika Tafoya  Sent: 2024   8:50 AM EST  To: [facility] [address]  Clerical  Subject: FW: Botox denied                                 Please see previous message, Pt needs appt with Dr. Montalvo. Thank you  ----- Message -----  From: Zelalem Martin MD  Sent: 2024   4:50 PM EST  To: Anika Michelet  Subject: RE: Botox denied                                 Botox denied.  Peer to peer will be a waste of time.  Please set up patient for an office visit with Dr. Montalvo to evaluate and discuss whether she might be a good candidate for G POEM.  ----- Message -----  From: Anika Tafoya  Sent: 2024   1:35 PM EST  To: Zelalem Martin MD  Subject: FW: Botox denied                                 Please see previous message, Thank you.  ----- Message -----  From: Naomi Hubbard  Sent: 2024   1:29 PM EST  To: [address]  Clinical  Subject: Botox denied                                     Please call to do a peer to [phone]  case # [id number]  will need patient name and .The Botox denied- not medically necessary.

## 2025-01-14 DIAGNOSIS — F90.1 ADHD, PREDOMINANTLY HYPERACTIVE TYPE: ICD-10-CM

## 2025-01-14 NOTE — TELEPHONE ENCOUNTER
Patient called advising the wrong prescription was refilled.     Reason for call:   [x] Refill   [] Prior Auth  [] Other:     Office:   [] PCP/Provider -   [x] Specialty/Provider - Psychiatry     Medication:     amphetamine-dextroamphetamine (ADDERALL, 10MG,) 10 mg tablet       Dose/Frequency: 10 mg, Oral, 2 times daily (morning and afternoon)     Quantity: 60    Pharmacy: [facility]/pharmacy #[id number] - [place] - [address] [place] [address] RD     Does the patient have enough for 3 days?   [x] Yes   [] No - Send as HP to POD

## 2025-01-14 NOTE — TELEPHONE ENCOUNTER
Medication:  PDMP  12/26/2024 11/18/2024 clonazePAM (Tablet) 30.0 30 0.5 MG [facility] 1 / 1 [place]   1 [id number] 12/19/2024 12/19/2024 Amphetamine Salt Combo (Tablet) 60.0 30 10 MG NA LONG [facility] Medicaid 0 / 0 [place]   1 [id number] 11/29/2024 11/18/2024 clonazePAM (Tablet) 30.0 30 0.5 MG NA LONG [facility] Medicaid 0 / 1 [place]  Active agreement on file -

## 2025-01-15 RX ORDER — DEXTROAMPHETAMINE SACCHARATE, AMPHETAMINE ASPARTATE, DEXTROAMPHETAMINE SULFATE AND AMPHETAMINE SULFATE 2.5; 2.5; 2.5; 2.5 MG/1; MG/1; MG/1; MG/1
10 TABLET ORAL
Qty: 60 TABLET | Refills: 0 | Status: SHIPPED | OUTPATIENT
Start: 2025-01-15

## 2025-01-17 DIAGNOSIS — G43.709 CHRONIC MIGRAINE WITHOUT AURA WITHOUT STATUS MIGRAINOSUS, NOT INTRACTABLE: ICD-10-CM

## 2025-01-20 RX ORDER — ERENUMAB-AOOE 140 MG/ML
INJECTION, SOLUTION SUBCUTANEOUS
Qty: 1 ML | Refills: 10 | Status: SHIPPED | OUTPATIENT
Start: 2025-01-20

## 2025-01-21 ENCOUNTER — OFFICE VISIT (OUTPATIENT)
Dept: INTERNAL MEDICINE CLINIC | Facility: CLINIC | Age: 45
End: 2025-01-21
Payer: COMMERCIAL

## 2025-01-21 VITALS
HEART RATE: 128 BPM | OXYGEN SATURATION: 97 % | HEIGHT: 72 IN | SYSTOLIC BLOOD PRESSURE: 128 MMHG | BODY MASS INDEX: 34.81 KG/M2 | DIASTOLIC BLOOD PRESSURE: 80 MMHG | TEMPERATURE: 96.9 F | WEIGHT: 257 LBS

## 2025-01-21 DIAGNOSIS — J45.30 MILD PERSISTENT ASTHMA WITHOUT COMPLICATION: ICD-10-CM

## 2025-01-21 DIAGNOSIS — J06.9 ACUTE UPPER RESPIRATORY INFECTION: Primary | ICD-10-CM

## 2025-01-21 LAB
SARS-COV-2 AG UPPER RESP QL IA: NEGATIVE
SL AMB POCT RAPID FLU A: NEGATIVE
SL AMB POCT RAPID FLU B: NEGATIVE
VALID CONTROL: NORMAL

## 2025-01-21 PROCEDURE — 99213 OFFICE O/P EST LOW 20 MIN: CPT | Performed by: NURSE PRACTITIONER

## 2025-01-21 PROCEDURE — 87811 SARS-COV-2 COVID19 W/OPTIC: CPT | Performed by: NURSE PRACTITIONER

## 2025-01-21 PROCEDURE — 87804 INFLUENZA ASSAY W/OPTIC: CPT | Performed by: NURSE PRACTITIONER

## 2025-01-21 RX ORDER — AZITHROMYCIN 250 MG/1
TABLET, FILM COATED ORAL
Qty: 6 TABLET | Refills: 0 | Status: SHIPPED | OUTPATIENT
Start: 2025-01-21 | End: 2025-01-26

## 2025-01-21 RX ORDER — FLUCONAZOLE 200 MG/1
TABLET ORAL
COMMUNITY
Start: 2024-12-16

## 2025-01-21 NOTE — PROGRESS NOTES
Name: Nhung Grant      : 1980      MRN: [id number]  Encounter Provider: CROW Felix  Encounter Date: 2025   Encounter department: [facility] INTERNAL MEDICINE  :  Assessment & Plan  Acute upper respiratory infection  Covid/flu negative.   If symptoms persist or worsen over the next 2-3 days ok to start antibiotics.   Ok to take tylenol and robitussin as needed.   Orders:    POCT rapid flu A and B    POCT Rapid Covid Ag    azithromycin (Zithromax) 250 mg tablet; Take 2 tablets (500 mg total) by mouth daily for 1 day, THEN 1 tablet (250 mg total) daily for 4 days.    Mild persistent asthma without complication  On advair twice daily.  Use albuterol as needed for wheezing/chest tightness.               History of Present Illness   Nhung is here today with complaints of cold like symptoms.   Symptoms started   She has a productive cough with yellow sputum, chest tightness, wheezing, and shortness of breath.  She also has left ear pain, body aches, nasal congestion, and a sore throat.  No fevers.   She is taking OTC cold/flu medication.       Cough  This is a new problem. The current episode started in the past 7 days. The problem has been gradually worsening. The problem occurs every few minutes. The cough is Productive of purulent sputum. Associated symptoms include chest pain, ear congestion, ear pain, headaches, heartburn, myalgias, nasal congestion, postnasal drip, rhinorrhea, a sore throat, shortness of breath and wheezing. Pertinent negatives include no chills, fever, hemoptysis, rash, sweats or weight loss. Nothing aggravates the symptoms.     Review of Systems   Constitutional:  Negative for chills, fever and weight loss.   HENT:  Positive for ear pain, postnasal drip, rhinorrhea and sore throat.    Respiratory:  Positive for cough, shortness of breath and wheezing. Negative for hemoptysis.    Cardiovascular:  Positive for chest pain.   Gastrointestinal:  Positive for  "heartburn.   Musculoskeletal:  Positive for myalgias.   Skin:  Negative for rash.   Neurological:  Positive for headaches.       Objective   /80   Pulse (!) 128   Temp (!) 96.9 °F (36.1 °C)   Ht 6' 1\" (1.854 m)   Wt 117 kg (257 lb)   SpO2 97%   BMI 33.91 kg/m²      Physical Exam  Vitals reviewed.   Constitutional:       Appearance: Normal appearance.   HENT:      Head: Normocephalic and atraumatic.      Right Ear: Tympanic membrane, ear canal and external ear normal.      Left Ear: Tympanic membrane, ear canal and external ear normal.      Nose: Congestion present.      Mouth/Throat:      Pharynx: Posterior oropharyngeal erythema present.   Eyes:      Conjunctiva/sclera: Conjunctivae normal.   Cardiovascular:      Rate and Rhythm: Regular rhythm.      Heart sounds: Normal heart sounds.   Pulmonary:      Effort: Pulmonary effort is normal.      Breath sounds: Examination of the left-lower field reveals wheezing. Wheezing present.   Neurological:      Mental Status: She is alert and oriented to person, place, and time.   Psychiatric:         Mood and Affect: Mood normal.         Behavior: Behavior normal.         "

## 2025-01-21 NOTE — LETTER
January 21, 2025     Patient: Nhung Grant  YOB: 1980  Date of Visit: 1/21/2025      To Whom it May Concern:    Nhung Grant is under my professional care. Nhung was seen in my office on 1/21/2025. Nhung may return to work on 1/24/25 .    If you have any questions or concerns, please don't hesitate to call.         Sincerely,          CROW Felix

## 2025-01-24 DIAGNOSIS — J06.9 ACUTE UPPER RESPIRATORY INFECTION: Primary | ICD-10-CM

## 2025-01-24 RX ORDER — BENZONATATE 100 MG/1
100 CAPSULE ORAL 3 TIMES DAILY PRN
Qty: 30 CAPSULE | Refills: 0 | Status: SHIPPED | OUTPATIENT
Start: 2025-01-24

## 2025-01-29 DIAGNOSIS — F41.1 GENERALIZED ANXIETY DISORDER: ICD-10-CM

## 2025-01-29 RX ORDER — CLONAZEPAM 0.5 MG/1
0.5 TABLET ORAL
Qty: 30 TABLET | Refills: 0 | Status: SHIPPED | OUTPATIENT
Start: 2025-01-29

## 2025-01-29 NOTE — TELEPHONE ENCOUNTER
Reason for call:   [x] Refill   [] Prior Auth  [] Other:     Office:   [] PCP/Provider -   [x] Specialty/Provider - Candelario Ribeiro     Medication: clonazePAM (KlonoPIN) 0.5 mg     Dose/Frequency: 1 daily    Quantity: 30    Pharmacy: [facility]    Does the patient have enough for 3 days?   [] Yes   [x] No - Send as HP to POD

## 2025-01-30 ENCOUNTER — PROCEDURE VISIT (OUTPATIENT)
Dept: NEUROLOGY | Facility: CLINIC | Age: 45
End: 2025-01-30
Payer: COMMERCIAL

## 2025-01-30 VITALS — HEART RATE: 86 BPM | SYSTOLIC BLOOD PRESSURE: 131 MMHG | DIASTOLIC BLOOD PRESSURE: 84 MMHG | TEMPERATURE: 98.3 F

## 2025-01-30 DIAGNOSIS — G43.709 CHRONIC MIGRAINE WITHOUT AURA WITHOUT STATUS MIGRAINOSUS, NOT INTRACTABLE: Primary | ICD-10-CM

## 2025-01-30 PROCEDURE — 64615 CHEMODENERV MUSC MIGRAINE: CPT | Performed by: PHYSICIAN ASSISTANT

## 2025-01-30 NOTE — PROGRESS NOTES
Universal Protocol   Consent: Verbal consent obtained. Written consent obtained.  Risks and benefits: risks, benefits and alternatives were discussed  Consent given by: patient  Patient understanding: patient states understanding of the procedure being performed  Patient consent: the patient's understanding of the procedure matches consent given  Procedure consent: procedure consent matches procedure scheduled      Chemodenervation     Date/Time  1/30/2025 10:30 AM     Performed by  Ayseha Aguilar PA-C   Authorized by  Ayesha Aguilar PA-C     Pre-procedure details      Prepped With: Alcohol     Procedure details      Position:  Upright   Botox      Botox Type:  Type A    Brand:  Botox    mL's of Botulinum Toxin:  200    Final Concentration per CC:  100 units    Needle Gauge:  30 G 2.5 inch   Procedures      Botox Procedures: chronic headache      Indications: migraines     Injection Location      Head / Face:  L superior trapezius, R superior trapezius, L superior cervical paraspinal, R superior cervical paraspinal, L , R , procerus, L temporalis, R temporalis, R frontalis, L frontalis, R medial occipitalis and L medial occipitalis    L  injection amount:  5 unit(s)    R  injection amount:  5 unit(s)    L lateral frontalis:  5 unit(s)    R lateral frontalis:  5 unit(s)    L medial frontalis:  5 unit(s)    R medial frontalis:  5 unit(s)    L temporalis injection amount:  20 unit(s)    R temporalis injection amount:  20 unit(s)    Procerus injection amount:  5 unit(s)    L medial occipitalis injection amount:  15 unit(s)    R medial occipitalis injection amount:  15 unit(s)    L superior cervical paraspinal injection amount:  10 unit(s)    R superior cervical paraspinal injection amount:  10 unit(s)    L superior trapezius injection amount:  15 unit(s)    R superior trapezius injection amount:  15 unit(s)   Total Units      Total units used:  200    Total units discarded:  0    Post-procedure details      Chemodenervation:  Chronic migraine    Facial Nerve Location::  Bilateral facial nerve    Patient tolerance of procedure:  Tolerated well, no immediate complications   Comments       Extra units medically necessary: 25 R frontotemporal region and R apex/scalp, 10 left temporalis, 5 each masseter (10 total).       Blood pressure 131/84, pulse 86, temperature 98.3 °F (36.8 °C), temperature source Temporal, not currently breastfeeding.

## 2025-02-11 ENCOUNTER — HOSPITAL ENCOUNTER (EMERGENCY)
Facility: HOSPITAL | Age: 45
Discharge: HOME | End: 2025-02-11
Attending: STUDENT IN AN ORGANIZED HEALTH CARE EDUCATION/TRAINING PROGRAM | Admitting: STUDENT IN AN ORGANIZED HEALTH CARE EDUCATION/TRAINING PROGRAM
Payer: COMMERCIAL

## 2025-02-11 VITALS
OXYGEN SATURATION: 99 % | RESPIRATION RATE: 18 BRPM | SYSTOLIC BLOOD PRESSURE: 143 MMHG | TEMPERATURE: 97.7 F | DIASTOLIC BLOOD PRESSURE: 89 MMHG | HEART RATE: 95 BPM

## 2025-02-11 DIAGNOSIS — R42 VERTIGO: Primary | ICD-10-CM

## 2025-02-11 DIAGNOSIS — F90.1 ADHD, PREDOMINANTLY HYPERACTIVE TYPE: ICD-10-CM

## 2025-02-11 DIAGNOSIS — G43.909 MIGRAINE: ICD-10-CM

## 2025-02-11 LAB
ALBUMIN SERPL BCG-MCNC: 4.3 G/DL (ref 3.5–5)
ALP SERPL-CCNC: 50 U/L (ref 34–104)
ALT SERPL W P-5'-P-CCNC: 31 U/L (ref 7–52)
ANION GAP SERPL CALCULATED.3IONS-SCNC: 6 MMOL/L (ref 4–13)
AST SERPL W P-5'-P-CCNC: 20 U/L (ref 13–39)
ATRIAL RATE: 86 BPM
BASOPHILS # BLD AUTO: 0.04 THOUSANDS/ΜL (ref 0–0.1)
BASOPHILS NFR BLD AUTO: 1 % (ref 0–1)
BILIRUB SERPL-MCNC: 0.36 MG/DL (ref 0.2–1)
BUN SERPL-MCNC: 10 MG/DL (ref 5–25)
CALCIUM SERPL-MCNC: 9.4 MG/DL (ref 8.4–10.2)
CARDIAC TROPONIN I PNL SERPL HS: <2 NG/L (ref ?–50)
CHLORIDE SERPL-SCNC: 103 MMOL/L (ref 96–108)
CO2 SERPL-SCNC: 27 MMOL/L (ref 21–32)
CREAT SERPL-MCNC: 0.96 MG/DL (ref 0.6–1.3)
EOSINOPHIL # BLD AUTO: 0.11 THOUSAND/ΜL (ref 0–0.61)
EOSINOPHIL NFR BLD AUTO: 2 % (ref 0–6)
ERYTHROCYTE [DISTWIDTH] IN BLOOD BY AUTOMATED COUNT: 12.6 % (ref 11.6–15.1)
GFR SERPL CREATININE-BSD FRML MDRD: 72 ML/MIN/1.73SQ M
GLUCOSE SERPL-MCNC: 93 MG/DL (ref 65–140)
HCG SERPL QL: NEGATIVE
HCT VFR BLD AUTO: 42.4 % (ref 34.8–46.1)
HGB BLD-MCNC: 14.2 G/DL (ref 11.5–15.4)
IMM GRANULOCYTES # BLD AUTO: 0.03 THOUSAND/UL (ref 0–0.2)
IMM GRANULOCYTES NFR BLD AUTO: 0 % (ref 0–2)
LYMPHOCYTES # BLD AUTO: 1.65 THOUSANDS/ΜL (ref 0.6–4.47)
LYMPHOCYTES NFR BLD AUTO: 23 % (ref 14–44)
MCH RBC QN AUTO: 30 PG (ref 26.8–34.3)
MCHC RBC AUTO-ENTMCNC: 33.5 G/DL (ref 31.4–37.4)
MCV RBC AUTO: 90 FL (ref 82–98)
MONOCYTES # BLD AUTO: 0.56 THOUSAND/ΜL (ref 0.17–1.22)
MONOCYTES NFR BLD AUTO: 8 % (ref 4–12)
NEUTROPHILS # BLD AUTO: 4.95 THOUSANDS/ΜL (ref 1.85–7.62)
NEUTS SEG NFR BLD AUTO: 66 % (ref 43–75)
NRBC BLD AUTO-RTO: 0 /100 WBCS
P AXIS: 51 DEGREES
PLATELET # BLD AUTO: 259 THOUSANDS/UL (ref 149–390)
PMV BLD AUTO: 10.5 FL (ref 8.9–12.7)
POTASSIUM SERPL-SCNC: 4.3 MMOL/L (ref 3.5–5.3)
PR INTERVAL: 134 MS
PROT SERPL-MCNC: 7.3 G/DL (ref 6.4–8.4)
QRS AXIS: 28 DEGREES
QRSD INTERVAL: 78 MS
QT INTERVAL: 338 MS
QTC INTERVAL: 404 MS
RBC # BLD AUTO: 4.74 MILLION/UL (ref 3.81–5.12)
SODIUM SERPL-SCNC: 136 MMOL/L (ref 135–147)
T WAVE AXIS: 34 DEGREES
VENTRICULAR RATE: 86 BPM
WBC # BLD AUTO: 7.34 THOUSAND/UL (ref 4.31–10.16)

## 2025-02-11 PROCEDURE — 96368 THER/DIAG CONCURRENT INF: CPT

## 2025-02-11 PROCEDURE — 80053 COMPREHEN METABOLIC PANEL: CPT

## 2025-02-11 PROCEDURE — 96366 THER/PROPH/DIAG IV INF ADDON: CPT

## 2025-02-11 PROCEDURE — 36415 COLL VENOUS BLD VENIPUNCTURE: CPT

## 2025-02-11 PROCEDURE — 85025 COMPLETE CBC W/AUTO DIFF WBC: CPT

## 2025-02-11 PROCEDURE — 96375 TX/PRO/DX INJ NEW DRUG ADDON: CPT

## 2025-02-11 PROCEDURE — 93010 ELECTROCARDIOGRAM REPORT: CPT | Performed by: INTERNAL MEDICINE

## 2025-02-11 PROCEDURE — 84484 ASSAY OF TROPONIN QUANT: CPT

## 2025-02-11 PROCEDURE — 84703 CHORIONIC GONADOTROPIN ASSAY: CPT

## 2025-02-11 PROCEDURE — 99285 EMERGENCY DEPT VISIT HI MDM: CPT | Performed by: STUDENT IN AN ORGANIZED HEALTH CARE EDUCATION/TRAINING PROGRAM

## 2025-02-11 PROCEDURE — 96365 THER/PROPH/DIAG IV INF INIT: CPT

## 2025-02-11 PROCEDURE — 93005 ELECTROCARDIOGRAM TRACING: CPT

## 2025-02-11 PROCEDURE — 99284 EMERGENCY DEPT VISIT MOD MDM: CPT

## 2025-02-11 RX ORDER — DIPHENHYDRAMINE HYDROCHLORIDE 50 MG/ML
25 INJECTION INTRAMUSCULAR; INTRAVENOUS ONCE
Status: COMPLETED | OUTPATIENT
Start: 2025-02-11 | End: 2025-02-11

## 2025-02-11 RX ORDER — KETOROLAC TROMETHAMINE 30 MG/ML
15 INJECTION, SOLUTION INTRAMUSCULAR; INTRAVENOUS ONCE
Status: COMPLETED | OUTPATIENT
Start: 2025-02-11 | End: 2025-02-11

## 2025-02-11 RX ORDER — METOCLOPRAMIDE HYDROCHLORIDE 5 MG/ML
10 INJECTION INTRAMUSCULAR; INTRAVENOUS ONCE
Status: COMPLETED | OUTPATIENT
Start: 2025-02-11 | End: 2025-02-11

## 2025-02-11 RX ORDER — ACETAMINOPHEN 325 MG/1
975 TABLET ORAL ONCE
Status: COMPLETED | OUTPATIENT
Start: 2025-02-11 | End: 2025-02-11

## 2025-02-11 RX ORDER — DIAZEPAM 10 MG/2ML
2.5 INJECTION, SOLUTION INTRAMUSCULAR; INTRAVENOUS ONCE
Status: COMPLETED | OUTPATIENT
Start: 2025-02-11 | End: 2025-02-11

## 2025-02-11 RX ORDER — MAGNESIUM SULFATE HEPTAHYDRATE 40 MG/ML
2 INJECTION, SOLUTION INTRAVENOUS ONCE
Status: COMPLETED | OUTPATIENT
Start: 2025-02-11 | End: 2025-02-11

## 2025-02-11 RX ORDER — DROPERIDOL 2.5 MG/ML
0.62 INJECTION, SOLUTION INTRAMUSCULAR; INTRAVENOUS ONCE
Status: COMPLETED | OUTPATIENT
Start: 2025-02-11 | End: 2025-02-11

## 2025-02-11 RX ADMIN — KETOROLAC TROMETHAMINE 15 MG: 30 INJECTION, SOLUTION INTRAMUSCULAR; INTRAVENOUS at 14:01

## 2025-02-11 RX ADMIN — METOCLOPRAMIDE 10 MG: 5 INJECTION, SOLUTION INTRAMUSCULAR; INTRAVENOUS at 12:46

## 2025-02-11 RX ADMIN — DIAZEPAM 2.5 MG: 10 INJECTION, SOLUTION INTRAMUSCULAR; INTRAVENOUS at 14:35

## 2025-02-11 RX ADMIN — DROPERIDOL 0.62 MG: 2.5 INJECTION, SOLUTION INTRAMUSCULAR; INTRAVENOUS at 14:35

## 2025-02-11 RX ADMIN — MAGNESIUM SULFATE IN WATER FOR 2 G: 40 INJECTION INTRAVENOUS at 14:35

## 2025-02-11 RX ADMIN — SODIUM CHLORIDE, SODIUM LACTATE, POTASSIUM CHLORIDE, AND CALCIUM CHLORIDE 1000 ML: .6; .31; .03; .02 INJECTION, SOLUTION INTRAVENOUS at 12:46

## 2025-02-11 RX ADMIN — ACETAMINOPHEN 975 MG: 325 TABLET, FILM COATED ORAL at 12:47

## 2025-02-11 RX ADMIN — DIPHENHYDRAMINE HYDROCHLORIDE 25 MG: 50 INJECTION, SOLUTION INTRAMUSCULAR; INTRAVENOUS at 12:47

## 2025-02-11 NOTE — ED PROVIDER NOTES
Time reflects when diagnosis was documented in both MDM as applicable and the Disposition within this note       Time User Action Codes Description Comment    2/11/2025  3:03 PM Cameron Monge [R42] Vertigo     2/11/2025  3:03 PM Cameron Monge [R51.9] Headache     2/11/2025  3:03 PM Cameron Monge Remove [R51.9] Headache     2/11/2025  3:03 PM Cameron Monge [G43.909] Migraine           ED Disposition       ED Disposition   Discharge    Condition   Stable    Date/Time   Tue Feb 11, 2025  3:05 PM    Comment   Nhung Grant discharge to home/self care.                   Assessment & Plan       Medical Decision Making  44y.o F presenting with dizziness. Ddx includes but not limited to atypical migraine, vertigo, influenza; less likely intracranial pathology given no neurological deficits; less likely OM or rupture TM given absent physical exam findings. Pt found minimal relief with IV fluids, tylenol, toradol, reglan, and benadryl. IV magnesium, droperidol, and valium provided significant relief in her sx. Pt likely multifactorial cause- migraine with vertigo. Pt was referred to ENT for further evaluation and management. Strict return precautions discussed.    Amount and/or Complexity of Data Reviewed  Labs: ordered.    Risk  OTC drugs.  Prescription drug management.        ED Course as of 02/11/25 1555   Tue Feb 11, 2025   1505 Pt feeling better and requesting to go home       Medications   lactated ringers bolus 1,000 mL (0 mL Intravenous Stopped 2/11/25 1545)   acetaminophen (TYLENOL) tablet 975 mg (975 mg Oral Given 2/11/25 1247)   diphenhydrAMINE (BENADRYL) injection 25 mg (25 mg Intravenous Given 2/11/25 1247)   metoclopramide (REGLAN) injection 10 mg (10 mg Intravenous Given 2/11/25 1246)   ketorolac (TORADOL) injection 15 mg (15 mg Intravenous Given 2/11/25 1401)   magnesium sulfate 2 g/50 mL IVPB (premix) 2 g (0 g Intravenous Stopped 2/11/25 1545)   droperidol (INAPSINE) injection 0.625 mg (0.625  "mg Intravenous Given 2/11/25 1435)   diazepam (VALIUM) injection 2.5 mg (2.5 mg Intravenous Given 2/11/25 1435)       ED Risk Strat Scores                                              History of Present Illness       Chief Complaint   Patient presents with    Vertigo - Recurrent     Pt reports \"horrible vertigo\" since last Tuesday. No relief with medication. Denies additional symptoms.        Past Medical History:   Diagnosis Date    Allergic     Anxiety     Arthritis     Asthma     Chronic right shoulder pain 03/16/2022    COVID-19 02/08/2021    Depression     ETOH abuse     Headache(784.0)     Herniated cervical disc     Hypertension     Memory loss     Meniere's disease     Migraine     Post-COVID syndrome 08/05/2022    Psychiatric disorder     anxiety/depression    Scoliosis     Sinusitis     Stroke (HCC)     Thyroid disease     Vertigo     Vertigo       Past Surgical History:   Procedure Laterality Date    BACK SURGERY      [id number]    COLONOSCOPY      HIP SURGERY      JOINT REPLACEMENT      right hip    OTHER SURGICAL HISTORY      Hip Replacement (right) , Spinal  Diskectomy Cervical C5-C6    AZ APPLICATION SHORT ARM SPLINT FOREARM-HAND STATIC Right 05/25/2023    Procedure: Application of short arm thumb spica splint;  Surgeon: Valeriy Escobedo MD;  Location: [facility];  Service: Orthopedics    AZ INCISION EXTENSOR TENDON SHEATH WRIST Right 05/25/2023    Procedure: right de Quervain's release;  Surgeon: Valeriy Escobedo MD;  Location: [facility];  Service: Orthopedics    AZ SYNOVECTOMY EXTENSOR TENDON SHTH WRIST 1 CMPRT Right 05/25/2023    Procedure: right second extensor compartment tenosynovectomy;  Surgeon: Valeriy Escobedo MD;  Location: [facility];  Service: Orthopedics    AZ TDN TRNSPLJ/TR FLXR/XTNSR F/ARM&/WRST 1/TDN GR Right 05/25/2023    Procedure: right EIP to EPL tendon transfer;  Surgeon: Valeriy Escobedo MD;  Location: [facility];  Service: Orthopedics      Family History   Problem " Relation Age of Onset    Hypertension Mother     Thyroid disease Mother     Anxiety disorder Mother     Hypertension Father     Colon cancer Maternal Grandmother     Breast cancer Paternal Grandmother 60    Dementia Paternal Grandmother     Heart disease Paternal Grandfather     No Known Problems Maternal Aunt     No Known Problems Paternal Aunt     Drug abuse Sister         Pasted away 2022      Social History     Tobacco Use    Smoking status: Former     Current packs/day: 0.00     Average packs/day: 0.3 packs/day for 25.0 years (6.3 ttl pk-yrs)     Types: Cigarettes     Start date: 1/7/1997     Quit date: 1/7/2022     Years since quitting: 3.0     Passive exposure: Yes    Smokeless tobacco: Never   Vaping Use    Vaping status: Never Used   Substance Use Topics    Alcohol use: Yes     Alcohol/week: 3.0 standard drinks of alcohol     Types: 3 Glasses of wine per week     Comment: 3 glass wine per week    Drug use: Yes     Frequency: 1.0 times per week     Types: Marijuana      E-Cigarette/Vaping    E-Cigarette Use Never User       E-Cigarette/Vaping Substances    Nicotine No     THC No     CBD No     Flavoring No     Other No     Unknown No       I have reviewed and agree with the history as documented.     44y.o F w/ h/o migraine, vertigo (Meniere's dz) presenting for dizziness. 1wk ago Pt developed sudden onset dizziness that she states is unlike her usual vertigo sx. She had associated frontal HA, visual disturbance (seeing stars), left ear tinnitus, photophobia. Has not found relief with her usual vertigo medications. Denies head trauma, confusion, syncope, numbness, weakness, neck stiffness, fever, CP, SOB, ear pain, ear fullness, loss of hearing, vision changes.      History provided by:  Patient      Review of Systems   Constitutional:  Positive for activity change. Negative for fever.   Eyes:  Positive for photophobia and visual disturbance.   Respiratory:  Negative for shortness of breath.     Cardiovascular:  Negative for chest pain.   Gastrointestinal:  Negative for abdominal pain, nausea and vomiting.   Musculoskeletal:  Negative for neck pain and neck stiffness.   Neurological:  Positive for dizziness and headaches. Negative for seizures, syncope, weakness and numbness.   Psychiatric/Behavioral:  Negative for confusion.            Objective       ED Triage Vitals   Temperature Pulse Blood Pressure Respirations SpO2 Patient Position - Orthostatic VS   02/11/25 0954 02/11/25 0952 02/11/25 0952 02/11/25 0952 02/11/25 0952 --   97.7 °F (36.5 °C) 95 143/89 18 99 %       Temp src Heart Rate Source BP Location FiO2 (%) Pain Score    -- -- -- -- 02/11/25 1401        4      Vitals      Date and Time Temp Pulse SpO2 Resp BP Pain Score FACES Pain Rating User   02/11/25 1401 -- -- -- -- -- 4 -- KL   02/11/25 0954 97.7 °F (36.5 °C) -- -- -- -- -- -- MD   02/11/25 0952 -- 95 99 % 18 143/89 -- -- MD            Physical Exam  Constitutional:       General: She is not in acute distress.     Appearance: Normal appearance.   HENT:      Head: Normocephalic and atraumatic.      Right Ear: Tympanic membrane, ear canal and external ear normal.      Left Ear: Tympanic membrane, ear canal and external ear normal.      Nose: Nose normal. No rhinorrhea.      Mouth/Throat:      Mouth: Mucous membranes are moist.      Pharynx: Oropharynx is clear.   Eyes:      Extraocular Movements: Extraocular movements intact.      Conjunctiva/sclera: Conjunctivae normal.      Pupils: Pupils are equal, round, and reactive to light.   Cardiovascular:      Rate and Rhythm: Normal rate and regular rhythm.      Pulses: Normal pulses.      Heart sounds: Normal heart sounds.   Pulmonary:      Effort: Pulmonary effort is normal.      Breath sounds: Normal breath sounds.   Abdominal:      General: Abdomen is flat.      Palpations: Abdomen is soft.   Musculoskeletal:      Cervical back: Normal range of motion and neck supple. No rigidity.   Skin:      General: Skin is warm and dry.      Capillary Refill: Capillary refill takes less than 2 seconds.   Neurological:      General: No focal deficit present.      Mental Status: She is alert and oriented to person, place, and time.         Results Reviewed       Procedure Component Value Units Date/Time    Pregnancy Test (HCG Qualitative) [[id number]]  (Normal) Collected: 02/11/25 0955    Lab Status: Final result Specimen: Blood from Arm, Right Updated: 02/11/25 1408     Preg, Serum Negative    HS Troponin 0hr (reflex protocol) [[id number]]  (Normal) Collected: 02/11/25 0955    Lab Status: Final result Specimen: Blood from Arm, Right Updated: 02/11/25 1037     hs TnI 0hr <2 ng/L     Comprehensive metabolic panel [[id number]] Collected: 02/11/25 0955    Lab Status: Final result Specimen: Blood from Arm, Right Updated: 02/11/25 1034     Sodium 136 mmol/L      Potassium 4.3 mmol/L      Chloride 103 mmol/L      CO2 27 mmol/L      ANION GAP 6 mmol/L      BUN 10 mg/dL      Creatinine 0.96 mg/dL      Glucose 93 mg/dL      Calcium 9.4 mg/dL      AST 20 U/L      ALT 31 U/L      Alkaline Phosphatase 50 U/L      Total Protein 7.3 g/dL      Albumin 4.3 g/dL      Total Bilirubin 0.36 mg/dL      eGFR 72 ml/min/1.73sq m     Narrative:      [employer] guidelines for Chronic Kidney Disease (CKD):     Stage 1 with normal or high GFR (GFR > 90 mL/min/1.73 square meters)    Stage 2 Mild CKD (GFR = 60-89 mL/min/1.73 square meters)    Stage 3A Moderate CKD (GFR = 45-59 mL/min/1.73 square meters)    Stage 3B Moderate CKD (GFR = 30-44 mL/min/1.73 square meters)    Stage 4 Severe CKD (GFR = 15-29 mL/min/1.73 square meters)    Stage 5 End Stage CKD (GFR <15 mL/min/1.73 square meters)  Note: GFR calculation is accurate only with a steady state creatinine    CBC and differential [[id number]] Collected: 02/11/25 0955    Lab Status: Final result Specimen: Blood from Arm, Right Updated: 02/11/25 1016     WBC 7.34 Thousand/uL       "RBC 4.74 Million/uL      Hemoglobin 14.2 g/dL      Hematocrit 42.4 %      MCV 90 fL      MCH 30.0 pg      MCHC 33.5 g/dL      RDW 12.6 %      MPV 10.5 fL      Platelets 259 Thousands/uL      nRBC 0 /100 WBCs      Segmented % 66 %      Immature Grans % 0 %      Lymphocytes % 23 %      Monocytes % 8 %      Eosinophils Relative 2 %      Basophils Relative 1 %      Absolute Neutrophils 4.95 Thousands/µL      Absolute Immature Grans 0.03 Thousand/uL      Absolute Lymphocytes 1.65 Thousands/µL      Absolute Monocytes 0.56 Thousand/µL      Eosinophils Absolute 0.11 Thousand/µL      Basophils Absolute 0.04 Thousands/µL             No orders to display       ECG 12 Lead Documentation Only    Date/Time: 2/11/2025 12:34 PM    Performed by: Cameron Monge MD  Authorized by: Cameron Monge MD    ECG reviewed by me, the ED Provider: yes    Patient location:  ED  Interpretation:     Interpretation: normal    Rate:     ECG rate:  86    ECG rate assessment: normal    Rhythm:     Rhythm: sinus rhythm    Ectopy:     Ectopy: none    QRS:     QRS axis:  Normal    QRS intervals:  Normal  Conduction:     Conduction: normal    ST segments:     ST segments:  Normal  T waves:     T waves: normal        ED Medication and Procedure Management   Prior to Admission Medications   Prescriptions Last Dose Informant Patient Reported? Taking?   Aimovig 140 MG/ML SOAJ   No No   Sig: Inject 140mg (1 Pen) under the skin every 30 days.   Botox 200 units SOLR  Self Yes No   Fluticasone-Salmeterol (Advair) 250-50 mcg/dose inhaler  Self No No   Sig: INHALE 1 PUFF 2 TIMES A DAY RINSE MOUTH AFTER USE.   Nurtec 75 MG TBDP  Self No No   Sig: TAKE 1 TABLET BY MOUTH EVERY OTHER DAY NO MORE THAN 1 DOSE PER 24 HOURS   Patient taking differently: Take by mouth if needed As needed   Syringe/Needle, Disp, (SYRINGE 3CC/61KG7-0/2\") 25G X 1-1/2\" 3 ML MISC  Self No No   Sig: Use for toradol IM injections.   albuterol (PROVENTIL HFA,VENTOLIN HFA) 90 mcg/act inhaler  Self " No No   Sig: Inhale 2 puffs 4 (four) times a day   Patient taking differently: Inhale 2 puffs 4 (four) times a day Takes as needed   amphetamine-dextroamphetamine (ADDERALL XR, 20MG,) 20 MG 24 hr capsule   No No   Sig: Take 1 capsule (20 mg total) by mouth every morning Max Daily Amount: 20 mg Do not start before 2025.   amphetamine-dextroamphetamine (ADDERALL, 10MG,) 10 mg tablet   No No   Sig: Take 1 tablet (10 mg total) by mouth 2 (two) times a day Max Daily Amount: 20 mg   benzonatate (TESSALON PERLES) 100 mg capsule   No No   Sig: Take 1 capsule (100 mg total) by mouth 3 (three) times a day as needed for cough   cariprazine (VRAYLAR) 3 MG capsule   No No   Sig: Take 1 capsule (3 mg total) by mouth daily   clonazePAM (KlonoPIN) 0.5 mg tablet   No No   Sig: Take 1 tablet (0.5 mg total) by mouth daily at bedtime   dexamethasone (DECADRON) 2 mg tablet  Self No No   Si tablet daily.   Patient taking differently: Take by mouth if needed 1 tablet daily.   fluconazole (DIFLUCAN) 200 mg tablet   Yes No   Sig: TAKE 1 TABLET AS DIRECTED   ketorolac (TORADOL) 30 mg/mL injection  Self No No   Sig: I-2 ML INTRAMUSCULAR INJECTION ONCE PER 24 HOURS AS NEEDED FOR MIGRAINE. NO MORE THAN 2 INJECTIONS PER WEEK.   lisinopril (ZESTRIL) 10 mg tablet  Self No No   Sig: TAKE 1 TABLET BY MOUTH EVERY DAY   meclizine (ANTIVERT) 12.5 MG tablet  Self No No   Sig: Take 2 tablets (25 mg total) by mouth every 8 (eight) hours as needed for dizziness   metoclopramide (REGLAN) 5 mg tablet  Self No No   Sig: TAKE 2 TABLETS BY MOUTH 4 TIMES A DAY 30 MINUTES PRIOR TO MEALS AND AT BEDTIME   Patient not taking: Reported on 2024   montelukast (SINGULAIR) 10 mg tablet  Self No No   Sig: TAKE 1 TABLET BY MOUTH EVERYDAY AT BEDTIME   norethindrone (MICRONOR) 0.35 MG tablet  Self Yes No   Sig: Take by mouth daily   omeprazole (PriLOSEC) 40 MG capsule  Self No No   Sig: TAKE 1 CAPSULE BY MOUTH TWICE A DAY   onabotulinumtoxin A (BOTOX)  "100 units  Self Yes No   Sig: one time. \"for vertigo\"   Patient not taking: Reported on 4/25/2024   ondansetron (ZOFRAN-ODT) 4 mg disintegrating tablet  Self No No   Sig: TAKE 1 TABLET BY MOUTH EVERY 8 HOURS AS NEEDED FOR NAUSEA   ondansetron (ZOFRAN-ODT) 4 mg disintegrating tablet  Self No No   Sig: Take 1 tablet (4 mg total) by mouth every 6 (six) hours as needed for nausea or vomiting   prochlorperazine (COMPAZINE) 10 mg/2mL  Self No No   Sig: Inject 2 mL IM EVERY 12 HOURS AS NEEDED FOR MIGRAINE/NAUSEA/VOMITING - MAX 4 INJECTIONS PER WEEK   sertraline (ZOLOFT) 100 mg tablet   No No   Sig: Take 2 tablets (200 mg total) by mouth every morning   sucralfate (CARAFATE) 1 g tablet  Self No No   Sig: TAKE 1 TABLET (1 G TOTAL) BY MOUTH 4 (FOUR) TIMES A DAY (BEFORE MEALS AND AT BEDTIME) DISSOLVE PILL IN 10 ML OF WATER AND TAKE AS SLURRY   Patient not taking: Reported on 12/17/2024   tiZANidine (ZANAFLEX) 4 mg tablet  Self No No   Sig: TAKE 1 TABLET (4 MG TOTAL) BY MOUTH DAILY AT BEDTIME AS NEEDED FOR MUSCLE SPASMS   traZODone (DESYREL) 100 mg tablet   No No   Sig: Take 1 tablet (100 mg total) by mouth daily at bedtime   verapamil (CALAN) 40 mg tablet  Self No No   Sig: TAKE 1 TABLET BY MOUTH EVERY DAY IN THE MORNING      Facility-Administered Medications: None     Discharge Medication List as of 2/11/2025  3:05 PM        CONTINUE these medications which have NOT CHANGED    Details   Aimovig 140 MG/ML SOAJ Inject 140mg (1 Pen) under the skin every 30 days., Normal      albuterol (PROVENTIL HFA,VENTOLIN HFA) 90 mcg/act inhaler Inhale 2 puffs 4 (four) times a day, Starting Thu 8/25/2022, Normal      amphetamine-dextroamphetamine (ADDERALL XR, 20MG,) 20 MG 24 hr capsule Take 1 capsule (20 mg total) by mouth every morning Max Daily Amount: 20 mg Do not start before January 19, 2025., Starting Sun 1/19/2025, Normal      amphetamine-dextroamphetamine (ADDERALL, 10MG,) 10 mg tablet Take 1 tablet (10 mg total) by mouth 2 (two) " "times a day Max Daily Amount: 20 mg, Starting Wed 1/15/2025, Normal      benzonatate (TESSALON PERLES) 100 mg capsule Take 1 capsule (100 mg total) by mouth 3 (three) times a day as needed for cough, Starting Fri 1/24/2025, Normal      !! Botox 200 units SOLR Starting Wed 6/7/2023, Historical Med      cariprazine (VRAYLAR) 3 MG capsule Take 1 capsule (3 mg total) by mouth daily, Starting u 12/19/2024, Normal      clonazePAM (KlonoPIN) 0.5 mg tablet Take 1 tablet (0.5 mg total) by mouth daily at bedtime, Starting Wed 1/29/2025, Normal      dexamethasone (DECADRON) 2 mg tablet 1 tablet daily., Normal      fluconazole (DIFLUCAN) 200 mg tablet TAKE 1 TABLET AS DIRECTED, Historical Med      Fluticasone-Salmeterol (Advair) 250-50 mcg/dose inhaler INHALE 1 PUFF 2 TIMES A DAY RINSE MOUTH AFTER USE., Normal      ketorolac (TORADOL) 30 mg/mL injection I-2 ML INTRAMUSCULAR INJECTION ONCE PER 24 HOURS AS NEEDED FOR MIGRAINE. NO MORE THAN 2 INJECTIONS PER WEEK., Normal      lisinopril (ZESTRIL) 10 mg tablet TAKE 1 TABLET BY MOUTH EVERY DAY, Normal      meclizine (ANTIVERT) 12.5 MG tablet Take 2 tablets (25 mg total) by mouth every 8 (eight) hours as needed for dizziness, Starting Fri 11/24/2023, Normal      metoclopramide (REGLAN) 5 mg tablet TAKE 2 TABLETS BY MOUTH 4 TIMES A DAY 30 MINUTES PRIOR TO MEALS AND AT BEDTIME, Normal      montelukast (SINGULAIR) 10 mg tablet TAKE 1 TABLET BY MOUTH EVERYDAY AT BEDTIME, Starting Fri 11/29/2024, Normal      norethindrone (MICRONOR) 0.35 MG tablet Take by mouth daily, Starting Sat 12/3/2022, Historical Med      Nurtec 75 MG TBDP TAKE 1 TABLET BY MOUTH EVERY OTHER DAY NO MORE THAN 1 DOSE PER 24 HOURS, Normal      omeprazole (PriLOSEC) 40 MG capsule TAKE 1 CAPSULE BY MOUTH TWICE A DAY, Starting Fri 11/29/2024, Normal      !! onabotulinumtoxin A (BOTOX) 100 units one time. \"for vertigo\", Historical Med      !! ondansetron (ZOFRAN-ODT) 4 mg disintegrating tablet TAKE 1 TABLET BY MOUTH EVERY " "8 HOURS AS NEEDED FOR NAUSEA, Normal      !! ondansetron (ZOFRAN-ODT) 4 mg disintegrating tablet Take 1 tablet (4 mg total) by mouth every 6 (six) hours as needed for nausea or vomiting, Starting Tue 7/30/2024, Normal      prochlorperazine (COMPAZINE) 10 mg/2mL Inject 2 mL IM EVERY 12 HOURS AS NEEDED FOR MIGRAINE/NAUSEA/VOMITING - MAX 4 INJECTIONS PER WEEK, Normal      sertraline (ZOLOFT) 100 mg tablet Take 2 tablets (200 mg total) by mouth every morning, Starting Thu 12/19/2024, Normal      sucralfate (CARAFATE) 1 g tablet TAKE 1 TABLET (1 G TOTAL) BY MOUTH 4 (FOUR) TIMES A DAY (BEFORE MEALS AND AT BEDTIME) DISSOLVE PILL IN 10 ML OF WATER AND TAKE AS SLURRY, Starting Thu 10/31/2024, Until Tue 12/3/2024, Normal      Syringe/Needle, Disp, (SYRINGE 3CC/72UE7-0/2\") 25G X 1-1/2\" 3 ML MISC Use for toradol IM injections., Normal      tiZANidine (ZANAFLEX) 4 mg tablet TAKE 1 TABLET (4 MG TOTAL) BY MOUTH DAILY AT BEDTIME AS NEEDED FOR MUSCLE SPASMS, Starting Wed 10/23/2024, Normal      traZODone (DESYREL) 100 mg tablet Take 1 tablet (100 mg total) by mouth daily at bedtime, Starting Thu 12/19/2024, Normal      verapamil (CALAN) 40 mg tablet TAKE 1 TABLET BY MOUTH EVERY DAY IN THE MORNING, Starting Fri 11/29/2024, Normal       !! - Potential duplicate medications found. Please discuss with provider.          ED SEPSIS DOCUMENTATION   Time reflects when diagnosis was documented in both MDM as applicable and the Disposition within this note       Time User Action Codes Description Comment    2/11/2025  3:03 PM Cameron Monge [R42] Vertigo     2/11/2025  3:03 PM Cameron Monge [R51.9] Headache     2/11/2025  3:03 PM Cameron Monge [R51.9] Headache     2/11/2025  3:03 PM Cameron Monge [G43.909] Migraine                  Cameron Monge MD  02/11/25 1555    "

## 2025-02-11 NOTE — TELEPHONE ENCOUNTER
Reason for call:   [x] Refill   [] Prior Auth  [] Other:     Office:   [] PCP/Provider -   [x] Specialty/Provider - [facility]     Medication:     amphetamine-dextroamphetamine (ADDERALL, 10MG,) 10 mg tablet  Take 1 tablet (10 mg total) by mouth 2 (two) times a day          Pharmacy:  [id number] [place]     Does the patient have enough for 3 days?   [x] Yes   [] No - Send as HP to POD

## 2025-02-11 NOTE — DISCHARGE INSTRUCTIONS
Follow up with ENT within 1 week regarding your symptoms.    Return to the ER if you develop:    Worsening headache, numbness, weakness, seizures, loss of consciousness, worsening dizziness.

## 2025-02-11 NOTE — Clinical Note
Nhung Grant was seen and treated in our emergency department on 2/11/2025.                Diagnosis:     Nhung  .    She may return on this date: 02/14/2025         If you have any questions or concerns, please don't hesitate to call.      Cameron Monge MD    ______________________________           _______________          _______________  Hospital Representative                              Date                                Time

## 2025-02-11 NOTE — TELEPHONE ENCOUNTER
Refill cannot be delegated    1 [id number] 01/24/2025 12/19/2024 clonazePAM (Tablet) 30.0 30 0.5 MG [facility], [facility]. [place] 0 / 1 [place]   1 [id number] 01/16/2025 01/15/2025 Amphetamine Salt Combo (Tablet) 60.0 30 10 MG [facility], [facility]. Medicaid 0 / 0 [place]   1 [id number] 12/26/2024 11/18/2024 clonazePAM (Tablet) 30.0 30 0.5 MG NA [facility], .[facility]. Medicaid 1 / 1 [place]   1 [id number] 12/19/2024 12/19/2024 Amphetamine Salt Combo (Tablet) 60.0 30 10 MG [facility], [facility]. Medicaid 0 / 0 [place]   1 [id number] 11/29/2024 11/18/2024 clonazePAM (Tablet) 30.0 30 0.5 MG [facility], ... Medicaid 0 / 1 [place]   1 [id number] 11/25/2024 11/18/2024 Amphetamine Salt Combo (Tablet) 51.0 25 10 MG [facility], [facility]. Medicaid 0 / 0 [place]   1 [id number] 11/04/2024 11/01/2024 Methylphenidate Hcl La (Capsule, Extended Release Biph) 30.0 30 20 MG [facility], [facility]. Medicaid 0 / 0 [place]   1 [id number] 11/01/2024 11/01/2024 clonazePAM (Tablet) 30.0 30 0.5 MG NA [facility], [facility]. Medicaid 0 / 0 [place]   1 [id number] 10/07/2024 10/02/2024 Methylphenidate Hcl La (Capsule, Extended Release Biph) 30.0 30 20 MG NA [facility], [facility]. Medicaid 0 / 0 [place]   1 [id number] 10/02/2024 10/02/2024 clonazePAM (Tablet) 30.0 30 0.5 MG NA [facility] Medicaid 0 / 0 [place]

## 2025-02-11 NOTE — ED ATTENDING ATTESTATION
I, Yana Eden MD, saw and evaluated the patient. I have discussed the patient with the resident/non-physician practitioner and agree with the resident's/non-physician practitioner's findings, Plan of Care, and MDM as documented in the resident's/non-physician practitioner's note, except where noted. All available labs and Radiology studies were reviewed.  I was present for key portions of any procedure(s) performed by the resident/non-physician practitioner and I was immediately available to provide assistance.       At this point I agree with the current assessment done in the Emergency Department.  I have conducted an independent evaluation of this patient a history and physical is as follows:    Subjective: 44-year-old female with history of migraines status post Botox injection yesterday, Ménière's disease, hypertension who presents with several days of intermittent lightheadedness with associated frontal headache with photophobia/phonophobia not associated with neck stiffness, fevers, head trauma, changes in vision, numbness/tingling/weakness, ataxia, speech disturbance or any other complaints.  She took her home migraine medications without relief of symptoms.     Objective: Vital signs stable and afebrile.  Cranial nerves II through XII intact, PERRLA, EOMI, no field cuts, 5 out of 5 strength and sensation intact to light touch throughout with no dysmetria and with normal gait. No dysarthria or aphasia with intact naming and repetition.    Assessment/Plan: Middle-aged female with history of migraines and Ménière's presenting with headache, phono/photophobia and intermittent dizziness.  Her vitals are stable and her neuroexam is normal.  I have a low clinical suspicion for central causes of vertigo given the intermittent nature of her lightheadedness.  Also unlikely to be cardiovascular in nature given it is her typical vertiginous symptoms and is associated with migraine night headache.  No red flag symptoms  for headache.  Patient provided with migraine cocktail and had symptomatic improvement.    Patient tolerated p.o. and ambulatory challenges in the emergency department and had stable vitals at time of discharge.    Patient was discharged to home with strict return precautions. Patient acknowledged understanding of plan and diagnostic results, and all their questions were answered to their satisfaction.

## 2025-02-12 RX ORDER — DEXTROAMPHETAMINE SACCHARATE, AMPHETAMINE ASPARTATE, DEXTROAMPHETAMINE SULFATE AND AMPHETAMINE SULFATE 2.5; 2.5; 2.5; 2.5 MG/1; MG/1; MG/1; MG/1
10 TABLET ORAL
Qty: 60 TABLET | Refills: 0 | Status: SHIPPED | OUTPATIENT
Start: 2025-02-12

## 2025-02-14 ENCOUNTER — TELEPHONE (OUTPATIENT)
Dept: MAMMOGRAPHY | Facility: CLINIC | Age: 45
End: 2025-02-14

## 2025-02-14 NOTE — TELEPHONE ENCOUNTER
"Called patient and left message with \"[employer]\". Adv I am going to cancel 3/27/25 screening mammogram due to imaging performed in 2023 stated patient needed diagnostic imaging. Left name/#/office hours with request for patient to call back to assist in scheduling.   "

## 2025-02-18 DIAGNOSIS — F39 MOOD DISORDER (HCC): ICD-10-CM

## 2025-02-20 DIAGNOSIS — F39 MOOD DISORDER (HCC): ICD-10-CM

## 2025-02-20 NOTE — TELEPHONE ENCOUNTER
Reason for call:   [x] Refill   [] Prior Auth  [] Other:     Office:   [] PCP/Provider -   [x] Specialty/Provider - [facility]     Medication: cariprazine (VRAYLAR) 3 MG capsule     Dose/Frequency: Take 1 capsule (3 mg total) by mouth daily,     Quantity: 30    Pharmacy: [facility]/pharmacy #[id number] - [place] - [address] [place] [address]      Does the patient have enough for 3 days?   [x] Yes   [] No - Send as HP to POD

## 2025-02-23 RX ORDER — CARIPRAZINE 3 MG/1
3 CAPSULE, GELATIN COATED ORAL DAILY
Qty: 30 CAPSULE | Refills: 1 | Status: SHIPPED | OUTPATIENT
Start: 2025-02-23

## 2025-03-03 ENCOUNTER — TELEPHONE (OUTPATIENT)
Dept: PSYCHIATRY | Facility: CLINIC | Age: 45
End: 2025-03-03

## 2025-03-06 DIAGNOSIS — F41.1 GENERALIZED ANXIETY DISORDER: ICD-10-CM

## 2025-03-06 DIAGNOSIS — F90.1 ADHD, PREDOMINANTLY HYPERACTIVE TYPE: ICD-10-CM

## 2025-03-06 NOTE — TELEPHONE ENCOUNTER
02/27/2025 12/19/2024 clonazePAM (Tablet) 30.0 30 0.5 MG [facility] 1 / 1 [place]      1 [id number] 02/12/2025 02/12/2025 Amphetamine Salt Combo (Tablet) 60.0 30 10 MG NA LONG [facility] Medicaid 0 / 0 [place]    1 [id number] 01/24/2025 12/19/2024 clonazePAM (Tablet) 30.0 30 0.5 MG NA LONG [facility] Medicaid 0 / 1 [place]    1 [id number] 01/16/2025 01/15/2025 Amphetamine Salt Combo (Tablet) 60.0 30 10 MG NA LONG [facility] Medicaid 0 / 0 [place]    1 [id number] 12/26/2024 11/18/2024 clonazePAM (Tablet) 30.0 [facility] [place] 1 / 1 [place]    1 [id number] 12/19/2024 12/19/2024 Amphetamine Salt Combo (Tablet) 60.0 30 10 MG NA LONG [facility]

## 2025-03-06 NOTE — TELEPHONE ENCOUNTER
Reason for call:   [x] Refill   [] Prior Auth  [] Other:     Office:   [] PCP/Provider -   [x] Specialty/Provider - Psych    Medication: Clonazepam       Dose/Frequency: 0.5 mg    Quantity: 30 tablets    Pharmacy: [facility]/pharmacy #[id number] [address] Pharmacy   Does the patient have enough for 3 days?   [] Yes   [x] No - Send as HP to POD    Mail Away Pharmacy   Does the patient have enough for 10 days?   [] Yes   [] No - Send as HP to POD      Reason for call:   [x] Refill   [] Prior Auth  [] Other:     Office:   [] PCP/Provider -   [x] Specialty/Provider - Psych    Medication: Amphetamine-Dextroamphetamine    Dose/Frequency: 10 mg    Quantity: 60 tablets    Pharmacy: [facility]/pharmacy #[address] [place] [address] Pharmacy   Does the patient have enough for 3 days?   [x] Yes   [] No - Send as HP to POD    Mail Away Pharmacy   Does the patient have enough for 10 days?   [] Yes   [] No - Send as HP to POD

## 2025-03-10 RX ORDER — CLONAZEPAM 0.5 MG/1
0.5 TABLET ORAL
Qty: 30 TABLET | Refills: 0 | Status: SHIPPED | OUTPATIENT
Start: 2025-03-10

## 2025-03-10 RX ORDER — DEXTROAMPHETAMINE SACCHARATE, AMPHETAMINE ASPARTATE, DEXTROAMPHETAMINE SULFATE AND AMPHETAMINE SULFATE 2.5; 2.5; 2.5; 2.5 MG/1; MG/1; MG/1; MG/1
10 TABLET ORAL
Qty: 60 TABLET | Refills: 0 | Status: SHIPPED | OUTPATIENT
Start: 2025-03-10

## 2025-03-20 DIAGNOSIS — M54.2 CERVICALGIA: ICD-10-CM

## 2025-03-24 DIAGNOSIS — G43.709 CHRONIC MIGRAINE W/O AURA W/O STATUS MIGRAINOSUS, NOT INTRACTABLE: ICD-10-CM

## 2025-03-27 DIAGNOSIS — G43.709 CHRONIC MIGRAINE W/O AURA W/O STATUS MIGRAINOSUS, NOT INTRACTABLE: ICD-10-CM

## 2025-03-28 RX ORDER — KETOROLAC TROMETHAMINE 30 MG/ML
INJECTION, SOLUTION INTRAMUSCULAR; INTRAVENOUS
Qty: 4 ML | Refills: 2 | Status: SHIPPED | OUTPATIENT
Start: 2025-03-28

## 2025-03-31 RX ORDER — KETOROLAC TROMETHAMINE 30 MG/ML
INJECTION, SOLUTION INTRAMUSCULAR; INTRAVENOUS
Qty: 4 ML | Refills: 2 | OUTPATIENT
Start: 2025-03-31

## 2025-04-02 ENCOUNTER — VBI (OUTPATIENT)
Dept: ADMINISTRATIVE | Facility: OTHER | Age: 45
End: 2025-04-02

## 2025-04-02 NOTE — TELEPHONE ENCOUNTER
04/02/25 2:47 PM     Chart reviewed for Pap Smear (HPV) aka Cervical Cancer Screening ; nothing is submitted to the patient's insurance at this time.     Kelsi Jernigan MA   [facility]

## 2025-04-07 DIAGNOSIS — F90.1 ADHD, PREDOMINANTLY HYPERACTIVE TYPE: ICD-10-CM

## 2025-04-07 RX ORDER — DEXTROAMPHETAMINE SACCHARATE, AMPHETAMINE ASPARTATE, DEXTROAMPHETAMINE SULFATE AND AMPHETAMINE SULFATE 2.5; 2.5; 2.5; 2.5 MG/1; MG/1; MG/1; MG/1
10 TABLET ORAL
Qty: 60 TABLET | Refills: 0 | Status: SHIPPED | OUTPATIENT
Start: 2025-04-07

## 2025-04-07 NOTE — TELEPHONE ENCOUNTER
"Called and spoke w/pt and felt pop about 30 min ago and now cannot move thumb  Pt does not see any swelling or bruising  She was trying to put pants on and heard a \"pop\" and thumb got stuck and now cannot move  Pt has another appt at 1100  She is unable to see Dr Brooke Torres  Pt states that her pain level is a 5  Since she is unable to see Dr Brooke Torres, she states she may go to ED and recommended that she may want to go since she is not able to move thumb and she was previously able to move it     Will forward to Dr Brooke Torres for any other advice     " Back in room. 3 way dunaway with continuous irrigation. Urine output pink. No signs of distress. Will cont to monitor.

## 2025-04-07 NOTE — TELEPHONE ENCOUNTER
Reason for call:   [x] Refill   [] Prior Auth  [] Other:     Office:   [] PCP/Provider -   [x] Specialty/Provider - Psych    Medication: amphetamine-dextroamphetamine (ADDERALL, 10MG,) 10 mg tablet      Dose/Frequency: : Take 1 tablet (10 mg total) by mouth 2 (two) times a day     Quantity: 60    Pharmacy: [facility]/pharmacy #[id number] - [place] - [address] [place] [address]     Local Pharmacy   Does the patient have enough for 3 days?   [x] Yes   [] No - Send as HP to POD    Mail Away Pharmacy   Does the patient have enough for 10 days?   [] Yes   [] No - Send as HP to POD

## 2025-04-07 NOTE — TELEPHONE ENCOUNTER
03/28/2025 01/29/2025 clonazePAM (Tablet) 30.0 30 0.5 [facility] [place] / 0 [place]   1 [id number] 03/25/2025 03/25/2025 diazePAM (Tablet) 30.0 30 2 MG NA [employer] Medicaid 0 / 0 [place]   1 [id number] 03/11/2025 03/10/2025 Amphetamine Salt Combo (Tablet) 60.0 30 10 MG [facility] [place] 0 / 0 [place]   1 [id number] 02/27/2025 12/19/2024 clonazePAM (Tablet) 30.0 30 0.5 MG NA [facility] Medicaid 1 / 1 [place]   1 [id number] 02/12/2025 02/12/2025 Amphetamine Salt Combo (Tablet) 60.0 30 [facility] [place] 0 / 0 [place]   1 [id number] 01/24/2025 12/19/2024 clonazePAM (Tablet) 30.0 30 0.5 MG NA [facility] Medicaid 0 / 1 [place]   1 [id number] 01/16/2025 01/15/2025 Amphetamine Salt Combo (Tablet) 60.0 30 10 [facility] Medicaid 0 / 0 [place]   1 [id number] 12/26/2024 11/18/2024 clonazePAM (Tablet) 30.0 30 0.5 MG NA LONG [facility] Medicaid 1 / 1 [place]   1 [id number] 12/19/2024 12/19/2024 Amphetamine Salt Combo (T

## 2025-05-02 DIAGNOSIS — F90.1 ADHD, PREDOMINANTLY HYPERACTIVE TYPE: ICD-10-CM

## 2025-05-02 RX ORDER — DEXTROAMPHETAMINE SACCHARATE, AMPHETAMINE ASPARTATE, DEXTROAMPHETAMINE SULFATE AND AMPHETAMINE SULFATE 2.5; 2.5; 2.5; 2.5 MG/1; MG/1; MG/1; MG/1
10 TABLET ORAL
Qty: 60 TABLET | Refills: 0 | OUTPATIENT
Start: 2025-05-02

## 2025-05-02 NOTE — TELEPHONE ENCOUNTER
Reason for call:   [x] Refill   [] Prior Auth  [] Other:     Office:   [] PCP/Provider -   [x] Specialty/Provider - [facility]. [facility]    Medication: amphetamine-dextroamphetamine (ADDERALL, 10MG,) 10 mg tablet     Dose/Frequency: 10 mg, Oral, 2 times daily (morning and afternoon)     Quantity: 60    Pharmacy: [id number] [place], [place] - [address] [place] [address] Pharmacy   Does the patient have enough for 3 days?   [x] Yes   [] No - Send as HP to POD    Mail Away Pharmacy   Does the patient have enough for 10 days?   [] Yes   [] No - Send as HP to POD

## 2025-05-02 NOTE — TELEPHONE ENCOUNTER
Refill must be reviewed and completed by the office or provider. The refill is unable to be approved or denied by the medication management team.  Medication cannot be delegated.    [id number] ** 04/07/2025 04/07/2025 Amphetamine Salt Combo (Tablet) 60.0 30 10 MG NA [facility]  [id number] ** 03/11/2025 03/10/2025 Amphetamine Salt Combo (Tablet) 60.0 30 10 MG NA [facility]  [id number] ** 02/12/2025 02/12/2025 Amphetamine Salt Combo (Tablet) 60.0 30 10 MG NA [facility]  [id number] ** 01/16/2025 01/15/2025 Amphetamine Salt Combo (Tablet) 60.0 30 10 MG NA [facility]   [id number] ** 12/19/2024 12/19/2024 Amphetamine Salt Combo (Tablet) 60.0 30 10 MG NA LONG [facility]

## 2025-05-08 ENCOUNTER — HOSPITAL ENCOUNTER (OUTPATIENT)
Dept: MAMMOGRAPHY | Facility: CLINIC | Age: 45
Discharge: HOME | End: 2025-05-08
Payer: COMMERCIAL

## 2025-05-08 ENCOUNTER — HOSPITAL ENCOUNTER (OUTPATIENT)
Dept: ULTRASOUND IMAGING | Facility: CLINIC | Age: 45
Discharge: HOME | End: 2025-05-08
Payer: COMMERCIAL

## 2025-05-08 VITALS — WEIGHT: 257 LBS | HEIGHT: 72 IN | BODY MASS INDEX: 34.81 KG/M2

## 2025-05-08 DIAGNOSIS — R92.8 ABNORMAL MAMMOGRAM: ICD-10-CM

## 2025-05-08 PROCEDURE — 77066 DX MAMMO INCL CAD BI: CPT

## 2025-05-08 PROCEDURE — G0279 TOMOSYNTHESIS, MAMMO: HCPCS

## 2025-05-09 DIAGNOSIS — F41.1 GENERALIZED ANXIETY DISORDER: ICD-10-CM

## 2025-05-09 NOTE — TELEPHONE ENCOUNTER
Refill must be reviewed and completed by the office or provider. The refill is unable to be approved or denied by the medication management team.  Medication cannot be delegated.    [id number] ** 03/28/2025 01/29/2025 clonazePAM (Tablet) 30.0 30 0.5 MG NA LONG [facility]  [id number] ** 02/27/2025 12/19/2024 clonazePAM (Tablet) 30.0 30 0.5 MG NA LONG [facility]  [id number] ** 01/24/2025 12/19/2024 clonazePAM (Tablet) 30.0 30 0.5 MG NA LONG [facility]

## 2025-05-09 NOTE — TELEPHONE ENCOUNTER
Reason for call:   [x] Refill   [] Prior Auth  [] Other:     Office:   [] PCP/Provider -   [x] Specialty/Provider - [facility]     Medication: clonazePAM (KlonoPIN) 0.5 mg tablet     Dose/Frequency: 0.5 mg, Daily at bedtime     Quantity: 30    [id number]    Local Pharmacy   Does the patient have enough for 3 days?   [] Yes   [x] No - Send as HP to POD    Mail Away Pharmacy   Does the patient have enough for 10 days?   [] Yes   [] No - Send as HP to POD

## 2025-05-12 RX ORDER — CLONAZEPAM 0.5 MG/1
0.5 TABLET ORAL
Qty: 30 TABLET | Refills: 0 | Status: SHIPPED | OUTPATIENT
Start: 2025-05-12

## 2025-05-15 ENCOUNTER — PROCEDURE VISIT (OUTPATIENT)
Dept: NEUROLOGY | Facility: CLINIC | Age: 45
End: 2025-05-15
Payer: COMMERCIAL

## 2025-05-15 VITALS — SYSTOLIC BLOOD PRESSURE: 148 MMHG | TEMPERATURE: 97.9 F | DIASTOLIC BLOOD PRESSURE: 88 MMHG

## 2025-05-15 DIAGNOSIS — I10 PRIMARY HYPERTENSION: ICD-10-CM

## 2025-05-15 DIAGNOSIS — G43.709 CHRONIC MIGRAINE W/O AURA W/O STATUS MIGRAINOSUS, NOT INTRACTABLE: Primary | ICD-10-CM

## 2025-05-15 PROCEDURE — 64615 CHEMODENERV MUSC MIGRAINE: CPT | Performed by: PHYSICIAN ASSISTANT

## 2025-05-15 RX ORDER — ATOGEPANT 10 MG/1
TABLET ORAL
Qty: 90 TABLET | Refills: 0 | Status: SHIPPED | OUTPATIENT
Start: 2025-05-15

## 2025-05-15 RX ORDER — LISINOPRIL 10 MG/1
10 TABLET ORAL DAILY
Qty: 90 TABLET | Refills: 1 | Status: SHIPPED | OUTPATIENT
Start: 2025-05-15

## 2025-05-15 NOTE — PROGRESS NOTES
Universal Protocol   Consent: Verbal consent obtained. Written consent obtained  Risks and benefits: risks, benefits and alternatives were discussed  Consent given by: patient  Patient understanding: patient states understanding of the procedure being performed  Patient consent: the patient's understanding of the procedure matches consent given  Procedure consent: procedure consent matches procedure scheduled      Chemodenervation     Date/Time  5/15/2025 10:30 AM     Performed by  Ayesha Aguilar PA-C   Authorized by  Ayesha Aguilar PA-C     Pre-procedure details      Prepped With: Alcohol     Procedure details      Position:  Upright   Botox      Botox Type:  Type A    Brand:  Botox    mL's of Botulinum Toxin:  200    Final Concentration per CC:  100 units    Needle Gauge:  30 G 2.5 inch   Procedures      Botox Procedures: chronic headache      Indications: migraines     Injection Location      Head / Face:  L superior trapezius, R superior trapezius, L superior cervical paraspinal, R superior cervical paraspinal, L , R , procerus, L temporalis, R temporalis, R frontalis, L frontalis, R medial occipitalis and L medial occipitalis    L  injection amount:  5 unit(s)    R  injection amount:  5 unit(s)    L lateral frontalis:  5 unit(s)    R lateral frontalis:  5 unit(s)    L medial frontalis:  5 unit(s)    R medial frontalis:  5 unit(s)    L temporalis injection amount:  20 unit(s)    R temporalis injection amount:  20 unit(s)    Procerus injection amount:  5 unit(s)    L medial occipitalis injection amount:  15 unit(s)    R medial occipitalis injection amount:  15 unit(s)    L superior cervical paraspinal injection amount:  10 unit(s)    R superior cervical paraspinal injection amount:  10 unit(s)    L superior trapezius injection amount:  15 unit(s)    R superior trapezius injection amount:  15 unit(s)   Total Units      Total units used:  200    Total units discarded:  0    Post-procedure details      Chemodenervation:  Chronic migraine    Facial Nerve Location::  Bilateral facial nerve    Patient tolerance of procedure:  Tolerated well, no immediate complications   Comments       Extra units medically necessary: 25 R frontotemporal region and R apex/scalp, 10 left temporalis, 5 each masseter (10 total).     Blood pressure 148/88, temperature 97.9 °F (36.6 °C), temperature source Temporal, last menstrual period 04/15/2025, not currently breastfeeding.    She stopped Aimovig due to some nausea.  Nausea improved after stopping it.  Migraines are no worse thankfully.  She reports sometimes migraine headaches are worse in the summertime associated with vertigo at times.  She does get nausea with the vertigo.  We discussed trial of Qulipta if migraine headaches worsen over the summer.  She can also increase the use of Nurtec to every other day if needed.  Side effects of Qulipta reviewed.  She will not do the Qulipta unless the migraines worsen.

## 2025-05-15 NOTE — TELEPHONE ENCOUNTER
Reason for call:   [x] Refill   [] Prior Auth  [] Other:     Office:   [x] PCP/Provider - Mariah Parada,   [] Specialty/Provider -     Medication: lisinopril (ZESTRIL) 10 mg     Dose/Frequency: 1 daily    Quantity: 90    Pharmacy: [facility]    Local Pharmacy   Does the patient have enough for 3 days?   [] Yes   [x] No - Send as HP to POD    Mail Away Pharmacy   Does the patient have enough for 10 days?   [] Yes   [] No - Send as HP to POD

## 2025-05-18 DIAGNOSIS — M54.2 CERVICALGIA: ICD-10-CM

## 2025-05-30 DIAGNOSIS — R12 HEARTBURN: ICD-10-CM

## 2025-05-30 DIAGNOSIS — F39 MOOD DISORDER (HCC): ICD-10-CM

## 2025-05-30 DIAGNOSIS — R11.2 NAUSEA AND VOMITING, UNSPECIFIED VOMITING TYPE: ICD-10-CM

## 2025-05-30 RX ORDER — OMEPRAZOLE 40 MG/1
40 CAPSULE, DELAYED RELEASE ORAL 2 TIMES DAILY
Qty: 60 CAPSULE | Refills: 5 | Status: SHIPPED | OUTPATIENT
Start: 2025-05-30

## 2025-06-02 ENCOUNTER — TELEPHONE (OUTPATIENT)
Dept: PSYCHIATRY | Facility: CLINIC | Age: 45
End: 2025-06-02

## 2025-06-02 DIAGNOSIS — F39 MOOD DISORDER (HCC): ICD-10-CM

## 2025-06-02 DIAGNOSIS — F90.1 ADHD, PREDOMINANTLY HYPERACTIVE TYPE: ICD-10-CM

## 2025-06-02 RX ORDER — CARIPRAZINE 3 MG/1
3 CAPSULE, GELATIN COATED ORAL DAILY
Qty: 30 CAPSULE | Refills: 1 | Status: SHIPPED | OUTPATIENT
Start: 2025-06-02 | End: 2025-06-06 | Stop reason: SDUPTHER

## 2025-06-02 RX ORDER — DEXTROAMPHETAMINE SACCHARATE, AMPHETAMINE ASPARTATE, DEXTROAMPHETAMINE SULFATE AND AMPHETAMINE SULFATE 2.5; 2.5; 2.5; 2.5 MG/1; MG/1; MG/1; MG/1
10 TABLET ORAL
Qty: 60 TABLET | Refills: 0 | OUTPATIENT
Start: 2025-06-02

## 2025-06-02 NOTE — TELEPHONE ENCOUNTER
Reason for call:   [x] Refill   [] Prior Auth  [] Other:     Office:   [] PCP/Provider -   [x] Specialty/Provider -     Medication:   amphetamine-dextroamphetamine (ADDERALL, 10MG,) 10 mg tablet    cariprazine (VRAYLAR) 3 MG capsule    Dose/Frequency: Sig: Take 1 tablet (10 mg total) by mouth 2 (two) times a day Max Daily Amount: 20 mg    Sig: Take 1 capsule (3 mg total) by mouth daily    Quantity:   60  30    Pharmacy: [facility]/pharmacy #[id number] [place] [address]  Phone: [phone]  Fax: [phone]    Local Pharmacy   Does the patient have enough for 3 days?   [] Yes   [x] No - Send as HP to POD    Mail Away Pharmacy   Does the patient have enough for 10 days?   [] Yes   [] No - Send as HP to POD

## 2025-06-02 NOTE — TELEPHONE ENCOUNTER
PA for Vraylar 3 mg capsules SUBMITTED to [employer]    via    [x]CMM-KEY:  [id number]  []Surescripts-Case ID #   []Availity-Auth ID # NDC #   []Faxed to plan   []Other website   []Phone call Case ID #     []PA sent as URGENT    All office notes, labs and other pertaining documents and studies sent. Clinical questions answered. Awaiting determination from insurance company.     Turnaround time for your insurance to make a decision on your Prior Authorization can take 7-21 business days.

## 2025-06-02 NOTE — TELEPHONE ENCOUNTER
Vraylar-not assigned to protocol, please review.    Adderall-Refill cannot be delegated    1 [id number] ** 05/12/2025 05/12/2025 clonazePAM (Tablet) 30.0 30 0.5 MG NA LONG [facility] [place] 0 / 0 [place]   1 [id number] ** 05/06/2025 05/05/2025 Amphetamine Salt Combo (Tablet) 60.0 30 10 MG NA LONG [facility] [place] 0 / 0 [place]   1 [id number] 05/07/2025 05/05/2025 05/05/2025 diazePAM (Tablet) 30.0 30 2 MG NA [employer] [facility]. Medicaid 0 / 0 [place]   1 [id number] ** 04/07/2025 04/07/2025 Amphetamine Salt Combo (Tablet) 60.0 30 10 MG NA LONG [facility] [place] 0 / 0 [place]   1 [id number] ** 03/28/2025 01/29/2025 clonazePAM (Tablet) 30.0 30 0.5 MG NA LONG [facility] [place] 0 / 0 [place]   1 [id number] 03/25/2025 03/25/2025 03/25/2025 diazePAM (Tablet) 30.0 30 2 MG NA [employer] [facility]. Medicaid 0 / 0 [place]   1 [id number] ** 03/11/2025 03/10/2025 Amphetamine Salt Combo (Tablet) 60.0 30 10 MG NA [facility] Medicaid 0 / 0 [place]   1 [id number] ** 02/27/2025 12/19/2024 clonazePAM (Tablet) 30.0 30 0.5 MG NA LONG [facility] Medicaid 1 / 1 [place]   1 [id number] ** 02/12/2025 02/12/2025 Amphetamine Salt Combo (Tablet) 60.0 30 10 MG NA LONG [facility] Medicaid 0 / 0 [place]   1 [id number] ** 01/24/2025 12/19/2024 clonazePAM (Tablet) 30.0 30 0.5 MG NA [facility] [place] 0 / 1 [place]

## 2025-06-03 NOTE — TELEPHONE ENCOUNTER
PA for Vraylar 3 mg  CANCELLED due to     [x]Approval on file-dates approved 7/24/24-7/24/25  []Medication already on Formulary  []Brand Name Preferred  []Patient no longer covered by insurance  []Therapy Changed/Medication Discontinued    Patient advised by     [x]My Chart Message  []Phone call    Scanned into Media  yes

## 2025-06-05 ENCOUNTER — TELEPHONE (OUTPATIENT)
Age: 45
End: 2025-06-05

## 2025-06-06 DIAGNOSIS — F90.1 ADHD, PREDOMINANTLY HYPERACTIVE TYPE: ICD-10-CM

## 2025-06-06 DIAGNOSIS — F39 MOOD DISORDER (HCC): ICD-10-CM

## 2025-06-06 RX ORDER — DEXTROAMPHETAMINE SACCHARATE, AMPHETAMINE ASPARTATE, DEXTROAMPHETAMINE SULFATE AND AMPHETAMINE SULFATE 2.5; 2.5; 2.5; 2.5 MG/1; MG/1; MG/1; MG/1
10 TABLET ORAL
Qty: 60 TABLET | Refills: 0 | Status: SHIPPED | OUTPATIENT
Start: 2025-06-06

## 2025-06-06 NOTE — TELEPHONE ENCOUNTER
Pt calling in regards to adderall prescription. Pt is out of medication. Pt asking for a call back once sent

## 2025-06-10 DIAGNOSIS — F41.1 GENERALIZED ANXIETY DISORDER: ICD-10-CM

## 2025-06-10 NOTE — TELEPHONE ENCOUNTER
Reason for call:   [x] Refill   [] Prior Auth  [] Other:     Office:   [] PCP/Provider -   [x] Specialty/Provider -Candelario Ribeiro      Medication: clonazePAM (KlonoPIN) 0.5 mg tablet     Dose/Frequency: Take 1 tablet (0.5 mg total) by mouth daily at bedtime     Quantity: 30    Pharmacy: [facility]    Local Pharmacy   Does the patient have enough for 3 days?   [x] Yes   [] No - Send as HP to POD

## 2025-06-11 ENCOUNTER — APPOINTMENT (OUTPATIENT)
Dept: LAB | Facility: CLINIC | Age: 45
End: 2025-06-11
Attending: NURSE PRACTITIONER
Payer: COMMERCIAL

## 2025-06-11 DIAGNOSIS — Z11.3 SCREENING EXAMINATION FOR VENEREAL DISEASE: ICD-10-CM

## 2025-06-11 DIAGNOSIS — G93.31 POSTVIRAL FATIGUE SYNDROME: ICD-10-CM

## 2025-06-11 DIAGNOSIS — I10 PRIMARY HYPERTENSION: ICD-10-CM

## 2025-06-11 LAB
ALBUMIN SERPL BCG-MCNC: 4.6 G/DL (ref 3.5–5)
ALP SERPL-CCNC: 52 U/L (ref 34–104)
ALT SERPL W P-5'-P-CCNC: 93 U/L (ref 7–52)
ANION GAP SERPL CALCULATED.3IONS-SCNC: 7 MMOL/L (ref 4–13)
AST SERPL W P-5'-P-CCNC: 41 U/L (ref 13–39)
BASOPHILS # BLD AUTO: 0.05 THOUSANDS/ÂΜL (ref 0–0.1)
BASOPHILS NFR BLD AUTO: 1 % (ref 0–1)
BILIRUB SERPL-MCNC: 0.62 MG/DL (ref 0.2–1)
BUN SERPL-MCNC: 10 MG/DL (ref 5–25)
CALCIUM SERPL-MCNC: 9.5 MG/DL (ref 8.4–10.2)
CHLORIDE SERPL-SCNC: 99 MMOL/L (ref 96–108)
CHOLEST SERPL-MCNC: 221 MG/DL (ref ?–200)
CO2 SERPL-SCNC: 26 MMOL/L (ref 21–32)
CREAT SERPL-MCNC: 0.98 MG/DL (ref 0.6–1.3)
EOSINOPHIL # BLD AUTO: 0.08 THOUSAND/ÂΜL (ref 0–0.61)
EOSINOPHIL NFR BLD AUTO: 1 % (ref 0–6)
ERYTHROCYTE [DISTWIDTH] IN BLOOD BY AUTOMATED COUNT: 13.3 % (ref 11.6–15.1)
FERRITIN SERPL-MCNC: 82 NG/ML (ref 30–307)
GFR SERPL CREATININE-BSD FRML MDRD: 70 ML/MIN/1.73SQ M
GLUCOSE P FAST SERPL-MCNC: 101 MG/DL (ref 65–99)
HBV CORE AB SER QL: NORMAL
HBV SURFACE AG SER QL: NORMAL
HCT VFR BLD AUTO: 46.2 % (ref 34.8–46.1)
HCV AB SER QL: REACTIVE
HDLC SERPL-MCNC: 63 MG/DL
HGB BLD-MCNC: 15.5 G/DL (ref 11.5–15.4)
HIV 1+2 AB+HIV1 P24 AG SERPL QL IA: NORMAL
IMM GRANULOCYTES # BLD AUTO: 0.02 THOUSAND/UL (ref 0–0.2)
IMM GRANULOCYTES NFR BLD AUTO: 0 % (ref 0–2)
LDLC SERPL CALC-MCNC: 139 MG/DL (ref 0–100)
LYMPHOCYTES # BLD AUTO: 1.61 THOUSANDS/ÂΜL (ref 0.6–4.47)
LYMPHOCYTES NFR BLD AUTO: 29 % (ref 14–44)
MCH RBC QN AUTO: 30.9 PG (ref 26.8–34.3)
MCHC RBC AUTO-ENTMCNC: 33.5 G/DL (ref 31.4–37.4)
MCV RBC AUTO: 92 FL (ref 82–98)
MONOCYTES # BLD AUTO: 0.53 THOUSAND/ÂΜL (ref 0.17–1.22)
MONOCYTES NFR BLD AUTO: 9 % (ref 4–12)
NEUTROPHILS # BLD AUTO: 3.32 THOUSANDS/ÂΜL (ref 1.85–7.62)
NEUTS SEG NFR BLD AUTO: 60 % (ref 43–75)
NRBC BLD AUTO-RTO: 0 /100 WBCS
PLATELET # BLD AUTO: 292 THOUSANDS/UL (ref 149–390)
PMV BLD AUTO: 10.6 FL (ref 8.9–12.7)
POTASSIUM SERPL-SCNC: 4.4 MMOL/L (ref 3.5–5.3)
PROT SERPL-MCNC: 8.1 G/DL (ref 6.4–8.4)
RBC # BLD AUTO: 5.01 MILLION/UL (ref 3.81–5.12)
SODIUM SERPL-SCNC: 132 MMOL/L (ref 135–147)
TREPONEMA PALLIDUM IGG+IGM AB [PRESENCE] IN SERUM OR PLASMA BY IMMUNOASSAY: NORMAL
TRIGL SERPL-MCNC: 94 MG/DL (ref ?–150)
TSH SERPL DL<=0.05 MIU/L-ACNC: 1.95 UIU/ML (ref 0.45–4.5)
WBC # BLD AUTO: 5.61 THOUSAND/UL (ref 4.31–10.16)

## 2025-06-11 PROCEDURE — 82728 ASSAY OF FERRITIN: CPT

## 2025-06-11 PROCEDURE — 80061 LIPID PANEL: CPT

## 2025-06-11 PROCEDURE — 86704 HEP B CORE ANTIBODY TOTAL: CPT

## 2025-06-11 PROCEDURE — 84443 ASSAY THYROID STIM HORMONE: CPT

## 2025-06-11 PROCEDURE — 36415 COLL VENOUS BLD VENIPUNCTURE: CPT

## 2025-06-11 PROCEDURE — 87340 HEPATITIS B SURFACE AG IA: CPT

## 2025-06-11 PROCEDURE — 85025 COMPLETE CBC W/AUTO DIFF WBC: CPT

## 2025-06-11 PROCEDURE — 87389 HIV-1 AG W/HIV-1&-2 AB AG IA: CPT

## 2025-06-11 PROCEDURE — 86780 TREPONEMA PALLIDUM: CPT

## 2025-06-11 PROCEDURE — 80053 COMPREHEN METABOLIC PANEL: CPT

## 2025-06-11 PROCEDURE — 86803 HEPATITIS C AB TEST: CPT

## 2025-06-11 RX ORDER — CLONAZEPAM 0.5 MG/1
0.5 TABLET ORAL
Qty: 30 TABLET | Refills: 0 | Status: SHIPPED | OUTPATIENT
Start: 2025-06-11 | End: 2025-06-16 | Stop reason: SDUPTHER

## 2025-06-11 NOTE — TELEPHONE ENCOUNTER
Refill must be reviewed and completed by the office or provider. The refill is unable to be approved or denied by the medication management team.      06/06/2025 06/06/2025 Amphetamine Salt Combo (Tablet) 60.0 30 10 MG NA VIVIENNE CARRILLO [facility] Medicaid 0 / 0 [place]   1 [id number] ** 05/12/2025 05/12/2025 clonazePAM (Tablet) 30.0 30 0.5 MG NA LONG [facility] Medicaid 0 / 0 [place]   1 [id number] ** 05/06/2025 05/05/2025 Amphetamine Salt Combo (Tablet) 60.0 30 10 MG NA LONG [facility] Medicaid 0 / 0 [place]

## 2025-06-13 DIAGNOSIS — F32.A DEPRESSION, UNSPECIFIED DEPRESSION TYPE: ICD-10-CM

## 2025-06-13 RX ORDER — TRAZODONE HYDROCHLORIDE 100 MG/1
100 TABLET ORAL
Qty: 30 TABLET | Refills: 1 | Status: SHIPPED | OUTPATIENT
Start: 2025-06-13

## 2025-06-16 DIAGNOSIS — F41.1 GENERALIZED ANXIETY DISORDER: ICD-10-CM

## 2025-06-16 NOTE — TELEPHONE ENCOUNTER
Refill must be reviewed and completed by the office or provider. The refill is unable to be approved or denied by the medication management team.      Wrong pharmacy     Patient Id Prescription # Sold Filled Written Drug Label Qty Days Strength MME* Prescriber Pharmacy Payment REFILL #/Auth State Detail   1 [id number] ** 06/06/2025 06/06/2025 Amphetamine Salt Combo (Tablet) 60.0 30 10 MG NA VIVIENNE CARRILLO [facility] [place] 0 / 0 [place]    1 [id number] ** 05/12/2025 05/12/2025 clonazePAM (Tablet) 30.0 30 0.5 MG NA LONG [facility] [place] 0 / 0 [place]

## 2025-06-16 NOTE — TELEPHONE ENCOUNTER
Sent to wrong pharmacy on 06/11/25, needs to go to [facility]   Reason for call:   [x] Refill   [] Prior Auth  [] Other:     Office:   [] PCP/Provider -   [x] Specialty/Provider -  Candelario Ribeiro    Medication: Clonazepam    Dose/Frequency: 0.5 mg HS    Quantity: 30    Pharmacy: [facility]   Does the patient have enough for 3 days?   [] Yes   [x] No - Send as HP to POD    Mail Away Pharmacy   Does the patient have enough for 10 days?   [] Yes   [] No - Send as HP to POD

## 2025-06-17 RX ORDER — CLONAZEPAM 0.5 MG/1
0.5 TABLET ORAL
Qty: 30 TABLET | Refills: 2 | Status: SHIPPED | OUTPATIENT
Start: 2025-06-17

## 2025-06-19 ENCOUNTER — TELEPHONE (OUTPATIENT)
Dept: PSYCHIATRY | Facility: CLINIC | Age: 45
End: 2025-06-19

## 2025-06-19 NOTE — TELEPHONE ENCOUNTER
Called PT and canceled appt for the 6/23/25 Provider is covering the clinic. PT is aware they now need a NISHANT and sent info to the NISHANT team

## 2025-06-24 RX ORDER — LORAZEPAM 2 MG/1
2 TABLET ORAL EVERY 6 HOURS PRN
COMMUNITY
Start: 2025-06-12

## 2025-06-25 ENCOUNTER — RA CDI HCC (OUTPATIENT)
Dept: OTHER | Facility: HOSPITAL | Age: 45
End: 2025-06-25

## 2025-06-25 DIAGNOSIS — F32.A DEPRESSION, UNSPECIFIED DEPRESSION TYPE: ICD-10-CM

## 2025-06-25 NOTE — PROGRESS NOTES
HCC coding opportunities          Chart Reviewed number of suggestions sent to Provider: 1   [id number]  Scoliosis, unspecified refer to MRI 3/31/21 - as per [place] AUDIT,    Patients Insurance        Commercial Insurance: [email or web address]

## 2025-06-26 ENCOUNTER — OFFICE VISIT (OUTPATIENT)
Dept: INTERNAL MEDICINE CLINIC | Facility: CLINIC | Age: 45
End: 2025-06-26
Payer: COMMERCIAL

## 2025-06-26 VITALS
SYSTOLIC BLOOD PRESSURE: 112 MMHG | TEMPERATURE: 96.9 F | BODY MASS INDEX: 32.64 KG/M2 | DIASTOLIC BLOOD PRESSURE: 78 MMHG | WEIGHT: 241 LBS | HEIGHT: 72 IN | OXYGEN SATURATION: 97 % | HEART RATE: 84 BPM

## 2025-06-26 DIAGNOSIS — F32.A DEPRESSION, UNSPECIFIED DEPRESSION TYPE: ICD-10-CM

## 2025-06-26 DIAGNOSIS — R76.8 HEPATITIS C ANTIBODY POSITIVE: ICD-10-CM

## 2025-06-26 DIAGNOSIS — I10 PRIMARY HYPERTENSION: ICD-10-CM

## 2025-06-26 DIAGNOSIS — Z00.00 ANNUAL PHYSICAL EXAM: Primary | ICD-10-CM

## 2025-06-26 DIAGNOSIS — H81.03 MENIERE'S DISEASE OF BOTH EARS: ICD-10-CM

## 2025-06-26 DIAGNOSIS — F98.8 ATTENTION DEFICIT DISORDER, UNSPECIFIED TYPE: ICD-10-CM

## 2025-06-26 DIAGNOSIS — J45.30 MILD PERSISTENT ASTHMA WITHOUT COMPLICATION: ICD-10-CM

## 2025-06-26 DIAGNOSIS — L85.8 KERATOSIS PILARIS: ICD-10-CM

## 2025-06-26 DIAGNOSIS — K21.9 GASTROESOPHAGEAL REFLUX DISEASE WITHOUT ESOPHAGITIS: ICD-10-CM

## 2025-06-26 DIAGNOSIS — G43.709 CHRONIC MIGRAINE WITHOUT AURA, NOT INTRACTABLE, WITHOUT STATUS MIGRAINOSUS: ICD-10-CM

## 2025-06-26 DIAGNOSIS — F41.1 GENERALIZED ANXIETY DISORDER: ICD-10-CM

## 2025-06-26 PROCEDURE — 99214 OFFICE O/P EST MOD 30 MIN: CPT | Performed by: NURSE PRACTITIONER

## 2025-06-26 PROCEDURE — 99396 PREV VISIT EST AGE 40-64: CPT | Performed by: NURSE PRACTITIONER

## 2025-06-26 RX ORDER — MOMETASONE FUROATE 1 MG/G
CREAM TOPICAL DAILY
Qty: 50 G | Refills: 0 | Status: SHIPPED | OUTPATIENT
Start: 2025-06-26

## 2025-06-26 NOTE — PROGRESS NOTES
Adult Annual Physical  Name: Nhung Grant      : 1980      MRN: [id number]  Encounter Provider: CROW Felix  Encounter Date: 2025   Encounter department: [facility] INTERNAL MEDICINE    :  Assessment & Plan  Annual physical exam         Primary hypertension  Well controlled.  On lisinopril.          Chronic migraine without aura, not intractable, without status migrainosus  On aimovig.   Botox injections per neurology.          Mild persistent asthma without complication  Stable.  Continue advair twice daily and albuterol as needed.          Gastroesophageal reflux disease without esophagitis  On a PPI twice daily.          Generalized anxiety disorder  Takes clonazepam at HS.          Attention deficit disorder, unspecified type  On adderall.   Sees psychiatry.          Depression, unspecified depression type  Managed by psychiatry.          Hepatitis C antibody positive    Orders:    Hepatitis C genotype; Future    Hepatitis C RNA, Quantitative PCR; Future    US right upper quadrant; Future    Keratosis pilaris    Orders:    mometasone (ELOCON) 0.1 % cream; Apply topically daily    Meniere's disease of both ears  Seeing ENT, MRI scheduled.            Preventive Screenings:  - Diabetes Screening: screening up-to-date  - Hepatitis C screening: screening up-to-date   - HIV screening: screening up-to-date   - Breast cancer screening: screening up-to-date   - Colon cancer screening: screening up-to-date   - Lung cancer screening: screening not indicated     Immunizations:  - Immunizations due: Prevnar 20         History of Present Illness     Adult Annual Physical:  Patient presents for annual physical. She continues to have vertigo, headaches, and hot flashes. misses a lot of work due to symptoms. She is seeing ENT, MRI is scheduled.     She has been doing yoga, eating better. Lost 20 lbs in the last 7-8 months.     .     Diet and Physical Activity:  - Diet/Nutrition: no special  "diet.  - Exercise: 1-2 times a week on average. yoga    Depression Screening:    - PHQ-9 Score: 0    General Health:    - Hearing: normal hearing right ear and normal hearing left ear.  - Vision: most recent eye exam > 1 year ago.  - Dental: regular dental visits.    /GYN Health:  - Follows with GYN: yes.     Review of Systems   Constitutional:  Negative for activity change, appetite change, fatigue and unexpected weight change.   Eyes:  Negative for visual disturbance.   Respiratory:  Negative for cough, chest tightness, shortness of breath and wheezing.    Cardiovascular:  Negative for chest pain, palpitations and leg swelling.   Gastrointestinal:  Negative for abdominal pain, blood in stool, constipation and diarrhea.   Genitourinary:  Negative for difficulty urinating.   Musculoskeletal:  Negative for arthralgias.   Skin:  Negative for rash.   Neurological:  Negative for dizziness, weakness, light-headedness and headaches.   Psychiatric/Behavioral:  Negative for sleep disturbance.          Objective   /78   Pulse 84   Temp (!) 96.9 °F (36.1 °C)   Ht 6' 1\" (1.854 m)   Wt 109 kg (241 lb)   SpO2 97%   BMI 31.80 kg/m²     Physical Exam  Vitals reviewed.   Constitutional:       Appearance: Normal appearance. She is well-developed.   HENT:      Head: Normocephalic and atraumatic.      Right Ear: Tympanic membrane, ear canal and external ear normal.      Left Ear: Tympanic membrane, ear canal and external ear normal.     Eyes:      Conjunctiva/sclera: Conjunctivae normal.       Cardiovascular:      Rate and Rhythm: Normal rate and regular rhythm.      Heart sounds: Normal heart sounds.   Pulmonary:      Effort: Pulmonary effort is normal.      Breath sounds: Normal breath sounds.   Abdominal:      General: Bowel sounds are normal.      Palpations: Abdomen is soft.     Musculoskeletal:         General: Normal range of motion.      Cervical back: Neck supple.      Right lower leg: No edema.      Left lower " leg: No edema.     Skin:     General: Skin is warm and dry.     Neurological:      Mental Status: She is alert and oriented to person, place, and time.     Psychiatric:         Mood and Affect: Mood normal.         Behavior: Behavior normal.

## 2025-06-27 RX ORDER — TRAZODONE HYDROCHLORIDE 100 MG/1
100 TABLET ORAL
Qty: 90 TABLET | Refills: 1 | Status: SHIPPED | OUTPATIENT
Start: 2025-06-27

## 2025-07-01 DIAGNOSIS — G43.709 CHRONIC MIGRAINE W/O AURA W/O STATUS MIGRAINOSUS, NOT INTRACTABLE: ICD-10-CM

## 2025-07-01 RX ORDER — DIAZEPAM 2 MG/1
TABLET ORAL
Refills: 0 | OUTPATIENT
Start: 2025-07-01

## 2025-07-03 ENCOUNTER — TELEPHONE (OUTPATIENT)
Dept: INTERNAL MEDICINE CLINIC | Facility: CLINIC | Age: 45
End: 2025-07-03

## 2025-07-03 DIAGNOSIS — F90.1 ADHD, PREDOMINANTLY HYPERACTIVE TYPE: ICD-10-CM

## 2025-07-03 RX ORDER — KETOROLAC TROMETHAMINE 30 MG/ML
INJECTION, SOLUTION INTRAMUSCULAR; INTRAVENOUS
Qty: 4 ML | Refills: 0 | Status: SHIPPED | OUTPATIENT
Start: 2025-07-03

## 2025-07-03 NOTE — TELEPHONE ENCOUNTER
Reason for call:   [x] Refill   [] Prior Auth  [] Other:     Office:   [] PCP/Provider -   [x] Specialty/Provider - psych    Medication: amphetamine-dextroamphetamine (ADDERALL, 10MG,) 10 mg tablet     Dose/Frequency: 10 mg, Oral, 2 times daily (morning and afternoon)     Quantity: 60    Pharmacy: [id number] - [place] - [address] [facility]   Does the patient have enough for 3 days?   [x] Yes   [] No - Send as HP to POD    Mail Away Pharmacy   Does the patient have enough for 10 days?   [] Yes   [] No - Send as HP to POD

## 2025-07-05 ENCOUNTER — APPOINTMENT (OUTPATIENT)
Dept: LAB | Facility: CLINIC | Age: 45
End: 2025-07-05
Payer: COMMERCIAL

## 2025-07-05 DIAGNOSIS — R76.8 HEPATITIS C ANTIBODY POSITIVE: ICD-10-CM

## 2025-07-05 PROCEDURE — 87522 HEPATITIS C REVRS TRNSCRPJ: CPT

## 2025-07-05 PROCEDURE — 36415 COLL VENOUS BLD VENIPUNCTURE: CPT

## 2025-07-05 PROCEDURE — 87902 NFCT AGT GNTYP ALYS HEP C: CPT

## 2025-07-07 LAB — HCV RNA SERPL NAA+PROBE-ACNC: NOT DETECTED K[IU]/ML

## 2025-07-07 RX ORDER — DEXTROAMPHETAMINE SACCHARATE, AMPHETAMINE ASPARTATE, DEXTROAMPHETAMINE SULFATE AND AMPHETAMINE SULFATE 2.5; 2.5; 2.5; 2.5 MG/1; MG/1; MG/1; MG/1
10 TABLET ORAL
Qty: 60 TABLET | Refills: 0 | Status: SHIPPED | OUTPATIENT
Start: 2025-07-07

## 2025-07-07 NOTE — TELEPHONE ENCOUNTER
Patient was notified by [facility] that her Adderall prescription cannot be filled because it is missing information. She is currently out of med.    Please contact [facility] to clarify what is needed so that patient can get her refill.

## 2025-07-07 NOTE — TELEPHONE ENCOUNTER
Nurse spoke with pharmacist at [facility] -   [facility] needs to verify that Dr. Busch is aware - per PDMP, Nhung has script for Valium from another provider (filled at a [facility].    [facility] asking if OK to fill generic Adderall with pt being on Valium.     Please advise.

## 2025-07-08 NOTE — TELEPHONE ENCOUNTER
Spoke to pharmacist - pharmacist wanted to make sure we have it on file that she is prescribed diazepam by her ENT doctor. Chart checked and we do have that in our records. She will prepare the script. Called Nhung ([phone]) and informed her.

## 2025-07-08 NOTE — TELEPHONE ENCOUNTER
Patient called the RX Refill Line. Message is being forwarded to the office.     Patient called to check on status of Adderall. States that the pharmacy was waiting to hear back from the doctor regarding missing information on script. Out of medication and would like to see if that can be sent to the pharmacy today.     Please contact patient at [phone]

## 2025-07-10 ENCOUNTER — TELEMEDICINE (OUTPATIENT)
Dept: NEUROLOGY | Facility: CLINIC | Age: 45
End: 2025-07-10
Payer: COMMERCIAL

## 2025-07-10 DIAGNOSIS — G43.709 CHRONIC MIGRAINE W/O AURA W/O STATUS MIGRAINOSUS, NOT INTRACTABLE: Primary | ICD-10-CM

## 2025-07-10 DIAGNOSIS — R42 VERTIGO: ICD-10-CM

## 2025-07-10 DIAGNOSIS — G43.809 VESTIBULAR MIGRAINE: ICD-10-CM

## 2025-07-10 LAB — HCV GENTYP SERPL NAA+PROBE: NORMAL

## 2025-07-10 PROCEDURE — 99213 OFFICE O/P EST LOW 20 MIN: CPT | Performed by: PHYSICIAN ASSISTANT

## 2025-07-10 RX ORDER — LANOLIN ALCOHOL/MO/W.PET/CERES
CREAM (GRAM) TOPICAL
Qty: 30 TABLET | Refills: 5 | Status: SHIPPED | OUTPATIENT
Start: 2025-07-10

## 2025-07-10 RX ORDER — SCOPOLAMINE 1 MG/3D
1 PATCH, EXTENDED RELEASE TRANSDERMAL
Qty: 20 PATCH | Refills: 0 | Status: SHIPPED | OUTPATIENT
Start: 2025-07-10

## 2025-07-10 NOTE — ASSESSMENT & PLAN NOTE
Same as above.  Orders:    scopolamine (TRANSDERM-SCOP) 1 mg/3 days TD 72 hr patch; Place 1 patch on the skin every third day over 72 hours

## 2025-07-10 NOTE — PROGRESS NOTES
Virtual Regular Visit  Name: Nhung Grant      : 1980      MRN: [id number]  Encounter Provider: Ayesha Aguilar PA-C  Encounter Date: 7/10/2025   Encounter department: NEUROLOGY [facility]  :  Assessment & Plan  Chronic migraine w/o aura w/o status migrainosus, not intractable  Continue Botox.  Since starting botox, the patient reports greater than 7 days of migraine relief from baseline, correlated with headache diary, decreased abortive medication use and decreased ER visits.    She is going to start magnesium for prevention.  Continue verapamil 40 mg nightly, consider increasing the medication to twice daily dosing if magnesium above is not helpful, especially verapamil can be used for vestibular migraine, if she is experiencing more vertigo.    Migraine onset, Nurtec.    She wants to hold off on Qulipta at this time, but this is a viable treatment plan for the future if migraine headaches worsen.    Orders:    magnesium Oxide (MAG-OX) 400 mg TABS; One tab daily    Vertigo  Can use meclizine or scopolamine, depending on level of nausea and/or if vomiting.  Orders:    scopolamine (TRANSDERM-SCOP) 1 mg/3 days TD 72 hr patch; Place 1 patch on the skin every third day over 72 hours    Vestibular migraine  Same as above.  Orders:    scopolamine (TRANSDERM-SCOP) 1 mg/3 days TD 72 hr patch; Place 1 patch on the skin every third day over 72 hours      Mentioned at the last visit:  Hold Aimovig- nausea/ vomiting for about 1 week after taking this injection; she states at this time benefits do not outweigh risks and wants to hold this med for a while to see if no worsening migraines.     Meclizine +/- zofran at migraine onset, when experiencing vertigo.  Nurtec PRN if above fails.  Toradol injection reserved for very severe migraine.    The patient should not hesitate to call me prior to her follow up with any questions or concerns.        History of Present Illness     HPI Botox martin, says it  helps, she is good for about a month and a half, gets maybe 1 migraine a month, then the third month gets about 1 a week. Says the Nurtec helps in between.  She continues to have episodes of vertigo in between the migraines.  Vertigo can happen without moving the head and she feels that vestibular therapy never worked.  Sometimes she has vomiting with the vertigo and nausea.  She has nausea several times per week but it only amounts to vomiting once per week approximately.  She continues to use the same medications including meclizine and Zofran which helps.    Recently stress level is lower which is good.    hx Meniere's disease- saw ENT in past.  Recently diagnosed with gastroparesis.    Botox has been helping her migraines, it usually wears off by the third month and then has 2-3 vertigo attacks with headaches. Monthly injection also helps with her migraines.    Nurtec works well at the onset.  Prior to that she sometimes drinks Coke with caffeine, nondiet.  She also adds meclizine for vertigo, Zofran as needed nausea.  Nurtec is typically last resort.  She uses Toradol injection if she is very severe to the point where she cannot swallow anything due to nausea and vomiting, typically has a migraine like this around [place] time.     She is trying to avoid her triggers.  She states she gets enough sleep.  She started taking gabapentin 600 mg only at nighttime for sleep function and it seems to work well, denies side effects.     She stays away from certain foods that trigger her.  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  She has been working there for about a month.     She uses 25 mg of meclizine at the onset of vertigo, which she sometimes wakes up with, add Zofran as needed for nausea     Migraines:  Location: right temple, right back of neck and extends to the side of the head  Frequency: 2 to 3/month  Duration: 1-2 hours with medication  Triggers: weather  "changes, stress, dehydration, salty foods  Aura: stars in vision, occurs with more severe headaches, has occurred once in the past few months  Assocatied symptoms: Sometimes gets vertigo, nausea, rare vomiting, photophobia     Meds tried:  Preventative:  gabapentin 600 mg BID  tizanidine 4 mg qhs prn  aimovig-side effects as above  sertraline 100 mg qd   botox q3 months  Emgality-ineffective  Verapamil     Abortive:   imitrex 100 mg  excedrin  zofran, promethazine   prochlorperazine inj   meclizine  Nurtec  Decadron  Toradol injection  Depakote     Prior note:  She started Botox 12/10/2018 and since that time has had greatly improved migraine control. Prior she would get vertigo daily, nonstop, daily headaches. Since starting Botox pt has had a reduction in migraine headaches by 7 days as correlated by a headache diary.  Pt has had decreased trips to the ER and decreased use of abortive medications.      She states over the past few months she switched from was emgality to aimovig as she had an increase in her headaches-she has 1 injection so far. She was also to try nurtec as needed but the nurtec did not work as well as the triptan, just really \"takes the edge off\". She has not tried the higher dose of the imitrex yet. She has not needed to use the promethazine, zofran, compazine since December when she had a bad flare of migraines. She will utilize meclizine prn if she has vertigo with headache-has used twice this year so far. She has been getting tinnitus more often the past few months-can be with or without a headache.      She will utilize decadron towards the end of her botox to break headaches if they are lasting more then 2-3 days.          Review of Systems   Constitutional:  Negative for appetite change, fatigue and fever.   HENT: Negative.  Negative for hearing loss, tinnitus, trouble swallowing and voice change.    Eyes: Negative.  Negative for photophobia, pain and visual disturbance.   Respiratory: " Negative.  Negative for shortness of breath.    Cardiovascular: Negative.  Negative for palpitations.   Gastrointestinal: Negative.  Negative for nausea and vomiting.   Endocrine: Negative.  Negative for cold intolerance.   Genitourinary: Negative.  Negative for dysuria, frequency and urgency.   Musculoskeletal:  Negative for back pain, gait problem, myalgias, neck pain and neck stiffness.   Skin: Negative.  Negative for rash.   Allergic/Immunologic: Negative.    Neurological:  Positive for headaches. Negative for dizziness, tremors, seizures, syncope, facial asymmetry, speech difficulty, weakness, light-headedness and numbness.   Hematological: Negative.  Does not bruise/bleed easily.   Psychiatric/Behavioral: Negative.  Negative for confusion, hallucinations and sleep disturbance.    The following portions of the patient's history were reviewed and updated as appropriate: allergies, current medications/ medication history, past family history, past medical history, past social history, past surgical history and problem list.    Review of systems was reviewed and otherwise unremarkable from a neurological perspective.        Objective   There were no vitals taken for this visit.    Physical Exam  Neurological exam:  On neurologic exam, the patient is alert and oriented to time and place. Speech is fluent and articulate, and the patient follows commands appropriately. Judgment and affect appear normal.  Extraocular muscles are intact without nystagmus. Face is symmetric. Hearing is intact bilaterally. Motor examination reveals adequate range of motion.      Administrative Statements   Encounter provider Ayesha Aguilar PA-C    The Patient is located at Home and in the following state in which I hold an active license [place].    The patient was identified by name and date of birth. Nhung Grant was informed that this is a telemedicine visit and that the visit is being conducted through the Epic Embedded platform. She  agrees to proceed..  My office door was closed. No one else was in the room.  She acknowledged consent and understanding of privacy and security of the video platform. The patient has agreed to participate and understands they can discontinue the visit at any time.    I have spent a total time of 20 minutes in caring for this patient on the day of the visit/encounter including Risks and benefits of tx options, Instructions for management, Patient and family education, Importance of tx compliance, Risk factor reductions, Impressions, Counseling / Coordination of care, Documenting in the medical record, Reviewing/placing orders in the medical record (including tests, medications, and/or procedures), and Obtaining or reviewing history  , not including the time spent for establishing the audio/video connection.

## 2025-07-24 ENCOUNTER — HOSPITAL ENCOUNTER (OUTPATIENT)
Dept: RADIOLOGY | Facility: HOSPITAL | Age: 45
Discharge: HOME | End: 2025-07-24
Payer: COMMERCIAL

## 2025-07-24 ENCOUNTER — OFFICE VISIT (OUTPATIENT)
Dept: INTERNAL MEDICINE CLINIC | Facility: CLINIC | Age: 45
End: 2025-07-24
Payer: COMMERCIAL

## 2025-07-24 VITALS
HEART RATE: 120 BPM | HEIGHT: 72 IN | SYSTOLIC BLOOD PRESSURE: 118 MMHG | TEMPERATURE: 97.2 F | OXYGEN SATURATION: 97 % | BODY MASS INDEX: 32.32 KG/M2 | WEIGHT: 238.6 LBS | DIASTOLIC BLOOD PRESSURE: 74 MMHG

## 2025-07-24 DIAGNOSIS — M54.12 RIGHT CERVICAL RADICULOPATHY: Primary | ICD-10-CM

## 2025-07-24 DIAGNOSIS — M54.12 RIGHT CERVICAL RADICULOPATHY: ICD-10-CM

## 2025-07-24 PROCEDURE — 99213 OFFICE O/P EST LOW 20 MIN: CPT | Performed by: NURSE PRACTITIONER

## 2025-07-24 PROCEDURE — 72050 X-RAY EXAM NECK SPINE 4/5VWS: CPT

## 2025-07-24 RX ORDER — METHYLPREDNISOLONE 4 MG/1
TABLET ORAL
Qty: 21 EACH | Refills: 0 | Status: SHIPPED | OUTPATIENT
Start: 2025-07-24

## 2025-07-24 RX ORDER — TRAMADOL HYDROCHLORIDE 50 MG/1
50 TABLET ORAL EVERY 8 HOURS PRN
Qty: 20 TABLET | Refills: 0 | Status: SHIPPED | OUTPATIENT
Start: 2025-07-24
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
## 2025-07-31 DIAGNOSIS — F90.1 ADHD, PREDOMINANTLY HYPERACTIVE TYPE: ICD-10-CM

## 2025-08-04 RX ORDER — DEXTROAMPHETAMINE SACCHARATE, AMPHETAMINE ASPARTATE, DEXTROAMPHETAMINE SULFATE AND AMPHETAMINE SULFATE 2.5; 2.5; 2.5; 2.5 MG/1; MG/1; MG/1; MG/1
10 TABLET ORAL
Qty: 60 TABLET | Refills: 0 | Status: SHIPPED | OUTPATIENT
Start: 2025-08-04

## 2025-08-19 ENCOUNTER — TELEPHONE (OUTPATIENT)
Dept: NEUROLOGY | Facility: CLINIC | Age: 45
End: 2025-08-19

## 2025-08-20 DIAGNOSIS — J45.30 MILD PERSISTENT ASTHMA WITHOUT COMPLICATION: ICD-10-CM

## 2025-08-21 DIAGNOSIS — F32.A DEPRESSION, UNSPECIFIED DEPRESSION TYPE: ICD-10-CM

## 2025-08-21 DIAGNOSIS — F41.1 GENERALIZED ANXIETY DISORDER: ICD-10-CM

## 2025-08-21 RX ORDER — MONTELUKAST SODIUM 10 MG/1
10 TABLET ORAL
Qty: 90 TABLET | Refills: 1 | Status: SHIPPED | OUTPATIENT
Start: 2025-08-21

## 2025-08-21 RX ORDER — SERTRALINE HYDROCHLORIDE 100 MG/1
200 TABLET, FILM COATED ORAL EVERY MORNING
Qty: 60 TABLET | Refills: 1 | Status: SHIPPED | OUTPATIENT
Start: 2025-08-21

## (undated) DEVICE — CUFF TOURNIQUET 18 X 4 IN QUICK CONNECT DISP 1 BLADDER

## (undated) DEVICE — SPONGE PVP SCRUB WING STERILE

## (undated) DEVICE — GLOVE SRG BIOGEL 7.5

## (undated) DEVICE — STERI DRAPE 1000 NON-STERILE ROLL

## (undated) DEVICE — SUT MONOCRYL 4-0 PS-2 27 IN Y426H

## (undated) DEVICE — DRAPE C-ARM 48 X 84 IN F/ OEC MINIVIEW 6800

## (undated) DEVICE — ARM SLING: Brand: DEROYAL

## (undated) DEVICE — SUT VICRYL 3-0 SH 27 IN J416H

## (undated) DEVICE — DRAPE C-ARM X-RAY

## (undated) DEVICE — NEEDLE 25G X 1 1/2

## (undated) DEVICE — ACE WRAP 4 IN UNSTERILE

## (undated) DEVICE — GLOVE INDICATOR PI UNDERGLOVE SZ 8 BLUE

## (undated) DEVICE — SUT ETHIBOND 2-0 V-7 30 IN B964H

## (undated) DEVICE — STERILE BETHLEHEM PLASTIC HAND: Brand: CARDINAL HEALTH